# Patient Record
Sex: MALE | Race: WHITE | Employment: FULL TIME | URBAN - NONMETROPOLITAN AREA
[De-identification: names, ages, dates, MRNs, and addresses within clinical notes are randomized per-mention and may not be internally consistent; named-entity substitution may affect disease eponyms.]

---

## 2017-01-04 ENCOUNTER — HOSPITAL ENCOUNTER (OUTPATIENT)
Dept: WOUND CARE | Age: 47
Discharge: HOME | End: 2017-01-04
Attending: INTERNAL MEDICINE | Admitting: INTERNAL MEDICINE

## 2017-01-04 VITALS
SYSTOLIC BLOOD PRESSURE: 161 MMHG | DIASTOLIC BLOOD PRESSURE: 105 MMHG | HEART RATE: 85 BPM | RESPIRATION RATE: 16 BRPM | TEMPERATURE: 97.4 F

## 2017-01-04 DIAGNOSIS — E11.621 DIABETIC ULCER OF FOOT WITH FAT LAYER EXPOSED (HCC): Primary | ICD-10-CM

## 2017-01-04 DIAGNOSIS — L97.512 ULCER OF RIGHT FOOT WITH FAT LAYER EXPOSED (HCC): ICD-10-CM

## 2017-01-04 DIAGNOSIS — L97.502 DIABETIC ULCER OF FOOT WITH FAT LAYER EXPOSED (HCC): Primary | ICD-10-CM

## 2017-01-04 PROCEDURE — 11042 DBRDMT SUBQ TIS 1ST 20SQCM/<: CPT | Performed by: INTERNAL MEDICINE

## 2017-01-09 ENCOUNTER — HOSPITAL ENCOUNTER (OUTPATIENT)
Dept: WOUND CARE | Age: 47
Discharge: HOME | End: 2017-01-09
Attending: PODIATRIST | Admitting: PODIATRIST

## 2017-01-09 VITALS
RESPIRATION RATE: 16 BRPM | SYSTOLIC BLOOD PRESSURE: 108 MMHG | DIASTOLIC BLOOD PRESSURE: 78 MMHG | HEART RATE: 80 BPM | TEMPERATURE: 98.3 F

## 2017-01-09 DIAGNOSIS — E11.621 DIABETIC ULCER OF FOOT WITH FAT LAYER EXPOSED (HCC): ICD-10-CM

## 2017-01-09 DIAGNOSIS — L97.502 DIABETIC ULCER OF FOOT WITH FAT LAYER EXPOSED (HCC): ICD-10-CM

## 2017-01-09 DIAGNOSIS — E11.610 CHARCOT'S JOINT OF FOOT IN TYPE 2 DIABETES MELLITUS (HCC): Primary | ICD-10-CM

## 2017-01-09 DIAGNOSIS — E08.00 DIABETES MELLITUS DUE TO UNDERLYING CONDITION WITH HYPEROSMOLARITY WITHOUT COMA, WITHOUT LONG-TERM CURRENT USE OF INSULIN (HCC): ICD-10-CM

## 2017-01-09 DIAGNOSIS — L97.512 ULCER OF RIGHT FOOT WITH FAT LAYER EXPOSED (HCC): ICD-10-CM

## 2017-01-09 DIAGNOSIS — L84 SKIN CALLUS: ICD-10-CM

## 2017-01-11 ENCOUNTER — HOSPITAL ENCOUNTER (OUTPATIENT)
Dept: LAB | Age: 47
Discharge: HOME | End: 2017-01-11

## 2017-01-11 LAB
ALBUMIN SERPL-MCNC: 3.9 GM/DL (ref 3.4–5)
ALP BLD-CCNC: 76 IU/L (ref 40–129)
ALT SERPL-CCNC: 15 U/L (ref 10–40)
ANION GAP SERPL CALCULATED.3IONS-SCNC: 9 MMOL/L (ref 4–16)
AST SERPL-CCNC: 11 IU/L (ref 15–37)
BASOPHILS ABSOLUTE: 0.1 K/CU MM
BASOPHILS RELATIVE PERCENT: 0.6 % (ref 0–1)
BILIRUB SERPL-MCNC: 0.4 MG/DL (ref 0–1)
BUN BLDV-MCNC: 12 MG/DL (ref 6–23)
CALCIUM SERPL-MCNC: 9.9 MG/DL (ref 8.3–10.6)
CHLORIDE BLD-SCNC: 101 MMOL/L (ref 99–110)
CHOLESTEROL: 165 MG/DL
CO2: 29 MMOL/L (ref 21–32)
CREAT SERPL-MCNC: 0.7 MG/DL (ref 0.9–1.3)
DIFFERENTIAL TYPE: ABNORMAL
EOSINOPHILS ABSOLUTE: 0.3 K/CU MM
EOSINOPHILS RELATIVE PERCENT: 3.3 % (ref 0–3)
ESTIMATED AVERAGE GLUCOSE: 105 MG/DL
GFR AFRICAN AMERICAN: >60 ML/MIN/1.73M2
GFR NON-AFRICAN AMERICAN: >60 ML/MIN/1.73M2
GLUCOSE FASTING: 140 MG/DL (ref 70–99)
HBA1C MFR BLD: 5.3 % (ref 4.2–6.3)
HCT VFR BLD CALC: 36.3 % (ref 42–52)
HDLC SERPL-MCNC: 27 MG/DL
HEMOGLOBIN: 10.9 GM/DL (ref 13.5–18)
IMMATURE NEUTROPHIL %: 0.2 % (ref 0–0.43)
LDL CHOLESTEROL CALCULATED: 116 MG/DL
LYMPHOCYTES ABSOLUTE: 2.2 K/CU MM
LYMPHOCYTES RELATIVE PERCENT: 25.6 % (ref 24–44)
MCH RBC QN AUTO: 24.4 PG (ref 27–31)
MCHC RBC AUTO-ENTMCNC: 30 % (ref 32–36)
MCV RBC AUTO: 81.2 FL (ref 78–100)
MONOCYTES ABSOLUTE: 0.4 K/CU MM
MONOCYTES RELATIVE PERCENT: 5.1 % (ref 0–4)
PDW BLD-RTO: 13.9 % (ref 11.7–14.9)
PLATELET # BLD: 264 K/CU MM (ref 140–440)
PMV BLD AUTO: 10 FL (ref 7.5–11.1)
POTASSIUM SERPL-SCNC: 3.9 MMOL/L (ref 3.5–5.1)
RBC # BLD: 4.47 M/CU MM (ref 4.6–6.2)
SEGMENTED NEUTROPHILS ABSOLUTE COUNT: 5.7 K/CU MM
SEGMENTED NEUTROPHILS RELATIVE PERCENT: 65.2 % (ref 36–66)
SODIUM BLD-SCNC: 139 MMOL/L (ref 135–145)
TOTAL IMMATURE NEUTOROPHIL: 0.02 K/CU MM
TOTAL PROTEIN: 7.5 GM/DL (ref 6.4–8.2)
TRIGL SERPL-MCNC: 112 MG/DL
WBC # BLD: 8.7 K/CU MM (ref 4–10.5)

## 2017-01-16 ENCOUNTER — HOSPITAL ENCOUNTER (OUTPATIENT)
Dept: WOUND CARE | Age: 47
Discharge: HOME | End: 2017-01-16
Attending: PODIATRIST | Admitting: PODIATRIST

## 2017-01-16 VITALS
DIASTOLIC BLOOD PRESSURE: 92 MMHG | SYSTOLIC BLOOD PRESSURE: 157 MMHG | RESPIRATION RATE: 18 BRPM | HEART RATE: 87 BPM | TEMPERATURE: 98 F

## 2017-01-16 DIAGNOSIS — L97.502 DIABETIC ULCER OF FOOT WITH FAT LAYER EXPOSED (HCC): ICD-10-CM

## 2017-01-16 DIAGNOSIS — L84 SKIN CALLUS: ICD-10-CM

## 2017-01-16 DIAGNOSIS — L97.512 ULCER OF RIGHT FOOT WITH FAT LAYER EXPOSED (HCC): ICD-10-CM

## 2017-01-16 DIAGNOSIS — E08.00 DIABETES MELLITUS DUE TO UNDERLYING CONDITION WITH HYPEROSMOLARITY WITHOUT COMA, WITHOUT LONG-TERM CURRENT USE OF INSULIN (HCC): ICD-10-CM

## 2017-01-16 DIAGNOSIS — E11.610 CHARCOT'S JOINT OF FOOT IN TYPE 2 DIABETES MELLITUS (HCC): Primary | ICD-10-CM

## 2017-01-16 DIAGNOSIS — E11.621 DIABETIC ULCER OF FOOT WITH FAT LAYER EXPOSED (HCC): ICD-10-CM

## 2017-01-23 ENCOUNTER — HOSPITAL ENCOUNTER (OUTPATIENT)
Dept: WOUND CARE | Age: 47
Discharge: HOME | End: 2017-01-23
Attending: PODIATRIST | Admitting: PODIATRIST

## 2017-01-23 VITALS
SYSTOLIC BLOOD PRESSURE: 134 MMHG | DIASTOLIC BLOOD PRESSURE: 66 MMHG | RESPIRATION RATE: 16 BRPM | HEART RATE: 78 BPM | TEMPERATURE: 97.2 F

## 2017-01-23 DIAGNOSIS — E08.00 DIABETES MELLITUS DUE TO UNDERLYING CONDITION WITH HYPEROSMOLARITY WITHOUT COMA, WITHOUT LONG-TERM CURRENT USE OF INSULIN (HCC): Primary | ICD-10-CM

## 2017-01-23 DIAGNOSIS — E11.610 CHARCOT'S JOINT OF FOOT IN TYPE 2 DIABETES MELLITUS (HCC): ICD-10-CM

## 2017-01-23 DIAGNOSIS — E11.621 DIABETIC ULCER OF FOOT WITH FAT LAYER EXPOSED (HCC): ICD-10-CM

## 2017-01-23 DIAGNOSIS — L97.512 ULCER OF RIGHT FOOT WITH FAT LAYER EXPOSED (HCC): ICD-10-CM

## 2017-01-23 DIAGNOSIS — L97.502 DIABETIC ULCER OF FOOT WITH FAT LAYER EXPOSED (HCC): ICD-10-CM

## 2017-01-23 RX ORDER — BUSPIRONE HYDROCHLORIDE 7.5 MG/1
7.5 TABLET ORAL 2 TIMES DAILY
COMMUNITY
End: 2018-04-12 | Stop reason: SDUPTHER

## 2017-01-23 RX ORDER — ESCITALOPRAM OXALATE 10 MG/1
20 TABLET ORAL DAILY
COMMUNITY
End: 2018-04-12 | Stop reason: SDUPTHER

## 2017-01-30 ENCOUNTER — HOSPITAL ENCOUNTER (OUTPATIENT)
Dept: WOUND CARE | Age: 47
Discharge: HOME | End: 2017-01-30
Attending: PODIATRIST | Admitting: PODIATRIST

## 2017-01-30 VITALS
RESPIRATION RATE: 18 BRPM | DIASTOLIC BLOOD PRESSURE: 84 MMHG | HEART RATE: 94 BPM | SYSTOLIC BLOOD PRESSURE: 152 MMHG | TEMPERATURE: 98 F

## 2017-01-30 DIAGNOSIS — L97.502 DIABETIC ULCER OF FOOT WITH FAT LAYER EXPOSED (HCC): ICD-10-CM

## 2017-01-30 DIAGNOSIS — L97.512 ULCER OF RIGHT FOOT WITH FAT LAYER EXPOSED (HCC): ICD-10-CM

## 2017-01-30 DIAGNOSIS — E11.621 DIABETIC ULCER OF FOOT WITH FAT LAYER EXPOSED (HCC): ICD-10-CM

## 2017-01-30 DIAGNOSIS — E11.610 CHARCOT'S JOINT OF FOOT IN TYPE 2 DIABETES MELLITUS (HCC): Primary | ICD-10-CM

## 2017-01-30 DIAGNOSIS — E08.00 DIABETES MELLITUS DUE TO UNDERLYING CONDITION WITH HYPEROSMOLARITY WITHOUT COMA, WITHOUT LONG-TERM CURRENT USE OF INSULIN (HCC): ICD-10-CM

## 2017-02-06 ENCOUNTER — HOSPITAL ENCOUNTER (OUTPATIENT)
Dept: WOUND CARE | Age: 47
Discharge: HOME | End: 2017-02-06
Attending: PODIATRIST | Admitting: PODIATRIST

## 2017-02-06 VITALS
SYSTOLIC BLOOD PRESSURE: 134 MMHG | RESPIRATION RATE: 16 BRPM | DIASTOLIC BLOOD PRESSURE: 88 MMHG | TEMPERATURE: 98.9 F | HEART RATE: 91 BPM

## 2017-02-06 DIAGNOSIS — E11.621 DIABETIC ULCER OF FOOT WITH FAT LAYER EXPOSED (HCC): ICD-10-CM

## 2017-02-06 DIAGNOSIS — E08.00 DIABETES MELLITUS DUE TO UNDERLYING CONDITION WITH HYPEROSMOLARITY WITHOUT COMA, WITHOUT LONG-TERM CURRENT USE OF INSULIN (HCC): ICD-10-CM

## 2017-02-06 DIAGNOSIS — L97.502 DIABETIC ULCER OF FOOT WITH FAT LAYER EXPOSED (HCC): ICD-10-CM

## 2017-02-06 DIAGNOSIS — L97.512 ULCER OF RIGHT FOOT WITH FAT LAYER EXPOSED (HCC): ICD-10-CM

## 2017-02-06 DIAGNOSIS — L84 SKIN CALLUS: ICD-10-CM

## 2017-02-06 DIAGNOSIS — E11.610 CHARCOT'S JOINT OF FOOT IN TYPE 2 DIABETES MELLITUS (HCC): Primary | ICD-10-CM

## 2017-02-06 RX ORDER — CEPHALEXIN 500 MG/1
500 CAPSULE ORAL 3 TIMES DAILY
Qty: 21 CAPSULE | Refills: 0 | Status: SHIPPED | OUTPATIENT
Start: 2017-02-06 | End: 2017-03-27

## 2017-02-10 LAB
CULTURE: NORMAL
ORGANISM: NORMAL
ORGANISM: NORMAL
REPORT STATUS: NORMAL
REQUEST PROBLEM: NORMAL
SPECIMEN: NORMAL

## 2017-02-13 ENCOUNTER — HOSPITAL ENCOUNTER (OUTPATIENT)
Dept: WOUND CARE | Age: 47
Discharge: HOME | End: 2017-02-13
Attending: PODIATRIST | Admitting: PODIATRIST

## 2017-02-13 VITALS
DIASTOLIC BLOOD PRESSURE: 95 MMHG | HEART RATE: 89 BPM | TEMPERATURE: 98.4 F | RESPIRATION RATE: 16 BRPM | SYSTOLIC BLOOD PRESSURE: 152 MMHG

## 2017-02-13 DIAGNOSIS — L97.512 ULCER OF RIGHT FOOT WITH FAT LAYER EXPOSED (HCC): ICD-10-CM

## 2017-02-13 DIAGNOSIS — E11.610 CHARCOT'S JOINT OF FOOT IN TYPE 2 DIABETES MELLITUS (HCC): Primary | ICD-10-CM

## 2017-02-13 DIAGNOSIS — L97.502 DIABETIC ULCER OF FOOT WITH FAT LAYER EXPOSED (HCC): ICD-10-CM

## 2017-02-13 DIAGNOSIS — E08.00 DIABETES MELLITUS DUE TO UNDERLYING CONDITION WITH HYPEROSMOLARITY WITHOUT COMA, WITHOUT LONG-TERM CURRENT USE OF INSULIN (HCC): ICD-10-CM

## 2017-02-13 DIAGNOSIS — E11.621 DIABETIC ULCER OF FOOT WITH FAT LAYER EXPOSED (HCC): ICD-10-CM

## 2017-02-20 ENCOUNTER — HOSPITAL ENCOUNTER (OUTPATIENT)
Dept: WOUND CARE | Age: 47
Discharge: HOME | End: 2017-02-20
Attending: PODIATRIST | Admitting: PODIATRIST

## 2017-02-20 VITALS
HEART RATE: 85 BPM | TEMPERATURE: 98.7 F | RESPIRATION RATE: 18 BRPM | SYSTOLIC BLOOD PRESSURE: 137 MMHG | DIASTOLIC BLOOD PRESSURE: 87 MMHG

## 2017-02-20 DIAGNOSIS — E11.621 DIABETIC ULCER OF FOOT WITH FAT LAYER EXPOSED (HCC): ICD-10-CM

## 2017-02-20 DIAGNOSIS — L97.512 ULCER OF RIGHT FOOT WITH FAT LAYER EXPOSED (HCC): ICD-10-CM

## 2017-02-20 DIAGNOSIS — E08.00 DIABETES MELLITUS DUE TO UNDERLYING CONDITION WITH HYPEROSMOLARITY WITHOUT COMA, WITHOUT LONG-TERM CURRENT USE OF INSULIN (HCC): ICD-10-CM

## 2017-02-20 DIAGNOSIS — L97.502 DIABETIC ULCER OF FOOT WITH FAT LAYER EXPOSED (HCC): ICD-10-CM

## 2017-02-20 DIAGNOSIS — E11.610 CHARCOT'S JOINT OF FOOT IN TYPE 2 DIABETES MELLITUS (HCC): Primary | ICD-10-CM

## 2017-02-20 DIAGNOSIS — L84 SKIN CALLUS: ICD-10-CM

## 2017-02-27 ENCOUNTER — HOSPITAL ENCOUNTER (OUTPATIENT)
Dept: WOUND CARE | Age: 47
Discharge: HOME | End: 2017-02-27
Attending: ORTHOPAEDIC SURGERY | Admitting: ORTHOPAEDIC SURGERY

## 2017-02-27 VITALS
DIASTOLIC BLOOD PRESSURE: 104 MMHG | TEMPERATURE: 97.6 F | RESPIRATION RATE: 16 BRPM | SYSTOLIC BLOOD PRESSURE: 163 MMHG | HEART RATE: 76 BPM

## 2017-02-27 DIAGNOSIS — L97.502 DIABETIC ULCER OF FOOT WITH FAT LAYER EXPOSED (HCC): ICD-10-CM

## 2017-02-27 DIAGNOSIS — E11.621 DIABETIC ULCER OF FOOT WITH FAT LAYER EXPOSED (HCC): ICD-10-CM

## 2017-02-27 DIAGNOSIS — L97.521 ISCHEMIC ULCER OF LEFT FOOT, LIMITED TO BREAKDOWN OF SKIN (HCC): ICD-10-CM

## 2017-02-27 DIAGNOSIS — L97.522 CHRONIC ULCER OF LEFT FOOT WITH FAT LAYER EXPOSED (HCC): Primary | ICD-10-CM

## 2017-03-13 ENCOUNTER — HOSPITAL ENCOUNTER (OUTPATIENT)
Dept: WOUND CARE | Age: 47
Discharge: HOME | End: 2017-03-13
Attending: PODIATRIST | Admitting: PODIATRIST

## 2017-03-13 VITALS — RESPIRATION RATE: 16 BRPM | TEMPERATURE: 96.4 F

## 2017-03-13 DIAGNOSIS — L97.512 ULCER OF RIGHT FOOT WITH FAT LAYER EXPOSED (HCC): ICD-10-CM

## 2017-03-13 DIAGNOSIS — L97.502 DIABETIC ULCER OF FOOT WITH FAT LAYER EXPOSED (HCC): ICD-10-CM

## 2017-03-13 DIAGNOSIS — E11.621 DIABETIC ULCER OF FOOT WITH FAT LAYER EXPOSED (HCC): ICD-10-CM

## 2017-03-13 DIAGNOSIS — E08.00 DIABETES MELLITUS DUE TO UNDERLYING CONDITION WITH HYPEROSMOLARITY WITHOUT COMA, WITHOUT LONG-TERM CURRENT USE OF INSULIN (HCC): ICD-10-CM

## 2017-03-13 DIAGNOSIS — E11.610 CHARCOT'S JOINT OF FOOT IN TYPE 2 DIABETES MELLITUS (HCC): Primary | ICD-10-CM

## 2017-03-20 ENCOUNTER — HOSPITAL ENCOUNTER (OUTPATIENT)
Dept: WOUND CARE | Age: 47
Discharge: HOME | End: 2017-03-20
Attending: PODIATRIST | Admitting: PODIATRIST

## 2017-03-20 VITALS — TEMPERATURE: 97.4 F | RESPIRATION RATE: 16 BRPM

## 2017-03-20 DIAGNOSIS — E11.610 CHARCOT'S JOINT OF FOOT IN TYPE 2 DIABETES MELLITUS (HCC): Primary | ICD-10-CM

## 2017-03-20 DIAGNOSIS — E11.621 DIABETIC ULCER OF FOOT WITH FAT LAYER EXPOSED (HCC): ICD-10-CM

## 2017-03-20 DIAGNOSIS — L97.512 ULCER OF RIGHT FOOT WITH FAT LAYER EXPOSED (HCC): ICD-10-CM

## 2017-03-20 DIAGNOSIS — E08.00 DIABETES MELLITUS DUE TO UNDERLYING CONDITION WITH HYPEROSMOLARITY WITHOUT COMA, WITHOUT LONG-TERM CURRENT USE OF INSULIN (HCC): ICD-10-CM

## 2017-03-20 DIAGNOSIS — L97.502 DIABETIC ULCER OF FOOT WITH FAT LAYER EXPOSED (HCC): ICD-10-CM

## 2017-03-20 RX ORDER — DOXYCYCLINE HYCLATE 100 MG
100 TABLET ORAL 2 TIMES DAILY
Qty: 20 TABLET | Refills: 0 | Status: SHIPPED | OUTPATIENT
Start: 2017-03-20 | End: 2017-03-27

## 2017-03-27 ENCOUNTER — HOSPITAL ENCOUNTER (OUTPATIENT)
Dept: WOUND CARE | Age: 47
Discharge: HOME | End: 2017-03-27
Attending: PODIATRIST | Admitting: PODIATRIST

## 2017-03-27 VITALS — RESPIRATION RATE: 16 BRPM | TEMPERATURE: 97.8 F

## 2017-03-27 DIAGNOSIS — L97.512 ULCER OF RIGHT FOOT WITH FAT LAYER EXPOSED (HCC): Primary | ICD-10-CM

## 2017-03-27 DIAGNOSIS — L97.502 DIABETIC ULCER OF FOOT WITH FAT LAYER EXPOSED (HCC): ICD-10-CM

## 2017-03-27 DIAGNOSIS — E11.621 DIABETIC ULCER OF FOOT WITH FAT LAYER EXPOSED (HCC): ICD-10-CM

## 2017-03-27 DIAGNOSIS — E11.610 CHARCOT'S JOINT OF FOOT IN TYPE 2 DIABETES MELLITUS (HCC): ICD-10-CM

## 2017-03-27 DIAGNOSIS — E08.00 DIABETES MELLITUS DUE TO UNDERLYING CONDITION WITH HYPEROSMOLARITY WITHOUT COMA, WITHOUT LONG-TERM CURRENT USE OF INSULIN (HCC): ICD-10-CM

## 2017-03-27 RX ORDER — CIPROFLOXACIN 500 MG/1
500 TABLET, FILM COATED ORAL 2 TIMES DAILY
Qty: 20 TABLET | Refills: 0 | Status: SHIPPED | OUTPATIENT
Start: 2017-03-27 | End: 2017-04-06

## 2017-03-27 RX ORDER — DICLOXACILLIN SODIUM 250 MG/1
250 CAPSULE ORAL 4 TIMES DAILY
Qty: 40 CAPSULE | Refills: 0 | Status: SHIPPED | OUTPATIENT
Start: 2017-03-27 | End: 2017-04-06

## 2017-04-03 ENCOUNTER — HOSPITAL ENCOUNTER (OUTPATIENT)
Dept: WOUND CARE | Age: 47
Discharge: HOME | End: 2017-04-03
Attending: PODIATRIST | Admitting: PODIATRIST

## 2017-04-03 VITALS — TEMPERATURE: 98.6 F | RESPIRATION RATE: 16 BRPM

## 2017-04-03 DIAGNOSIS — L97.512 ULCER OF RIGHT FOOT WITH FAT LAYER EXPOSED (HCC): ICD-10-CM

## 2017-04-03 DIAGNOSIS — E08.00 DIABETES MELLITUS DUE TO UNDERLYING CONDITION WITH HYPEROSMOLARITY WITHOUT COMA, WITHOUT LONG-TERM CURRENT USE OF INSULIN (HCC): ICD-10-CM

## 2017-04-03 DIAGNOSIS — E11.610 CHARCOT'S JOINT OF FOOT IN TYPE 2 DIABETES MELLITUS (HCC): Primary | ICD-10-CM

## 2017-04-10 ENCOUNTER — HOSPITAL ENCOUNTER (OUTPATIENT)
Dept: WOUND CARE | Age: 47
Discharge: HOME | End: 2017-04-10
Attending: PODIATRIST | Admitting: PODIATRIST

## 2017-04-10 ENCOUNTER — HOSPITAL ENCOUNTER (OUTPATIENT)
Dept: GENERAL RADIOLOGY | Age: 47
Discharge: HOME | End: 2017-04-10
Attending: PODIATRIST | Admitting: PODIATRIST

## 2017-04-10 VITALS — TEMPERATURE: 97.4 F

## 2017-04-10 DIAGNOSIS — M86.9 DIABETIC FOOT ULCER WITH OSTEOMYELITIS (HCC): ICD-10-CM

## 2017-04-10 DIAGNOSIS — E11.621 DIABETIC FOOT ULCER WITH OSTEOMYELITIS (HCC): ICD-10-CM

## 2017-04-10 DIAGNOSIS — E11.610 CHARCOT'S JOINT OF FOOT IN TYPE 2 DIABETES MELLITUS (HCC): Primary | ICD-10-CM

## 2017-04-10 DIAGNOSIS — E11.610 DIABETIC NEUROGENIC ARTHROPATHY (HCC): ICD-10-CM

## 2017-04-10 DIAGNOSIS — L97.512 ULCER OF RIGHT FOOT WITH FAT LAYER EXPOSED (HCC): ICD-10-CM

## 2017-04-10 DIAGNOSIS — L97.502 DIABETIC ULCER OF FOOT WITH FAT LAYER EXPOSED (HCC): ICD-10-CM

## 2017-04-10 DIAGNOSIS — E11.621 DIABETIC ULCER OF FOOT WITH FAT LAYER EXPOSED (HCC): ICD-10-CM

## 2017-04-10 DIAGNOSIS — E11.69 DIABETIC FOOT ULCER WITH OSTEOMYELITIS (HCC): ICD-10-CM

## 2017-04-10 DIAGNOSIS — L97.509 DIABETIC FOOT ULCER WITH OSTEOMYELITIS (HCC): ICD-10-CM

## 2017-04-10 DIAGNOSIS — E08.00 DIABETES MELLITUS DUE TO UNDERLYING CONDITION WITH HYPEROSMOLARITY WITHOUT COMA, WITHOUT LONG-TERM CURRENT USE OF INSULIN (HCC): ICD-10-CM

## 2017-04-24 ENCOUNTER — HOSPITAL ENCOUNTER (OUTPATIENT)
Dept: WOUND CARE | Age: 47
Discharge: HOME | End: 2017-04-24
Attending: PODIATRIST | Admitting: PODIATRIST

## 2017-04-24 VITALS — RESPIRATION RATE: 16 BRPM | TEMPERATURE: 97.4 F

## 2017-04-24 DIAGNOSIS — E11.621 DIABETIC ULCER OF FOOT WITH FAT LAYER EXPOSED (HCC): ICD-10-CM

## 2017-04-24 DIAGNOSIS — L97.502 DIABETIC ULCER OF FOOT WITH FAT LAYER EXPOSED (HCC): ICD-10-CM

## 2017-04-24 DIAGNOSIS — L97.512 ULCER OF RIGHT FOOT WITH FAT LAYER EXPOSED (HCC): Primary | ICD-10-CM

## 2017-04-24 DIAGNOSIS — E11.610 CHARCOT'S JOINT OF FOOT IN TYPE 2 DIABETES MELLITUS (HCC): ICD-10-CM

## 2017-04-24 DIAGNOSIS — E11.610 TYPE 2 DIABETES MELLITUS WITH DIABETIC NEUROPATHIC ARTHROPATHY, WITHOUT LONG-TERM CURRENT USE OF INSULIN (HCC): ICD-10-CM

## 2017-05-01 ENCOUNTER — HOSPITAL ENCOUNTER (OUTPATIENT)
Dept: WOUND CARE | Age: 47
Discharge: HOME | End: 2017-05-01
Attending: PODIATRIST | Admitting: PODIATRIST

## 2017-05-01 ENCOUNTER — HOSPITAL ENCOUNTER (OUTPATIENT)
Dept: CARDIAC REHAB | Age: 47
Discharge: HOME | End: 2017-05-01
Attending: PODIATRIST | Admitting: PODIATRIST

## 2017-05-01 VITALS
HEART RATE: 87 BPM | RESPIRATION RATE: 16 BRPM | TEMPERATURE: 97.4 F | SYSTOLIC BLOOD PRESSURE: 138 MMHG | DIASTOLIC BLOOD PRESSURE: 85 MMHG

## 2017-05-01 DIAGNOSIS — E11.610 CHARCOT'S JOINT OF FOOT DUE TO DIABETES (HCC): ICD-10-CM

## 2017-05-01 DIAGNOSIS — L97.502 DIABETIC ULCER OF FOOT WITH FAT LAYER EXPOSED (HCC): ICD-10-CM

## 2017-05-01 DIAGNOSIS — L97.512 ULCER OF RIGHT FOOT WITH FAT LAYER EXPOSED (HCC): ICD-10-CM

## 2017-05-01 DIAGNOSIS — E11.621 DIABETIC ULCER OF FOOT WITH FAT LAYER EXPOSED (HCC): ICD-10-CM

## 2017-05-01 DIAGNOSIS — E11.610 TYPE 2 DIABETES MELLITUS WITH DIABETIC NEUROPATHIC ARTHROPATHY, WITHOUT LONG-TERM CURRENT USE OF INSULIN (HCC): ICD-10-CM

## 2017-05-01 DIAGNOSIS — Z01.818 PREOP EXAMINATION: ICD-10-CM

## 2017-05-01 DIAGNOSIS — E11.610 CHARCOT'S JOINT OF FOOT IN TYPE 2 DIABETES MELLITUS (HCC): Primary | ICD-10-CM

## 2017-05-01 DIAGNOSIS — E11.610 TYPE 2 DIABETES MELLITUS WITH CHARCOT'S JOINT ARTHROPATHY (HCC): ICD-10-CM

## 2017-05-01 LAB
BUN / CREAT RATIO: 20 (CALC) (ref 7–25)
BUN BLDV-MCNC: 14 MG/DL (ref 3–29)
CALCIUM SERPL-MCNC: 9.8 MG/DL (ref 8.5–10.5)
CHLORIDE BLD-SCNC: 95 MEQ/L (ref 96–110)
CO2: 29 MEQ/L (ref 19–32)
CREAT SERPL-MCNC: 0.7 MG/DL
GFR SERPL CREATININE-BSD FRML MDRD: 113 ML/MIN/1.73M2
GLUCOSE BLD-MCNC: 126 MG/DL
POTASSIUM SERPL-SCNC: 4.3 MEQ/L (ref 3.4–5.3)
SODIUM BLD-SCNC: 138 MEQ/L (ref 135–148)

## 2017-05-01 PROCEDURE — 99214 OFFICE O/P EST MOD 30 MIN: CPT | Performed by: INTERNAL MEDICINE

## 2017-05-01 RX ORDER — AMLODIPINE BESYLATE 2.5 MG/1
5 TABLET ORAL DAILY
COMMUNITY
End: 2018-04-12 | Stop reason: SDUPTHER

## 2017-05-02 LAB
EKG ATRIAL RATE: 86 BPM
EKG DIAGNOSIS: NORMAL
EKG P AXIS: 12 DEGREES
EKG P-R INTERVAL: 196 MS
EKG Q-T INTERVAL: 386 MS
EKG QRS DURATION: 110 MS
EKG QTC CALCULATION (BAZETT): 461 MS
EKG R AXIS: 6 DEGREES
EKG T AXIS: 9 DEGREES
EKG VENTRICULAR RATE: 86 BPM

## 2017-05-03 PROBLEM — I10 HTN (HYPERTENSION): Status: ACTIVE | Noted: 2017-05-03

## 2017-05-08 ENCOUNTER — HOSPITAL ENCOUNTER (OUTPATIENT)
Dept: WOUND CARE | Age: 47
Discharge: HOME | End: 2017-05-08
Attending: PODIATRIST | Admitting: PODIATRIST

## 2017-05-08 VITALS
TEMPERATURE: 98 F | SYSTOLIC BLOOD PRESSURE: 143 MMHG | HEART RATE: 86 BPM | RESPIRATION RATE: 18 BRPM | DIASTOLIC BLOOD PRESSURE: 82 MMHG

## 2017-05-08 DIAGNOSIS — E11.621 DIABETIC ULCER OF FOOT WITH FAT LAYER EXPOSED (HCC): ICD-10-CM

## 2017-05-08 DIAGNOSIS — E08.29 DIABETES MELLITUS DUE TO UNDERLYING CONDITION WITH OTHER DIABETIC KIDNEY COMPLICATION, WITHOUT LONG-TERM CURRENT USE OF INSULIN (HCC): ICD-10-CM

## 2017-05-08 DIAGNOSIS — E11.610 CHARCOT'S JOINT OF FOOT IN TYPE 2 DIABETES MELLITUS (HCC): Primary | ICD-10-CM

## 2017-05-08 DIAGNOSIS — L97.502 DIABETIC ULCER OF FOOT WITH FAT LAYER EXPOSED (HCC): ICD-10-CM

## 2017-05-08 DIAGNOSIS — L97.512 ULCER OF RIGHT FOOT WITH FAT LAYER EXPOSED (HCC): ICD-10-CM

## 2017-05-15 ENCOUNTER — HOSPITAL ENCOUNTER (OUTPATIENT)
Dept: WOUND CARE | Age: 47
Discharge: HOME | End: 2017-05-15
Attending: PODIATRIST | Admitting: PODIATRIST

## 2017-05-15 VITALS — TEMPERATURE: 97.6 F

## 2017-05-15 DIAGNOSIS — E11.621 DIABETIC ULCER OF FOOT WITH FAT LAYER EXPOSED (HCC): ICD-10-CM

## 2017-05-15 DIAGNOSIS — L97.502 DIABETIC ULCER OF FOOT WITH FAT LAYER EXPOSED (HCC): ICD-10-CM

## 2017-05-15 DIAGNOSIS — E11.610 CHARCOT'S JOINT OF FOOT IN TYPE 2 DIABETES MELLITUS (HCC): Primary | ICD-10-CM

## 2017-05-15 DIAGNOSIS — L97.512 ULCER OF RIGHT FOOT WITH FAT LAYER EXPOSED (HCC): ICD-10-CM

## 2017-05-15 DIAGNOSIS — E08.29 DIABETES MELLITUS DUE TO UNDERLYING CONDITION WITH OTHER DIABETIC KIDNEY COMPLICATION, WITHOUT LONG-TERM CURRENT USE OF INSULIN (HCC): ICD-10-CM

## 2017-05-22 ENCOUNTER — HOSPITAL ENCOUNTER (OUTPATIENT)
Dept: WOUND CARE | Age: 47
Discharge: HOME | End: 2017-05-22
Attending: PODIATRIST | Admitting: PODIATRIST

## 2017-05-22 VITALS
SYSTOLIC BLOOD PRESSURE: 121 MMHG | HEART RATE: 83 BPM | TEMPERATURE: 97.8 F | DIASTOLIC BLOOD PRESSURE: 79 MMHG | RESPIRATION RATE: 16 BRPM

## 2017-05-22 DIAGNOSIS — L97.502 DIABETIC ULCER OF FOOT WITH FAT LAYER EXPOSED (HCC): ICD-10-CM

## 2017-05-22 DIAGNOSIS — E11.610 CHARCOT'S JOINT OF FOOT IN TYPE 2 DIABETES MELLITUS (HCC): Primary | ICD-10-CM

## 2017-05-22 DIAGNOSIS — L97.512 ULCER OF RIGHT FOOT WITH FAT LAYER EXPOSED (HCC): ICD-10-CM

## 2017-05-22 DIAGNOSIS — E11.621 DIABETIC ULCER OF FOOT WITH FAT LAYER EXPOSED (HCC): ICD-10-CM

## 2017-06-05 ENCOUNTER — HOSPITAL ENCOUNTER (OUTPATIENT)
Dept: WOUND CARE | Age: 47
Discharge: HOME | End: 2017-06-05
Attending: PODIATRIST | Admitting: PODIATRIST

## 2017-06-05 VITALS
DIASTOLIC BLOOD PRESSURE: 76 MMHG | SYSTOLIC BLOOD PRESSURE: 113 MMHG | TEMPERATURE: 97.4 F | HEART RATE: 84 BPM | RESPIRATION RATE: 16 BRPM

## 2017-06-05 DIAGNOSIS — L97.512 ULCER OF RIGHT FOOT WITH FAT LAYER EXPOSED (HCC): ICD-10-CM

## 2017-06-05 DIAGNOSIS — E11.621 DIABETIC ULCER OF FOOT WITH FAT LAYER EXPOSED (HCC): Primary | ICD-10-CM

## 2017-06-05 DIAGNOSIS — E08.29 DIABETES MELLITUS DUE TO UNDERLYING CONDITION WITH OTHER DIABETIC KIDNEY COMPLICATION, WITHOUT LONG-TERM CURRENT USE OF INSULIN (HCC): ICD-10-CM

## 2017-06-05 DIAGNOSIS — E11.610 CHARCOT'S JOINT OF FOOT IN TYPE 2 DIABETES MELLITUS (HCC): ICD-10-CM

## 2017-06-05 DIAGNOSIS — L97.502 DIABETIC ULCER OF FOOT WITH FAT LAYER EXPOSED (HCC): Primary | ICD-10-CM

## 2017-06-05 RX ORDER — CLINDAMYCIN HYDROCHLORIDE 300 MG/1
300 CAPSULE ORAL 3 TIMES DAILY
COMMUNITY
Start: 2017-06-02 | End: 2017-06-11

## 2017-06-12 ENCOUNTER — HOSPITAL ENCOUNTER (OUTPATIENT)
Dept: WOUND CARE | Age: 47
Discharge: HOME | End: 2017-06-12
Attending: PODIATRIST | Admitting: PODIATRIST

## 2017-06-12 VITALS
DIASTOLIC BLOOD PRESSURE: 77 MMHG | TEMPERATURE: 98.1 F | SYSTOLIC BLOOD PRESSURE: 154 MMHG | HEART RATE: 73 BPM | RESPIRATION RATE: 15 BRPM

## 2017-06-12 DIAGNOSIS — L97.502 DIABETIC ULCER OF FOOT WITH FAT LAYER EXPOSED (HCC): ICD-10-CM

## 2017-06-12 DIAGNOSIS — E11.621 DIABETIC ULCER OF FOOT WITH FAT LAYER EXPOSED (HCC): ICD-10-CM

## 2017-06-12 DIAGNOSIS — E11.610 CHARCOT'S JOINT OF FOOT IN TYPE 2 DIABETES MELLITUS (HCC): Primary | ICD-10-CM

## 2017-06-12 DIAGNOSIS — L97.512 ULCER OF RIGHT FOOT WITH FAT LAYER EXPOSED (HCC): ICD-10-CM

## 2017-06-19 ENCOUNTER — HOSPITAL ENCOUNTER (OUTPATIENT)
Dept: WOUND CARE | Age: 47
Discharge: HOME | End: 2017-06-19
Attending: PODIATRIST | Admitting: PODIATRIST

## 2017-06-19 VITALS
HEART RATE: 79 BPM | SYSTOLIC BLOOD PRESSURE: 159 MMHG | RESPIRATION RATE: 16 BRPM | DIASTOLIC BLOOD PRESSURE: 109 MMHG | TEMPERATURE: 97.6 F

## 2017-06-19 DIAGNOSIS — E11.621 DIABETIC ULCER OF FOOT WITH FAT LAYER EXPOSED (HCC): ICD-10-CM

## 2017-06-19 DIAGNOSIS — E11.610 CHARCOT'S JOINT OF FOOT IN TYPE 2 DIABETES MELLITUS (HCC): Primary | ICD-10-CM

## 2017-06-19 DIAGNOSIS — E08.29 DIABETES MELLITUS DUE TO UNDERLYING CONDITION WITH OTHER DIABETIC KIDNEY COMPLICATION, WITHOUT LONG-TERM CURRENT USE OF INSULIN (HCC): ICD-10-CM

## 2017-06-19 DIAGNOSIS — L97.502 DIABETIC ULCER OF FOOT WITH FAT LAYER EXPOSED (HCC): ICD-10-CM

## 2017-06-19 DIAGNOSIS — L97.512 ULCER OF RIGHT FOOT WITH FAT LAYER EXPOSED (HCC): ICD-10-CM

## 2017-06-26 ENCOUNTER — HOSPITAL ENCOUNTER (OUTPATIENT)
Dept: WOUND CARE | Age: 47
Discharge: HOME | End: 2017-06-26
Attending: PODIATRIST | Admitting: PODIATRIST

## 2017-06-26 VITALS — TEMPERATURE: 97.1 F

## 2017-06-26 DIAGNOSIS — L97.512 ULCER OF RIGHT FOOT WITH FAT LAYER EXPOSED (HCC): ICD-10-CM

## 2017-06-26 DIAGNOSIS — E11.621 DIABETIC ULCER OF FOOT WITH FAT LAYER EXPOSED (HCC): ICD-10-CM

## 2017-06-26 DIAGNOSIS — E11.610 CHARCOT'S JOINT OF FOOT IN TYPE 2 DIABETES MELLITUS (HCC): Primary | ICD-10-CM

## 2017-06-26 DIAGNOSIS — L97.502 DIABETIC ULCER OF FOOT WITH FAT LAYER EXPOSED (HCC): ICD-10-CM

## 2017-07-03 ENCOUNTER — HOSPITAL ENCOUNTER (OUTPATIENT)
Dept: WOUND CARE | Age: 47
Discharge: HOME | End: 2017-07-03
Attending: PODIATRIST | Admitting: INTERNAL MEDICINE

## 2017-07-03 VITALS — TEMPERATURE: 97.2 F | RESPIRATION RATE: 16 BRPM

## 2017-07-03 DIAGNOSIS — L97.513 ULCER OF RIGHT FOOT WITH NECROSIS OF MUSCLE (HCC): ICD-10-CM

## 2017-07-03 DIAGNOSIS — E11.610 CHARCOT'S JOINT OF FOOT IN TYPE 2 DIABETES MELLITUS (HCC): Primary | ICD-10-CM

## 2017-07-03 DIAGNOSIS — L97.512 ULCER OF RIGHT FOOT WITH FAT LAYER EXPOSED (HCC): ICD-10-CM

## 2017-07-10 ENCOUNTER — HOSPITAL ENCOUNTER (OUTPATIENT)
Dept: WOUND CARE | Age: 47
Discharge: HOME | End: 2017-07-10
Attending: PODIATRIST | Admitting: PODIATRIST

## 2017-07-10 VITALS — RESPIRATION RATE: 18 BRPM | TEMPERATURE: 97.4 F

## 2017-07-10 DIAGNOSIS — E08.29 DIABETES MELLITUS DUE TO UNDERLYING CONDITION WITH OTHER DIABETIC KIDNEY COMPLICATION, WITHOUT LONG-TERM CURRENT USE OF INSULIN (HCC): ICD-10-CM

## 2017-07-10 DIAGNOSIS — E11.610 CHARCOT'S JOINT OF FOOT IN TYPE 2 DIABETES MELLITUS (HCC): Primary | ICD-10-CM

## 2017-07-10 DIAGNOSIS — L97.512 ULCER OF RIGHT FOOT WITH FAT LAYER EXPOSED (HCC): ICD-10-CM

## 2017-07-10 DIAGNOSIS — L97.513 ULCER OF RIGHT FOOT WITH NECROSIS OF MUSCLE (HCC): ICD-10-CM

## 2017-07-17 ENCOUNTER — HOSPITAL ENCOUNTER (OUTPATIENT)
Dept: WOUND CARE | Age: 47
Discharge: HOME | End: 2017-07-17
Attending: PODIATRIST | Admitting: INTERNAL MEDICINE

## 2017-07-17 ENCOUNTER — HOSPITAL ENCOUNTER (OUTPATIENT)
Dept: WOUND CARE | Age: 47
Discharge: HOME | End: 2017-07-31
Attending: INTERNAL MEDICINE | Admitting: INTERNAL MEDICINE

## 2017-07-17 DIAGNOSIS — L97.513 DIABETIC ULCER OF TOE OF RIGHT FOOT ASSOCIATED WITH TYPE 2 DIABETES MELLITUS, WITH NECROSIS OF MUSCLE (HCC): ICD-10-CM

## 2017-07-17 DIAGNOSIS — E11.621 DIABETIC ULCER OF TOE OF RIGHT FOOT ASSOCIATED WITH TYPE 2 DIABETES MELLITUS, WITH NECROSIS OF MUSCLE (HCC): ICD-10-CM

## 2017-07-17 DIAGNOSIS — L97.512 ULCER OF RIGHT FOOT WITH FAT LAYER EXPOSED (HCC): Primary | ICD-10-CM

## 2017-07-17 PROCEDURE — 11042 DBRDMT SUBQ TIS 1ST 20SQCM/<: CPT | Performed by: INTERNAL MEDICINE

## 2017-07-18 ENCOUNTER — HOSPITAL ENCOUNTER (OUTPATIENT)
Dept: WOUND CARE | Age: 47
Discharge: HOME | End: 2017-07-18
Attending: INTERNAL MEDICINE | Admitting: INTERNAL MEDICINE

## 2017-07-18 VITALS
SYSTOLIC BLOOD PRESSURE: 154 MMHG | HEART RATE: 76 BPM | RESPIRATION RATE: 16 BRPM | TEMPERATURE: 98 F | DIASTOLIC BLOOD PRESSURE: 98 MMHG

## 2017-07-18 DIAGNOSIS — E11.621 DIABETIC ULCER OF TOE OF RIGHT FOOT ASSOCIATED WITH TYPE 2 DIABETES MELLITUS, WITH NECROSIS OF MUSCLE (HCC): Primary | ICD-10-CM

## 2017-07-18 DIAGNOSIS — L97.513 DIABETIC ULCER OF TOE OF RIGHT FOOT ASSOCIATED WITH TYPE 2 DIABETES MELLITUS, WITH NECROSIS OF MUSCLE (HCC): Primary | ICD-10-CM

## 2017-07-18 PROCEDURE — 99183 HYPERBARIC OXYGEN THERAPY: CPT | Performed by: INTERNAL MEDICINE

## 2017-07-19 ENCOUNTER — HOSPITAL ENCOUNTER (OUTPATIENT)
Dept: WOUND CARE | Age: 47
Discharge: HOME | End: 2017-07-19
Attending: INTERNAL MEDICINE | Admitting: INTERNAL MEDICINE

## 2017-07-19 VITALS
TEMPERATURE: 98 F | RESPIRATION RATE: 16 BRPM | DIASTOLIC BLOOD PRESSURE: 119 MMHG | SYSTOLIC BLOOD PRESSURE: 156 MMHG | HEART RATE: 64 BPM

## 2017-07-19 DIAGNOSIS — L97.513 DIABETIC ULCER OF TOE OF RIGHT FOOT ASSOCIATED WITH TYPE 2 DIABETES MELLITUS, WITH NECROSIS OF MUSCLE (HCC): Primary | ICD-10-CM

## 2017-07-19 DIAGNOSIS — E11.621 DIABETIC ULCER OF TOE OF RIGHT FOOT ASSOCIATED WITH TYPE 2 DIABETES MELLITUS, WITH NECROSIS OF MUSCLE (HCC): Primary | ICD-10-CM

## 2017-07-19 PROCEDURE — 99183 HYPERBARIC OXYGEN THERAPY: CPT | Performed by: INTERNAL MEDICINE

## 2017-07-20 ENCOUNTER — HOSPITAL ENCOUNTER (OUTPATIENT)
Dept: WOUND CARE | Age: 47
Discharge: HOME | End: 2017-07-20
Attending: INTERNAL MEDICINE | Admitting: INTERNAL MEDICINE

## 2017-07-20 VITALS
SYSTOLIC BLOOD PRESSURE: 173 MMHG | DIASTOLIC BLOOD PRESSURE: 114 MMHG | HEART RATE: 76 BPM | TEMPERATURE: 98.6 F | RESPIRATION RATE: 16 BRPM

## 2017-07-20 DIAGNOSIS — L97.513 DIABETIC ULCER OF TOE OF RIGHT FOOT ASSOCIATED WITH TYPE 2 DIABETES MELLITUS, WITH NECROSIS OF MUSCLE (HCC): Primary | ICD-10-CM

## 2017-07-20 DIAGNOSIS — E11.621 DIABETIC ULCER OF TOE OF RIGHT FOOT ASSOCIATED WITH TYPE 2 DIABETES MELLITUS, WITH NECROSIS OF MUSCLE (HCC): Primary | ICD-10-CM

## 2017-07-20 RX ORDER — SULFAMETHOXAZOLE AND TRIMETHOPRIM 800; 160 MG/1; MG/1
1 TABLET ORAL 2 TIMES DAILY
COMMUNITY
Start: 2017-07-19 | End: 2017-07-29

## 2017-07-24 ENCOUNTER — HOSPITAL ENCOUNTER (OUTPATIENT)
Dept: WOUND CARE | Age: 47
Discharge: HOME | End: 2017-07-24
Attending: PODIATRIST | Admitting: PODIATRIST

## 2017-07-24 ENCOUNTER — HOSPITAL ENCOUNTER (OUTPATIENT)
Dept: WOUND CARE | Age: 47
Discharge: HOME | End: 2017-07-24
Attending: INTERNAL MEDICINE | Admitting: INTERNAL MEDICINE

## 2017-07-24 VITALS
DIASTOLIC BLOOD PRESSURE: 90 MMHG | SYSTOLIC BLOOD PRESSURE: 149 MMHG | TEMPERATURE: 98.3 F | RESPIRATION RATE: 16 BRPM | HEART RATE: 77 BPM

## 2017-07-24 DIAGNOSIS — L97.513 DIABETIC ULCER OF TOE OF RIGHT FOOT ASSOCIATED WITH TYPE 2 DIABETES MELLITUS, WITH NECROSIS OF MUSCLE (HCC): Primary | ICD-10-CM

## 2017-07-24 DIAGNOSIS — I10 ESSENTIAL HYPERTENSION: ICD-10-CM

## 2017-07-24 DIAGNOSIS — E08.29 DIABETES MELLITUS DUE TO UNDERLYING CONDITION WITH OTHER DIABETIC KIDNEY COMPLICATION, WITHOUT LONG-TERM CURRENT USE OF INSULIN (HCC): ICD-10-CM

## 2017-07-24 DIAGNOSIS — E11.621 DIABETIC ULCER OF TOE OF RIGHT FOOT ASSOCIATED WITH TYPE 2 DIABETES MELLITUS, WITH NECROSIS OF MUSCLE (HCC): Primary | ICD-10-CM

## 2017-07-24 DIAGNOSIS — L97.513 DIABETIC ULCER OF TOE OF RIGHT FOOT ASSOCIATED WITH TYPE 2 DIABETES MELLITUS, WITH NECROSIS OF MUSCLE (HCC): ICD-10-CM

## 2017-07-24 DIAGNOSIS — E11.610 CHARCOT'S JOINT OF FOOT IN TYPE 2 DIABETES MELLITUS (HCC): Primary | ICD-10-CM

## 2017-07-24 DIAGNOSIS — L97.512 ULCER OF RIGHT FOOT WITH FAT LAYER EXPOSED (HCC): ICD-10-CM

## 2017-07-24 DIAGNOSIS — E11.621 DIABETIC ULCER OF TOE OF RIGHT FOOT ASSOCIATED WITH TYPE 2 DIABETES MELLITUS, WITH NECROSIS OF MUSCLE (HCC): ICD-10-CM

## 2017-07-24 PROCEDURE — 99183 HYPERBARIC OXYGEN THERAPY: CPT | Performed by: INTERNAL MEDICINE

## 2017-07-25 ENCOUNTER — HOSPITAL ENCOUNTER (OUTPATIENT)
Dept: WOUND CARE | Age: 47
Discharge: HOME | End: 2017-07-25
Admitting: INTERNAL MEDICINE

## 2017-07-25 VITALS
SYSTOLIC BLOOD PRESSURE: 161 MMHG | TEMPERATURE: 98.6 F | DIASTOLIC BLOOD PRESSURE: 98 MMHG | HEART RATE: 84 BPM | RESPIRATION RATE: 16 BRPM

## 2017-07-25 DIAGNOSIS — L97.513 DIABETIC ULCER OF TOE OF RIGHT FOOT ASSOCIATED WITH TYPE 2 DIABETES MELLITUS, WITH NECROSIS OF MUSCLE (HCC): Primary | ICD-10-CM

## 2017-07-25 DIAGNOSIS — E11.621 DIABETIC ULCER OF TOE OF RIGHT FOOT ASSOCIATED WITH TYPE 2 DIABETES MELLITUS, WITH NECROSIS OF MUSCLE (HCC): Primary | ICD-10-CM

## 2017-07-26 ENCOUNTER — HOSPITAL ENCOUNTER (OUTPATIENT)
Dept: WOUND CARE | Age: 47
Discharge: HOME | End: 2017-07-26
Admitting: SURGERY

## 2017-07-26 VITALS
SYSTOLIC BLOOD PRESSURE: 171 MMHG | TEMPERATURE: 98.1 F | DIASTOLIC BLOOD PRESSURE: 101 MMHG | HEART RATE: 76 BPM | RESPIRATION RATE: 20 BRPM

## 2017-07-26 DIAGNOSIS — E11.621 DIABETIC ULCER OF TOE OF RIGHT FOOT ASSOCIATED WITH TYPE 2 DIABETES MELLITUS, WITH NECROSIS OF MUSCLE (HCC): Primary | ICD-10-CM

## 2017-07-26 DIAGNOSIS — L97.513 DIABETIC ULCER OF TOE OF RIGHT FOOT ASSOCIATED WITH TYPE 2 DIABETES MELLITUS, WITH NECROSIS OF MUSCLE (HCC): Primary | ICD-10-CM

## 2017-07-26 PROCEDURE — 99183 HYPERBARIC OXYGEN THERAPY: CPT | Performed by: NURSE PRACTITIONER

## 2017-07-27 ENCOUNTER — HOSPITAL ENCOUNTER (OUTPATIENT)
Dept: WOUND CARE | Age: 47
Discharge: HOME | End: 2017-07-27
Admitting: INTERNAL MEDICINE

## 2017-07-27 VITALS
DIASTOLIC BLOOD PRESSURE: 93 MMHG | HEART RATE: 69 BPM | TEMPERATURE: 98 F | RESPIRATION RATE: 18 BRPM | SYSTOLIC BLOOD PRESSURE: 157 MMHG

## 2017-07-27 DIAGNOSIS — E11.621 DIABETIC ULCER OF TOE OF RIGHT FOOT ASSOCIATED WITH TYPE 2 DIABETES MELLITUS, WITH NECROSIS OF MUSCLE (HCC): Primary | ICD-10-CM

## 2017-07-27 DIAGNOSIS — L97.513 DIABETIC ULCER OF TOE OF RIGHT FOOT ASSOCIATED WITH TYPE 2 DIABETES MELLITUS, WITH NECROSIS OF MUSCLE (HCC): Primary | ICD-10-CM

## 2017-07-28 ENCOUNTER — HOSPITAL ENCOUNTER (OUTPATIENT)
Dept: WOUND CARE | Age: 47
Discharge: HOME | End: 2017-07-28
Admitting: INTERNAL MEDICINE

## 2017-07-28 VITALS
TEMPERATURE: 97.9 F | SYSTOLIC BLOOD PRESSURE: 147 MMHG | HEART RATE: 83 BPM | DIASTOLIC BLOOD PRESSURE: 96 MMHG | RESPIRATION RATE: 16 BRPM

## 2017-07-28 DIAGNOSIS — E11.621 DIABETIC ULCER OF TOE OF RIGHT FOOT ASSOCIATED WITH TYPE 2 DIABETES MELLITUS, WITH NECROSIS OF MUSCLE (HCC): Primary | ICD-10-CM

## 2017-07-28 DIAGNOSIS — L97.513 DIABETIC ULCER OF TOE OF RIGHT FOOT ASSOCIATED WITH TYPE 2 DIABETES MELLITUS, WITH NECROSIS OF MUSCLE (HCC): Primary | ICD-10-CM

## 2017-07-31 ENCOUNTER — HOSPITAL ENCOUNTER (OUTPATIENT)
Dept: WOUND CARE | Age: 47
Discharge: HOME | End: 2017-07-31
Attending: PODIATRIST | Admitting: PODIATRIST

## 2017-07-31 ENCOUNTER — HOSPITAL ENCOUNTER (OUTPATIENT)
Dept: WOUND CARE | Age: 47
Discharge: HOME | End: 2017-07-31
Attending: INTERNAL MEDICINE | Admitting: INTERNAL MEDICINE

## 2017-07-31 VITALS
RESPIRATION RATE: 16 BRPM | HEART RATE: 79 BPM | DIASTOLIC BLOOD PRESSURE: 105 MMHG | SYSTOLIC BLOOD PRESSURE: 155 MMHG | TEMPERATURE: 98.2 F

## 2017-07-31 DIAGNOSIS — L97.513 DIABETIC ULCER OF TOE OF RIGHT FOOT ASSOCIATED WITH TYPE 2 DIABETES MELLITUS, WITH NECROSIS OF MUSCLE (HCC): ICD-10-CM

## 2017-07-31 DIAGNOSIS — E11.621 DIABETIC ULCER OF TOE OF RIGHT FOOT ASSOCIATED WITH TYPE 2 DIABETES MELLITUS, WITH NECROSIS OF MUSCLE (HCC): ICD-10-CM

## 2017-07-31 DIAGNOSIS — E11.610 CHARCOT'S JOINT OF FOOT IN TYPE 2 DIABETES MELLITUS (HCC): ICD-10-CM

## 2017-07-31 DIAGNOSIS — L97.512 ULCER OF RIGHT FOOT WITH FAT LAYER EXPOSED (HCC): ICD-10-CM

## 2017-07-31 DIAGNOSIS — E08.29: Primary | ICD-10-CM

## 2017-07-31 DIAGNOSIS — L97.513 DIABETIC ULCER OF TOE OF RIGHT FOOT ASSOCIATED WITH TYPE 2 DIABETES MELLITUS, WITH NECROSIS OF MUSCLE (HCC): Primary | ICD-10-CM

## 2017-07-31 DIAGNOSIS — E11.621 DIABETIC ULCER OF TOE OF RIGHT FOOT ASSOCIATED WITH TYPE 2 DIABETES MELLITUS, WITH NECROSIS OF MUSCLE (HCC): Primary | ICD-10-CM

## 2017-07-31 PROCEDURE — 99183 HYPERBARIC OXYGEN THERAPY: CPT | Performed by: INTERNAL MEDICINE

## 2017-08-01 ENCOUNTER — HOSPITAL ENCOUNTER (OUTPATIENT)
Dept: WOUND CARE | Age: 47
Discharge: HOME | End: 2017-08-31
Attending: INTERNAL MEDICINE | Admitting: INTERNAL MEDICINE

## 2017-08-01 VITALS
HEART RATE: 79 BPM | RESPIRATION RATE: 16 BRPM | DIASTOLIC BLOOD PRESSURE: 108 MMHG | SYSTOLIC BLOOD PRESSURE: 171 MMHG | TEMPERATURE: 98.4 F

## 2017-08-01 DIAGNOSIS — L97.513 DIABETIC ULCER OF TOE OF RIGHT FOOT ASSOCIATED WITH TYPE 2 DIABETES MELLITUS, WITH NECROSIS OF MUSCLE (HCC): Primary | ICD-10-CM

## 2017-08-01 DIAGNOSIS — E11.621 DIABETIC ULCER OF TOE OF RIGHT FOOT ASSOCIATED WITH TYPE 2 DIABETES MELLITUS, WITH NECROSIS OF MUSCLE (HCC): Primary | ICD-10-CM

## 2017-08-02 ENCOUNTER — HOSPITAL ENCOUNTER (OUTPATIENT)
Dept: WOUND CARE | Age: 47
Discharge: HOME | End: 2017-08-02
Admitting: SURGERY

## 2017-08-02 VITALS
SYSTOLIC BLOOD PRESSURE: 162 MMHG | DIASTOLIC BLOOD PRESSURE: 81 MMHG | TEMPERATURE: 98 F | HEART RATE: 89 BPM | RESPIRATION RATE: 16 BRPM

## 2017-08-02 DIAGNOSIS — L97.513 DIABETIC ULCER OF TOE OF RIGHT FOOT ASSOCIATED WITH TYPE 2 DIABETES MELLITUS, WITH NECROSIS OF MUSCLE (HCC): Primary | ICD-10-CM

## 2017-08-02 DIAGNOSIS — E11.621 DIABETIC ULCER OF TOE OF RIGHT FOOT ASSOCIATED WITH TYPE 2 DIABETES MELLITUS, WITH NECROSIS OF MUSCLE (HCC): Primary | ICD-10-CM

## 2017-08-02 PROCEDURE — 99183 HYPERBARIC OXYGEN THERAPY: CPT | Performed by: NURSE PRACTITIONER

## 2017-08-03 ENCOUNTER — HOSPITAL ENCOUNTER (OUTPATIENT)
Dept: WOUND CARE | Age: 47
Discharge: HOME | End: 2017-08-03
Admitting: NURSE PRACTITIONER

## 2017-08-03 VITALS
TEMPERATURE: 98.4 F | HEART RATE: 82 BPM | RESPIRATION RATE: 16 BRPM | SYSTOLIC BLOOD PRESSURE: 152 MMHG | DIASTOLIC BLOOD PRESSURE: 97 MMHG

## 2017-08-03 DIAGNOSIS — E11.621 DIABETIC ULCER OF TOE OF RIGHT FOOT ASSOCIATED WITH TYPE 2 DIABETES MELLITUS, WITH NECROSIS OF MUSCLE (HCC): ICD-10-CM

## 2017-08-03 DIAGNOSIS — L97.513 DIABETIC ULCER OF TOE OF RIGHT FOOT ASSOCIATED WITH TYPE 2 DIABETES MELLITUS, WITH NECROSIS OF MUSCLE (HCC): ICD-10-CM

## 2017-08-03 DIAGNOSIS — L97.512 ULCER OF RIGHT FOOT WITH FAT LAYER EXPOSED (HCC): Primary | ICD-10-CM

## 2017-08-04 ENCOUNTER — HOSPITAL ENCOUNTER (OUTPATIENT)
Dept: WOUND CARE | Age: 47
Discharge: HOME | End: 2017-08-04
Admitting: NURSE PRACTITIONER

## 2017-08-04 VITALS
DIASTOLIC BLOOD PRESSURE: 102 MMHG | HEART RATE: 78 BPM | RESPIRATION RATE: 16 BRPM | SYSTOLIC BLOOD PRESSURE: 151 MMHG | TEMPERATURE: 98.3 F

## 2017-08-04 DIAGNOSIS — E11.621 DIABETIC ULCER OF TOE OF RIGHT FOOT ASSOCIATED WITH TYPE 2 DIABETES MELLITUS, WITH NECROSIS OF MUSCLE (HCC): Primary | ICD-10-CM

## 2017-08-04 DIAGNOSIS — L97.513 DIABETIC ULCER OF TOE OF RIGHT FOOT ASSOCIATED WITH TYPE 2 DIABETES MELLITUS, WITH NECROSIS OF MUSCLE (HCC): Primary | ICD-10-CM

## 2017-08-07 ENCOUNTER — HOSPITAL ENCOUNTER (OUTPATIENT)
Dept: WOUND CARE | Age: 47
Discharge: HOME | End: 2017-08-07
Attending: INTERNAL MEDICINE | Admitting: NURSE PRACTITIONER

## 2017-08-07 ENCOUNTER — HOSPITAL ENCOUNTER (OUTPATIENT)
Dept: WOUND CARE | Age: 47
Discharge: HOME | End: 2017-08-07
Attending: PODIATRIST | Admitting: PODIATRIST

## 2017-08-07 VITALS
HEART RATE: 76 BPM | SYSTOLIC BLOOD PRESSURE: 116 MMHG | TEMPERATURE: 98.4 F | RESPIRATION RATE: 16 BRPM | DIASTOLIC BLOOD PRESSURE: 82 MMHG

## 2017-08-07 DIAGNOSIS — L97.513 DIABETIC ULCER OF TOE OF RIGHT FOOT ASSOCIATED WITH TYPE 2 DIABETES MELLITUS, WITH NECROSIS OF MUSCLE (HCC): ICD-10-CM

## 2017-08-07 DIAGNOSIS — L97.512 ULCER OF RIGHT FOOT WITH FAT LAYER EXPOSED (HCC): ICD-10-CM

## 2017-08-07 DIAGNOSIS — E11.610 CHARCOT'S JOINT OF FOOT IN TYPE 2 DIABETES MELLITUS (HCC): Primary | ICD-10-CM

## 2017-08-07 DIAGNOSIS — E11.621 DIABETIC ULCER OF TOE OF RIGHT FOOT ASSOCIATED WITH TYPE 2 DIABETES MELLITUS, WITH NECROSIS OF MUSCLE (HCC): ICD-10-CM

## 2017-08-07 DIAGNOSIS — E08.29: ICD-10-CM

## 2017-08-07 DIAGNOSIS — L97.513 DIABETIC ULCER OF TOE OF RIGHT FOOT ASSOCIATED WITH TYPE 2 DIABETES MELLITUS, WITH NECROSIS OF MUSCLE (HCC): Primary | ICD-10-CM

## 2017-08-07 DIAGNOSIS — E11.621 DIABETIC ULCER OF TOE OF RIGHT FOOT ASSOCIATED WITH TYPE 2 DIABETES MELLITUS, WITH NECROSIS OF MUSCLE (HCC): Primary | ICD-10-CM

## 2017-08-07 PROCEDURE — 99183 HYPERBARIC OXYGEN THERAPY: CPT | Performed by: INTERNAL MEDICINE

## 2017-08-07 RX ORDER — HYDROCHLOROTHIAZIDE 25 MG/1
25 TABLET ORAL DAILY
COMMUNITY
End: 2018-04-12 | Stop reason: SDUPTHER

## 2017-08-08 ENCOUNTER — HOSPITAL ENCOUNTER (OUTPATIENT)
Dept: WOUND CARE | Age: 47
Discharge: HOME | End: 2017-08-08
Admitting: INTERNAL MEDICINE

## 2017-08-08 VITALS
HEART RATE: 75 BPM | SYSTOLIC BLOOD PRESSURE: 124 MMHG | TEMPERATURE: 98.6 F | RESPIRATION RATE: 16 BRPM | DIASTOLIC BLOOD PRESSURE: 86 MMHG

## 2017-08-08 DIAGNOSIS — L97.513 DIABETIC ULCER OF TOE OF RIGHT FOOT ASSOCIATED WITH TYPE 2 DIABETES MELLITUS, WITH NECROSIS OF MUSCLE (HCC): Primary | ICD-10-CM

## 2017-08-08 DIAGNOSIS — E11.621 DIABETIC ULCER OF TOE OF RIGHT FOOT ASSOCIATED WITH TYPE 2 DIABETES MELLITUS, WITH NECROSIS OF MUSCLE (HCC): Primary | ICD-10-CM

## 2017-08-10 ENCOUNTER — HOSPITAL ENCOUNTER (OUTPATIENT)
Dept: WOUND CARE | Age: 47
Discharge: HOME | End: 2017-08-10
Attending: INTERNAL MEDICINE | Admitting: SURGERY

## 2017-08-10 VITALS
SYSTOLIC BLOOD PRESSURE: 114 MMHG | RESPIRATION RATE: 16 BRPM | TEMPERATURE: 98.4 F | DIASTOLIC BLOOD PRESSURE: 79 MMHG | HEART RATE: 111 BPM

## 2017-08-10 DIAGNOSIS — E11.621 DIABETIC ULCER OF TOE OF RIGHT FOOT ASSOCIATED WITH TYPE 2 DIABETES MELLITUS, WITH NECROSIS OF MUSCLE (HCC): ICD-10-CM

## 2017-08-10 DIAGNOSIS — L97.512 ULCER OF RIGHT FOOT WITH FAT LAYER EXPOSED (HCC): Primary | ICD-10-CM

## 2017-08-10 DIAGNOSIS — L97.513 DIABETIC ULCER OF TOE OF RIGHT FOOT ASSOCIATED WITH TYPE 2 DIABETES MELLITUS, WITH NECROSIS OF MUSCLE (HCC): ICD-10-CM

## 2017-08-11 ENCOUNTER — HOSPITAL ENCOUNTER (OUTPATIENT)
Dept: WOUND CARE | Age: 47
Discharge: HOME | End: 2017-08-11
Admitting: NURSE PRACTITIONER

## 2017-08-11 VITALS
DIASTOLIC BLOOD PRESSURE: 75 MMHG | HEART RATE: 73 BPM | RESPIRATION RATE: 16 BRPM | SYSTOLIC BLOOD PRESSURE: 113 MMHG | TEMPERATURE: 98.3 F

## 2017-08-11 DIAGNOSIS — E11.621 DIABETIC ULCER OF TOE OF RIGHT FOOT ASSOCIATED WITH TYPE 2 DIABETES MELLITUS, WITH NECROSIS OF MUSCLE (HCC): Primary | ICD-10-CM

## 2017-08-11 DIAGNOSIS — L97.513 DIABETIC ULCER OF TOE OF RIGHT FOOT ASSOCIATED WITH TYPE 2 DIABETES MELLITUS, WITH NECROSIS OF MUSCLE (HCC): Primary | ICD-10-CM

## 2017-08-14 ENCOUNTER — HOSPITAL ENCOUNTER (OUTPATIENT)
Dept: WOUND CARE | Age: 47
Discharge: HOME | End: 2017-08-14
Attending: INTERNAL MEDICINE | Admitting: NURSE PRACTITIONER

## 2017-08-14 ENCOUNTER — HOSPITAL ENCOUNTER (OUTPATIENT)
Dept: WOUND CARE | Age: 47
Discharge: HOME | End: 2017-08-14
Attending: PODIATRIST | Admitting: INTERNAL MEDICINE

## 2017-08-14 VITALS
DIASTOLIC BLOOD PRESSURE: 76 MMHG | HEART RATE: 80 BPM | RESPIRATION RATE: 16 BRPM | TEMPERATURE: 98.5 F | SYSTOLIC BLOOD PRESSURE: 123 MMHG

## 2017-08-14 DIAGNOSIS — L97.513 DIABETIC ULCER OF TOE OF RIGHT FOOT ASSOCIATED WITH TYPE 2 DIABETES MELLITUS, WITH NECROSIS OF MUSCLE (HCC): ICD-10-CM

## 2017-08-14 DIAGNOSIS — L97.512 ULCER OF RIGHT FOOT WITH FAT LAYER EXPOSED (HCC): ICD-10-CM

## 2017-08-14 DIAGNOSIS — L97.513 DIABETIC ULCER OF TOE OF RIGHT FOOT ASSOCIATED WITH TYPE 2 DIABETES MELLITUS, WITH NECROSIS OF MUSCLE (HCC): Primary | ICD-10-CM

## 2017-08-14 DIAGNOSIS — E11.621 DIABETIC ULCER OF TOE OF RIGHT FOOT ASSOCIATED WITH TYPE 2 DIABETES MELLITUS, WITH NECROSIS OF MUSCLE (HCC): Primary | ICD-10-CM

## 2017-08-14 DIAGNOSIS — E11.621 DIABETIC ULCER OF TOE OF RIGHT FOOT ASSOCIATED WITH TYPE 2 DIABETES MELLITUS, WITH NECROSIS OF MUSCLE (HCC): ICD-10-CM

## 2017-08-14 PROCEDURE — 99183 HYPERBARIC OXYGEN THERAPY: CPT | Performed by: INTERNAL MEDICINE

## 2017-08-14 PROCEDURE — 11042 DBRDMT SUBQ TIS 1ST 20SQCM/<: CPT | Performed by: INTERNAL MEDICINE

## 2017-08-15 ENCOUNTER — HOSPITAL ENCOUNTER (OUTPATIENT)
Dept: WOUND CARE | Age: 47
Discharge: HOME | End: 2017-08-15
Admitting: INTERNAL MEDICINE

## 2017-08-15 VITALS
TEMPERATURE: 98.5 F | HEART RATE: 76 BPM | DIASTOLIC BLOOD PRESSURE: 78 MMHG | SYSTOLIC BLOOD PRESSURE: 120 MMHG | RESPIRATION RATE: 16 BRPM

## 2017-08-15 DIAGNOSIS — E11.621 DIABETIC ULCER OF TOE OF RIGHT FOOT ASSOCIATED WITH TYPE 2 DIABETES MELLITUS, WITH NECROSIS OF MUSCLE (HCC): Primary | ICD-10-CM

## 2017-08-15 DIAGNOSIS — L97.513 DIABETIC ULCER OF TOE OF RIGHT FOOT ASSOCIATED WITH TYPE 2 DIABETES MELLITUS, WITH NECROSIS OF MUSCLE (HCC): Primary | ICD-10-CM

## 2017-08-16 ENCOUNTER — HOSPITAL ENCOUNTER (OUTPATIENT)
Dept: WOUND CARE | Age: 47
Discharge: HOME | End: 2017-08-16
Admitting: SURGERY

## 2017-08-16 VITALS
SYSTOLIC BLOOD PRESSURE: 113 MMHG | DIASTOLIC BLOOD PRESSURE: 76 MMHG | HEART RATE: 70 BPM | TEMPERATURE: 98.3 F | RESPIRATION RATE: 16 BRPM

## 2017-08-16 DIAGNOSIS — L97.513 DIABETIC ULCER OF TOE OF RIGHT FOOT ASSOCIATED WITH TYPE 2 DIABETES MELLITUS, WITH NECROSIS OF MUSCLE (HCC): Primary | ICD-10-CM

## 2017-08-16 DIAGNOSIS — E11.621 DIABETIC ULCER OF TOE OF RIGHT FOOT ASSOCIATED WITH TYPE 2 DIABETES MELLITUS, WITH NECROSIS OF MUSCLE (HCC): Primary | ICD-10-CM

## 2017-08-16 PROCEDURE — 99183 HYPERBARIC OXYGEN THERAPY: CPT | Performed by: INTERNAL MEDICINE

## 2017-08-17 ENCOUNTER — HOSPITAL ENCOUNTER (OUTPATIENT)
Dept: WOUND CARE | Age: 47
Discharge: HOME | End: 2017-08-17
Admitting: INTERNAL MEDICINE

## 2017-08-17 VITALS
HEART RATE: 72 BPM | RESPIRATION RATE: 16 BRPM | SYSTOLIC BLOOD PRESSURE: 104 MMHG | TEMPERATURE: 98.3 F | DIASTOLIC BLOOD PRESSURE: 73 MMHG

## 2017-08-17 DIAGNOSIS — E11.621 DIABETIC ULCER OF TOE OF RIGHT FOOT ASSOCIATED WITH TYPE 2 DIABETES MELLITUS, WITH NECROSIS OF MUSCLE (HCC): ICD-10-CM

## 2017-08-17 DIAGNOSIS — L97.512 ULCER OF RIGHT FOOT WITH FAT LAYER EXPOSED (HCC): Primary | ICD-10-CM

## 2017-08-17 DIAGNOSIS — L97.513 DIABETIC ULCER OF TOE OF RIGHT FOOT ASSOCIATED WITH TYPE 2 DIABETES MELLITUS, WITH NECROSIS OF MUSCLE (HCC): ICD-10-CM

## 2017-08-18 ENCOUNTER — HOSPITAL ENCOUNTER (OUTPATIENT)
Dept: WOUND CARE | Age: 47
Discharge: HOME | End: 2017-08-18
Admitting: INTERNAL MEDICINE

## 2017-08-18 VITALS
HEART RATE: 75 BPM | DIASTOLIC BLOOD PRESSURE: 81 MMHG | TEMPERATURE: 98.2 F | RESPIRATION RATE: 16 BRPM | SYSTOLIC BLOOD PRESSURE: 131 MMHG

## 2017-08-18 DIAGNOSIS — L97.513 DIABETIC ULCER OF TOE OF RIGHT FOOT ASSOCIATED WITH TYPE 2 DIABETES MELLITUS, WITH NECROSIS OF MUSCLE (HCC): Primary | ICD-10-CM

## 2017-08-18 DIAGNOSIS — E11.621 DIABETIC ULCER OF TOE OF RIGHT FOOT ASSOCIATED WITH TYPE 2 DIABETES MELLITUS, WITH NECROSIS OF MUSCLE (HCC): Primary | ICD-10-CM

## 2017-08-21 ENCOUNTER — HOSPITAL ENCOUNTER (OUTPATIENT)
Dept: WOUND CARE | Age: 47
Discharge: HOME | End: 2017-08-21
Attending: PODIATRIST | Admitting: PODIATRIST

## 2017-08-21 ENCOUNTER — HOSPITAL ENCOUNTER (OUTPATIENT)
Dept: WOUND CARE | Age: 47
Discharge: HOME | End: 2017-08-21
Admitting: INTERNAL MEDICINE

## 2017-08-21 VITALS
HEART RATE: 75 BPM | RESPIRATION RATE: 16 BRPM | TEMPERATURE: 98.1 F | DIASTOLIC BLOOD PRESSURE: 75 MMHG | SYSTOLIC BLOOD PRESSURE: 115 MMHG

## 2017-08-21 DIAGNOSIS — E08.29: ICD-10-CM

## 2017-08-21 DIAGNOSIS — L97.513 DIABETIC ULCER OF TOE OF RIGHT FOOT ASSOCIATED WITH TYPE 2 DIABETES MELLITUS, WITH NECROSIS OF MUSCLE (HCC): Primary | ICD-10-CM

## 2017-08-21 DIAGNOSIS — E11.621 DIABETIC ULCER OF TOE OF RIGHT FOOT ASSOCIATED WITH TYPE 2 DIABETES MELLITUS, WITH NECROSIS OF MUSCLE (HCC): Primary | ICD-10-CM

## 2017-08-21 DIAGNOSIS — L97.513 DIABETIC ULCER OF TOE OF RIGHT FOOT ASSOCIATED WITH TYPE 2 DIABETES MELLITUS, WITH NECROSIS OF MUSCLE (HCC): ICD-10-CM

## 2017-08-21 DIAGNOSIS — E11.621 DIABETIC ULCER OF TOE OF RIGHT FOOT ASSOCIATED WITH TYPE 2 DIABETES MELLITUS, WITH NECROSIS OF MUSCLE (HCC): ICD-10-CM

## 2017-08-21 DIAGNOSIS — L97.512 ULCER OF RIGHT FOOT WITH FAT LAYER EXPOSED (HCC): ICD-10-CM

## 2017-08-21 DIAGNOSIS — E11.610 CHARCOT'S JOINT OF FOOT IN TYPE 2 DIABETES MELLITUS (HCC): Primary | ICD-10-CM

## 2017-08-21 PROCEDURE — 99183 HYPERBARIC OXYGEN THERAPY: CPT | Performed by: NURSE PRACTITIONER

## 2017-08-22 ENCOUNTER — HOSPITAL ENCOUNTER (OUTPATIENT)
Dept: WOUND CARE | Age: 47
Discharge: HOME | End: 2017-08-22
Admitting: NURSE PRACTITIONER

## 2017-08-22 VITALS
HEART RATE: 73 BPM | DIASTOLIC BLOOD PRESSURE: 75 MMHG | TEMPERATURE: 98.2 F | SYSTOLIC BLOOD PRESSURE: 114 MMHG | RESPIRATION RATE: 16 BRPM

## 2017-08-22 DIAGNOSIS — E11.621 DIABETIC ULCER OF TOE OF RIGHT FOOT ASSOCIATED WITH TYPE 2 DIABETES MELLITUS, WITH NECROSIS OF MUSCLE (HCC): Primary | ICD-10-CM

## 2017-08-22 DIAGNOSIS — L97.513 DIABETIC ULCER OF TOE OF RIGHT FOOT ASSOCIATED WITH TYPE 2 DIABETES MELLITUS, WITH NECROSIS OF MUSCLE (HCC): Primary | ICD-10-CM

## 2017-08-22 PROCEDURE — 99183 HYPERBARIC OXYGEN THERAPY: CPT | Performed by: NURSE PRACTITIONER

## 2017-08-23 ENCOUNTER — HOSPITAL ENCOUNTER (OUTPATIENT)
Dept: WOUND CARE | Age: 47
Discharge: HOME | End: 2017-08-23
Admitting: SURGERY

## 2017-08-23 VITALS
RESPIRATION RATE: 16 BRPM | TEMPERATURE: 98.2 F | SYSTOLIC BLOOD PRESSURE: 106 MMHG | HEART RATE: 72 BPM | DIASTOLIC BLOOD PRESSURE: 72 MMHG

## 2017-08-23 DIAGNOSIS — L97.513 DIABETIC ULCER OF TOE OF RIGHT FOOT ASSOCIATED WITH TYPE 2 DIABETES MELLITUS, WITH NECROSIS OF MUSCLE (HCC): Primary | ICD-10-CM

## 2017-08-23 DIAGNOSIS — E11.621 DIABETIC ULCER OF TOE OF RIGHT FOOT ASSOCIATED WITH TYPE 2 DIABETES MELLITUS, WITH NECROSIS OF MUSCLE (HCC): Primary | ICD-10-CM

## 2017-08-23 PROCEDURE — 99183 HYPERBARIC OXYGEN THERAPY: CPT | Performed by: NURSE PRACTITIONER

## 2017-08-24 ENCOUNTER — HOSPITAL ENCOUNTER (OUTPATIENT)
Dept: WOUND CARE | Age: 47
Discharge: HOME | End: 2017-08-24
Admitting: NURSE PRACTITIONER

## 2017-08-24 VITALS
DIASTOLIC BLOOD PRESSURE: 74 MMHG | HEART RATE: 74 BPM | TEMPERATURE: 98.4 F | RESPIRATION RATE: 16 BRPM | SYSTOLIC BLOOD PRESSURE: 114 MMHG

## 2017-08-24 DIAGNOSIS — L97.513 DIABETIC ULCER OF TOE OF RIGHT FOOT ASSOCIATED WITH TYPE 2 DIABETES MELLITUS, WITH NECROSIS OF MUSCLE (HCC): ICD-10-CM

## 2017-08-24 DIAGNOSIS — E11.621 DIABETIC ULCER OF TOE OF RIGHT FOOT ASSOCIATED WITH TYPE 2 DIABETES MELLITUS, WITH NECROSIS OF MUSCLE (HCC): ICD-10-CM

## 2017-08-24 DIAGNOSIS — L97.512 ULCER OF RIGHT FOOT WITH FAT LAYER EXPOSED (HCC): Primary | ICD-10-CM

## 2017-08-25 ENCOUNTER — HOSPITAL ENCOUNTER (OUTPATIENT)
Dept: WOUND CARE | Age: 47
Discharge: HOME | End: 2017-08-25
Admitting: NURSE PRACTITIONER

## 2017-08-25 ENCOUNTER — HOSPITAL ENCOUNTER (OUTPATIENT)
Dept: GENERAL RADIOLOGY | Age: 47
Discharge: HOME | End: 2017-08-25
Attending: INTERNAL MEDICINE | Admitting: INTERNAL MEDICINE

## 2017-08-25 ENCOUNTER — HOSPITAL ENCOUNTER (OUTPATIENT)
Dept: CT IMAGING | Age: 47
Discharge: HOME | End: 2017-08-25
Attending: PODIATRIST

## 2017-08-25 VITALS
RESPIRATION RATE: 16 BRPM | HEART RATE: 89 BPM | DIASTOLIC BLOOD PRESSURE: 88 MMHG | TEMPERATURE: 98.6 F | SYSTOLIC BLOOD PRESSURE: 129 MMHG

## 2017-08-25 DIAGNOSIS — R60.0 LOCALIZED EDEMA: ICD-10-CM

## 2017-08-25 DIAGNOSIS — L97.512 ULCER OF RIGHT FOOT, WITH FAT LAYER EXPOSED (HCC): ICD-10-CM

## 2017-08-25 DIAGNOSIS — E11.69 DIABETIC FOOT ULCER WITH OSTEOMYELITIS (HCC): ICD-10-CM

## 2017-08-25 DIAGNOSIS — E11.621 DIABETIC ULCER OF TOE OF RIGHT FOOT ASSOCIATED WITH TYPE 2 DIABETES MELLITUS, WITH NECROSIS OF MUSCLE (HCC): ICD-10-CM

## 2017-08-25 DIAGNOSIS — L97.509 DIABETIC FOOT ULCER WITH OSTEOMYELITIS (HCC): ICD-10-CM

## 2017-08-25 DIAGNOSIS — R60.0 LEG EDEMA, RIGHT: ICD-10-CM

## 2017-08-25 DIAGNOSIS — E11.621 DIABETIC FOOT ULCER WITH OSTEOMYELITIS (HCC): ICD-10-CM

## 2017-08-25 DIAGNOSIS — L97.512 ULCER OF RIGHT FOOT WITH FAT LAYER EXPOSED (HCC): Primary | ICD-10-CM

## 2017-08-25 DIAGNOSIS — L97.512 RIGHT FOOT ULCER, WITH FAT LAYER EXPOSED (HCC): ICD-10-CM

## 2017-08-25 DIAGNOSIS — M79.604 RIGHT LEG PAIN: ICD-10-CM

## 2017-08-25 DIAGNOSIS — L97.513 DIABETIC ULCER OF TOE OF RIGHT FOOT ASSOCIATED WITH TYPE 2 DIABETES MELLITUS, WITH NECROSIS OF MUSCLE (HCC): ICD-10-CM

## 2017-08-25 DIAGNOSIS — M86.9 DIABETIC FOOT ULCER WITH OSTEOMYELITIS (HCC): ICD-10-CM

## 2017-08-25 LAB
GFR AFRICAN AMERICAN: >60 ML/MIN/1.73M2
GFR NON-AFRICAN AMERICAN: >60 ML/MIN/1.73M2
POC CREATININE: 0.8 MG/DL (ref 0.9–1.3)

## 2017-08-28 ENCOUNTER — HOSPITAL ENCOUNTER (OUTPATIENT)
Dept: WOUND CARE | Age: 47
Discharge: HOME | End: 2017-08-28
Attending: INTERNAL MEDICINE | Admitting: NURSE PRACTITIONER

## 2017-08-28 ENCOUNTER — HOSPITAL ENCOUNTER (OUTPATIENT)
Dept: WOUND CARE | Age: 47
Discharge: HOME | End: 2017-08-28
Attending: PODIATRIST | Admitting: PODIATRIST

## 2017-08-28 VITALS
DIASTOLIC BLOOD PRESSURE: 68 MMHG | SYSTOLIC BLOOD PRESSURE: 105 MMHG | TEMPERATURE: 98.6 F | RESPIRATION RATE: 16 BRPM | HEART RATE: 78 BPM

## 2017-08-28 DIAGNOSIS — L97.512 ULCER OF RIGHT FOOT WITH FAT LAYER EXPOSED (HCC): ICD-10-CM

## 2017-08-28 DIAGNOSIS — L97.513 DIABETIC ULCER OF TOE OF RIGHT FOOT ASSOCIATED WITH TYPE 2 DIABETES MELLITUS, WITH NECROSIS OF MUSCLE (HCC): Primary | ICD-10-CM

## 2017-08-28 DIAGNOSIS — E11.610 CHARCOT'S JOINT OF FOOT IN TYPE 2 DIABETES MELLITUS (HCC): Primary | ICD-10-CM

## 2017-08-28 DIAGNOSIS — S92.354D CLOSED NONDISPLACED FRACTURE OF FIFTH METATARSAL BONE OF RIGHT FOOT WITH ROUTINE HEALING: ICD-10-CM

## 2017-08-28 DIAGNOSIS — L97.513 DIABETIC ULCER OF TOE OF RIGHT FOOT ASSOCIATED WITH TYPE 2 DIABETES MELLITUS, WITH NECROSIS OF MUSCLE (HCC): ICD-10-CM

## 2017-08-28 DIAGNOSIS — E11.621 DIABETIC ULCER OF TOE OF RIGHT FOOT ASSOCIATED WITH TYPE 2 DIABETES MELLITUS, WITH NECROSIS OF MUSCLE (HCC): Primary | ICD-10-CM

## 2017-08-28 DIAGNOSIS — E11.621 DIABETIC ULCER OF TOE OF RIGHT FOOT ASSOCIATED WITH TYPE 2 DIABETES MELLITUS, WITH NECROSIS OF MUSCLE (HCC): ICD-10-CM

## 2017-08-28 PROCEDURE — 99183 HYPERBARIC OXYGEN THERAPY: CPT | Performed by: INTERNAL MEDICINE

## 2017-08-29 ENCOUNTER — HOSPITAL ENCOUNTER (OUTPATIENT)
Dept: WOUND CARE | Age: 47
Discharge: HOME | End: 2017-08-29
Admitting: INTERNAL MEDICINE

## 2017-08-29 VITALS
TEMPERATURE: 98.5 F | SYSTOLIC BLOOD PRESSURE: 106 MMHG | RESPIRATION RATE: 16 BRPM | DIASTOLIC BLOOD PRESSURE: 66 MMHG | HEART RATE: 75 BPM

## 2017-08-29 DIAGNOSIS — L97.513 DIABETIC ULCER OF TOE OF RIGHT FOOT ASSOCIATED WITH TYPE 2 DIABETES MELLITUS, WITH NECROSIS OF MUSCLE (HCC): Primary | ICD-10-CM

## 2017-08-29 DIAGNOSIS — E11.621 DIABETIC ULCER OF TOE OF RIGHT FOOT ASSOCIATED WITH TYPE 2 DIABETES MELLITUS, WITH NECROSIS OF MUSCLE (HCC): Primary | ICD-10-CM

## 2017-08-30 ENCOUNTER — HOSPITAL ENCOUNTER (OUTPATIENT)
Dept: WOUND CARE | Age: 47
Discharge: HOME | End: 2017-08-30
Attending: INTERNAL MEDICINE | Admitting: INTERNAL MEDICINE

## 2017-08-31 ENCOUNTER — HOSPITAL ENCOUNTER (OUTPATIENT)
Dept: WOUND CARE | Age: 47
Discharge: HOME | End: 2017-08-31
Attending: INTERNAL MEDICINE | Admitting: SURGERY

## 2017-08-31 VITALS
TEMPERATURE: 98.5 F | DIASTOLIC BLOOD PRESSURE: 65 MMHG | SYSTOLIC BLOOD PRESSURE: 95 MMHG | RESPIRATION RATE: 16 BRPM | HEART RATE: 73 BPM

## 2017-08-31 DIAGNOSIS — E11.621 DIABETIC ULCER OF TOE OF RIGHT FOOT ASSOCIATED WITH TYPE 2 DIABETES MELLITUS, WITH NECROSIS OF MUSCLE (HCC): ICD-10-CM

## 2017-08-31 DIAGNOSIS — L97.512 ULCER OF RIGHT FOOT WITH FAT LAYER EXPOSED (HCC): Primary | ICD-10-CM

## 2017-08-31 DIAGNOSIS — L97.513 DIABETIC ULCER OF TOE OF RIGHT FOOT ASSOCIATED WITH TYPE 2 DIABETES MELLITUS, WITH NECROSIS OF MUSCLE (HCC): ICD-10-CM

## 2017-09-01 ENCOUNTER — HOSPITAL ENCOUNTER (OUTPATIENT)
Dept: WOUND CARE | Age: 47
Discharge: HOME | End: 2017-09-30
Attending: INTERNAL MEDICINE | Admitting: INTERNAL MEDICINE

## 2017-09-01 VITALS
HEART RATE: 74 BPM | RESPIRATION RATE: 16 BRPM | DIASTOLIC BLOOD PRESSURE: 72 MMHG | TEMPERATURE: 98.6 F | SYSTOLIC BLOOD PRESSURE: 104 MMHG

## 2017-09-01 DIAGNOSIS — E11.621 DIABETIC ULCER OF TOE OF RIGHT FOOT ASSOCIATED WITH TYPE 2 DIABETES MELLITUS, WITH NECROSIS OF MUSCLE (HCC): ICD-10-CM

## 2017-09-01 DIAGNOSIS — L97.512 ULCER OF RIGHT FOOT WITH FAT LAYER EXPOSED (HCC): Primary | ICD-10-CM

## 2017-09-01 DIAGNOSIS — L97.513 DIABETIC ULCER OF TOE OF RIGHT FOOT ASSOCIATED WITH TYPE 2 DIABETES MELLITUS, WITH NECROSIS OF MUSCLE (HCC): ICD-10-CM

## 2017-09-01 LAB
CULTURE: NORMAL
ORGANISM: NORMAL
REPORT STATUS: NORMAL
REQUEST PROBLEM: NORMAL
SPECIMEN: NORMAL

## 2017-09-01 NOTE — PROGRESS NOTES
appears stated age    Ears: External otic canals are within acceptable limits. The TMs are well visualized and are normal.  Teed Grade on the right 0  on the left 0 pre-treatment. Lungs: Clear to auscultation bilaterally. No wheezes, rhonchi, or rales bilaterally. Respirations unlabored    Heart:  normal rate rhythm, no murmur, rub or gallop    Pretreatment sugar was 195  Posttreatment sugar was 177    The patient was treated with 100% oxygen at 2.0 ZAHRAA for 108 minutes. Assessment      Brain Emile successfully completed today's hyperbaric oxygen treatment at the [facility] and tolerated the treatment well without complications. Please refer to nursing documentation for details. Plan     Continue HBO treatment as outlined in the treatment plan. I was present on these premises and immediately available to furnish assistance & direction throughout the procedure.      Liza Nj MD  9/1/2017

## 2017-09-05 ENCOUNTER — HOSPITAL ENCOUNTER (OUTPATIENT)
Dept: WOUND CARE | Age: 47
Discharge: HOME | End: 2017-09-05
Attending: SURGERY | Admitting: INTERNAL MEDICINE

## 2017-09-05 VITALS
RESPIRATION RATE: 16 BRPM | SYSTOLIC BLOOD PRESSURE: 117 MMHG | DIASTOLIC BLOOD PRESSURE: 78 MMHG | TEMPERATURE: 98.6 F | HEART RATE: 73 BPM

## 2017-09-05 DIAGNOSIS — E11.621 DIABETIC ULCER OF TOE OF RIGHT FOOT ASSOCIATED WITH TYPE 2 DIABETES MELLITUS, WITH NECROSIS OF MUSCLE (HCC): Primary | ICD-10-CM

## 2017-09-05 DIAGNOSIS — L97.513 DIABETIC ULCER OF TOE OF RIGHT FOOT ASSOCIATED WITH TYPE 2 DIABETES MELLITUS, WITH NECROSIS OF MUSCLE (HCC): Primary | ICD-10-CM

## 2017-09-06 ENCOUNTER — HOSPITAL ENCOUNTER (OUTPATIENT)
Dept: WOUND CARE | Age: 47
Discharge: HOME | End: 2017-09-06
Attending: INTERNAL MEDICINE | Admitting: INTERNAL MEDICINE

## 2017-09-06 ENCOUNTER — HOSPITAL ENCOUNTER (OUTPATIENT)
Dept: WOUND CARE | Age: 47
Discharge: HOME | End: 2017-09-06
Admitting: SURGERY

## 2017-09-06 VITALS
SYSTOLIC BLOOD PRESSURE: 128 MMHG | HEART RATE: 71 BPM | DIASTOLIC BLOOD PRESSURE: 83 MMHG | RESPIRATION RATE: 16 BRPM | TEMPERATURE: 97.8 F

## 2017-09-06 VITALS — SYSTOLIC BLOOD PRESSURE: 112 MMHG | DIASTOLIC BLOOD PRESSURE: 73 MMHG | TEMPERATURE: 97.8 F | RESPIRATION RATE: 18 BRPM

## 2017-09-06 DIAGNOSIS — E11.621 TYPE 2 DIABETES MELLITUS WITH RIGHT DIABETIC FOOT ULCER (HCC): ICD-10-CM

## 2017-09-06 DIAGNOSIS — L97.513 DIABETIC ULCER OF TOE OF RIGHT FOOT ASSOCIATED WITH TYPE 2 DIABETES MELLITUS, WITH NECROSIS OF MUSCLE (HCC): Primary | ICD-10-CM

## 2017-09-06 DIAGNOSIS — L97.519 TYPE 2 DIABETES MELLITUS WITH RIGHT DIABETIC FOOT ULCER (HCC): ICD-10-CM

## 2017-09-06 DIAGNOSIS — E11.621 DIABETIC ULCER OF TOE OF RIGHT FOOT ASSOCIATED WITH TYPE 2 DIABETES MELLITUS, WITH NECROSIS OF MUSCLE (HCC): Primary | ICD-10-CM

## 2017-09-06 PROCEDURE — 11042 DBRDMT SUBQ TIS 1ST 20SQCM/<: CPT | Performed by: INTERNAL MEDICINE

## 2017-09-06 PROCEDURE — 99183 HYPERBARIC OXYGEN THERAPY: CPT | Performed by: INTERNAL MEDICINE

## 2017-09-07 ENCOUNTER — HOSPITAL ENCOUNTER (OUTPATIENT)
Dept: WOUND CARE | Age: 47
Discharge: HOME | End: 2017-09-07
Admitting: INTERNAL MEDICINE

## 2017-09-07 VITALS
SYSTOLIC BLOOD PRESSURE: 123 MMHG | RESPIRATION RATE: 16 BRPM | HEART RATE: 70 BPM | TEMPERATURE: 97.8 F | DIASTOLIC BLOOD PRESSURE: 79 MMHG

## 2017-09-07 DIAGNOSIS — E11.621 DIABETIC ULCER OF TOE OF RIGHT FOOT ASSOCIATED WITH TYPE 2 DIABETES MELLITUS, WITH NECROSIS OF MUSCLE (HCC): Primary | ICD-10-CM

## 2017-09-07 DIAGNOSIS — L97.513 DIABETIC ULCER OF TOE OF RIGHT FOOT ASSOCIATED WITH TYPE 2 DIABETES MELLITUS, WITH NECROSIS OF MUSCLE (HCC): Primary | ICD-10-CM

## 2017-09-08 ENCOUNTER — HOSPITAL ENCOUNTER (OUTPATIENT)
Dept: WOUND CARE | Age: 47
Discharge: HOME | End: 2017-09-08
Admitting: INTERNAL MEDICINE

## 2017-09-08 VITALS
TEMPERATURE: 98.2 F | DIASTOLIC BLOOD PRESSURE: 75 MMHG | SYSTOLIC BLOOD PRESSURE: 108 MMHG | RESPIRATION RATE: 16 BRPM | HEART RATE: 66 BPM

## 2017-09-08 DIAGNOSIS — E11.621 TYPE 2 DIABETES MELLITUS WITH RIGHT DIABETIC FOOT ULCER (HCC): Primary | ICD-10-CM

## 2017-09-08 DIAGNOSIS — L97.519 TYPE 2 DIABETES MELLITUS WITH RIGHT DIABETIC FOOT ULCER (HCC): Primary | ICD-10-CM

## 2017-09-08 DIAGNOSIS — L97.512 ULCER OF RIGHT FOOT WITH FAT LAYER EXPOSED (HCC): ICD-10-CM

## 2017-09-11 ENCOUNTER — HOSPITAL ENCOUNTER (OUTPATIENT)
Dept: WOUND CARE | Age: 47
Discharge: HOME | End: 2017-09-11
Attending: PODIATRIST | Admitting: PODIATRIST

## 2017-09-11 ENCOUNTER — HOSPITAL ENCOUNTER (OUTPATIENT)
Dept: WOUND CARE | Age: 47
Discharge: HOME | End: 2017-09-11
Attending: INTERNAL MEDICINE | Admitting: INTERNAL MEDICINE

## 2017-09-11 VITALS
RESPIRATION RATE: 16 BRPM | DIASTOLIC BLOOD PRESSURE: 71 MMHG | TEMPERATURE: 98.4 F | HEART RATE: 79 BPM | SYSTOLIC BLOOD PRESSURE: 117 MMHG

## 2017-09-11 VITALS
SYSTOLIC BLOOD PRESSURE: 124 MMHG | DIASTOLIC BLOOD PRESSURE: 82 MMHG | HEIGHT: 72 IN | WEIGHT: 267 LBS | BODY MASS INDEX: 36.16 KG/M2 | HEART RATE: 76 BPM | TEMPERATURE: 96.8 F | RESPIRATION RATE: 16 BRPM

## 2017-09-11 DIAGNOSIS — L97.512 ULCER OF RIGHT FOOT WITH FAT LAYER EXPOSED (HCC): ICD-10-CM

## 2017-09-11 DIAGNOSIS — E11.621 DIABETIC ULCER OF TOE OF RIGHT FOOT ASSOCIATED WITH TYPE 2 DIABETES MELLITUS, WITH NECROSIS OF MUSCLE (HCC): Primary | ICD-10-CM

## 2017-09-11 DIAGNOSIS — E11.621 DIABETIC ULCER OF TOE OF RIGHT FOOT ASSOCIATED WITH TYPE 2 DIABETES MELLITUS, WITH NECROSIS OF MUSCLE (HCC): ICD-10-CM

## 2017-09-11 DIAGNOSIS — L97.513 DIABETIC ULCER OF TOE OF RIGHT FOOT ASSOCIATED WITH TYPE 2 DIABETES MELLITUS, WITH NECROSIS OF MUSCLE (HCC): Primary | ICD-10-CM

## 2017-09-11 DIAGNOSIS — E11.621 TYPE 2 DIABETES MELLITUS WITH RIGHT DIABETIC FOOT ULCER (HCC): ICD-10-CM

## 2017-09-11 DIAGNOSIS — L97.513 DIABETIC ULCER OF TOE OF RIGHT FOOT ASSOCIATED WITH TYPE 2 DIABETES MELLITUS, WITH NECROSIS OF MUSCLE (HCC): ICD-10-CM

## 2017-09-11 DIAGNOSIS — E11.610 CHARCOT'S JOINT OF FOOT IN TYPE 2 DIABETES MELLITUS (HCC): Primary | ICD-10-CM

## 2017-09-11 DIAGNOSIS — E08.29: ICD-10-CM

## 2017-09-11 DIAGNOSIS — L97.519 TYPE 2 DIABETES MELLITUS WITH RIGHT DIABETIC FOOT ULCER (HCC): ICD-10-CM

## 2017-09-11 DIAGNOSIS — S92.354D CLOSED NONDISPLACED FRACTURE OF FIFTH METATARSAL BONE OF RIGHT FOOT WITH ROUTINE HEALING: ICD-10-CM

## 2017-09-11 PROCEDURE — 99183 HYPERBARIC OXYGEN THERAPY: CPT | Performed by: INTERNAL MEDICINE

## 2017-09-12 ENCOUNTER — HOSPITAL ENCOUNTER (OUTPATIENT)
Dept: WOUND CARE | Age: 47
Discharge: HOME | End: 2017-09-12
Admitting: INTERNAL MEDICINE

## 2017-09-12 VITALS
SYSTOLIC BLOOD PRESSURE: 120 MMHG | HEART RATE: 71 BPM | RESPIRATION RATE: 16 BRPM | DIASTOLIC BLOOD PRESSURE: 81 MMHG | TEMPERATURE: 98.1 F

## 2017-09-12 DIAGNOSIS — E11.621 DIABETIC ULCER OF TOE OF RIGHT FOOT ASSOCIATED WITH TYPE 2 DIABETES MELLITUS, WITH NECROSIS OF MUSCLE (HCC): Primary | ICD-10-CM

## 2017-09-12 DIAGNOSIS — L97.513 DIABETIC ULCER OF TOE OF RIGHT FOOT ASSOCIATED WITH TYPE 2 DIABETES MELLITUS, WITH NECROSIS OF MUSCLE (HCC): Primary | ICD-10-CM

## 2017-09-13 ENCOUNTER — HOSPITAL ENCOUNTER (OUTPATIENT)
Dept: WOUND CARE | Age: 47
Discharge: HOME | End: 2017-09-13
Admitting: SURGERY

## 2017-09-13 VITALS
DIASTOLIC BLOOD PRESSURE: 73 MMHG | HEART RATE: 81 BPM | SYSTOLIC BLOOD PRESSURE: 112 MMHG | TEMPERATURE: 98 F | RESPIRATION RATE: 16 BRPM

## 2017-09-13 DIAGNOSIS — L97.513 DIABETIC ULCER OF TOE OF RIGHT FOOT ASSOCIATED WITH TYPE 2 DIABETES MELLITUS, WITH NECROSIS OF MUSCLE (HCC): Primary | ICD-10-CM

## 2017-09-13 DIAGNOSIS — E11.621 DIABETIC ULCER OF TOE OF RIGHT FOOT ASSOCIATED WITH TYPE 2 DIABETES MELLITUS, WITH NECROSIS OF MUSCLE (HCC): Primary | ICD-10-CM

## 2017-09-13 PROCEDURE — 99183 HYPERBARIC OXYGEN THERAPY: CPT | Performed by: INTERNAL MEDICINE

## 2017-09-14 ENCOUNTER — HOSPITAL ENCOUNTER (OUTPATIENT)
Dept: WOUND CARE | Age: 47
Discharge: HOME | End: 2017-09-14
Admitting: INTERNAL MEDICINE

## 2017-09-14 VITALS
DIASTOLIC BLOOD PRESSURE: 79 MMHG | RESPIRATION RATE: 16 BRPM | HEART RATE: 71 BPM | TEMPERATURE: 98.6 F | SYSTOLIC BLOOD PRESSURE: 126 MMHG

## 2017-09-14 DIAGNOSIS — L97.512 ULCER OF RIGHT FOOT WITH FAT LAYER EXPOSED (HCC): ICD-10-CM

## 2017-09-14 DIAGNOSIS — L97.519 TYPE 2 DIABETES MELLITUS WITH RIGHT DIABETIC FOOT ULCER (HCC): Primary | ICD-10-CM

## 2017-09-14 DIAGNOSIS — E11.621 TYPE 2 DIABETES MELLITUS WITH RIGHT DIABETIC FOOT ULCER (HCC): Primary | ICD-10-CM

## 2017-09-15 ENCOUNTER — HOSPITAL ENCOUNTER (OUTPATIENT)
Dept: WOUND CARE | Age: 47
Discharge: HOME | End: 2017-09-15
Admitting: INTERNAL MEDICINE

## 2017-09-15 VITALS
RESPIRATION RATE: 16 BRPM | HEART RATE: 71 BPM | SYSTOLIC BLOOD PRESSURE: 122 MMHG | DIASTOLIC BLOOD PRESSURE: 83 MMHG | TEMPERATURE: 98.4 F

## 2017-09-15 DIAGNOSIS — L97.513 DIABETIC ULCER OF TOE OF RIGHT FOOT ASSOCIATED WITH TYPE 2 DIABETES MELLITUS, WITH NECROSIS OF MUSCLE (HCC): Primary | ICD-10-CM

## 2017-09-15 DIAGNOSIS — E11.621 DIABETIC ULCER OF TOE OF RIGHT FOOT ASSOCIATED WITH TYPE 2 DIABETES MELLITUS, WITH NECROSIS OF MUSCLE (HCC): Primary | ICD-10-CM

## 2017-09-18 ENCOUNTER — HOSPITAL ENCOUNTER (OUTPATIENT)
Dept: WOUND CARE | Age: 47
Discharge: HOME | End: 2017-09-18
Attending: PODIATRIST | Admitting: PODIATRIST

## 2017-09-18 VITALS
SYSTOLIC BLOOD PRESSURE: 101 MMHG | RESPIRATION RATE: 16 BRPM | HEART RATE: 87 BPM | TEMPERATURE: 97.6 F | DIASTOLIC BLOOD PRESSURE: 65 MMHG

## 2017-09-18 DIAGNOSIS — S92.354D CLOSED NONDISPLACED FRACTURE OF FIFTH METATARSAL BONE OF RIGHT FOOT WITH ROUTINE HEALING: ICD-10-CM

## 2017-09-18 DIAGNOSIS — L97.513 DIABETIC ULCER OF TOE OF RIGHT FOOT ASSOCIATED WITH TYPE 2 DIABETES MELLITUS, WITH NECROSIS OF MUSCLE (HCC): ICD-10-CM

## 2017-09-18 DIAGNOSIS — E11.621 TYPE 2 DIABETES MELLITUS WITH RIGHT DIABETIC FOOT ULCER (HCC): ICD-10-CM

## 2017-09-18 DIAGNOSIS — E11.621 DIABETIC ULCER OF TOE OF RIGHT FOOT ASSOCIATED WITH TYPE 2 DIABETES MELLITUS, WITH NECROSIS OF MUSCLE (HCC): ICD-10-CM

## 2017-09-18 DIAGNOSIS — E11.610 CHARCOT'S JOINT OF FOOT IN TYPE 2 DIABETES MELLITUS (HCC): Primary | ICD-10-CM

## 2017-09-18 DIAGNOSIS — L97.519 TYPE 2 DIABETES MELLITUS WITH RIGHT DIABETIC FOOT ULCER (HCC): ICD-10-CM

## 2017-09-18 DIAGNOSIS — L97.512 ULCER OF RIGHT FOOT WITH FAT LAYER EXPOSED (HCC): ICD-10-CM

## 2017-10-01 ENCOUNTER — HOSPITAL ENCOUNTER (OUTPATIENT)
Dept: WOUND CARE | Age: 47
Discharge: HOME | End: 2017-10-31
Attending: INTERNAL MEDICINE | Admitting: INTERNAL MEDICINE

## 2017-10-11 LAB
ALBUMIN ELP: NEGATIVE MG/DL
APPEARANCE: CLEAR
BACTERIA: NORMAL
BILIRUBIN URINE: NEGATIVE
BLOOD, URINE: ABNORMAL
BUN / CREAT RATIO: 18 (CALC) (ref 7–25)
BUN BLDV-MCNC: 11 MG/DL (ref 3–29)
CALCIUM SERPL-MCNC: 9.3 MG/DL (ref 8.5–10.5)
CASTS: NORMAL
CHLORIDE BLD-SCNC: 96 MEQ/L (ref 96–110)
CO2: 32 MEQ/L (ref 19–32)
COLOR: YELLOW
CREAT SERPL-MCNC: 0.6 MG/DL
CREATININE, RANDOM: 87.8 MG/DL
CRYSTALS: NORMAL
EPITHELIAL CELLS: NORMAL HPF
GFR SERPL CREATININE-BSD FRML MDRD: 121 ML/MIN/1.73M2
GLUCOSE BLD-MCNC: 120 MG/DL
GLUCOSE BLD-MCNC: 50 MG/DL
KETONES, URINE: NEGATIVE MG/DL
LEUKOCYTE ESTERASE, URINE: NEGATIVE
MAGNESIUM: 1.5 MG/DL (ref 1.4–2.5)
NITRITE, URINE: NEGATIVE
PH UA: 6.5 (ref 4.5–8)
POTASSIUM SERPL-SCNC: 4.1 MEQ/L (ref 3.4–5.3)
PROTEIN, URINE, RANDOM: 12.2 MG/DL
PROTEIN/CREAT RATIO URINE RAN: 0.1 MG/MG CREAT (ref 0.02–0.17)
RBC URINE: NORMAL HPF (ref 0–5)
SODIUM BLD-SCNC: 139 MEQ/L (ref 135–148)
SPECIFIC GRAVITY UA: 1.01 (ref 1–1.03)
UROBILINOGEN, URINE: <2 MG/DL (ref 0–2)
WBC UA: NORMAL HPF (ref 0–5)

## 2018-03-19 ENCOUNTER — OFFICE VISIT (OUTPATIENT)
Dept: FAMILY MEDICINE CLINIC | Age: 48
End: 2018-03-19

## 2018-03-19 VITALS
DIASTOLIC BLOOD PRESSURE: 80 MMHG | HEART RATE: 96 BPM | WEIGHT: 283 LBS | RESPIRATION RATE: 16 BRPM | BODY MASS INDEX: 38.33 KG/M2 | SYSTOLIC BLOOD PRESSURE: 128 MMHG | HEIGHT: 72 IN

## 2018-03-19 DIAGNOSIS — E11.610 CHARCOT'S JOINT OF FOOT IN TYPE 2 DIABETES MELLITUS (HCC): ICD-10-CM

## 2018-03-19 DIAGNOSIS — A41.9 SEVERE SEPSIS (HCC): ICD-10-CM

## 2018-03-19 DIAGNOSIS — I10 ESSENTIAL HYPERTENSION: ICD-10-CM

## 2018-03-19 DIAGNOSIS — E08.29: ICD-10-CM

## 2018-03-19 DIAGNOSIS — L40.9 PSORIASIS: ICD-10-CM

## 2018-03-19 DIAGNOSIS — Z23 NEED FOR PROPHYLACTIC VACCINATION AGAINST DIPHTHERIA-TETANUS-PERTUSSIS (DTP): ICD-10-CM

## 2018-03-19 DIAGNOSIS — Z23 NEED FOR PROPHYLACTIC VACCINATION AGAINST STREPTOCOCCUS PNEUMONIAE (PNEUMOCOCCUS): ICD-10-CM

## 2018-03-19 DIAGNOSIS — R65.20 SEVERE SEPSIS (HCC): ICD-10-CM

## 2018-03-19 DIAGNOSIS — Z00.00 ROUTINE HEALTH MAINTENANCE: Primary | ICD-10-CM

## 2018-03-19 DIAGNOSIS — Z00.00 ROUTINE HEALTH MAINTENANCE: ICD-10-CM

## 2018-03-19 LAB
CREATININE URINE POCT: 100
HCT VFR BLD CALC: 44.4 % (ref 40.5–52.5)
HEMOGLOBIN: 15.1 G/DL (ref 13.5–17.5)
MCH RBC QN AUTO: 27.9 PG (ref 26–34)
MCHC RBC AUTO-ENTMCNC: 34 G/DL (ref 31–36)
MCV RBC AUTO: 82.2 FL (ref 80–100)
MICROALBUMIN/CREAT 24H UR: 30 MG/G{CREAT}
MICROALBUMIN/CREAT UR-RTO: <30
PDW BLD-RTO: 14.1 % (ref 12.4–15.4)
PLATELET # BLD: 202 K/UL (ref 135–450)
PMV BLD AUTO: 8.8 FL (ref 5–10.5)
RBC # BLD: 5.4 M/UL (ref 4.2–5.9)
WBC # BLD: 6.6 K/UL (ref 4–11)

## 2018-03-19 PROCEDURE — 90732 PPSV23 VACC 2 YRS+ SUBQ/IM: CPT | Performed by: NURSE PRACTITIONER

## 2018-03-19 PROCEDURE — 82044 UR ALBUMIN SEMIQUANTITATIVE: CPT | Performed by: NURSE PRACTITIONER

## 2018-03-19 PROCEDURE — 90471 IMMUNIZATION ADMIN: CPT | Performed by: NURSE PRACTITIONER

## 2018-03-19 PROCEDURE — 90472 IMMUNIZATION ADMIN EACH ADD: CPT | Performed by: NURSE PRACTITIONER

## 2018-03-19 PROCEDURE — 90715 TDAP VACCINE 7 YRS/> IM: CPT | Performed by: NURSE PRACTITIONER

## 2018-03-19 PROCEDURE — 99386 PREV VISIT NEW AGE 40-64: CPT | Performed by: NURSE PRACTITIONER

## 2018-03-19 ASSESSMENT — PATIENT HEALTH QUESTIONNAIRE - PHQ9
2. FEELING DOWN, DEPRESSED OR HOPELESS: 0
1. LITTLE INTEREST OR PLEASURE IN DOING THINGS: 0
SUM OF ALL RESPONSES TO PHQ9 QUESTIONS 1 & 2: 0
SUM OF ALL RESPONSES TO PHQ QUESTIONS 1-9: 0

## 2018-03-19 ASSESSMENT — ENCOUNTER SYMPTOMS
VOMITING: 0
NAUSEA: 0
WHEEZING: 0
CHEST TIGHTNESS: 0
ABDOMINAL PAIN: 0
COUGH: 0
DIARRHEA: 0
SHORTNESS OF BREATH: 0

## 2018-03-19 NOTE — ASSESSMENT & PLAN NOTE
Uses foam occaisonal  Has not needed steroid lotion for awhil  Diagnosed 2 years ago  He is in process of finding new dermatologist due to insurance changes

## 2018-03-19 NOTE — PROGRESS NOTES
SUBJECTIVE:    Jona Max  1970  52 y.o.  male      Chief Complaint   Patient presents with    Established New Doctor     47yr old male in office today for est care    Other     pt scratched himself on the side of the face yesterday with a glen nail; started to bleed for a little bit then stopped; pt was told he should get a tetnus shot    Medication Check     pt stated at his old PCP's office they gave him samples of Januvia because it was too expensive at the pharmacy     HPI     Patient scratched his face with a glen nail yesterday. States it's been greater than 10 years since he got a tetanus. No current bleeding. No drainage, swelling, redness. Patient states that he was hospitalized in the fall 2016 due to sepsis. They're not sure completely sure what caused it. Could be due to sulfa allergy or a possible other cause. He did state that he follows with nephrology still due to kidney failure during this time. Follows with nephrology at least once a year.     Allergies   Allergen Reactions    Bactrim [Sulfamethoxazole-Trimethoprim] Swelling, Dermatitis and Other (See Comments)     Causes vasculitis    Sulfa Antibiotics Anaphylaxis       Past Medical History:   Diagnosis Date    Blood circulation, collateral     Callus of foot     Charcot's joint of foot     Charcot's joint of foot in type 2 diabetes mellitus ([address] 2/6/2014    Closed nondisplaced fracture of fifth metatarsal bone of right foot with routine healing 8/28/2017    Diabetes mellitus ([address]     Hyperlipidemia     Hypertension     Ischemic ulcer of left foot, limited to breakdown of skin ([address] 7/12/2016    Leg edema, right 8/25/2017    Neuropathy ([address]     Ulcer of right foot with fat layer exposed ([address] 11/28/2016    Ulcer of right foot with necrosis of muscle, Hannon Grade III 8/11/2016    WD-Chronic ulcer of left foot with fat layer exposed ([address] 8/11/2016    WD-Chronic ulcer of toe of right foot, limited to breakdown of skin ([facility] 7/12/2016    WD-Diabetic ulcer of left foot associated with type 2 diabetes mellitus ([facility] 8/11/2016    WD-Diabetic ulcer of right lateral foot associated with type 2 diabetes mellitus, with fat layer exposed ([facility]     WD-Type 2 diabetes mellitus with right diabetic foot ulcer ([facility] 7/12/2016     Past Surgical History:   Procedure Laterality Date    ABDOMEN SURGERY      FOOT SURGERY  7/2012    charcot    FRACTURE SURGERY Right     foot     Social History     Social History    Marital status:      Spouse name: N/A    Number of children: N/A    Years of education: N/A     Social History Main Topics    Smoking status: Never Smoker    Smokeless tobacco: Current User     Types: Chew    Alcohol use Yes      Comment: limited    Drug use: No    Sexual activity: Not Asked     Other Topics Concern    None     Social History Narrative    None         Problem List Items Addressed This Visit     Charcot's joint of foot in type 2 diabetes mellitus ([facility]     Charcot foot  Diagnosed 6-7 years ago  Sees podiatry every 3-6 months  Last appointment was November 2017    Occasional swelling  He has had 6 surgeries in 5 years on the foot  States he has a broken angel in his foot--not getting fixed due to surgery for fixing being wors claude surgery to fix    Sees Dr. Merrill Nageotte (Completed)    Severe sepsis [facility])     Patient states that he was hospitalized in the fall 2016 due to sepsis. They're not sure completely sure what caused it. Could be due to sulfa allergy or a possible other cause. He did state that he follows with nephrology still due to kidney failure during this time. Follows with nephrology at least once a year.          DM (diabetes mellitus) ([facility]     Diagnosed 7-8 years ago  He is on metformin  States he was getting januvia samples from previous provider due to cost    Neuropathy in both feet  \"next to no feeling in right foot,

## 2018-03-20 LAB
A/G RATIO: 1.4 (ref 1.1–2.2)
ALBUMIN SERPL-MCNC: 4.2 G/DL (ref 3.4–5)
ALP BLD-CCNC: 73 U/L (ref 40–129)
ALT SERPL-CCNC: 33 U/L (ref 10–40)
ANION GAP SERPL CALCULATED.3IONS-SCNC: 16 MMOL/L (ref 3–16)
AST SERPL-CCNC: 26 U/L (ref 15–37)
BILIRUB SERPL-MCNC: 0.4 MG/DL (ref 0–1)
BUN BLDV-MCNC: 15 MG/DL (ref 7–20)
CALCIUM SERPL-MCNC: 9.9 MG/DL (ref 8.3–10.6)
CHLORIDE BLD-SCNC: 94 MMOL/L (ref 99–110)
CHOLESTEROL, TOTAL: 207 MG/DL (ref 0–199)
CO2: 27 MMOL/L (ref 21–32)
CREAT SERPL-MCNC: 0.7 MG/DL (ref 0.9–1.3)
ESTIMATED AVERAGE GLUCOSE: 211.6 MG/DL
GFR AFRICAN AMERICAN: >60
GFR NON-AFRICAN AMERICAN: >60
GLOBULIN: 2.9 G/DL
GLUCOSE BLD-MCNC: 286 MG/DL (ref 70–99)
HBA1C MFR BLD: 9 %
HDLC SERPL-MCNC: 28 MG/DL (ref 40–60)
HIV AG/AB: NORMAL
HIV ANTIGEN: NORMAL
HIV-1 ANTIBODY: NORMAL
HIV-2 AB: NORMAL
LDL CHOLESTEROL CALCULATED: 120 MG/DL
POTASSIUM SERPL-SCNC: 4.2 MMOL/L (ref 3.5–5.1)
SODIUM BLD-SCNC: 137 MMOL/L (ref 136–145)
TOTAL PROTEIN: 7.1 G/DL (ref 6.4–8.2)
TRIGL SERPL-MCNC: 297 MG/DL (ref 0–150)
TSH REFLEX: 1.39 UIU/ML (ref 0.27–4.2)
VLDLC SERPL CALC-MCNC: 59 MG/DL

## 2018-03-20 RX ORDER — ATORVASTATIN CALCIUM 80 MG/1
80 TABLET, FILM COATED ORAL DAILY
Qty: 30 TABLET | Refills: 5 | Status: SHIPPED | OUTPATIENT
Start: 2018-03-20 | End: 2018-04-12 | Stop reason: SDUPTHER

## 2018-04-12 ENCOUNTER — OFFICE VISIT (OUTPATIENT)
Dept: FAMILY MEDICINE CLINIC | Age: 48
End: 2018-04-12

## 2018-04-12 VITALS
RESPIRATION RATE: 18 BRPM | SYSTOLIC BLOOD PRESSURE: 112 MMHG | DIASTOLIC BLOOD PRESSURE: 76 MMHG | HEIGHT: 72 IN | BODY MASS INDEX: 37.76 KG/M2 | WEIGHT: 278.8 LBS | HEART RATE: 82 BPM

## 2018-04-12 DIAGNOSIS — E11.610 CHARCOT'S JOINT OF FOOT IN TYPE 2 DIABETES MELLITUS (HCC): ICD-10-CM

## 2018-04-12 DIAGNOSIS — L40.9 PSORIASIS: ICD-10-CM

## 2018-04-12 DIAGNOSIS — I10 ESSENTIAL HYPERTENSION: ICD-10-CM

## 2018-04-12 DIAGNOSIS — E08.29: Primary | ICD-10-CM

## 2018-04-12 LAB — HBA1C MFR BLD: 9.4 %

## 2018-04-12 PROCEDURE — 99214 OFFICE O/P EST MOD 30 MIN: CPT | Performed by: NURSE PRACTITIONER

## 2018-04-12 PROCEDURE — 83036 HEMOGLOBIN GLYCOSYLATED A1C: CPT | Performed by: NURSE PRACTITIONER

## 2018-04-12 RX ORDER — GABAPENTIN 300 MG/1
300 CAPSULE ORAL 3 TIMES DAILY
Qty: 270 CAPSULE | Status: CANCELLED | OUTPATIENT
Start: 2018-04-12

## 2018-04-12 RX ORDER — ATORVASTATIN CALCIUM 80 MG/1
80 TABLET, FILM COATED ORAL DAILY
Qty: 90 TABLET | Refills: 3 | Status: SHIPPED | OUTPATIENT
Start: 2018-04-12 | End: 2019-03-26 | Stop reason: SDUPTHER

## 2018-04-12 RX ORDER — AMLODIPINE BESYLATE 2.5 MG/1
5 TABLET ORAL DAILY
Qty: 90 TABLET | Refills: 3 | Status: SHIPPED | OUTPATIENT
Start: 2018-04-12 | End: 2018-10-25 | Stop reason: CLARIF

## 2018-04-12 RX ORDER — GABAPENTIN 600 MG/1
300 TABLET ORAL 3 TIMES DAILY
Qty: 45 TABLET | Refills: 0 | Status: SHIPPED | OUTPATIENT
Start: 2018-04-12 | End: 2018-08-13 | Stop reason: CLARIF

## 2018-04-12 RX ORDER — BUSPIRONE HYDROCHLORIDE 7.5 MG/1
7.5 TABLET ORAL 2 TIMES DAILY
Qty: 180 TABLET | Refills: 1 | Status: SHIPPED | OUTPATIENT
Start: 2018-04-12 | End: 2018-09-27 | Stop reason: SDUPTHER

## 2018-04-12 RX ORDER — HYDROCHLOROTHIAZIDE 25 MG/1
25 TABLET ORAL DAILY
Qty: 90 TABLET | Refills: 3 | Status: SHIPPED | OUTPATIENT
Start: 2018-04-12 | End: 2019-04-15 | Stop reason: SDUPTHER

## 2018-04-12 RX ORDER — LISINOPRIL 20 MG/1
20 TABLET ORAL 2 TIMES DAILY
Qty: 180 TABLET | Refills: 3 | Status: SHIPPED | OUTPATIENT
Start: 2018-04-12 | End: 2019-04-15 | Stop reason: SDUPTHER

## 2018-04-12 RX ORDER — ESCITALOPRAM OXALATE 10 MG/1
10 TABLET ORAL DAILY
Qty: 90 TABLET | Refills: 3 | Status: SHIPPED | OUTPATIENT
Start: 2018-04-12 | End: 2019-06-24 | Stop reason: SDUPTHER

## 2018-04-12 RX ORDER — CLONIDINE HYDROCHLORIDE 0.1 MG/1
0.1 TABLET ORAL 2 TIMES DAILY
Qty: 180 TABLET | Refills: 3 | Status: SHIPPED | OUTPATIENT
Start: 2018-04-12 | End: 2019-08-04 | Stop reason: SDUPTHER

## 2018-04-12 ASSESSMENT — ENCOUNTER SYMPTOMS
VOMITING: 0
DIARRHEA: 0
COUGH: 0
CONSTIPATION: 0
ABDOMINAL PAIN: 0
WHEEZING: 0
NAUSEA: 0
SHORTNESS OF BREATH: 0
CHEST TIGHTNESS: 0

## 2018-04-16 ENCOUNTER — TELEPHONE (OUTPATIENT)
Dept: FAMILY MEDICINE CLINIC | Age: 48
End: 2018-04-16

## 2018-04-23 LAB — DIABETIC RETINOPATHY: NORMAL

## 2018-06-13 ENCOUNTER — OFFICE VISIT (OUTPATIENT)
Dept: FAMILY MEDICINE CLINIC | Age: 48
End: 2018-06-13

## 2018-06-13 VITALS
BODY MASS INDEX: 37.16 KG/M2 | RESPIRATION RATE: 16 BRPM | SYSTOLIC BLOOD PRESSURE: 128 MMHG | WEIGHT: 274 LBS | HEART RATE: 80 BPM | DIASTOLIC BLOOD PRESSURE: 74 MMHG

## 2018-06-13 DIAGNOSIS — L97.519 TYPE 2 DIABETES MELLITUS WITH RIGHT DIABETIC FOOT ULCER (HCC): ICD-10-CM

## 2018-06-13 DIAGNOSIS — E11.621 TYPE 2 DIABETES MELLITUS WITH RIGHT DIABETIC FOOT ULCER (HCC): ICD-10-CM

## 2018-06-13 DIAGNOSIS — E08.29: Primary | ICD-10-CM

## 2018-06-13 DIAGNOSIS — E11.610 CHARCOT'S JOINT OF FOOT IN TYPE 2 DIABETES MELLITUS (HCC): ICD-10-CM

## 2018-06-13 DIAGNOSIS — L97.513 DIABETIC ULCER OF TOE OF RIGHT FOOT ASSOCIATED WITH TYPE 2 DIABETES MELLITUS, WITH NECROSIS OF MUSCLE (HCC): ICD-10-CM

## 2018-06-13 DIAGNOSIS — I10 ESSENTIAL HYPERTENSION: ICD-10-CM

## 2018-06-13 DIAGNOSIS — E11.621 DIABETIC ULCER OF TOE OF RIGHT FOOT ASSOCIATED WITH TYPE 2 DIABETES MELLITUS, WITH NECROSIS OF MUSCLE (HCC): ICD-10-CM

## 2018-06-13 DIAGNOSIS — L40.9 PSORIASIS: ICD-10-CM

## 2018-06-13 DIAGNOSIS — L97.512 ULCER OF RIGHT FOOT WITH FAT LAYER EXPOSED (HCC): ICD-10-CM

## 2018-06-13 LAB — HBA1C MFR BLD: 7.8 %

## 2018-06-13 PROCEDURE — 83036 HEMOGLOBIN GLYCOSYLATED A1C: CPT | Performed by: NURSE PRACTITIONER

## 2018-06-13 PROCEDURE — 99214 OFFICE O/P EST MOD 30 MIN: CPT | Performed by: NURSE PRACTITIONER

## 2018-06-13 RX ORDER — ADALIMUMAB 40MG/0.8ML
KIT SUBCUTANEOUS
COMMUNITY
Start: 2018-05-23 | End: 2022-10-05

## 2018-06-13 ASSESSMENT — ENCOUNTER SYMPTOMS
CONSTIPATION: 0
CHEST TIGHTNESS: 0
NAUSEA: 0
VOMITING: 0
ABDOMINAL PAIN: 0
DIARRHEA: 0
WHEEZING: 0
COUGH: 0
SHORTNESS OF BREATH: 0

## 2018-07-18 ENCOUNTER — OFFICE VISIT (OUTPATIENT)
Dept: FAMILY MEDICINE CLINIC | Age: 48
End: 2018-07-18

## 2018-07-18 VITALS
OXYGEN SATURATION: 98 % | HEART RATE: 89 BPM | RESPIRATION RATE: 16 BRPM | SYSTOLIC BLOOD PRESSURE: 104 MMHG | BODY MASS INDEX: 36.56 KG/M2 | WEIGHT: 269.6 LBS | DIASTOLIC BLOOD PRESSURE: 66 MMHG

## 2018-07-18 DIAGNOSIS — L97.519 TYPE 2 DIABETES MELLITUS WITH RIGHT DIABETIC FOOT ULCER (HCC): ICD-10-CM

## 2018-07-18 DIAGNOSIS — L97.512 ULCER OF RIGHT FOOT WITH FAT LAYER EXPOSED (HCC): ICD-10-CM

## 2018-07-18 DIAGNOSIS — L97.513 DIABETIC ULCER OF TOE OF RIGHT FOOT ASSOCIATED WITH TYPE 2 DIABETES MELLITUS, WITH NECROSIS OF MUSCLE (HCC): ICD-10-CM

## 2018-07-18 DIAGNOSIS — E08.29: ICD-10-CM

## 2018-07-18 DIAGNOSIS — E11.621 TYPE 2 DIABETES MELLITUS WITH RIGHT DIABETIC FOOT ULCER (HCC): ICD-10-CM

## 2018-07-18 DIAGNOSIS — E11.610 CHARCOT'S JOINT OF FOOT IN TYPE 2 DIABETES MELLITUS (HCC): ICD-10-CM

## 2018-07-18 DIAGNOSIS — I10 ESSENTIAL HYPERTENSION: ICD-10-CM

## 2018-07-18 DIAGNOSIS — M70.61 TROCHANTERIC BURSITIS OF RIGHT HIP: Primary | ICD-10-CM

## 2018-07-18 DIAGNOSIS — E11.621 DIABETIC ULCER OF TOE OF RIGHT FOOT ASSOCIATED WITH TYPE 2 DIABETES MELLITUS, WITH NECROSIS OF MUSCLE (HCC): ICD-10-CM

## 2018-07-18 LAB — HBA1C MFR BLD: 7.4 %

## 2018-07-18 PROCEDURE — 83036 HEMOGLOBIN GLYCOSYLATED A1C: CPT | Performed by: NURSE PRACTITIONER

## 2018-07-18 PROCEDURE — 99214 OFFICE O/P EST MOD 30 MIN: CPT | Performed by: NURSE PRACTITIONER

## 2018-07-18 ASSESSMENT — ENCOUNTER SYMPTOMS
COUGH: 0
DIARRHEA: 0
VOMITING: 0
SHORTNESS OF BREATH: 0
ABDOMINAL PAIN: 0
CHEST TIGHTNESS: 0
WHEEZING: 0
NAUSEA: 0
CONSTIPATION: 0

## 2018-07-18 NOTE — PATIENT INSTRUCTIONS
your top knee, but keep your feet together. Do not let your hips roll back. Your legs should open up like a clamshell. 3. Hold for 6 seconds. 4. Slowly lower your knee back down. Rest for 10 seconds. 5. Repeat 8 to 12 times. Follow-up care is a key part of your treatment and safety. Be sure to make and go to all appointments, and call your doctor if you are having problems. It's also a good idea to know your test results and keep a list of the medicines you take. Where can you learn more? Go to [email or web address] org and sign in to your [employer] account. Enter G468 in the [employer] box to learn more about \"Hip Bursitis: Exercises. \"     If you do not have an account, please click on the \"Sign Up Now\" link. Current as of: November 29, 2017  Content Version: 11.6  © 0963-4536 [employer]. Care instructions adapted under license by [facility] ([facility]). If you have questions about a medical condition or this instruction, always ask your healthcare professional. [place] [address] any warranty or liability for your use of this information. Patient Education        Trochanteric Bursitis: Exercises  Your Care Instructions  Here are some examples of typical rehabilitation exercises for your condition. Start each exercise slowly. Ease off the exercise if you start to have pain. Your doctor or physical therapist will tell you when you can start these exercises and which ones will work best for you. How to do the exercises  Hamstring wall stretch    7. Lie on your back in a doorway, with your good leg through the open door. 8. Slide your affected leg up the wall to straighten your knee. You should feel a gentle stretch down the back of your leg. 1. Do not arch your back. 2. Do not bend either knee. 3. Keep one heel touching the floor and the other heel touching the wall. Do not point your toes. 9. Hold the stretch for at least 1 minute to begin.  Then try until you feel a gentle stretch across the front of your hip and down the front of your thigh. Your knee should be pointed directly to the ground, and not out to the side. 9. Hold the stretch for 15 to 30 seconds. 10. Repeat 2 to 4 times. Piriformis stretch    1. Lie on your back with your legs straight. 2. Lift your affected leg and bend your knee. With your opposite hand, reach across your body, and then gently pull your knee toward your opposite shoulder. 3. Hold the stretch for 15 to 30 seconds. 4. Repeat 2 to 4 times. Double knee-to-chest    1. Lie on your back with your knees bent and your feet flat on the floor. You can put a small pillow under your head and neck if it is more comfortable. 2. Bring both knees to your chest.  3. Keep your lower back pressed to the floor. Hold for 15 to 30 seconds. 4. Relax, and lower your knees to the starting position. 5. Repeat 2 to 4 times. Follow-up care is a key part of your treatment and safety. Be sure to make and go to all appointments, and call your doctor if you are having problems. It's also a good idea to know your test results and keep a list of the medicines you take. Where can you learn more? Go to https://Pixlee.[email or web address] org and sign in to your [employer] account. Enter P498 in the [employer] box to learn more about \"Trochanteric Bursitis: Exercises. \"     If you do not have an account, please click on the \"Sign Up Now\" link. Current as of: November 29, 2017  Content Version: 11.6  © 3676-3454 [employer]. Care instructions adapted under license by [facility] ([facility]). If you have questions about a medical condition or this instruction, always ask your healthcare professional. [place] [address] any warranty or liability for your use of this information.

## 2018-07-18 NOTE — PROGRESS NOTES
Nasal route daily      betamethasone valerate 0.12 % FOAM Apply topically 2 times daily as needed (Skin irritation / itching) Apply a small amount topically to affected area       No current facility-administered medications for this visit.         Past Medical History:   Diagnosis Date    Blood circulation, collateral     Callus of foot     Charcot's joint of foot     Charcot's joint of foot in type 2 diabetes mellitus ([address] 2/6/2014    Closed nondisplaced fracture of fifth metatarsal bone of right foot with routine healing 8/28/2017    Diabetes mellitus ([address]     Hyperlipidemia     Hypertension     Ischemic ulcer of left foot, limited to breakdown of skin ([address] 7/12/2016    Leg edema, right 8/25/2017    Neuropathy ([address]     Ulcer of right foot with fat layer exposed ([address] 11/28/2016    Ulcer of right foot with necrosis of muscle, Hannon Grade III 8/11/2016    WD-Chronic ulcer of left foot with fat layer exposed ([address] 8/11/2016    WD-Chronic ulcer of toe of right foot, limited to breakdown of skin ([address] 7/12/2016    WD-Diabetic ulcer of left foot associated with type 2 diabetes mellitus ([address] 8/11/2016    WD-Diabetic ulcer of right lateral foot associated with type 2 diabetes mellitus, with fat layer exposed ([address]     WD-Type 2 diabetes mellitus with right diabetic foot ulcer ([address] 7/12/2016     Past Surgical History:   Procedure Laterality Date    ABDOMEN SURGERY      FOOT SURGERY  7/2012    charcot    FRACTURE SURGERY Right     foot     Social History     Social History    Marital status:      Spouse name: N/A    Number of children: N/A    Years of education: N/A     Social History Main Topics    Smoking status: Never Smoker    Smokeless tobacco: Current User     Types: Chew    Alcohol use Yes      Comment: limited    Drug use: No    Sexual activity: Not Asked     Other Topics Concern    None     Social History Narrative    None         DM (diabetes mellitus) ([address]  Average present. Carotid bruit is not present. Cardiovascular: Normal rate, regular rhythm and normal heart sounds. Exam reveals no gallop and no friction rub. No murmur heard. Pulmonary/Chest: Effort normal and breath sounds normal. No respiratory distress. He has no wheezes. He has no rhonchi. He has no rales. Abdominal: Soft. Musculoskeletal: He exhibits no edema. Right hip: He exhibits decreased range of motion. He exhibits no bony tenderness, no swelling, no crepitus and no deformity. Tenderness to palpation of greater trochanter. Neurological: He is alert and oriented to person, place, and time. Skin: Skin is warm and dry. 1 inch ulceration to lateral right foot. No redness, swelling. Psychiatric: He has a normal mood and affect. His behavior is normal.        ASSESSMENT/PLAN:    1. Diabetes mellitus due to underlying condition with other kidney complication, unspecified long term insulin use status (HCC)  A1c further improved. Continue current medication and dietary changes. - POCT glycosylated hemoglobin (Hb A1C)    2. Essential hypertension  Continue current medication    3. WD-Type 2 diabetes mellitus with right lateral diabetic foot ulcer ([address]  Keep follow up with podiatry    4. WD-Ulcer of right foot with fat layer exposed ([address]  resolved    5. HBO-WD-Diabetic ulcer of toe of right foot associated with type 2 diabetes mellitus, with necrosis of muscle Hannon grade 3 ([address]  resolved    6. Charcot's joint of foot in type 2 diabetes mellitus ([address]  Follow up with podiatry    7. Trochanteric bursitis of right hip  Given handout of exercises. Encouraged regular physical activity. Return in about 3 months (around 10/18/2018).       (Please note that portions of this note may have been completed with a voice recognition program. Efforts were made to edit the dictations but occasionally words are mis-transcribed.)

## 2018-07-18 NOTE — ASSESSMENT & PLAN NOTE
Ulcer on right foot, started in the last month  He was picking at a callus and opened up area--difficulty healing. He has been seeing podiatrist weekly for the last 3 weeks.  Area starting to heal. No infection per podiatry

## 2018-07-18 NOTE — ASSESSMENT & PLAN NOTE
Stable  Patient denies any exertional chest pain, dyspnea, palpitations, syncope, orthopnea, edema or paroxysmal nocturnal dyspnea.

## 2018-07-18 NOTE — ASSESSMENT & PLAN NOTE
Continues to follow with podiatry. Currently seeing podiatry weekly due to ulcer on lateral aspect of right foot. Full diabetic foot exam completed at podiatry with no feeling in right foot.

## 2018-07-19 LAB — DIABETIC RETINOPATHY: NEGATIVE

## 2018-08-06 ENCOUNTER — HOSPITAL ENCOUNTER (OUTPATIENT)
Dept: WOUND CARE | Age: 48
Discharge: HOME | End: 2018-08-06
Attending: PODIATRIST | Admitting: PODIATRIST

## 2018-08-06 VITALS
RESPIRATION RATE: 16 BRPM | DIASTOLIC BLOOD PRESSURE: 69 MMHG | TEMPERATURE: 98.3 F | HEART RATE: 78 BPM | SYSTOLIC BLOOD PRESSURE: 118 MMHG

## 2018-08-06 DIAGNOSIS — E11.621 TYPE 2 DIABETES MELLITUS WITH RIGHT DIABETIC FOOT ULCER (HCC): ICD-10-CM

## 2018-08-06 DIAGNOSIS — E11.610 CHARCOT'S JOINT OF FOOT IN TYPE 2 DIABETES MELLITUS (HCC): Primary | ICD-10-CM

## 2018-08-06 DIAGNOSIS — L97.519 TYPE 2 DIABETES MELLITUS WITH RIGHT DIABETIC FOOT ULCER (HCC): ICD-10-CM

## 2018-08-06 DIAGNOSIS — L97.512 ULCER OF RIGHT FOOT WITH FAT LAYER EXPOSED (HCC): ICD-10-CM

## 2018-08-06 DIAGNOSIS — E08.29 DIABETES MELLITUS DUE TO UNDERLYING CONDITION WITH OTHER KIDNEY COMPLICATION, UNSPECIFIED WHETHER LONG TERM INSULIN USE (HCC): ICD-10-CM

## 2018-08-06 DIAGNOSIS — E11.621 DIABETIC ULCER OF TOE OF RIGHT FOOT ASSOCIATED WITH TYPE 2 DIABETES MELLITUS, WITH NECROSIS OF MUSCLE (HCC): ICD-10-CM

## 2018-08-06 DIAGNOSIS — L97.513 DIABETIC ULCER OF TOE OF RIGHT FOOT ASSOCIATED WITH TYPE 2 DIABETES MELLITUS, WITH NECROSIS OF MUSCLE (HCC): ICD-10-CM

## 2018-08-10 LAB
CULTURE: NORMAL
Lab: NORMAL
ORGANISM: NORMAL
REPORT STATUS: NORMAL
SPECIMEN: NORMAL

## 2018-08-13 ENCOUNTER — HOSPITAL ENCOUNTER (OUTPATIENT)
Dept: WOUND CARE | Age: 48
Discharge: HOME | End: 2018-08-13
Attending: PODIATRIST | Admitting: PODIATRIST

## 2018-08-13 VITALS
RESPIRATION RATE: 16 BRPM | TEMPERATURE: 96.8 F | HEART RATE: 71 BPM | DIASTOLIC BLOOD PRESSURE: 84 MMHG | SYSTOLIC BLOOD PRESSURE: 128 MMHG

## 2018-08-13 DIAGNOSIS — B07.0 PLANTAR WART, LEFT FOOT: ICD-10-CM

## 2018-08-13 DIAGNOSIS — L97.519 TYPE 2 DIABETES MELLITUS WITH RIGHT DIABETIC FOOT ULCER (HCC): ICD-10-CM

## 2018-08-13 DIAGNOSIS — E11.621 TYPE 2 DIABETES MELLITUS WITH RIGHT DIABETIC FOOT ULCER (HCC): ICD-10-CM

## 2018-08-13 DIAGNOSIS — L97.513 DIABETIC ULCER OF TOE OF RIGHT FOOT ASSOCIATED WITH TYPE 2 DIABETES MELLITUS, WITH NECROSIS OF MUSCLE (HCC): ICD-10-CM

## 2018-08-13 DIAGNOSIS — E11.621 DIABETIC ULCER OF TOE OF RIGHT FOOT ASSOCIATED WITH TYPE 2 DIABETES MELLITUS, WITH NECROSIS OF MUSCLE (HCC): ICD-10-CM

## 2018-08-13 DIAGNOSIS — E11.610 CHARCOT'S JOINT OF FOOT IN TYPE 2 DIABETES MELLITUS (HCC): Primary | ICD-10-CM

## 2018-08-13 DIAGNOSIS — L97.512 ULCER OF RIGHT FOOT WITH FAT LAYER EXPOSED (HCC): ICD-10-CM

## 2018-08-13 DIAGNOSIS — L84 CALLUS OF FOOT: ICD-10-CM

## 2018-08-13 RX ORDER — GABAPENTIN 600 MG/1
300 TABLET ORAL 3 TIMES DAILY
COMMUNITY
End: 2018-10-25 | Stop reason: SDUPTHER

## 2018-08-13 RX ORDER — AMOXICILLIN AND CLAVULANATE POTASSIUM 875; 125 MG/1; MG/1
1 TABLET, FILM COATED ORAL 2 TIMES DAILY
Qty: 20 TABLET | Refills: 0 | Status: SHIPPED | OUTPATIENT
Start: 2018-08-13 | End: 2018-08-23

## 2018-08-13 RX ORDER — CIPROFLOXACIN 500 MG/1
500 TABLET, FILM COATED ORAL 2 TIMES DAILY
Qty: 20 TABLET | Refills: 0 | Status: SHIPPED | OUTPATIENT
Start: 2018-08-13 | End: 2018-08-23

## 2018-08-13 NOTE — PLAN OF CARE
Problem: Wound:  Intervention: Assess pain status  See flow sheet  Intervention: Assess wound size, appearance and drainage  See flowsheet     Goal: Will show signs of wound healing; wound closure and no evidence of infection  Will show signs of wound healing; wound closure and no evidence of infection  Outcome: Ongoing  See flow sheet

## 2018-08-13 NOTE — PROGRESS NOTES
Wound Documentation Flow Sheet    All active wounds listed below with today's date are evaluated  Wound(s)    debrided this date include # : 6     Post  Debridement Measurements:  Wound 08/06/18 Wound #6 )onset 6 weeks) Right Lateral Foot (Active)   Wound Image   8/6/2018  9:35 AM   Wound Type Wound 8/6/2018  9:44 AM   Dressing Status Clean;Dry; Intact 8/13/2018  8:52 AM   Dressing Changed Changed/New 8/13/2018  8:52 AM   Wound Cleansed Wound cleanser;Rinsed/Irrigated with saline 8/6/2018  9:44 AM   Wound Length (cm) 1 cm 8/13/2018  8:39 AM   Wound Width (cm) 1.1 cm 8/13/2018  8:39 AM   Wound Depth (cm)  0.2 8/13/2018  8:39 AM   Calculated Wound Size (cm^2) (l*w) 1.1 cm^2 8/13/2018  8:39 AM   Change in Wound Size % (l*w) 23.61 8/13/2018  8:39 AM   Distance Tunneling (cm) 0 cm 8/13/2018  8:08 AM   Tunneling Position ___ O'Clock 0 8/13/2018  8:08 AM   Undermining Starts ___ O'Clock 0 8/13/2018  8:08 AM   Undermining Ends___ O'Clock 0 8/13/2018  8:08 AM   Undermining Maxium Distance (cm) 0 8/13/2018  8:08 AM   Wound Assessment Red;Yellow 8/13/2018  8:08 AM   Drainage Amount Moderate 8/13/2018  8:08 AM   Drainage Description Serosanguinous 8/13/2018  8:08 AM   Odor None 8/13/2018  8:08 AM   Margins Defined edges; Unattached edges 8/13/2018  8:08 AM   Violeta-wound Assessment Calloused;Dark edges;Dry 8/13/2018  8:08 AM   Non-staged Wound Description Full thickness 8/13/2018  8:08 AM   [place]%Wound Bed 0 8/13/2018  8:08 AM   Red%Wound Bed 50 8/13/2018  8:08 AM   Yellow%Wound Bed 50 8/13/2018  8:08 AM   Black%Wound Bed 0 8/13/2018  8:08 AM   Purple%Wound Bed 0 8/13/2018  8:08 AM   Other%Wound Bed 0 8/13/2018  8:08 AM   Debridement per physician Subcutaneous 8/13/2018  8:39 AM   Number of days: 6       Incision 05/05/17 Foot Right (Active)   Number of days: 464       Percent of Wound(s) Debrided: approximately 100%    Total Surface Area Debrided:  1.1 sq cm     Bleeding:  Minimal    Hemostasis Achieved:  by pressure    Procedural

## 2018-08-27 ENCOUNTER — HOSPITAL ENCOUNTER (OUTPATIENT)
Dept: WOUND CARE | Age: 48
Discharge: HOME | End: 2018-08-27
Attending: PODIATRIST | Admitting: PODIATRIST

## 2018-08-27 VITALS
RESPIRATION RATE: 16 BRPM | DIASTOLIC BLOOD PRESSURE: 73 MMHG | SYSTOLIC BLOOD PRESSURE: 107 MMHG | TEMPERATURE: 98.1 F | HEART RATE: 78 BPM

## 2018-08-27 DIAGNOSIS — L97.519 TYPE 2 DIABETES MELLITUS WITH RIGHT DIABETIC FOOT ULCER (HCC): ICD-10-CM

## 2018-08-27 DIAGNOSIS — L97.512 ULCER OF RIGHT FOOT WITH FAT LAYER EXPOSED (HCC): ICD-10-CM

## 2018-08-27 DIAGNOSIS — E08.29 DIABETES MELLITUS DUE TO UNDERLYING CONDITION WITH OTHER KIDNEY COMPLICATION, UNSPECIFIED WHETHER LONG TERM INSULIN USE (HCC): ICD-10-CM

## 2018-08-27 DIAGNOSIS — E11.621 TYPE 2 DIABETES MELLITUS WITH RIGHT DIABETIC FOOT ULCER (HCC): ICD-10-CM

## 2018-08-27 DIAGNOSIS — E11.610 CHARCOT'S JOINT OF FOOT IN TYPE 2 DIABETES MELLITUS (HCC): Primary | ICD-10-CM

## 2018-08-27 DIAGNOSIS — E11.621 DIABETIC ULCER OF TOE OF RIGHT FOOT ASSOCIATED WITH TYPE 2 DIABETES MELLITUS, WITH NECROSIS OF MUSCLE (HCC): ICD-10-CM

## 2018-08-27 DIAGNOSIS — B07.0 PLANTAR WART, LEFT FOOT: ICD-10-CM

## 2018-08-27 DIAGNOSIS — L97.513 DIABETIC ULCER OF TOE OF RIGHT FOOT ASSOCIATED WITH TYPE 2 DIABETES MELLITUS, WITH NECROSIS OF MUSCLE (HCC): ICD-10-CM

## 2018-08-27 NOTE — PROGRESS NOTES
Documentation Flow Sheet    All active wounds listed below with today's date are evaluated  Wound(s)    debrided this date include # : 6     Post  Debridement Measurements:  Wound 08/06/18 Wound #6 )onset 6 weeks) Right Lateral Foot (Active)   Wound Image   8/6/2018  9:35 AM   Wound Type Wound 8/6/2018  9:44 AM   Dressing Status Clean;Dry; Intact 8/27/2018 11:01 AM   Dressing Changed Changed/New 8/27/2018 11:01 AM   Wound Cleansed Wound cleanser 8/27/2018 10:54 AM   Wound Length (cm) 1 cm 8/27/2018 11:01 AM   Wound Width (cm) 1.1 cm 8/27/2018 11:01 AM   Wound Depth (cm)  0.3 8/27/2018 11:01 AM   Calculated Wound Size (cm^2) (l*w) 1.1 cm^2 8/27/2018 11:01 AM   Change in Wound Size % (l*w) 23.61 8/27/2018 11:01 AM   Distance Tunneling (cm) 0 cm 8/27/2018 10:54 AM   Tunneling Position ___ O'Clock 0 8/27/2018 10:54 AM   Undermining Starts ___ O'Clock 0 8/27/2018 10:54 AM   Undermining Ends___ O'Clock 0 8/27/2018 10:54 AM   Undermining Maxium Distance (cm) 0 8/27/2018 10:54 AM   Wound Assessment White 8/27/2018 10:54 AM   Drainage Amount Small 8/27/2018 10:54 AM   Drainage Description Sanguinous 8/27/2018 10:54 AM   Odor None 8/27/2018 10:54 AM   Margins Defined edges 8/27/2018 10:54 AM   Violeta-wound Assessment Dark edges;Calloused 8/27/2018 10:54 AM   Non-staged Wound Description Full thickness 8/27/2018 10:54 AM   [place]%Wound Bed 0 8/27/2018 10:54 AM   Red%Wound Bed 0 8/27/2018 10:54 AM   Yellow%Wound Bed 100 8/27/2018 10:54 AM   Black%Wound Bed 0 8/27/2018 10:54 AM   Purple%Wound Bed 0 8/27/2018 10:54 AM   Other%Wound Bed 0 8/27/2018 10:54 AM   Debridement per physician Subcutaneous 8/13/2018  8:39 AM   Number of days: 21       Incision 05/05/17 Foot Right (Active)   Number of days: 478       Percent of Wound(s) Debrided: approximately 100%    Total Surface Area Debrided:  1.1 sq cm     Bleeding:  Minimal    Hemostasis Achieved:  by pressure    Procedural Pain:  0  / 10     Post Procedural Pain:  0 / 10     Response to treatment:  Well tolerated by patient. Status of wound progress and description from last visit:   Improved, wart on left foot improving well as well. Upon debridement, small area appears to still be present that measures 0.2 x 0.2 cm in size. Will require one more treatment but skin too thin to do that this week. Will see him back in the private office in 2 weeks and 2 weeks here in the wound center. Plan:       Discharge Instructions         Plan:         Discharge Instructions        .PHYSICIAN ORDERS AND DISCHARGE INSTRUCTIONS     NOTE: Upon discharge from the [address], you will receive a patient experience survey. We would be grateful if you would take the time to fill this survey out.     Wound care order history:                 GREGG's   Right       Left               Date               Vascular studies:   Date               Imaging:   Date               Cultures:   Date               Labs/ HbA1c:   Date               Grafts:  Date               HBO:  N/a               Antibiotics:               Earlier Wound care treatments:  Silver alginate              Authorizations:                        Consults:   Date                           PCP: Dr. Rebecca Noble wound care orders and information:              Residence: home              Continue home health care with: n/a              Your wound-care supplies will be provided by: self              DME provider:              Compression with              Off loading:  Date               BJVK Meds:              NUQIM cleansing:                           ZF not scrub or use excessive force.                          Wash hands with soap and water before and after dressing changes.                           Prior to applying a clean dressing, cleanse wound with normal saline,                          wound cleanser, or mild soap and water.                           Ask your physician or nurse before getting the wound(s) wet in the shower.              Daily Wound management:                          Keep weight off wounds and reposition every 2 hours.                          TMNCK standing for long periods of time.                          NDZZB wraps/stockings in AM and remove at bedtime.                          Elevate legs to the level of the heart or above for 30 minutes 4-5 times a day and/or when sitting.                                               When taking antibiotics take entire prescription as ordered by MD do not stop taking until medicine is all gone.                                                                 Orders for this week: 8/27/18     Right lateral foot- cleanse with soap and water, pat dry. Apply damp fibracol, silver algniate, fluffed 4x4s, wrap with conform and tape. Change daily.      Follow up with Dr Ayaz Brian 2 weeks in the wound care center     Call [phone] for any questions or concerns. Date__________   Time____________             Treatment Note Wound 08/06/18 Wound #6 )onset 6 weeks) Right Lateral Foot-Dressing/Treatment:  (ca alginate ag, 4x4, conform, tape.  Pt requested coban)    Written Patient Dismissal Instructions Given            Electronically signed by Randie Siemens, DPM on 8/27/2018 at 12:05 PM

## 2018-09-10 ENCOUNTER — HOSPITAL ENCOUNTER (OUTPATIENT)
Dept: WOUND CARE | Age: 48
Discharge: HOME | End: 2018-09-10
Attending: PODIATRIST | Admitting: PODIATRIST

## 2018-09-10 VITALS
HEART RATE: 73 BPM | DIASTOLIC BLOOD PRESSURE: 82 MMHG | RESPIRATION RATE: 16 BRPM | WEIGHT: 275 LBS | TEMPERATURE: 96.8 F | BODY MASS INDEX: 37.25 KG/M2 | SYSTOLIC BLOOD PRESSURE: 143 MMHG | HEIGHT: 72 IN

## 2018-09-10 DIAGNOSIS — L97.519 TYPE 2 DIABETES MELLITUS WITH RIGHT DIABETIC FOOT ULCER (HCC): ICD-10-CM

## 2018-09-10 DIAGNOSIS — E11.621 TYPE 2 DIABETES MELLITUS WITH RIGHT DIABETIC FOOT ULCER (HCC): ICD-10-CM

## 2018-09-10 DIAGNOSIS — L97.512 ULCER OF RIGHT FOOT WITH FAT LAYER EXPOSED (HCC): ICD-10-CM

## 2018-09-10 DIAGNOSIS — E11.621 DIABETIC ULCER OF TOE OF RIGHT FOOT ASSOCIATED WITH TYPE 2 DIABETES MELLITUS, WITH NECROSIS OF MUSCLE (HCC): ICD-10-CM

## 2018-09-10 DIAGNOSIS — L97.513 DIABETIC ULCER OF TOE OF RIGHT FOOT ASSOCIATED WITH TYPE 2 DIABETES MELLITUS, WITH NECROSIS OF MUSCLE (HCC): ICD-10-CM

## 2018-09-10 DIAGNOSIS — E11.610 CHARCOT'S JOINT OF FOOT IN TYPE 2 DIABETES MELLITUS (HCC): Primary | ICD-10-CM

## 2018-09-10 NOTE — PROGRESS NOTES
MG tablet Take 1 tablet by mouth daily 90 tablet 3    fluticasone (FLONASE) 50 MCG/ACT nasal spray 1 spray by Nasal route daily       No current facility-administered medications on file prior to encounter. REVIEW OF SYSTEMS    Pertinent items are noted in HPI. Constitutional: Negative for systemic symptoms including fever, chills and malaise. Objective:      BP (!) 143/82   Pulse 73   Temp 96.8 °F (36 °C) (Temporal)   Resp 16   Ht 6' (1.829 m)   Wt 275 lb (124.7 kg)   BMI 37.30 kg/m²     PHYSICAL EXAM    General: The patient is in no acute distress. Mental status:  Patient is appropriate, is  oriented to place and plan of care. Dermatologic exam: Visual inspection of the periwound reveals the skin to be edematous. Wound exam:  see wound description below     All active wounds listed below with today's date are evaluated      Wound 08/06/18 Wound #6 )onset 6 weeks) Right Lateral Foot (Active)   Wound Image   9/10/2018  8:07 AM   Wound Type Wound 9/10/2018  8:07 AM   Dressing Status Clean;Dry; Intact 9/10/2018  8:37 AM   Dressing Changed Changed/New 9/10/2018  8:37 AM   Wound Cleansed Rinsed/Irrigated with saline 9/10/2018  8:07 AM   Wound Length (cm) 0.5 cm 9/10/2018  8:07 AM   Wound Width (cm) 0.3 cm 9/10/2018  8:07 AM   Wound Depth (cm)  0.3 9/10/2018  8:07 AM   Calculated Wound Size (cm^2) (l*w) 0.15 cm^2 9/10/2018  8:07 AM   Change in Wound Size % (l*w) 89.58 9/10/2018  8:07 AM   Distance Tunneling (cm) 0 cm 9/10/2018  8:07 AM   Tunneling Position ___ O'Clock 0 9/10/2018  8:07 AM   Undermining Starts ___ O'Clock 0 9/10/2018  8:07 AM   Undermining Ends___ O'Clock 0 9/10/2018  8:07 AM   Undermining Maxium Distance (cm) 0 9/10/2018  8:07 AM   Wound Assessment [place] 9/10/2018  8:07 AM   Drainage Amount Moderate 9/10/2018  8:07 AM   Drainage Description Clear;Brown 9/10/2018  8:07 AM   Odor None 9/10/2018  8:07 AM   Margins Defined edges; Unattached edges 9/10/2018  8:07 AM   Violeta-wound Assessment

## 2018-09-17 ENCOUNTER — HOSPITAL ENCOUNTER (OUTPATIENT)
Dept: WOUND CARE | Age: 48
Discharge: HOME | End: 2018-09-17
Attending: PODIATRIST | Admitting: PODIATRIST

## 2018-09-17 VITALS
SYSTOLIC BLOOD PRESSURE: 121 MMHG | HEART RATE: 76 BPM | TEMPERATURE: 98.1 F | DIASTOLIC BLOOD PRESSURE: 81 MMHG | RESPIRATION RATE: 16 BRPM

## 2018-09-17 DIAGNOSIS — E08.29 DIABETES MELLITUS DUE TO UNDERLYING CONDITION WITH OTHER KIDNEY COMPLICATION, UNSPECIFIED WHETHER LONG TERM INSULIN USE (HCC): ICD-10-CM

## 2018-09-17 DIAGNOSIS — E11.610 CHARCOT'S JOINT OF FOOT IN TYPE 2 DIABETES MELLITUS (HCC): Primary | ICD-10-CM

## 2018-09-17 DIAGNOSIS — L97.513 DIABETIC ULCER OF TOE OF RIGHT FOOT ASSOCIATED WITH TYPE 2 DIABETES MELLITUS, WITH NECROSIS OF MUSCLE (HCC): ICD-10-CM

## 2018-09-17 DIAGNOSIS — L97.512 ULCER OF RIGHT FOOT WITH FAT LAYER EXPOSED (HCC): ICD-10-CM

## 2018-09-17 DIAGNOSIS — E11.621 TYPE 2 DIABETES MELLITUS WITH RIGHT DIABETIC FOOT ULCER (HCC): ICD-10-CM

## 2018-09-17 DIAGNOSIS — E11.621 DIABETIC ULCER OF TOE OF RIGHT FOOT ASSOCIATED WITH TYPE 2 DIABETES MELLITUS, WITH NECROSIS OF MUSCLE (HCC): ICD-10-CM

## 2018-09-17 DIAGNOSIS — L97.519 TYPE 2 DIABETES MELLITUS WITH RIGHT DIABETIC FOOT ULCER (HCC): ICD-10-CM

## 2018-09-17 NOTE — PROGRESS NOTES
Date    ABDOMEN SURGERY      FOOT SURGERY  7/2012    charcot    FRACTURE SURGERY Right     foot       FAMILY HISTORY    Family History   Problem Relation Age of Onset    Diabetes Mother     High Blood Pressure Mother     High Cholesterol Mother     Heart Disease Father     High Blood Pressure Father     Heart Disease Sister     High Blood Pressure Sister     High Blood Pressure Brother     Cancer Maternal Grandmother     Stroke Maternal Grandfather     Cancer Paternal Grandmother     High Blood Pressure Paternal Grandmother        SOCIAL HISTORY    Social History   Substance Use Topics    Smoking status: Never Smoker    Smokeless tobacco: Current User     Types: Chew    Alcohol use Yes      Comment: limited       ALLERGIES    Allergies   Allergen Reactions    Bactrim [Sulfamethoxazole-Trimethoprim] Swelling, Dermatitis and Other (See Comments)     Causes vasculitis    Sulfa Antibiotics Anaphylaxis       MEDICATIONS    Current Outpatient Prescriptions on File Prior to Encounter   Medication Sig Dispense Refill    gabapentin (NEURONTIN) 600 MG tablet Take 300 mg by mouth 3 times daily. .      ELIQUIS 5 MG TABS tablet TAKE 1 TABLET TWICE A  tablet 1    HUMIRA PEN-PSORIASIS STARTER 40 MG/0.8ML injection       SITagliptin (JANUVIA) 100 MG tablet Take 1 tablet by mouth daily 90 tablet 1    metFORMIN (GLUCOPHAGE) 1000 MG tablet Take 1 tablet by mouth 2 times daily (with meals) 180 tablet 3    escitalopram (LEXAPRO) 10 MG tablet Take 1 tablet by mouth daily 90 tablet 3    busPIRone (BUSPAR) 7.5 MG tablet Take 1 tablet by mouth 2 times daily 180 tablet 1    lisinopril (PRINIVIL;ZESTRIL) 20 MG tablet Take 1 tablet by mouth 2 times daily 180 tablet 3    cloNIDine (CATAPRES) 0.1 MG tablet Take 1 tablet by mouth 2 times daily 180 tablet 3    amLODIPine (NORVASC) 2.5 MG tablet Take 2 tablets by mouth daily 90 tablet 3    hydrochlorothiazide (HYDRODIURIL) 25 MG tablet Take 1 tablet by mouth progress and description from last visit:   improved. Plan:       Discharge Instructions         Plan:         Discharge Instructions        .PHYSICIAN ORDERS AND DISCHARGE INSTRUCTIONS     NOTE: Upon discharge from the [address], you will receive a patient experience survey. We would be grateful if you would take the time to fill this survey out.     Wound care order history:                 GREGG's   Right       Left               Date               Vascular studies:   Date               Imaging:   Date               Cultures:   Date               Labs/ HbA1c:   Date               Grafts:  Date               HBO:  N/a               Antibiotics:               Earlier Wound care treatments:  Silver alginate              Authorizations:                        Consults:   Date                           PCP: Dr. Yossi Alvarenga wound care orders and information:              Residence: home              Continue home health care with: n/a              Your wound-care supplies will be provided by: self              DME provider:              Compression with              Off loading:  Date               [facility] Meds:              MSFWG cleansing:                           CG not scrub or use excessive force.                          Wash hands with soap and water before and after dressing changes.                           Prior to applying a clean dressing, cleanse wound with normal saline,                          wound cleanser, or mild soap and water.                           Ask your physician or nurse before getting the wound(s) wet in the shower.              Daily Wound management:                          Keep weight off wounds and reposition every 2 hours.                          Avoid standing for long periods of time.                          Apply wraps/stockings in AM and remove at bedtime.                          Elevate legs to the level of the heart or above for 30 minutes 4-5 times a day

## 2018-09-24 ENCOUNTER — HOSPITAL ENCOUNTER (OUTPATIENT)
Dept: WOUND CARE | Age: 48
Discharge: HOME | End: 2018-09-24
Payer: COMMERCIAL

## 2018-09-24 VITALS
HEART RATE: 75 BPM | SYSTOLIC BLOOD PRESSURE: 130 MMHG | RESPIRATION RATE: 16 BRPM | DIASTOLIC BLOOD PRESSURE: 81 MMHG | TEMPERATURE: 98.4 F

## 2018-09-24 DIAGNOSIS — E11.621 DIABETIC ULCER OF TOE OF RIGHT FOOT ASSOCIATED WITH TYPE 2 DIABETES MELLITUS, WITH NECROSIS OF MUSCLE (HCC): ICD-10-CM

## 2018-09-24 DIAGNOSIS — E11.621 TYPE 2 DIABETES MELLITUS WITH RIGHT DIABETIC FOOT ULCER (HCC): ICD-10-CM

## 2018-09-24 DIAGNOSIS — L97.513 DIABETIC ULCER OF TOE OF RIGHT FOOT ASSOCIATED WITH TYPE 2 DIABETES MELLITUS, WITH NECROSIS OF MUSCLE (HCC): ICD-10-CM

## 2018-09-24 DIAGNOSIS — E11.610 CHARCOT'S JOINT OF FOOT IN TYPE 2 DIABETES MELLITUS (HCC): Primary | ICD-10-CM

## 2018-09-24 DIAGNOSIS — L97.519 TYPE 2 DIABETES MELLITUS WITH RIGHT DIABETIC FOOT ULCER (HCC): ICD-10-CM

## 2018-09-24 DIAGNOSIS — E08.29 DIABETES MELLITUS DUE TO UNDERLYING CONDITION WITH OTHER KIDNEY COMPLICATION, UNSPECIFIED WHETHER LONG TERM INSULIN USE (HCC): ICD-10-CM

## 2018-09-24 DIAGNOSIS — L97.512 ULCER OF RIGHT FOOT WITH FAT LAYER EXPOSED (HCC): ICD-10-CM

## 2018-09-24 PROCEDURE — 11042 DBRDMT SUBQ TIS 1ST 20SQCM/<: CPT

## 2018-09-24 NOTE — PROGRESS NOTES
charcot    FRACTURE SURGERY Right     foot       FAMILY HISTORY    Family History   Problem Relation Age of Onset    Diabetes Mother     High Blood Pressure Mother     High Cholesterol Mother     Heart Disease Father     High Blood Pressure Father     Heart Disease Sister     High Blood Pressure Sister     High Blood Pressure Brother     Cancer Maternal Grandmother     Stroke Maternal Grandfather     Cancer Paternal Grandmother     High Blood Pressure Paternal Grandmother        SOCIAL HISTORY    Social History   Substance Use Topics    Smoking status: Never Smoker    Smokeless tobacco: Current User     Types: Chew    Alcohol use Yes      Comment: limited       ALLERGIES    Allergies   Allergen Reactions    Bactrim [Sulfamethoxazole-Trimethoprim] Swelling, Dermatitis and Other (See Comments)     Causes vasculitis    Sulfa Antibiotics Anaphylaxis       MEDICATIONS    Current Outpatient Prescriptions on File Prior to Encounter   Medication Sig Dispense Refill    gabapentin (NEURONTIN) 600 MG tablet Take 300 mg by mouth 3 times daily. .      ELIQUIS 5 MG TABS tablet TAKE 1 TABLET TWICE A  tablet 1    HUMIRA PEN-PSORIASIS STARTER 40 MG/0.8ML injection       SITagliptin (JANUVIA) 100 MG tablet Take 1 tablet by mouth daily 90 tablet 1    metFORMIN (GLUCOPHAGE) 1000 MG tablet Take 1 tablet by mouth 2 times daily (with meals) 180 tablet 3    escitalopram (LEXAPRO) 10 MG tablet Take 1 tablet by mouth daily 90 tablet 3    busPIRone (BUSPAR) 7.5 MG tablet Take 1 tablet by mouth 2 times daily 180 tablet 1    lisinopril (PRINIVIL;ZESTRIL) 20 MG tablet Take 1 tablet by mouth 2 times daily 180 tablet 3    cloNIDine (CATAPRES) 0.1 MG tablet Take 1 tablet by mouth 2 times daily 180 tablet 3    amLODIPine (NORVASC) 2.5 MG tablet Take 2 tablets by mouth daily 90 tablet 3    hydrochlorothiazide (HYDRODIURIL) 25 MG tablet Take 1 tablet by mouth daily 90 tablet 3    atorvastatin (LIPITOR) 80 MG tablet visit:   unchanged. Plan:       Discharge Instructions         Plan:         Discharge Instructions        .PHYSICIAN ORDERS AND DISCHARGE INSTRUCTIONS     NOTE: Upon discharge from the [address], you will receive a patient experience survey. We would be grateful if you would take the time to fill this survey out.     Wound care order history:                 GREGG's   Right       Left               Date               Vascular studies:   Date               Imaging:   Date               Cultures:   Date               Labs/ HbA1c:   Date               Grafts:  Date               HBO:  N/a               Antibiotics:               Earlier Wound care treatments:  Silver alginate              Authorizations:                        Consults:   Date                           PCP: Dr. Jonatan Mcclellan wound care orders and information:              Residence: home              Continue home health care with: n/a              Your wound-care supplies will be provided by: self              DME provider:              Compression with              Off loading:  Date               RFOLA Meds:              GTDJC cleansing:                           OK not scrub or use excessive force.                          Wash hands with soap and water before and after dressing changes.                         Prior to applying a clean dressing, cleanse wound with normal saline,                          wound cleanser, or mild soap and water.                           Ask your physician or nurse before getting the wound(s) wet in the shower.              Daily Wound management:                          Keep weight off wounds and reposition every 2 hours.                          Avoid standing for long periods of time.                          Apply wraps/stockings in AM and remove at bedtime.                          Elevate legs to the level of the heart or above for 30 minutes 4-5 times a day and/or when sitting.

## 2018-09-27 RX ORDER — BUSPIRONE HYDROCHLORIDE 7.5 MG/1
TABLET ORAL
Qty: 180 TABLET | Refills: 1 | Status: SHIPPED | OUTPATIENT
Start: 2018-09-27 | End: 2019-05-01

## 2018-09-27 RX ORDER — SITAGLIPTIN 100 MG/1
TABLET, FILM COATED ORAL
Qty: 90 TABLET | Refills: 1 | Status: SHIPPED | OUTPATIENT
Start: 2018-09-27 | End: 2019-06-26 | Stop reason: SDUPTHER

## 2018-10-08 ENCOUNTER — HOSPITAL ENCOUNTER (OUTPATIENT)
Dept: WOUND CARE | Age: 48
Discharge: HOME | End: 2018-10-08
Payer: COMMERCIAL

## 2018-10-08 VITALS — HEART RATE: 80 BPM | SYSTOLIC BLOOD PRESSURE: 97 MMHG | RESPIRATION RATE: 16 BRPM | DIASTOLIC BLOOD PRESSURE: 60 MMHG

## 2018-10-08 DIAGNOSIS — E08.29 DIABETES MELLITUS DUE TO UNDERLYING CONDITION WITH OTHER KIDNEY COMPLICATION, UNSPECIFIED WHETHER LONG TERM INSULIN USE (HCC): ICD-10-CM

## 2018-10-08 DIAGNOSIS — L97.513 DIABETIC ULCER OF TOE OF RIGHT FOOT ASSOCIATED WITH TYPE 2 DIABETES MELLITUS, WITH NECROSIS OF MUSCLE (HCC): ICD-10-CM

## 2018-10-08 DIAGNOSIS — E11.621 DIABETIC ULCER OF TOE OF RIGHT FOOT ASSOCIATED WITH TYPE 2 DIABETES MELLITUS, WITH NECROSIS OF MUSCLE (HCC): ICD-10-CM

## 2018-10-08 DIAGNOSIS — L97.512 ULCER OF RIGHT FOOT WITH FAT LAYER EXPOSED (HCC): ICD-10-CM

## 2018-10-08 DIAGNOSIS — E11.610 CHARCOT'S JOINT OF FOOT IN TYPE 2 DIABETES MELLITUS (HCC): Primary | ICD-10-CM

## 2018-10-08 PROCEDURE — 11042 DBRDMT SUBQ TIS 1ST 20SQCM/<: CPT

## 2018-10-15 RX ORDER — GABAPENTIN 600 MG/1
300 TABLET ORAL 3 TIMES DAILY
Qty: 90 TABLET | OUTPATIENT
Start: 2018-10-15

## 2018-10-22 ENCOUNTER — HOSPITAL ENCOUNTER (OUTPATIENT)
Dept: WOUND CARE | Age: 48
Discharge: HOME | End: 2018-10-22
Payer: COMMERCIAL

## 2018-10-22 DIAGNOSIS — E11.621 TYPE 2 DIABETES MELLITUS WITH RIGHT DIABETIC FOOT ULCER (HCC): ICD-10-CM

## 2018-10-22 DIAGNOSIS — E11.621 DIABETIC ULCER OF TOE OF RIGHT FOOT ASSOCIATED WITH TYPE 2 DIABETES MELLITUS, WITH NECROSIS OF MUSCLE (HCC): ICD-10-CM

## 2018-10-22 DIAGNOSIS — E11.610 CHARCOT'S JOINT OF FOOT IN TYPE 2 DIABETES MELLITUS (HCC): Primary | ICD-10-CM

## 2018-10-22 DIAGNOSIS — L97.519 TYPE 2 DIABETES MELLITUS WITH RIGHT DIABETIC FOOT ULCER (HCC): ICD-10-CM

## 2018-10-22 DIAGNOSIS — L97.512 ULCER OF RIGHT FOOT WITH FAT LAYER EXPOSED (HCC): ICD-10-CM

## 2018-10-22 DIAGNOSIS — L97.513 DIABETIC ULCER OF TOE OF RIGHT FOOT ASSOCIATED WITH TYPE 2 DIABETES MELLITUS, WITH NECROSIS OF MUSCLE (HCC): ICD-10-CM

## 2018-10-22 PROCEDURE — 99213 OFFICE O/P EST LOW 20 MIN: CPT

## 2018-10-22 NOTE — PROGRESS NOTES
7/2012    charcot    FRACTURE SURGERY Right     foot       FAMILY HISTORY    Family History   Problem Relation Age of Onset    Diabetes Mother     High Blood Pressure Mother     High Cholesterol Mother     Heart Disease Father     High Blood Pressure Father     Heart Disease Sister     High Blood Pressure Sister     High Blood Pressure Brother     Cancer Maternal Grandmother     Stroke Maternal Grandfather     Cancer Paternal Grandmother     High Blood Pressure Paternal Grandmother        SOCIAL HISTORY    Social History   Substance Use Topics    Smoking status: Never Smoker    Smokeless tobacco: Current User     Types: Chew    Alcohol use Yes      Comment: limited       ALLERGIES    Allergies   Allergen Reactions    Bactrim [Sulfamethoxazole-Trimethoprim] Swelling, Dermatitis and Other (See Comments)     Causes vasculitis    Sulfa Antibiotics Anaphylaxis       MEDICATIONS    Current Outpatient Prescriptions on File Prior to Encounter   Medication Sig Dispense Refill    busPIRone (BUSPAR) 7.5 MG tablet TAKE 1 TABLET TWICE A  tablet 1    JANUVIA 100 MG tablet TAKE 1 TABLET DAILY 90 tablet 1    gabapentin (NEURONTIN) 600 MG tablet Take 300 mg by mouth 3 times daily. .      ELIQUIS 5 MG TABS tablet TAKE 1 TABLET TWICE A  tablet 1    HUMIRA PEN-PSORIASIS STARTER 40 MG/0.8ML injection       metFORMIN (GLUCOPHAGE) 1000 MG tablet Take 1 tablet by mouth 2 times daily (with meals) 180 tablet 3    escitalopram (LEXAPRO) 10 MG tablet Take 1 tablet by mouth daily 90 tablet 3    lisinopril (PRINIVIL;ZESTRIL) 20 MG tablet Take 1 tablet by mouth 2 times daily 180 tablet 3    cloNIDine (CATAPRES) 0.1 MG tablet Take 1 tablet by mouth 2 times daily 180 tablet 3    amLODIPine (NORVASC) 2.5 MG tablet Take 2 tablets by mouth daily 90 tablet 3    hydrochlorothiazide (HYDRODIURIL) 25 MG tablet Take 1 tablet by mouth daily 90 tablet 3    atorvastatin (LIPITOR) 80 MG tablet Take 1 tablet by mouth

## 2018-10-25 ENCOUNTER — OFFICE VISIT (OUTPATIENT)
Dept: FAMILY MEDICINE CLINIC | Age: 48
End: 2018-10-25
Payer: COMMERCIAL

## 2018-10-25 VITALS
DIASTOLIC BLOOD PRESSURE: 78 MMHG | OXYGEN SATURATION: 98 % | WEIGHT: 275.2 LBS | RESPIRATION RATE: 16 BRPM | SYSTOLIC BLOOD PRESSURE: 122 MMHG | BODY MASS INDEX: 37.32 KG/M2 | HEART RATE: 85 BPM

## 2018-10-25 DIAGNOSIS — L97.519 TYPE 2 DIABETES MELLITUS WITH RIGHT DIABETIC FOOT ULCER (HCC): Primary | ICD-10-CM

## 2018-10-25 DIAGNOSIS — I10 ESSENTIAL HYPERTENSION: ICD-10-CM

## 2018-10-25 DIAGNOSIS — E08.29 DIABETES MELLITUS DUE TO UNDERLYING CONDITION WITH OTHER KIDNEY COMPLICATION, UNSPECIFIED WHETHER LONG TERM INSULIN USE (HCC): ICD-10-CM

## 2018-10-25 DIAGNOSIS — E11.621 TYPE 2 DIABETES MELLITUS WITH RIGHT DIABETIC FOOT ULCER (HCC): Primary | ICD-10-CM

## 2018-10-25 DIAGNOSIS — E11.610 CHARCOT'S JOINT OF FOOT IN TYPE 2 DIABETES MELLITUS (HCC): ICD-10-CM

## 2018-10-25 PROCEDURE — 99213 OFFICE O/P EST LOW 20 MIN: CPT | Performed by: NURSE PRACTITIONER

## 2018-10-25 RX ORDER — GABAPENTIN 600 MG/1
300 TABLET ORAL 3 TIMES DAILY
Qty: 45 TABLET | Refills: 0 | Status: SHIPPED | OUTPATIENT
Start: 2018-10-25 | End: 2018-11-22 | Stop reason: SDUPTHER

## 2018-10-25 RX ORDER — AMLODIPINE BESYLATE 5 MG/1
5 TABLET ORAL DAILY
COMMUNITY
End: 2019-08-05 | Stop reason: SDUPTHER

## 2018-10-25 ASSESSMENT — ENCOUNTER SYMPTOMS
CONSTIPATION: 0
CHEST TIGHTNESS: 0
ABDOMINAL PAIN: 0
SHORTNESS OF BREATH: 0
COUGH: 0
NAUSEA: 0
VOMITING: 0
DIARRHEA: 0
WHEEZING: 0

## 2018-11-05 ENCOUNTER — HOSPITAL ENCOUNTER (OUTPATIENT)
Dept: WOUND CARE | Age: 48
Discharge: HOME | End: 2018-11-05
Payer: COMMERCIAL

## 2018-11-05 VITALS
SYSTOLIC BLOOD PRESSURE: 124 MMHG | TEMPERATURE: 98.2 F | HEART RATE: 80 BPM | RESPIRATION RATE: 18 BRPM | DIASTOLIC BLOOD PRESSURE: 77 MMHG

## 2018-11-05 DIAGNOSIS — L97.519 TYPE 2 DIABETES MELLITUS WITH RIGHT DIABETIC FOOT ULCER (HCC): ICD-10-CM

## 2018-11-05 DIAGNOSIS — E11.621 TYPE 2 DIABETES MELLITUS WITH RIGHT DIABETIC FOOT ULCER (HCC): ICD-10-CM

## 2018-11-05 DIAGNOSIS — E11.610 CHARCOT'S JOINT OF FOOT IN TYPE 2 DIABETES MELLITUS (HCC): Primary | ICD-10-CM

## 2018-11-05 DIAGNOSIS — E08.29 DIABETES MELLITUS DUE TO UNDERLYING CONDITION WITH OTHER KIDNEY COMPLICATION, UNSPECIFIED WHETHER LONG TERM INSULIN USE (HCC): ICD-10-CM

## 2018-11-05 PROCEDURE — 99212 OFFICE O/P EST SF 10 MIN: CPT

## 2018-11-05 NOTE — PROGRESS NOTES
Bed 0 11/5/2018  7:59 AM   Red%Wound Bed 0 11/5/2018  7:59 AM   Yellow%Wound Bed 0 11/5/2018  7:59 AM   Black%Wound Bed 0 11/5/2018  7:59 AM   Purple%Wound Bed 0 11/5/2018  7:59 AM   Other%Wound Bed 100 tan  11/5/2018  7:59 AM   Debridement per physician Subcutaneous 10/8/2018  8:39 AM   Number of days: 91       Incision 05/05/17 Foot Right (Active)   Number of days: 548       Assessment:       Problem List Items Addressed This Visit     Charcot's joint of foot in type 2 diabetes mellitus ([facility] [address] - Primary    DM (diabetes mellitus) ([facility] [address]    WD-Type 2 diabetes mellitus with right lateral diabetic foot ulcer ([facility] [address]          Status of wound progress and description from last visit:   closed. Plan:     Discharge Instructions         Plan:         Discharge Instructions        .PHYSICIAN ORDERS AND DISCHARGE INSTRUCTIONS     NOTE: Upon discharge from the [address], you will receive a patient experience survey.  We would be grateful if you would take the time to fill this survey out.     Wound care order history:                 GREGG's   Right       Left               Date               Vascular studies:   Date               Imaging:   Date               Cultures:   Date               Labs/ HbA1c:   Date               Grafts:  Date               HBO:  N/a               Antibiotics:               Earlier Wound care treatments:  Silver alginate              Authorizations:                        Consults:   Date                           PCP: Dr. Cindy Thapa wound care orders and information:              Residence: home              Continue home health care with: n/a              Your wound-care supplies will be provided by: self              DME provider:              Compression with              Off loading:  Date               RONRW Meds:              IPDJL cleansing:                           PO not scrub or use excessive force.                          Wash hands with soap and water before and after

## 2018-11-19 ENCOUNTER — HOSPITAL ENCOUNTER (OUTPATIENT)
Dept: WOUND CARE | Age: 48
Discharge: HOME | End: 2018-11-19

## 2018-11-26 ENCOUNTER — HOSPITAL ENCOUNTER (OUTPATIENT)
Dept: WOUND CARE | Age: 48
Discharge: HOME | End: 2018-11-26
Payer: COMMERCIAL

## 2018-11-26 VITALS — HEART RATE: 93 BPM | DIASTOLIC BLOOD PRESSURE: 101 MMHG | RESPIRATION RATE: 16 BRPM | SYSTOLIC BLOOD PRESSURE: 147 MMHG

## 2018-11-26 DIAGNOSIS — E11.610 CHARCOT'S JOINT OF FOOT IN TYPE 2 DIABETES MELLITUS (HCC): Primary | ICD-10-CM

## 2018-11-26 DIAGNOSIS — E08.29 DIABETES MELLITUS DUE TO UNDERLYING CONDITION WITH OTHER KIDNEY COMPLICATION, UNSPECIFIED WHETHER LONG TERM INSULIN USE (HCC): ICD-10-CM

## 2018-11-26 DIAGNOSIS — L97.519 TYPE 2 DIABETES MELLITUS WITH RIGHT DIABETIC FOOT ULCER (HCC): ICD-10-CM

## 2018-11-26 DIAGNOSIS — E11.621 TYPE 2 DIABETES MELLITUS WITH RIGHT DIABETIC FOOT ULCER (HCC): ICD-10-CM

## 2018-11-26 PROCEDURE — 99211 OFF/OP EST MAY X REQ PHY/QHP: CPT

## 2018-11-26 RX ORDER — GABAPENTIN 600 MG/1
300 TABLET ORAL 3 TIMES DAILY
Qty: 45 TABLET | Refills: 0 | Status: SHIPPED | OUTPATIENT
Start: 2018-11-26 | End: 2019-01-03 | Stop reason: SDUPTHER

## 2018-11-26 NOTE — PROGRESS NOTES
by mouth daily 90 tablet 3     No current facility-administered medications on file prior to encounter. REVIEW OF SYSTEMS    Pertinent items are noted in HPI. Constitutional: Negative for systemic symptoms including fever, chills and malaise. Objective:      BP (!) 147/101   Pulse 93   Resp 16     PHYSICAL EXAM    General: The patient is in no acute distress. Mental status:  Patient is appropriate, is  oriented to place and plan of care. Dermatologic exam: Visual inspection of the periwound reveals the skin to be normal in turgor and texture. Wound exam:  see wound description below     All active wounds listed below with today's date are evaluated      Incision 05/05/17 Foot Right (Active)   Number of days: 569       Assessment:       Problem List Items Addressed This Visit     Charcot's joint of foot in type 2 diabetes mellitus ([facility] [address] - Primary    DM (diabetes mellitus) ([facility] [address]    WD-Type 2 diabetes mellitus with right lateral diabetic foot ulcer ([facility] [address]          Status of wound progress and description from last visit:   Wound is closed. Plan:     Discharge Instructions         Plan:         Discharge Instructions        .PHYSICIAN ORDERS AND DISCHARGE INSTRUCTIONS     NOTE: Upon discharge from the [address], you will receive a patient experience survey.  We would be grateful if you would take the time to fill this survey out.     Wound care order history:                 GREGG's   Right       Left               Date               Vascular studies:   Date               Imaging:   Date               Cultures:   Date               Labs/ HbA1c:   Date               Grafts:  Date               HBO:  N/a               Antibiotics:               Earlier Wound care treatments:  Silver alginate              Authorizations:                        Consults:   Date                           PCP: Dr. Good Barroso     Continuing wound care orders and information:              Residence: home              Continue

## 2019-01-03 ENCOUNTER — OFFICE VISIT (OUTPATIENT)
Dept: FAMILY MEDICINE CLINIC | Age: 49
End: 2019-01-03
Payer: COMMERCIAL

## 2019-01-03 VITALS
HEART RATE: 75 BPM | RESPIRATION RATE: 14 BRPM | WEIGHT: 276 LBS | DIASTOLIC BLOOD PRESSURE: 74 MMHG | OXYGEN SATURATION: 99 % | SYSTOLIC BLOOD PRESSURE: 122 MMHG | BODY MASS INDEX: 37.43 KG/M2

## 2019-01-03 DIAGNOSIS — E11.610 CHARCOT'S JOINT OF FOOT IN TYPE 2 DIABETES MELLITUS (HCC): ICD-10-CM

## 2019-01-03 DIAGNOSIS — I10 ESSENTIAL HYPERTENSION: ICD-10-CM

## 2019-01-03 DIAGNOSIS — E11.621 TYPE 2 DIABETES MELLITUS WITH RIGHT DIABETIC FOOT ULCER (HCC): Primary | ICD-10-CM

## 2019-01-03 DIAGNOSIS — E08.29 DIABETES MELLITUS DUE TO UNDERLYING CONDITION WITH OTHER KIDNEY COMPLICATION, UNSPECIFIED WHETHER LONG TERM INSULIN USE (HCC): ICD-10-CM

## 2019-01-03 DIAGNOSIS — R19.8 CHANGE IN BOWEL MOVEMENT: ICD-10-CM

## 2019-01-03 DIAGNOSIS — L97.519 TYPE 2 DIABETES MELLITUS WITH RIGHT DIABETIC FOOT ULCER (HCC): Primary | ICD-10-CM

## 2019-01-03 PROBLEM — S92.354D CLOSED NONDISPLACED FRACTURE OF FIFTH METATARSAL BONE OF RIGHT FOOT WITH ROUTINE HEALING: Status: RESOLVED | Noted: 2017-08-28 | Resolved: 2019-01-03

## 2019-01-03 LAB
CREATININE URINE POCT: ABNORMAL
HBA1C MFR BLD: 7.9 %
MICROALBUMIN/CREAT 24H UR: ABNORMAL MG/G{CREAT}
MICROALBUMIN/CREAT UR-RTO: ABNORMAL

## 2019-01-03 PROCEDURE — 99214 OFFICE O/P EST MOD 30 MIN: CPT | Performed by: NURSE PRACTITIONER

## 2019-01-03 PROCEDURE — 82044 UR ALBUMIN SEMIQUANTITATIVE: CPT | Performed by: NURSE PRACTITIONER

## 2019-01-03 PROCEDURE — 83036 HEMOGLOBIN GLYCOSYLATED A1C: CPT | Performed by: NURSE PRACTITIONER

## 2019-01-03 RX ORDER — GABAPENTIN 600 MG/1
300 TABLET ORAL 3 TIMES DAILY
Qty: 45 TABLET | Refills: 0 | Status: SHIPPED | OUTPATIENT
Start: 2019-01-03 | End: 2019-04-17 | Stop reason: SDUPTHER

## 2019-01-03 ASSESSMENT — ENCOUNTER SYMPTOMS
VOMITING: 0
SHORTNESS OF BREATH: 0
WHEEZING: 0
CHEST TIGHTNESS: 0
CONSTIPATION: 0
DIARRHEA: 0
NAUSEA: 0
ABDOMINAL PAIN: 0
COUGH: 0

## 2019-03-25 RX ORDER — APIXABAN 5 MG/1
TABLET, FILM COATED ORAL
Qty: 180 TABLET | Refills: 1 | Status: SHIPPED | OUTPATIENT
Start: 2019-03-25 | End: 2019-05-20 | Stop reason: CLARIF

## 2019-03-27 RX ORDER — ATORVASTATIN CALCIUM 80 MG/1
TABLET, FILM COATED ORAL
Qty: 90 TABLET | Refills: 3 | Status: SHIPPED | OUTPATIENT
Start: 2019-03-27 | End: 2020-03-03

## 2019-04-17 ENCOUNTER — OFFICE VISIT (OUTPATIENT)
Dept: FAMILY MEDICINE CLINIC | Age: 49
End: 2019-04-17
Payer: COMMERCIAL

## 2019-04-17 VITALS
SYSTOLIC BLOOD PRESSURE: 142 MMHG | BODY MASS INDEX: 37.08 KG/M2 | OXYGEN SATURATION: 98 % | DIASTOLIC BLOOD PRESSURE: 76 MMHG | RESPIRATION RATE: 16 BRPM | HEART RATE: 87 BPM | WEIGHT: 273.4 LBS

## 2019-04-17 DIAGNOSIS — R10.30 LOWER ABDOMINAL PAIN: ICD-10-CM

## 2019-04-17 DIAGNOSIS — Z79.4 DIABETES MELLITUS DUE TO UNDERLYING CONDITION WITH HYPEROSMOLARITY WITHOUT COMA, WITH LONG-TERM CURRENT USE OF INSULIN (HCC): Primary | ICD-10-CM

## 2019-04-17 DIAGNOSIS — E08.00 DIABETES MELLITUS DUE TO UNDERLYING CONDITION WITH HYPEROSMOLARITY WITHOUT COMA, WITH LONG-TERM CURRENT USE OF INSULIN (HCC): Primary | ICD-10-CM

## 2019-04-17 LAB — HBA1C MFR BLD: 8.5 %

## 2019-04-17 PROCEDURE — 83036 HEMOGLOBIN GLYCOSYLATED A1C: CPT | Performed by: NURSE PRACTITIONER

## 2019-04-17 PROCEDURE — 99214 OFFICE O/P EST MOD 30 MIN: CPT | Performed by: NURSE PRACTITIONER

## 2019-04-17 RX ORDER — GABAPENTIN 600 MG/1
300 TABLET ORAL 3 TIMES DAILY
Qty: 45 TABLET | Refills: 0 | Status: SHIPPED | OUTPATIENT
Start: 2019-04-17 | End: 2019-05-14 | Stop reason: SDUPTHER

## 2019-04-17 ASSESSMENT — ENCOUNTER SYMPTOMS
SHORTNESS OF BREATH: 0
DIARRHEA: 1
CONSTIPATION: 1
COUGH: 0
RECTAL PAIN: 0
CRAMPS: 1
WHEEZING: 0
BLOOD IN STOOL: 0
VOMITING: 0
ABDOMINAL PAIN: 1
RESPIRATORY NEGATIVE: 1
NAUSEA: 0

## 2019-04-17 ASSESSMENT — PATIENT HEALTH QUESTIONNAIRE - PHQ9
SUM OF ALL RESPONSES TO PHQ QUESTIONS 1-9: 0
SUM OF ALL RESPONSES TO PHQ9 QUESTIONS 1 & 2: 0
SUM OF ALL RESPONSES TO PHQ QUESTIONS 1-9: 0
1. LITTLE INTEREST OR PLEASURE IN DOING THINGS: 0
2. FEELING DOWN, DEPRESSED OR HOPELESS: 0

## 2019-04-17 NOTE — PROGRESS NOTES
SUBJECTIVE:    [place]  1970  50 y.o.  male      Chief Complaint   Patient presents with    Abdominal Cramping     Pt c/o abdominal cramping real bad on sunday    Constipation     Pt states it feels like he has to go all the time but can't    Testicle Pain     Pt states his testicles feel very tender    Diarrhea     Pt also has had some bouts of diarrhea off and on, Pt states none recently    Medication Refill    Lower Back Pain     Pt c/o lower back pain in lower right quadrant. \"I feel like I have been kicked in the nuts\". Feels like they are tender but not sore to touch. Has been feeling constipated butfeels like he cannot go. Has not taken anything for this. Has been going  Daily-but feels like he has to go all the time. Had problem in past with diarrhea and this is better with diet change. No fever. No NVD. Last bm this morning. Was soft this AM but not bloody. Abdominal Cramping   This is a new problem. The current episode started 1 to 4 weeks ago. The problem has been waxing and waning. The pain is located in the LLQ and RLQ. The pain is at a severity of 2/10. The pain is mild. The quality of the pain is cramping (could hear gas and gurgline). Associated symptoms include constipation and diarrhea. Pertinent negatives include no dysuria, fever, hematuria, nausea or vomiting. Nothing aggravates the pain. The pain is relieved by nothing. He has tried nothing for the symptoms. The treatment provided no relief. Constipation   Associated symptoms include abdominal pain and diarrhea. Pertinent negatives include no fever, nausea, rectal pain or vomiting. Testicle Pain   Associated symptoms include abdominal pain. Pertinent negatives include no chest pain, chills, coughing, diaphoresis, fever, nausea or vomiting. Diarrhea    Associated symptoms include abdominal pain. Pertinent negatives include no chills, coughing, fever or vomiting.        No problem-specific Assessment & Plan notes found for this encounter. Current Outpatient Medications on File Prior to Visit   Medication Sig Dispense Refill    hydrochlorothiazide (HYDRODIURIL) 25 MG tablet TAKE 1 TABLET DAILY 90 tablet 1    metFORMIN (GLUCOPHAGE) 1000 MG tablet TAKE 1 TABLET TWICE A DAY WITH MEALS 180 tablet 1    lisinopril (PRINIVIL;ZESTRIL) 20 MG tablet TAKE 1 TABLET TWICE A  tablet 1    atorvastatin (LIPITOR) 80 MG tablet TAKE 1 TABLET DAILY 90 tablet 3    ELIQUIS 5 MG TABS tablet TAKE 1 TABLET TWICE A  tablet 1    gabapentin (NEURONTIN) 600 MG tablet Take 0.5 tablets by mouth 3 times daily for 30 days. . 45 tablet 0    amLODIPine (NORVASC) 5 MG tablet Take 5 mg by mouth daily      busPIRone (BUSPAR) 7.5 MG tablet TAKE 1 TABLET TWICE A  tablet 1    JANUVIA 100 MG tablet TAKE 1 TABLET DAILY 90 tablet 1    HUMIRA PEN-PSORIASIS STARTER 40 MG/0.8ML injection       escitalopram (LEXAPRO) 10 MG tablet Take 1 tablet by mouth daily 90 tablet 3    cloNIDine (CATAPRES) 0.1 MG tablet Take 1 tablet by mouth 2 times daily 180 tablet 3     No current facility-administered medications on file prior to visit. Review of PMH, PSH, Family Hx, Allergies and updates made as needed.   Past Medical History:   Diagnosis Date    Blood circulation, collateral     Callus of foot     Callus of foot 8/13/2018    Charcot's joint of foot     Charcot's joint of foot in type 2 diabetes mellitus ([address] 2/6/2014    Closed nondisplaced fracture of fifth metatarsal bone of right foot with routine healing 8/28/2017    Diabetes mellitus ([address]     Hyperlipidemia     Hypertension     Ischemic ulcer of left foot, limited to breakdown of skin ([address] 7/12/2016    Leg edema, right 8/25/2017    Neuropathy     Plantar wart, left foot 8/13/2018    Ulcer of right foot with fat layer exposed ([address] 11/28/2016    Ulcer of right foot with necrosis of muscle, Hannon Grade III 8/11/2016    WD-Chronic ulcer of left foot with fat layer exposed ([facility] [address] 8/11/2016    WD-Chronic ulcer of toe of right foot, limited to breakdown of skin ([facility] [address] 7/12/2016    WD-Diabetic ulcer of left foot associated with type 2 diabetes mellitus ([address] 8/11/2016    WD-Diabetic ulcer of right lateral foot associated with type 2 diabetes mellitus, with fat layer exposed ([facility] [address]     WD-Type 2 diabetes mellitus with right diabetic foot ulcer ([facility] [address] 7/12/2016     Past Surgical History:   Procedure Laterality Date    ABDOMEN SURGERY      FOOT SURGERY  7/2012    charcot    FRACTURE SURGERY Right     foot       PHQ Scores 4/17/2019 3/19/2018   PHQ2 Score 0 0   PHQ9 Score 0 0     Interpretation of Total Score Depression Severity: 1-4 = Minimal depression, 5-9 = Mild depression, 10-14 = Moderate depression, 15-19 = Moderately severe depression, 20-27 = Severe depression    Review of Systems   Constitutional: Negative. Negative for chills, diaphoresis and fever. Respiratory: Negative. Negative for cough, shortness of breath and wheezing. Cardiovascular: Negative. Negative for chest pain, palpitations and leg swelling. Gastrointestinal: Positive for abdominal pain, constipation and diarrhea. Negative for blood in stool, nausea, rectal pain and vomiting. Endocrine: Negative. Genitourinary: Positive for testicular pain ('sore'). Negative for discharge, dysuria, hematuria, penile pain, penile swelling and scrotal swelling. Musculoskeletal: Negative. Neurological: Negative. Psychiatric/Behavioral: Negative. All other systems reviewed and are negative. OBJECTIVE:    BP (!) 142/76 (Site: Right Upper Arm, Position: Sitting, Cuff Size: Large Adult)   Pulse 87   Resp 16   Wt 273 lb 6.4 oz (124 kg)   SpO2 98%   BMI 37.08 kg/m²     Physical Exam   Constitutional: He is oriented to person, place, and time. He appears well-developed and well-nourished. HENT:   Head: Normocephalic and atraumatic.    Eyes: Pupils are equal, round, and reactive to light.   Neck: Normal range of motion. Neck supple. Cardiovascular: Normal rate, regular rhythm, normal heart sounds and intact distal pulses. No murmur heard. Pulmonary/Chest: Effort normal and breath sounds normal. No stridor. No respiratory distress. He has no wheezes. Abdominal: Soft. Bowel sounds are normal. He exhibits mass (diastasis recti). There is tenderness. There is no rebound and no guarding. No hernia. Neurological: He is alert and oriented to person, place, and time. Skin: Skin is warm and dry. Capillary refill takes less than 2 seconds. No pallor. Psychiatric: He has a normal mood and affect. His behavior is normal. Judgment and thought content normal.   Vitals reviewed. ASSESSMENT/PLAN:    Problem List     DM (diabetes mellitus) ([facility] [address] - Primary    Relevant Medications    JANUVIA 100 MG tablet    metFORMIN (GLUCOPHAGE) 1000 MG tablet    Other Relevant Orders    POCT glycosylated hemoglobin (Hb A1C) (Completed)          Current Outpatient Medications   Medication Sig Dispense Refill    gabapentin (NEURONTIN) 600 MG tablet Take 0.5 tablets by mouth 3 times daily for 30 days.  45 tablet 0    psyllium (METAMUCIL SMOOTH TEXTURE) 28 % packet Take 1 packet by mouth 2 times daily Take with full glass of h20 or juice 30 each 1    hydrochlorothiazide (HYDRODIURIL) 25 MG tablet TAKE 1 TABLET DAILY 90 tablet 1    metFORMIN (GLUCOPHAGE) 1000 MG tablet TAKE 1 TABLET TWICE A DAY WITH MEALS 180 tablet 1    lisinopril (PRINIVIL;ZESTRIL) 20 MG tablet TAKE 1 TABLET TWICE A  tablet 1    atorvastatin (LIPITOR) 80 MG tablet TAKE 1 TABLET DAILY 90 tablet 3    ELIQUIS 5 MG TABS tablet TAKE 1 TABLET TWICE A  tablet 1    amLODIPine (NORVASC) 5 MG tablet Take 5 mg by mouth daily      busPIRone (BUSPAR) 7.5 MG tablet TAKE 1 TABLET TWICE A  tablet 1    JANUVIA 100 MG tablet TAKE 1 TABLET DAILY 90 tablet 1    HUMIRA PEN-PSORIASIS STARTER 40 MG/0.8ML injection       escitalopram (LEXAPRO) 10 MG tablet Take 1 tablet by mouth daily 90 tablet 3    cloNIDine (CATAPRES) 0.1 MG tablet Take 1 tablet by mouth 2 times daily 180 tablet 3     No current facility-administered medications for this visit. 1. Diabetes mellitus due to underlying condition with hyperosmolarity without coma, with long-term current use of insulin (HCC)  a1c increased to 8.5. Discussed medication regimen. Patient frequently forgets evening dose of metformin. Advised to change to noon or early afternoon to increase med compliance and improve A1C . FU with PCP in a month to recheck. The patient is asked to make an attempt to improve diet and exercise patterns to aid in medical management of this problem. - POCT glycosylated hemoglobin (Hb A1C)    2. Lower abdominal pain  Increase fiber- metamucil. Ct scan this week. Call/FU for fever, worse pain, changes in condition. Otherwise FU in a month with PCP  - CT ABDOMEN PELVIS W WO CONTRAST Additional Contrast? Radiologist Recommendation; Future      Return in about 1 month (around 5/17/2019), or if symptoms worsen or fail to improve, for With primary care provider, DM. Controlled substance monitoring (if applicable to pt.  Visit)             (Please note that portions of this note may have beencompleted with a voice recognition program. Efforts were made to edit the dictations but occasionally words are mis-transcribed.)

## 2019-04-22 ENCOUNTER — HOSPITAL ENCOUNTER (OUTPATIENT)
Dept: CT IMAGING | Age: 49
Discharge: HOME | End: 2019-04-22
Payer: COMMERCIAL

## 2019-04-22 DIAGNOSIS — R10.30 LOWER ABDOMINAL PAIN: ICD-10-CM

## 2019-04-22 LAB
GFR AFRICAN AMERICAN: >60 ML/MIN/1.73M2
GFR NON-AFRICAN AMERICAN: >60 ML/MIN/1.73M2
POC CREATININE: 0.9 MG/DL (ref 0.9–1.3)

## 2019-04-22 PROCEDURE — 6360000004 HC RX CONTRAST MEDICATION: Performed by: NURSE PRACTITIONER

## 2019-04-22 PROCEDURE — 74178 CT ABD&PLV WO CNTR FLWD CNTR: CPT

## 2019-04-22 RX ADMIN — IOPAMIDOL 75 ML: 755 INJECTION, SOLUTION INTRAVENOUS at 10:00

## 2019-04-22 RX ADMIN — IOHEXOL 50 ML: 240 INJECTION, SOLUTION INTRATHECAL; INTRAVASCULAR; INTRAVENOUS; ORAL at 08:35

## 2019-04-23 DIAGNOSIS — N20.0 KIDNEY STONE: ICD-10-CM

## 2019-04-23 DIAGNOSIS — K57.90 DIVERTICULOSIS OF INTESTINE WITHOUT BLEEDING, UNSPECIFIED INTESTINAL TRACT LOCATION: Primary | ICD-10-CM

## 2019-04-24 ENCOUNTER — TELEPHONE (OUTPATIENT)
Dept: GASTROENTEROLOGY | Age: 49
End: 2019-04-24

## 2019-04-24 NOTE — TELEPHONE ENCOUNTER
I need to see him myself. Alternatively, if her prefers, he can see other GI provider if he can be seen earlier.

## 2019-04-24 NOTE — TELEPHONE ENCOUNTER
Pt was referred by Tobias Riddle for diverticulosis and abnormal CT results. Pt has also been having abd cramping and constipation. He is scheduled on 8/29/19 and on the wait list, but he wants to know if he can be scheduled with Salma Reaves in the meantime?

## 2019-04-25 ENCOUNTER — TELEPHONE (OUTPATIENT)
Dept: GASTROENTEROLOGY | Age: 49
End: 2019-04-25

## 2019-04-25 ENCOUNTER — HOSPITAL ENCOUNTER (OUTPATIENT)
Age: 49
Discharge: HOME | End: 2019-04-25
Payer: COMMERCIAL

## 2019-04-25 ENCOUNTER — OFFICE VISIT (OUTPATIENT)
Dept: GASTROENTEROLOGY | Age: 49
End: 2019-04-25
Payer: COMMERCIAL

## 2019-04-25 VITALS
HEIGHT: 71 IN | RESPIRATION RATE: 16 BRPM | DIASTOLIC BLOOD PRESSURE: 60 MMHG | HEART RATE: 86 BPM | OXYGEN SATURATION: 97 % | SYSTOLIC BLOOD PRESSURE: 124 MMHG | WEIGHT: 271.8 LBS | BODY MASS INDEX: 38.05 KG/M2

## 2019-04-25 DIAGNOSIS — R10.30 LOWER ABDOMINAL PAIN: ICD-10-CM

## 2019-04-25 DIAGNOSIS — R79.89 LFT ELEVATION: Primary | ICD-10-CM

## 2019-04-25 DIAGNOSIS — R10.30 LOWER ABDOMINAL PAIN: Primary | ICD-10-CM

## 2019-04-25 LAB
ALBUMIN SERPL-MCNC: 4.1 GM/DL (ref 3.4–5)
ALP BLD-CCNC: 104 IU/L (ref 40–129)
ALT SERPL-CCNC: 38 U/L (ref 10–40)
ANION GAP SERPL CALCULATED.3IONS-SCNC: 13 MMOL/L (ref 4–16)
AST SERPL-CCNC: 39 IU/L (ref 15–37)
BASOPHILS ABSOLUTE: 0 K/CU MM
BASOPHILS RELATIVE PERCENT: 0.4 % (ref 0–1)
BILIRUB SERPL-MCNC: 0.6 MG/DL (ref 0–1)
BILIRUBIN DIRECT: 0.2 MG/DL (ref 0–0.3)
BILIRUBIN, INDIRECT: 0.4 MG/DL (ref 0–0.7)
BUN BLDV-MCNC: 15 MG/DL (ref 6–23)
CALCIUM SERPL-MCNC: 9.6 MG/DL (ref 8.3–10.6)
CHLORIDE BLD-SCNC: 92 MMOL/L (ref 99–110)
CO2: 28 MMOL/L (ref 21–32)
CREAT SERPL-MCNC: 0.8 MG/DL (ref 0.9–1.3)
DIFFERENTIAL TYPE: ABNORMAL
EOSINOPHILS ABSOLUTE: 0.2 K/CU MM
EOSINOPHILS RELATIVE PERCENT: 2.9 % (ref 0–3)
GFR AFRICAN AMERICAN: >60 ML/MIN/1.73M2
GFR NON-AFRICAN AMERICAN: >60 ML/MIN/1.73M2
GLUCOSE BLD-MCNC: 164 MG/DL (ref 70–99)
HCT VFR BLD CALC: 44.9 % (ref 42–52)
HEMOGLOBIN: 14.7 GM/DL (ref 13.5–18)
IMMATURE NEUTROPHIL %: 0.3 % (ref 0–0.43)
LYMPHOCYTES ABSOLUTE: 2.5 K/CU MM
LYMPHOCYTES RELATIVE PERCENT: 33.8 % (ref 24–44)
MCH RBC QN AUTO: 28.4 PG (ref 27–31)
MCHC RBC AUTO-ENTMCNC: 32.7 % (ref 32–36)
MCV RBC AUTO: 86.8 FL (ref 78–100)
MONOCYTES ABSOLUTE: 0.5 K/CU MM
MONOCYTES RELATIVE PERCENT: 6.6 % (ref 0–4)
NUCLEATED RBC %: 0 %
PDW BLD-RTO: 13.2 % (ref 11.7–14.9)
PLATELET # BLD: 282 K/CU MM (ref 140–440)
PMV BLD AUTO: 10.1 FL (ref 7.5–11.1)
POTASSIUM SERPL-SCNC: 4.4 MMOL/L (ref 3.5–5.1)
RBC # BLD: 5.17 M/CU MM (ref 4.6–6.2)
SEGMENTED NEUTROPHILS ABSOLUTE COUNT: 4.2 K/CU MM
SEGMENTED NEUTROPHILS RELATIVE PERCENT: 56 % (ref 36–66)
SODIUM BLD-SCNC: 133 MMOL/L (ref 135–145)
TOTAL IMMATURE NEUTOROPHIL: 0.02 K/CU MM
TOTAL NUCLEATED RBC: 0 K/CU MM
TOTAL PROTEIN: 7.2 GM/DL (ref 6.4–8.2)
WBC # BLD: 7.5 K/CU MM (ref 4–10.5)

## 2019-04-25 PROCEDURE — 99203 OFFICE O/P NEW LOW 30 MIN: CPT | Performed by: INTERNAL MEDICINE

## 2019-04-25 PROCEDURE — 82248 BILIRUBIN DIRECT: CPT

## 2019-04-25 PROCEDURE — 36415 COLL VENOUS BLD VENIPUNCTURE: CPT

## 2019-04-25 PROCEDURE — 85025 COMPLETE CBC W/AUTO DIFF WBC: CPT

## 2019-04-25 PROCEDURE — 80053 COMPREHEN METABOLIC PANEL: CPT

## 2019-04-25 NOTE — PROGRESS NOTES
Reason for Visit: Intermittent bilateral low quadrant abdominal cramps and abnormal findings of the CT scan    HPI: A 63-year-old  male patient who has a past medical history of diabetics, hypertension, hypercholesteremia, presented with 2 month history of intermittent bilateral quadrant abdominal cramps which were 5-6 out of 10 in intensity. There was no modifier factors of the abdominal cramps. He only had abdominal cramps once every week over past 2 months. The abdominal cramps only last for 1 day. Today patient denied abdominal pain abdominal cramps. Patient did have a regular bowel movement. He moved bowels 2 times per day. Stool is formed. There was no hematochezia or melena. Patient denied a family history of colorectal cancer or polyps. Patient denied weight loss or weight gain. The CT of abdomen and pelvis on April 22 2019 showed filling defects in the cecum which might be due to a polyp or stool. The patient denied heartburn, regurgitation,  dysphagia, odynophagia,nauseate, vomiting, abdominal bloating, abdominal gassy sensations, abdominal distention, abdominal girth increase, hematochezia, hematemesis, hematemesis, melena, weight loss, weight gain, rashes, jaundice, mental status change, itchiness on the skin, muscle skeleton joint pain, mouth ulcers, and blurred vision.          PMH:    Past Medical History:   Diagnosis Date    Blood circulation, collateral     Callus of foot     Callus of foot 8/13/2018    Charcot's joint of foot     Charcot's joint of foot in type 2 diabetes mellitus ([address] 2/6/2014    Closed nondisplaced fracture of fifth metatarsal bone of right foot with routine healing 8/28/2017    Diabetes mellitus ([address]     Hyperlipidemia     Hypertension     Ischemic ulcer of left foot, limited to breakdown of skin ([address] 7/12/2016    Leg edema, right 8/25/2017    Neuropathy     Plantar wart, left foot 8/13/2018    Ulcer of right foot with fat layer exposed ([facility] 11/28/2016    Ulcer of right foot with necrosis of muscle, Hannon Grade III 8/11/2016    WD-Chronic ulcer of left foot with fat layer exposed ([facility] 8/11/2016    WD-Chronic ulcer of toe of right foot, limited to breakdown of skin ([facility] 7/12/2016    WD-Diabetic ulcer of left foot associated with type 2 diabetes mellitus ([facility] 8/11/2016    WD-Diabetic ulcer of right lateral foot associated with type 2 diabetes mellitus, with fat layer exposed ([facility]     WD-Type 2 diabetes mellitus with right diabetic foot ulcer ([facility] 7/12/2016       PSH:    Past Surgical History:   Procedure Laterality Date    ABDOMEN SURGERY      FOOT SURGERY  7/2012    charcot    FRACTURE SURGERY Right     foot       Medications:    Current Outpatient Medications on File Prior to Visit   Medication Sig Dispense Refill    gabapentin (NEURONTIN) 600 MG tablet Take 0.5 tablets by mouth 3 times daily for 30 days.  45 tablet 0    psyllium (METAMUCIL SMOOTH TEXTURE) 28 % packet Take 1 packet by mouth 2 times daily Take with full glass of h20 or juice 30 each 1    hydrochlorothiazide (HYDRODIURIL) 25 MG tablet TAKE 1 TABLET DAILY 90 tablet 1    metFORMIN (GLUCOPHAGE) 1000 MG tablet TAKE 1 TABLET TWICE A DAY WITH MEALS 180 tablet 1    lisinopril (PRINIVIL;ZESTRIL) 20 MG tablet TAKE 1 TABLET TWICE A  tablet 1    atorvastatin (LIPITOR) 80 MG tablet TAKE 1 TABLET DAILY 90 tablet 3    ELIQUIS 5 MG TABS tablet TAKE 1 TABLET TWICE A  tablet 1    amLODIPine (NORVASC) 5 MG tablet Take 5 mg by mouth daily      busPIRone (BUSPAR) 7.5 MG tablet TAKE 1 TABLET TWICE A  tablet 1    JANUVIA 100 MG tablet TAKE 1 TABLET DAILY 90 tablet 1    HUMIRA PEN-PSORIASIS STARTER 40 MG/0.8ML injection       escitalopram (LEXAPRO) 10 MG tablet Take 1 tablet by mouth daily 90 tablet 3    cloNIDine (CATAPRES) 0.1 MG tablet Take 1 tablet by mouth 2 times daily 180 tablet 3     No current facility-administered medications on file prior to visit. Allergies:    Allergies   Allergen Reactions    Bactrim [Sulfamethoxazole-Trimethoprim] Swelling, Dermatitis and Other (See Comments)     Causes vasculitis    Sulfa Antibiotics Anaphylaxis       Social History:    Social History     Socioeconomic History    Marital status:      Spouse name: Not on file    Number of children: Not on file    Years of education: Not on file    Highest education level: Not on file   Occupational History    Not on file   Social Needs    Financial resource strain: Not on file    Food insecurity:     Worry: Not on file     Inability: Not on file    Transportation needs:     Medical: Not on file     Non-medical: Not on file   Tobacco Use    Smoking status: Never Smoker    Smokeless tobacco: Current User     Types: Chew   Substance and Sexual Activity    Alcohol use: Yes     Comment: limited    Drug use: No    Sexual activity: Not on file   Lifestyle    Physical activity:     Days per week: Not on file     Minutes per session: Not on file    Stress: Not on file   Relationships    Social connections:     Talks on phone: Not on file     Gets together: Not on file     Attends Mosque service: Not on file     Active member of club or organization: Not on file     Attends meetings of clubs or organizations: Not on file     Relationship status: Not on file    Intimate partner violence:     Fear of current or ex partner: Not on file     Emotionally abused: Not on file     Physically abused: Not on file     Forced sexual activity: Not on file   Other Topics Concern    Not on file   Social History Narrative    Not on file       Family History:        Problem Relation Age of Onset    Diabetes Mother     High Blood Pressure Mother     High Cholesterol Mother     Heart Disease Father     High Blood Pressure Father     Heart Disease Sister     High Blood Pressure Sister     High Blood Pressure Brother     Cancer Maternal Grandmother     Stroke Maternal Grandfather     Cancer Paternal Grandmother     High Blood Pressure Paternal Grandmother          Review of Systems:  Constitutional: No weight loss, no fevers. Eyes: No problems with vision. ENT: No nose or sinus problems, no oral problems, no throat problems or hoarseness. Cardiovascular: No chest pain, no leg pain with walking, no palpitations, no ankle swelling. Respiratory: No shortness of breath, no persistent cough, no wheezing. Endocrine: No increased thirst, no increased urination. Gastrointestinal: No heartburn, no dysphagia, no abdominal pain, no loss of appetite, no nausea or vomiting, no diarrhea, no constipation, no melena, no hematochezia, no sceleral icterus or jaundice. Skin: No rashes. Musculoskeletal: No trouble walking or standing, no joint pain, no muscle pain. Allergy/Immune System: No allergies, no frequent infections. Neurological: No memory difficulties, no temporary blindness, no difficulty speaking, no headaches, no numbness. Psychiatric: No depression, no suicidal ideation, no auditory hallucinations. Hematological/Lymphatic: No lymphadenopathy, no frequent nose bleeds, no easy bruising. Genitourinary: No penile/vaginal discharge, no pain with urination, no trouble starting urinary stream, no hematuria. Physical Examination  Vital Signs: /60 (Site: Left Upper Arm, Position: Sitting, Cuff Size: Large Adult)   Pulse 86   Resp 16   Ht 5' 11\" (1.803 m)   Wt 271 lb 12.8 oz (123.3 kg)   SpO2 97%   BMI 37.91 kg/m²  Body mass index is 37.91 kg/m². General: The patient is a 50 y.o. male in No acute distress. EYE: EOMI, Gross visual field was normal. Pupils reactive, The conjunctive was normal, with no erythema. ENT: no lymphadenopathy, oropharynx is without erythema, edema, or exudates, and moist mucus membranes. The nasal mucosa, septum and turbinates were normal without inflammation or edema.   Neck: There was no mass on palpitation, tracheal position was in the middle of the neck and there was no enlarged thyroid. There was no JVD. Lungs: The respiratory was not in labor and the patient did not use accessory muscle. Clear to auscultation bilaterally, no wheeze/crackles. Cardiovascular: Regular rate and rhythm, normal S1 & S2, no murmurs, rubs or gallops appreciated. Peripheral pulses were normal and no tenderness. Abdomen: Soft, non-tender, no rebound or guarding or peritoneal features, no masses, no hepatosplenomegaly. Extremities: upper and lower extremities were warm and dry, no clubbing, cyanosis, edema. Neuro: CN II-XII were intact grossly. Sensation was normal on all extremities and the muscle strength was normal and symmetry. Rectum:  There was no fistular, fissure, external hemorrhoid, tenderness, abscess, erythema or discharge    Labs:  CBC  WBC   Date Value Ref Range Status   03/19/2018 6.6 4.0 - 11.0 K/uL Final     Hemoglobin   Date Value Ref Range Status   03/19/2018 15.1 13.5 - 17.5 g/dL Final     Hematocrit   Date Value Ref Range Status   03/19/2018 44.4 40.5 - 52.5 % Final     MCV   Date Value Ref Range Status   03/19/2018 82.2 80.0 - 100.0 fL Final        Glucose   Date Value Ref Range Status   03/19/2018 286 (H) 70 - 99 mg/dL Final     CO2   Date Value Ref Range Status   03/19/2018 27 21 - 32 mmol/L Final     BUN   Date Value Ref Range Status   03/19/2018 15 7 - 20 mg/dL Final     Lab Results   Component Value Date    ALT 33 03/19/2018    AST 26 03/19/2018     No results found for: AMYLASE  Lab Results   Component Value Date    LIPASE 62 11/02/2016     Lab Results   Component Value Date    ESR 10 05/15/2012     No components found for: CREACTIVEPR  Lab Results   Component Value Date    LOVELY None Detected 12/05/2016    No components found for: ANTISMA, ANTIMITO  No results found for: CEA  No components found for: OCCULTBLOOD, OCCBLDSINPOC, OCCULTBLOODS, OCCBLD1, OCCBLD2, OCCBLD3, OCCULTBLOOD  No results found for: IRON, FERRITIN  No results

## 2019-04-25 NOTE — LETTER
Vincent Caballero 64    Your procedure with Dr. Jarred Hill has been scheduled at the     [facility] located at     [address] [place], [place] [address]Lizeth gold [facility]. 5/6/19 /      12:00_______________            Procedure Date                Arrival Time (Arrive 1 hour early)       1:00_____________  Procedure Time                               If an emergency arises and you are unable to keep your procedure appointment, you must call [facility] at [phone] and our office at [phone] within 24 hours to let us know. Not giving 24 hour notice or not showing for your procedure appointment may result in immediate dismissal from Dr. Thalia cook. COLONOSCOPY  MIRALAX AND DULCOLAX SPLIT BOWEL PREP    To prepare for this procedure, you will need to clean out your bowels or large intestines. Review all the information you are given as soon as you can so that you are prepared and know what to expect during and after your procedure. You may need to make changes to your diet or medications. Begin this bowel prep 1 day prior to your scheduled procedure. You will need an adult to drive you home after the procedure. Your  will need to check in with you at the facility so the staff are aware of who they are and needs to stay at the facility during the entire procedure. If you  does leave during the procedure, he or she needs to give the staff a phone number where they can be reached and stay within 30 minutes of the facility. If you are taking a cab, bus, or medical transportation service home after the procedure, an adult, other than the , needs to ride with you for your safety.  You should have an adult with you to help you and check on you after the procedure at home for at least 6 hours after the procedure. If you do not have a  with you, your procedure will be rescheduled to another date. You may need to make changes to your medicines. If you take aspirin or NSAIDS, such as ibuprofen, naproxen, or Celebrex for pain, you do NOT need to stop taking these medicines before the procedure. If you take medicines for diabetes, ask the doctor who ordered your diabetes medication how to adjust these medicines for this procedure. If you take any of the blood thinner medications listed below:  ? Ask the doctor who ordered this medication if it is safe for you to stop taking this medicine before the procedure. If you have a stent or certain other health problems, do NOT stop taking these medicines unless otherwise directed by the doctor. ? If your doctor agrees you should stop taking any of the medications listed below, stop for the listed number of days or as your doctor recommends:    o [address] (Ticagretor)  5 days     o Coumadin (Warfarin)  5 days  o Effient (Prasugrel)  7 days  o Eliquis (Apixaban)  2 days  o Lovenox (Enoxaparin)  1 day  o Plavix (Clopidogrel)  5 days  o Pletal (Cilostazol)  5 days  o Pradaxa (Dabigatran)  2 days  o Savaysa (Edoxaban)  2 days  o Xarelto (Rivaroxaban)  1 day  o Aggrenox -  5 days                You will need to purchase 2 medicines over the counter from a pharmacy. They can be found in the laxative section. Store brands often cost less. You do not need a prescription. Ask the pharmacist to help you find what you need if you are having trouble finding it.  o A large bottle (8.3 ounces or 238 grams) of Miralax (Polyethelene Glycol 3350)  o 4 Dulcolax Laxative Tablets (Bisacodyl USP 5 mg)  o Also buy one large 64-ounce bottle of Gatorade, Powerade, or other sports drink that is not red or purple in color. Use Crystal Light or sugar-free sports drinks if you have diabetes. The liquid will be used to mix your Miralax the day before the procedure. o You will also need plenty of clear liquids from the list to drink during the day before your procedure    Acceptable clear liquids for your prep    ? AVOID RED OR PURPLE COLORED PRODUCTS  ? Water  ? Fruit juices that you can see through, such as apple, white cranberry, or white grape  ? Popsicles or ice chips  ? Ginger ale or lemon-lime soda  ? Gatorade, other sports drinks or drink mixes like Kayode-Aid  ? Clear broth or bouillon  ? Jell-0  ? Coffee or tea with no milk or cream added    About the prep    For this prep, you will drink a medicine mixture and take some pills to clear your bowels of all solid matter. You will need to go to the bathroom often, and your stool will get very watery. The prep may cause you to have cramps or feel bloated. Your bowels are clear when you pass pale yellow liquid without any stool. The prep medicine may not taste good. You need to take all of it, so your bowels are clear for the procedure. If your bowels are not cleared, you may have to have the procedure rescheduled and do another prep. Day before your procedure    Do not eat any solid foods or eat or drink any milk products until after your procedure is done. Drink only clear liquids. Morning  ? Start in the morning drinking only clear liquids  ? Drink at least four 8-ounce glasses of water through the day as well as other clear liquids    At 3:00 PM  ? Take 4 Dulcolax tablets with a drink of clear liquid  ? Mix the Miralax powder with the sports drink, so the mixture can chill in the refrigerator  o To make room for the Miralax in the sports drink bottle, pour out a cup of the sports drink first and drink it  o Pour the Miralax powder into the sports drink bottle. Put the cap on the bottle and shake it to dissolve the powder  o Place the mixture into the refrigerator to cool.  Most people find it easier to drink if the mixture is cold    At 6:00 PM through evening

## 2019-04-29 NOTE — TELEPHONE ENCOUNTER
Patient advised of results. Verbal understanding expressed. Pt agrees to additional blood work & liver US. Patient will come into the office to get lab orders. You are booked until September 3rd. Pt is already scheduled for F/U with Bree Burt in July. Do you want the patient seen by you sometime before September?

## 2019-04-30 ENCOUNTER — TELEPHONE (OUTPATIENT)
Dept: GASTROENTEROLOGY | Age: 49
End: 2019-04-30

## 2019-04-30 NOTE — TELEPHONE ENCOUNTER
Pt came into the office and picked up lab orders. He is going to have labs drawn at [address] [place] [address]. He scheduled a f/u with Dr. Ashkan Stallworth on 9/3/19 at 9:30 AM and was placed on the wait list. He also kept appt with Marcelina Workman in July. He is going to have his labs drawn today.

## 2019-04-30 NOTE — TELEPHONE ENCOUNTER
CompAdityaismael does not have the F-Actin Smooth Muscle w/ reflex titer. I called the pt and asked him to have it done at one of our facilities. He stated he prefers not to pay a second lab bill if it is not necessary. He wants to know if this test is absolutely necessary? What is it testing for and how important is it in the diagnoses process? Please advise. Pt had all other labs drawn at [address] today.

## 2019-05-01 ENCOUNTER — HOSPITAL ENCOUNTER (OUTPATIENT)
Age: 49
Discharge: HOME | End: 2019-05-01
Payer: COMMERCIAL

## 2019-05-01 DIAGNOSIS — R79.89 LFT ELEVATION: ICD-10-CM

## 2019-05-01 PROCEDURE — 83516 IMMUNOASSAY NONANTIBODY: CPT

## 2019-05-01 PROCEDURE — 36415 COLL VENOUS BLD VENIPUNCTURE: CPT

## 2019-05-01 NOTE — PROGRESS NOTES
1. Hx of anesthesia complications? -- no  2. Hx of difficult airway/intubation? -- no  3. Hx of changed chest pain/CHF exacerbation in last 6 mo. ? -- no  4. Home O2 dependent? -- no  5. Able to climb one flight of stairs w/o SOB? -- yes  6. Recent COPD exacerbation or pneumonia/bronchitis that has been treated in last      month? -- no       1. Do not eat or drink anything after 12 midnight (or____hours) unless instructed by your doctor prior to surgery. This includes no water, chewing gum or mints. 2. Follow your directions as prescribed by the doctor for your procedure and medications. 3.Check with your Doctor regarding stopping Plavix, Coumadin, Lovenox,Effient,Pradaxa,Xarelto, Fragmin or other blood thinners and follow their instructions. 4. Do not smoke, and do not drink any alcoholic beverages 24 hours prior to surgery. This includes NA Beer. 5. You may brush your teeth and gargle the morning of surgery. DO NOT SWALLOW WATER   6. You MUST make arrangements for a responsible adult to take you home after your surgery and be able to check on you every couple hours for the day. You will not be allowed to leave alone or drive yourself home. It is strongly suggested someone stay with you the first 24 hrs. Your surgery will be cancelled if you do not have a ride home. 7. Please wear simple, loose fitting clothing to the hospital.  Nida Majors not bring valuables (money, credit cards, checkbooks, etc.) Do not wear any makeup (including no eye makeup) or nail polish on your fingers or toes. 8. DO NOT wear any jewelry or piercings on day of surgery. All body piercing jewelry must be removed. 9. If you have dentures, they will be removed before going to the OR; we will provide you a container. If you wear contact lenses or glasses, they will be removed; please bring a case for them.              10. If you  have a Living Will and Durable Power of  for Healthcare, please bring in a

## 2019-05-01 NOTE — TELEPHONE ENCOUNTER
Pt agreed to have lab drawn at [facility]. I faxed the lab order to [facility].

## 2019-05-02 ENCOUNTER — HOSPITAL ENCOUNTER (OUTPATIENT)
Dept: ULTRASOUND IMAGING | Age: 49
Discharge: HOME | End: 2019-05-02
Payer: COMMERCIAL

## 2019-05-02 ENCOUNTER — TELEPHONE (OUTPATIENT)
Dept: GASTROENTEROLOGY | Age: 49
End: 2019-05-02

## 2019-05-02 DIAGNOSIS — R79.89 LFT ELEVATION: ICD-10-CM

## 2019-05-02 PROCEDURE — 93975 VASCULAR STUDY: CPT

## 2019-05-02 PROCEDURE — 76705 ECHO EXAM OF ABDOMEN: CPT

## 2019-05-02 NOTE — TELEPHONE ENCOUNTER
Please let him know that his liver US showed \"fatty liver\". I suggested eating a healthy diet and doing excise daily.     Please schedule him for liver Fibroscan and I will sign the order    armando

## 2019-05-03 ENCOUNTER — TELEPHONE (OUTPATIENT)
Dept: GASTROENTEROLOGY | Age: 49
End: 2019-05-03

## 2019-05-03 ENCOUNTER — ANESTHESIA EVENT (OUTPATIENT)
Dept: OPERATING ROOM | Age: 49
End: 2019-05-03
Payer: COMMERCIAL

## 2019-05-03 DIAGNOSIS — N20.0 KIDNEY STONE: Primary | ICD-10-CM

## 2019-05-04 LAB
F-ACTIN AB, IGG: 13 UNITS (ref 0–19)
F-ACTIN AB, IGG: NORMAL UNITS (ref 0–19)

## 2019-05-06 ENCOUNTER — HOSPITAL ENCOUNTER (OUTPATIENT)
Age: 49
Setting detail: OUTPATIENT SURGERY
Discharge: HOME | End: 2019-05-06
Attending: INTERNAL MEDICINE | Admitting: INTERNAL MEDICINE
Payer: COMMERCIAL

## 2019-05-06 ENCOUNTER — ANESTHESIA (OUTPATIENT)
Dept: OPERATING ROOM | Age: 49
End: 2019-05-06
Payer: COMMERCIAL

## 2019-05-06 ENCOUNTER — TELEPHONE (OUTPATIENT)
Dept: GASTROENTEROLOGY | Age: 49
End: 2019-05-06

## 2019-05-06 VITALS
TEMPERATURE: 97.6 F | HEART RATE: 63 BPM | WEIGHT: 270 LBS | BODY MASS INDEX: 37.8 KG/M2 | DIASTOLIC BLOOD PRESSURE: 81 MMHG | RESPIRATION RATE: 16 BRPM | OXYGEN SATURATION: 98 % | HEIGHT: 71 IN | SYSTOLIC BLOOD PRESSURE: 122 MMHG

## 2019-05-06 VITALS — SYSTOLIC BLOOD PRESSURE: 119 MMHG | OXYGEN SATURATION: 97 % | DIASTOLIC BLOOD PRESSURE: 68 MMHG

## 2019-05-06 DIAGNOSIS — R79.89 ELEVATED FERRITIN: Primary | ICD-10-CM

## 2019-05-06 DIAGNOSIS — R79.89 LFT ELEVATION: ICD-10-CM

## 2019-05-06 PROBLEM — R93.5 ABNORMAL CT OF THE ABDOMEN: Status: ACTIVE | Noted: 2019-05-06

## 2019-05-06 LAB
A/G RATIO: 1.5
ALBUMIN SERPL-MCNC: 4.4 G/DL
ALP BLD-CCNC: 101 U/L
ALT SERPL-CCNC: 33 U/L
ANTIBODY: NEGATIVE
ANTIBODY: NEGATIVE
ANTIGEN INTERPRETATION: NEGATIVE
AST SERPL-CCNC: 31 U/L
BILIRUB SERPL-MCNC: 0.9 MG/DL (ref 0.1–1.4)
BILIRUBIN DIRECT: 0.2 MG/DL
BILIRUBIN, INDIRECT: 0.7
FERRITIN: 507 NG/ML (ref 18–300)
GLOBULIN: 3
GLUCOSE BLD-MCNC: 179 MG/DL (ref 70–99)
IRON: 102
PROTEIN TOTAL: 7.4 G/DL
TOTAL IRON BINDING CAPACITY: 274

## 2019-05-06 PROCEDURE — 82962 GLUCOSE BLOOD TEST: CPT

## 2019-05-06 PROCEDURE — 2720000010 HC SURG SUPPLY STERILE: Performed by: INTERNAL MEDICINE

## 2019-05-06 PROCEDURE — 88305 TISSUE EXAM BY PATHOLOGIST: CPT

## 2019-05-06 PROCEDURE — 7100000011 HC PHASE II RECOVERY - ADDTL 15 MIN: Performed by: INTERNAL MEDICINE

## 2019-05-06 PROCEDURE — 45385 COLONOSCOPY W/LESION REMOVAL: CPT | Performed by: INTERNAL MEDICINE

## 2019-05-06 PROCEDURE — 7100000010 HC PHASE II RECOVERY - FIRST 15 MIN: Performed by: INTERNAL MEDICINE

## 2019-05-06 PROCEDURE — 3609009900 HC COLONOSCOPY W/CONTROL BLEEDING ANY METHOD: Performed by: INTERNAL MEDICINE

## 2019-05-06 PROCEDURE — 2500000003 HC RX 250 WO HCPCS: Performed by: NURSE ANESTHETIST, CERTIFIED REGISTERED

## 2019-05-06 PROCEDURE — 6360000002 HC RX W HCPCS: Performed by: NURSE ANESTHETIST, CERTIFIED REGISTERED

## 2019-05-06 PROCEDURE — 3700000000 HC ANESTHESIA ATTENDED CARE: Performed by: INTERNAL MEDICINE

## 2019-05-06 PROCEDURE — 2709999900 HC NON-CHARGEABLE SUPPLY: Performed by: INTERNAL MEDICINE

## 2019-05-06 PROCEDURE — 3700000001 HC ADD 15 MINUTES (ANESTHESIA): Performed by: INTERNAL MEDICINE

## 2019-05-06 PROCEDURE — 2580000003 HC RX 258: Performed by: INTERNAL MEDICINE

## 2019-05-06 DEVICE — CLIP INT L235CM WRK CHN DIA2.8MM OPN 11MM BRAID CATH ROT BX/10: Type: IMPLANTABLE DEVICE | Status: FUNCTIONAL

## 2019-05-06 RX ORDER — PROPOFOL 10 MG/ML
INJECTION, EMULSION INTRAVENOUS PRN
Status: DISCONTINUED | OUTPATIENT
Start: 2019-05-06 | End: 2019-05-06 | Stop reason: SDUPTHER

## 2019-05-06 RX ORDER — SODIUM CHLORIDE, SODIUM LACTATE, POTASSIUM CHLORIDE, CALCIUM CHLORIDE 600; 310; 30; 20 MG/100ML; MG/100ML; MG/100ML; MG/100ML
INJECTION, SOLUTION INTRAVENOUS CONTINUOUS
Status: DISCONTINUED | OUTPATIENT
Start: 2019-05-06 | End: 2019-05-06 | Stop reason: HOSPADM

## 2019-05-06 RX ORDER — LIDOCAINE HYDROCHLORIDE 20 MG/ML
INJECTION, SOLUTION EPIDURAL; INFILTRATION; INTRACAUDAL; PERINEURAL PRN
Status: DISCONTINUED | OUTPATIENT
Start: 2019-05-06 | End: 2019-05-06 | Stop reason: SDUPTHER

## 2019-05-06 RX ADMIN — PROPOFOL 50 MG: 10 INJECTION, EMULSION INTRAVENOUS at 09:35

## 2019-05-06 RX ADMIN — PROPOFOL 50 MG: 10 INJECTION, EMULSION INTRAVENOUS at 09:45

## 2019-05-06 RX ADMIN — LIDOCAINE HYDROCHLORIDE 200 MG: 20 INJECTION, SOLUTION EPIDURAL; INFILTRATION; INTRACAUDAL; PERINEURAL at 09:32

## 2019-05-06 RX ADMIN — PROPOFOL 50 MG: 10 INJECTION, EMULSION INTRAVENOUS at 09:51

## 2019-05-06 RX ADMIN — PROPOFOL 50 MG: 10 INJECTION, EMULSION INTRAVENOUS at 09:39

## 2019-05-06 RX ADMIN — PROPOFOL 40 MG: 10 INJECTION, EMULSION INTRAVENOUS at 09:54

## 2019-05-06 RX ADMIN — PROPOFOL 50 MG: 10 INJECTION, EMULSION INTRAVENOUS at 09:42

## 2019-05-06 RX ADMIN — SODIUM CHLORIDE, POTASSIUM CHLORIDE, SODIUM LACTATE AND CALCIUM CHLORIDE: 600; 310; 30; 20 INJECTION, SOLUTION INTRAVENOUS at 09:09

## 2019-05-06 RX ADMIN — PROPOFOL 50 MG: 10 INJECTION, EMULSION INTRAVENOUS at 09:48

## 2019-05-06 RX ADMIN — PROPOFOL 100 MG: 10 INJECTION, EMULSION INTRAVENOUS at 09:32

## 2019-05-06 ASSESSMENT — PAIN - FUNCTIONAL ASSESSMENT: PAIN_FUNCTIONAL_ASSESSMENT: 0-10

## 2019-05-06 ASSESSMENT — PAIN SCALES - GENERAL
PAINLEVEL_OUTOF10: 0
PAINLEVEL_OUTOF10: 0

## 2019-05-06 NOTE — OP NOTE
BRIEF COLONOSCOPY REPORT:    Pre-op Diagnosis: abnormal CT    Post-op Diagnosis: see impression below    Anesthesia: MAC  Estimated blood loss: less than 50 cc  Implants: none  Drains: none  Complications: none  Specimens- none- or as referred to below     Impression:    1) a small polyp (4mm) in the cecum, after polypectomy, there was slowly bleeding, one [place] clip was used. Suggest:   1) repeat it when he is 48years old               2) resume Eliquis the day after tomorrow. The complete operative report (including photos) is available in the following locations:   1) soft chart now   2) report will also be scanned and can then be found by going to \"chart review\" then \"notes\" then \"op report\" or by going to \"chart review\" then \"media\" then \"op report\". For review of photos, may need to go to page 2.

## 2019-05-06 NOTE — TELEPHONE ENCOUNTER
Patient advised of results. Verbal understanding expressed. Pt agreed to be referred to Dr. Golden Nagel and agreed to complete lab work. He will stop by the office to get the lab order. Faxed referral to Dr. Sudha Tena office.

## 2019-05-06 NOTE — H&P
Reason for Visit: abnormal CT    HPI: The patient came here for a colonoscopy. There was no contraindication for the colonoscopy. I discussed with him about liver Finroscan to determine the fibrosis stage in order to prevent sequela of cirrhosis. He understood and still declined it. PMH:    Past Medical History:   Diagnosis Date    Blood circulation, collateral     Callus of foot     Callus of foot 8/13/2018    Charcot's joint of foot     Charcot's joint of foot in type 2 diabetes mellitus ([address] 2/6/2014    Closed nondisplaced fracture of fifth metatarsal bone of right foot with routine healing 8/28/2017    Diabetes mellitus ([address]     Hyperlipidemia     Hypertension     Ischemic ulcer of left foot, limited to breakdown of skin ([address] 7/12/2016    Leg edema, right 8/25/2017    Neuropathy     Plantar wart, left foot 8/13/2018    Ulcer of right foot with fat layer exposed ([address] 11/28/2016    Ulcer of right foot with necrosis of muscle, Hannon Grade III 8/11/2016    WD-Chronic ulcer of left foot with fat layer exposed ([address] 8/11/2016    WD-Chronic ulcer of toe of right foot, limited to breakdown of skin ([address] 7/12/2016    WD-Diabetic ulcer of left foot associated with type 2 diabetes mellitus ([address] 8/11/2016    WD-Diabetic ulcer of right lateral foot associated with type 2 diabetes mellitus, with fat layer exposed ([address]     WD-Type 2 diabetes mellitus with right diabetic foot ulcer ([address] 7/12/2016       PSH:    Past Surgical History:   Procedure Laterality Date    ABDOMEN SURGERY      FOOT SURGERY  7/2012    charcot    FRACTURE SURGERY Right     foot       Medications:    No current facility-administered medications on file prior to encounter. Current Outpatient Medications on File Prior to Encounter   Medication Sig Dispense Refill    gabapentin (NEURONTIN) 600 MG tablet Take 0.5 tablets by mouth 3 times daily for 30 days.  45 tablet 0    psyllium (METAMUCIL SMOOTH TEXTURE) 28 % packet Take 1 packet by mouth 2 times daily Take with full glass of h20 or juice 30 each 1    hydrochlorothiazide (HYDRODIURIL) 25 MG tablet TAKE 1 TABLET DAILY 90 tablet 1    metFORMIN (GLUCOPHAGE) 1000 MG tablet TAKE 1 TABLET TWICE A DAY WITH MEALS 180 tablet 1    lisinopril (PRINIVIL;ZESTRIL) 20 MG tablet TAKE 1 TABLET TWICE A  tablet 1    atorvastatin (LIPITOR) 80 MG tablet TAKE 1 TABLET DAILY 90 tablet 3    ELIQUIS 5 MG TABS tablet TAKE 1 TABLET TWICE A  tablet 1    amLODIPine (NORVASC) 5 MG tablet Take 5 mg by mouth daily      JANUVIA 100 MG tablet TAKE 1 TABLET DAILY 90 tablet 1    HUMIRA PEN-PSORIASIS STARTER 40 MG/0.8ML injection       escitalopram (LEXAPRO) 10 MG tablet Take 1 tablet by mouth daily 90 tablet 3    cloNIDine (CATAPRES) 0.1 MG tablet Take 1 tablet by mouth 2 times daily 180 tablet 3       Allergies:    Allergies   Allergen Reactions    Bactrim [Sulfamethoxazole-Trimethoprim] Swelling, Dermatitis and Other (See Comments)     Causes vasculitis    Sulfa Antibiotics Anaphylaxis       Social History:    Social History     Socioeconomic History    Marital status:      Spouse name: Not on file    Number of children: Not on file    Years of education: Not on file    Highest education level: Not on file   Occupational History    Not on file   Social Needs    Financial resource strain: Not on file    Food insecurity:     Worry: Not on file     Inability: Not on file    Transportation needs:     Medical: Not on file     Non-medical: Not on file   Tobacco Use    Smoking status: Never Smoker    Smokeless tobacco: Current User     Types: Chew   Substance and Sexual Activity    Alcohol use: Yes     Comment: limited    Drug use: No    Sexual activity: Not on file   Lifestyle    Physical activity:     Days per week: Not on file     Minutes per session: Not on file    Stress: Not on file   Relationships    Social connections:     Talks on phone: Not on file frequent nose bleeds, no easy bruising. Genitourinary: No penile/vaginal discharge, no pain with urination, no trouble starting urinary stream, no hematuria. Physical Examination  Vital Signs: There were no vitals taken for this visit. There is no height or weight on file to calculate BMI. General: The patient is a 50 y.o. male in No acute distress. EYE: EOMI, Gross visual field was normal. Pupils reactive, The conjunctive was normal, with no erythema. ENT: no lymphadenopathy, oropharynx is without erythema, edema, or exudates, and moist mucus membranes. The nasal mucosa, septum and turbinates were normal without inflammation or edema. Neck: There was no mass on palpitation, tracheal position was in the middle of the neck and there was no enlarged thyroid. There was no JVD. Lungs: The respiratory was not in labor and the patient did not use accessory muscle. Clear to auscultation bilaterally, no wheeze/crackles. Cardiovascular: Regular rate and rhythm, normal S1 & S2, no murmurs, rubs or gallops appreciated. Peripheral pulses were normal and no tenderness. Abdomen: Soft, non-tender, no rebound or guarding or peritoneal features, no masses, no hepatosplenomegaly. Extremities: upper and lower extremities were warm and dry, no clubbing, cyanosis, edema. Neuro: CN II-XII were intact grossly. Sensation was normal on all extremities and the muscle strength was normal and symmetry. Rectum:  There was no fistular, fissure, external hemorrhoid, tenderness, abscess, erythema or discharge    Labs:  CBC  WBC   Date Value Ref Range Status   04/25/2019 7.5 4.0 - 10.5 K/CU MM Final     Hemoglobin   Date Value Ref Range Status   04/25/2019 14.7 13.5 - 18.0 GM/DL Final     Hematocrit   Date Value Ref Range Status   04/25/2019 44.9 42 - 52 % Final     MCV   Date Value Ref Range Status   04/25/2019 86.8 78 - 100 FL Final        Glucose   Date Value Ref Range Status   04/25/2019 164 (H) 70 - 99 MG/DL Final     CO2 Date Value Ref Range Status   04/25/2019 28 21 - 32 MMOL/L Final     BUN   Date Value Ref Range Status   04/25/2019 15 6 - 23 MG/DL Final     Lab Results   Component Value Date    ALT 38 04/25/2019    AST 39 04/25/2019     No results found for: AMYLASE  Lab Results   Component Value Date    LIPASE 62 11/02/2016     Lab Results   Component Value Date    ESR 10 05/15/2012     No components found for: CREACTIVEPR  Lab Results   Component Value Date    LOVELY None Detected 12/05/2016    No components found for: ANTISMA, ANTIMITO  No results found for: CEA  No components found for: Soni Piper, OCCULTBLOODS, OCCBLD1, OCCBLD2, OCCBLD3, OCCULTBLOOD  No results found for: IRON, FERRITIN  No results found for: HAV    Assessment     Assessment and Plan:    colonoscopy    Herve Lewis MD [facility]  [address] [facility] [address]  0ffice: [phone]  Fax: [phone]

## 2019-05-06 NOTE — ANESTHESIA POSTPROCEDURE EVALUATION
Department of Anesthesiology  Postprocedure Note    Patient: Jo Ann Mcdowell  MRN: [id number]  YOB: 1970  Date of evaluation: 5/6/2019  Time:  10:02 AM     Procedure Summary     Date:  05/06/19 Room / Location:  [place] [address] 03 / 1200 [facility] OR    Anesthesia Start:  5475 Anesthesia Stop:  9579    Procedure:  COLONOSCOPY CONTROL HEMORRHAGE (N/A ) Diagnosis:  (Abdominal Pain)    Surgeon:  Florentin Hughes MD Responsible Provider:  ADELFO Brown CRNA    Anesthesia Type:  general ASA Status:  3          Anesthesia Type: general    Shilo Phase I:      Shilo Phase II:      Last vitals: Reviewed and per EMR flowsheets.        Anesthesia Post Evaluation    Patient location during evaluation: bedside  Patient participation: complete - patient participated  Level of consciousness: awake and alert  Pain score: 0  Airway patency: patent  Nausea & Vomiting: no nausea and no vomiting  Complications: no  Cardiovascular status: hemodynamically stable  Respiratory status: acceptable  Hydration status: euvolemic

## 2019-05-07 ENCOUNTER — HOSPITAL ENCOUNTER (OUTPATIENT)
Age: 49
Discharge: HOME | End: 2019-05-07
Payer: COMMERCIAL

## 2019-05-07 PROCEDURE — 36415 COLL VENOUS BLD VENIPUNCTURE: CPT

## 2019-05-07 PROCEDURE — 81256 HFE GENE: CPT

## 2019-05-10 LAB
C282Y HEMOCHROMATOSIS MUT: NEGATIVE
H63D HEMOCHROMATOSIS MUT: NORMAL
HEMOCHROMATOSIS MUTATION C282Y/H63D: NORMAL
HEMOCHROMATOSIS MUTATION C282Y/H63D: NORMAL
HFE PCR SPECIMEN: NORMAL
S65C HEMOCHROMATOSIS MUT: NEGATIVE

## 2019-05-14 ENCOUNTER — HOSPITAL ENCOUNTER (OUTPATIENT)
Age: 49
Setting detail: SPECIMEN
Discharge: HOME | End: 2019-05-14
Payer: COMMERCIAL

## 2019-05-14 LAB
FERRITIN: 456 NG/ML (ref 30–400)
IRON: 74 UG/DL (ref 59–158)
PCT TRANSFERRIN: 27 % (ref 10–44)
TOTAL IRON BINDING CAPACITY: 270 UG/DL (ref 250–450)
UNSATURATED IRON BINDING CAPACITY: 196 UG/DL (ref 110–370)

## 2019-05-14 PROCEDURE — 83550 IRON BINDING TEST: CPT

## 2019-05-14 PROCEDURE — 82728 ASSAY OF FERRITIN: CPT

## 2019-05-14 PROCEDURE — 83540 ASSAY OF IRON: CPT

## 2019-05-15 RX ORDER — GABAPENTIN 600 MG/1
300 TABLET ORAL 3 TIMES DAILY
Qty: 45 TABLET | Refills: 0 | Status: SHIPPED | OUTPATIENT
Start: 2019-05-15 | End: 2019-05-20 | Stop reason: SDUPTHER

## 2019-05-20 ENCOUNTER — OFFICE VISIT (OUTPATIENT)
Dept: FAMILY MEDICINE CLINIC | Age: 49
End: 2019-05-20
Payer: COMMERCIAL

## 2019-05-20 VITALS
DIASTOLIC BLOOD PRESSURE: 68 MMHG | SYSTOLIC BLOOD PRESSURE: 116 MMHG | WEIGHT: 271.8 LBS | RESPIRATION RATE: 16 BRPM | BODY MASS INDEX: 37.91 KG/M2 | OXYGEN SATURATION: 98 % | HEART RATE: 77 BPM

## 2019-05-20 DIAGNOSIS — E11.610 CHARCOT'S JOINT OF FOOT IN TYPE 2 DIABETES MELLITUS (HCC): Primary | ICD-10-CM

## 2019-05-20 DIAGNOSIS — E08.29 DIABETES MELLITUS DUE TO UNDERLYING CONDITION WITH OTHER KIDNEY COMPLICATION, UNSPECIFIED WHETHER LONG TERM INSULIN USE (HCC): ICD-10-CM

## 2019-05-20 DIAGNOSIS — G62.9 NEUROPATHY: ICD-10-CM

## 2019-05-20 DIAGNOSIS — I10 ESSENTIAL HYPERTENSION: ICD-10-CM

## 2019-05-20 DIAGNOSIS — L40.9 PSORIASIS: ICD-10-CM

## 2019-05-20 PROCEDURE — 99214 OFFICE O/P EST MOD 30 MIN: CPT | Performed by: NURSE PRACTITIONER

## 2019-05-20 RX ORDER — GABAPENTIN 600 MG/1
300 TABLET ORAL 3 TIMES DAILY
Qty: 135 TABLET | Refills: 0 | Status: SHIPPED | OUTPATIENT
Start: 2019-05-20 | End: 2019-08-27 | Stop reason: SDUPTHER

## 2019-05-20 ASSESSMENT — ENCOUNTER SYMPTOMS
WHEEZING: 0
COUGH: 0
NAUSEA: 0
ABDOMINAL PAIN: 0
VOMITING: 0
CHEST TIGHTNESS: 0
SHORTNESS OF BREATH: 0
CONSTIPATION: 0
DIARRHEA: 0

## 2019-05-20 NOTE — ASSESSMENT & PLAN NOTE
He is not regularly checking his glucose. Today his glucose was 190. Last A1c was 8.5%. Numbness in his feet. Right charcot.

## 2019-05-20 NOTE — LETTER
which may also result in my being prevented from receiving further care from this office. · Other:____________________________________________________________________    AGREEMENT:    I have read the above and have had all of my questions answered. For chronic disease management, I know that my symptoms can be managed with many types of treatments. A chronic medication trial may be part of my treatment, but I must be an active participant in my care. Medication therapy is only one part of my symptom management plan. In some cases, there may be limited scientific evidence to support the chronic use of certain medications to improve symptoms and daily function. Furthermore, in certain circumstances, there may be scientific information that suggests that use of chronic controlled substances may actually worsen my symptoms and increase my risk of unintentional death directly related to this medication therapy. I know that if my provider feels my risk from controlled medications is greater than my benefit, I will have my controlled substance medication(s) compassionately lowered or removed altogether. I agree to a controlled substance medication trial.      I further agree to allow this office to contact my HIPAA contact on file if there are concerns about my safety and use of controlled medications. I have agreed to use the following medications above as instructed by my physician and as stated in this [id number].      Patient Signature:  ______________________  Date:5/20/2019 or _____________    Provider Signature:_  _____________________  Date:5/20/2019 or _____________

## 2019-05-20 NOTE — PROGRESS NOTES
SUBJECTIVE:    Cherl Cramp  1970  50 y.o.  male      Chief Complaint   Patient presents with    Other     Pt here for follow up     HPI       Allergies   Allergen Reactions    Bactrim [Sulfamethoxazole-Trimethoprim] Swelling, Dermatitis and Other (See Comments)     Causes vasculitis    Sulfa Antibiotics Anaphylaxis       Current Outpatient Medications   Medication Sig Dispense Refill    exenatide (BYETTA 5 MCG PEN) 5 MCG/0.02ML injection Inject 0.02 mLs into the skin 2 times daily (with meals) 1.2 mL 1    gabapentin (NEURONTIN) 600 MG tablet Take 0.5 tablets by mouth 3 times daily for 30 days. 135 tablet 0    hydrochlorothiazide (HYDRODIURIL) 25 MG tablet TAKE 1 TABLET DAILY 90 tablet 1    metFORMIN (GLUCOPHAGE) 1000 MG tablet TAKE 1 TABLET TWICE A DAY WITH MEALS 180 tablet 1    lisinopril (PRINIVIL;ZESTRIL) 20 MG tablet TAKE 1 TABLET TWICE A  tablet 1    atorvastatin (LIPITOR) 80 MG tablet TAKE 1 TABLET DAILY (Patient taking differently: TAKE 1 TABLET DAILY/ NIGHTLTY) 90 tablet 3    amLODIPine (NORVASC) 5 MG tablet Take 5 mg by mouth daily      JANUVIA 100 MG tablet TAKE 1 TABLET DAILY 90 tablet 1    HUMIRA PEN-PSORIASIS STARTER 40 MG/0.8ML injection       escitalopram (LEXAPRO) 10 MG tablet Take 1 tablet by mouth daily 90 tablet 3    cloNIDine (CATAPRES) 0.1 MG tablet Take 1 tablet by mouth 2 times daily 180 tablet 3     No current facility-administered medications for this visit.            Past Medical History:   Diagnosis Date    Blood circulation, collateral     Callus of foot     Callus of foot 8/13/2018    Charcot's joint of foot     Charcot's joint of foot in type 2 diabetes mellitus ([address] 2/6/2014    Closed nondisplaced fracture of fifth metatarsal bone of right foot with routine healing 8/28/2017    Diabetes mellitus ([address]     Hyperlipidemia     Hypertension     Ischemic ulcer of left foot, limited to breakdown of skin ([address] 7/12/2016    Leg edema, right 8/25/2017  Neuropathy     Plantar wart, left foot 8/13/2018    Ulcer of right foot with fat layer exposed ([address] 11/28/2016    Ulcer of right foot with necrosis of muscle, Hannon Grade III 8/11/2016    WD-Chronic ulcer of left foot with fat layer exposed ([address] 8/11/2016    WD-Chronic ulcer of toe of right foot, limited to breakdown of skin ([address] 7/12/2016    WD-Diabetic ulcer of left foot associated with type 2 diabetes mellitus ([address] 8/11/2016    WD-Diabetic ulcer of right lateral foot associated with type 2 diabetes mellitus, with fat layer exposed ([address]     WD-Type 2 diabetes mellitus with right diabetic foot ulcer ([address] 7/12/2016     Past Surgical History:   Procedure Laterality Date    ABDOMEN SURGERY      COLONOSCOPY  05/06/2019    divertic, polyps, clip, repeat age 48    COLONOSCOPY N/A 5/6/2019    COLONOSCOPY CONTROL HEMORRHAGE performed by Montana Casas MD at [address] [place] [address]  7/2012    charcot    FRACTURE SURGERY Right     foot     Social History     Socioeconomic History    Marital status:      Spouse name: None    Number of children: None    Years of education: None    Highest education level: None   Occupational History    None   Social Needs    Financial resource strain: None    Food insecurity:     Worry: None     Inability: None    Transportation needs:     Medical: None     Non-medical: None   Tobacco Use    Smoking status: Never Smoker    Smokeless tobacco: Current User     Types: Chew   Substance and Sexual Activity    Alcohol use: Yes     Comment: limited    Drug use: No    Sexual activity: None   Lifestyle    Physical activity:     Days per week: None     Minutes per session: None    Stress: None   Relationships    Social connections:     Talks on phone: None     Gets together: None     Attends Shinto service: None     Active member of club or organization: None     Attends meetings of clubs or organizations: None     Relationship status: None    Intimate partner violence:     Fear of current or ex partner: None     Emotionally abused: None     Physically abused: None     Forced sexual activity: None   Other Topics Concern    None   Social History Narrative    None         Essential hypertension  Stable. Patient denies any exertional chest pain, dyspnea, palpitations, syncope, orthopnea, edema or paroxysmal nocturnal dyspnea. Psoriasis  Remains on humira. Psoriasis well controlled. Followed by dermatology every 3-6 months. DM (diabetes mellitus) ([address]  He is not regularly checking his glucose. Today his glucose was 190. Last A1c was 8.5%. Numbness in his feet. Right charcot. Charcot's joint of foot in type 2 diabetes mellitus ([address]  He has seen podiatry twice last month due to swelling for 3-4 weeks. He had calluses cut off. He also had an xray completed, which showed collapsing of previously repaired portion of his foot. He is supposed to see the surgeon that completed his previous surgery. He has been wearing compression stockings with relief. Last saw podiatry one week ago. Surgeon previously told him there was not much else he could do for his foot. Review of Systems   Constitutional: Negative for appetite change, chills, fatigue and unexpected weight change. Respiratory: Negative for cough, chest tightness, shortness of breath and wheezing. Cardiovascular: Negative for chest pain, palpitations and leg swelling. Gastrointestinal: Negative for abdominal pain, constipation, diarrhea, nausea and vomiting. Musculoskeletal: Negative for arthralgias. Neurological: Negative for weakness, numbness and headaches. Hematological: Negative for adenopathy. OBJECTIVE:    /68 (Site: Left Upper Arm, Position: Sitting, Cuff Size: Medium Adult)   Pulse 77   Resp 16   Wt 271 lb 12.8 oz (123.3 kg)   SpO2 98%   BMI 37.91 kg/m²     Physical Exam   Constitutional: He is oriented to person, place, and time.  He appears well-developed and well-nourished. HENT:   Head: Normocephalic and atraumatic. Neck: Normal range of motion. Neck supple. No JVD present. Carotid bruit is not present. Cardiovascular: Normal rate, regular rhythm and normal heart sounds. Exam reveals no gallop and no friction rub. No murmur heard. Pulmonary/Chest: Effort normal and breath sounds normal. No stridor. No respiratory distress. He has no wheezes. He has no rhonchi. He has no rales. Abdominal: Soft. Musculoskeletal: Normal range of motion. He exhibits no edema. Right foot: There is swelling and deformity. There is normal capillary refill. Left foot: Normal.   Right foot swollen, flat. Neurological: He is alert and oriented to person, place, and time. Skin: Skin is warm and dry. Psychiatric: He has a normal mood and affect. His behavior is normal.       ASSESSMENT/PLAN:    1. Charcot's joint of foot in type 2 diabetes mellitus ([facility]  Follow up with podiatry as scheduled    2. 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 Start Lucetta Para. Monitor glucose. Return in one month  - exenatide (BYETTA 5 MCG PEN) 5 MCG/0.02ML injection; Inject 0.02 mLs into the skin 2 times daily (with meals)  Dispense: 1.2 mL; Refill: 1    3. Essential hypertension  Continue current medications    4. Psoriasis  Follow up with dermatology as scheduled    5. Neuropathy  Refilled for mail in pharmacy. - gabapentin (NEURONTIN) 600 MG tablet; Take 0.5 tablets by mouth 3 times daily for 30 days. Dispense: 135 tablet; Refill: 0        Attestation: The Prescription Monitoring Report for this patient was reviewed today.  ADELFO Blanco CNP)  Chronic Pain Routine Monitoring: No signs of potential drug abuse or diversion identified: otherwise, see note documentation ADELFO Blanco CNP)      Return in about 1 month (around

## 2019-05-20 NOTE — ASSESSMENT & PLAN NOTE
He has seen podiatry twice last month due to swelling for 3-4 weeks. He had calluses cut off. He also had an xray completed, which showed collapsing of previously repaired portion of his foot. He is supposed to see the surgeon that completed his previous surgery. He has been wearing compression stockings with relief. Last saw podiatry one week ago. Surgeon previously told him there was not much else he could do for his foot.

## 2019-05-20 NOTE — LETTER
MEDICATION AGREEMENT     [place] [facility]  92/92/3423      For certain conditions, multiple classes of medications may be used to help better manage your symptoms, and to improve your ability to function at home, work and in social settings. However, these medications do have risks, which will be discussed with you, including addiction and dependency. The following prescribed medications need frequent monitoring and will require you to partner and assist in your healthcare. Medication  Dose, instructions and quantity as indicated on current prescription bottle Diagnosis/Reason(s) for Taking Category     Gabapentin 300 TID neuropathy                            Benefits and goals of Controlled Substance Medications: There are two potential goals for your treatment: (1) decreased pain and suffering (2) improved daily life functions. There are many possible treatments for your chronic condition(s), and, in addition to controlled substance medications, we will try alternatives such as physical therapy, yoga, massage, home daily exercise, meditation, relaxation techniques, injections, chiropractic manipulations, surgery, cognitive therapy, hypnosis and many medications that are not habit-forming. Use of controlled substance medications may be helpful, but they are unlikely to resolve all of your symptoms or restore all function. Risks of Controlled Substance Medications:    Opioid pain medications: These medications can lead to problems such as addiction/dependence, sedation, lightheadedness/dizziness, memory issues, falls, constipation, nausea, or vomiting. They may also impair the ability to drive or operate machinery. Additionally, these medications may lower testosterone levels, leading to loss of bone strength, stamina and sex drive.   They may cause problems with breathing, sleep apnea and reduced coughing, which are especially dangerous for patients with lung disease. Overdose or dangerous interactions with alcohol and other medications may occur, leading to death. Hyperalgesia may develop, in which patients receiving opioids for the treatment of pain may actually become more sensitive to certain painful stimuli, and in some cases, experience pain from ordinarily non-painful stimuli. Women between the ages of 14-53 who could become pregnant should carefully weigh the risks and benefits of opioids with their physicians, as these medications increase the risk of pregnancy complications, including miscarriage,  delivery and stillbirth. It is also possible for babies to be born addicted to opioids. Opioid dependence withdrawal symptoms may include; feelings of uneasiness, increased pain, irritability, belly pain, diarrhea, sweats and goose-flesh. Benzodiazepines and non-benzodiazepine sleep medications: These medications can lead to problems such as addiction/dependence, sedation, fatigue, lightheadedness, dizziness, incoordination, falls, depression, hallucinations, and impaired judgment, memory and concentration. The ability to drive and operate machinery may also be affected. Abnormal sleep-related behaviors have been reported, including sleep walking, driving, making telephone calls, eating, or having sex while not fully awake. These medications can suppress breathing and worsen sleep apnea, particularly when combined with alcohol or other sedating medications, potentially leading to death. Dependence withdrawal symptoms may include tremors, anxiety, hallucinations and seizures. Stimulants:  Common adverse effects include addiction/dependence, increased blood pressure and heart rate, decreased appetite, nausea, involuntary weight loss, insomnia, irritability, and headaches.   These risks may increase when these medications are combined with other stimulants, such as caffeine pills or energy drinks, certain weight loss assessments recommended by my provider. · I will actively participate in any program designed to improve function, including social, physical, psychological and daily or work activities. 2. I will not use illegal or street drugs or another person's prescription. If I have an addiction problem with drugs or alcohol and my provider asks me to enter a program to address this issue, I agree to follow through. Such programs may include:  · 12-Step program and securing a sponsor  · Individual counseling   · Inpatient or outpatient treatment  · Other:_____________________________________________________________________________________________________________________________________________    If in treatment, I will request that a copy of the programs initial evaluation and treatment recommendations be sent to this provider and will not expect refills until that is received. I will also request written monthly updates be sent to this provider to verify my continuing treatment. 3. I will consent to drug screening upon my providers request to assure I am only taking the prescribed drugs, described in this MEDICATION AGREEMENT. I understand that a drug screen is a laboratory test in which a sample of my urine, blood or saliva is checked to see what drugs I have been taking. 4. I agree that I will treat the providers and staff at this office with respect at all times. I will keep all of my scheduled appointments, but if I need to cancel my appointment, I will do so a minimum of 24 hours before it is scheduled. 5. I understand that this provider may stop prescribing the medications listed if:  · I do not show any improvement in pain, or my activity has not improved. · I develop rapid tolerance or loss of improvement, as described in my treatment plan. · I develop significant side effects from the medication.   · My behavior is inconsistent with the responsibilities outlined above, which may also result in my being prevented from receiving further care from this office. · Other:____________________________________________________________________    AGREEMENT:    I have read the above and have had all of my questions answered. For chronic disease management, I know that my symptoms can be managed with many types of treatments. A chronic medication trial may be part of my treatment, but I must be an active participant in my care. Medication therapy is only one part of my symptom management plan. In some cases, there may be limited scientific evidence to support the chronic use of certain medications to improve symptoms and daily function. Furthermore, in certain circumstances, there may be scientific information that suggests that use of chronic controlled substances may actually worsen my symptoms and increase my risk of unintentional death directly related to this medication therapy. I know that if my provider feels my risk from controlled medications is greater than my benefit, I will have my controlled substance medication(s) compassionately lowered or removed altogether. I agree to a controlled substance medication trial.      I further agree to allow this office to contact my HIPAA contact on file if there are concerns about my safety and use of controlled medications. I have agreed to use the following medications above as instructed by my physician and as stated in this [id number].      Patient Signature:  ______________________  Date:5/20/2019 or _____________    Provider Signature:______________________  Date:5/20/2019 or _____________

## 2019-05-29 ENCOUNTER — TELEPHONE (OUTPATIENT)
Dept: FAMILY MEDICINE CLINIC | Age: 49
End: 2019-05-29

## 2019-05-29 RX ORDER — PEN NEEDLE, DIABETIC 31 G X1/4"
1 NEEDLE, DISPOSABLE MISCELLANEOUS DAILY
Qty: 100 EACH | Refills: 3 | Status: SHIPPED | OUTPATIENT
Start: 2019-05-29 | End: 2019-11-07 | Stop reason: CLARIF

## 2019-05-29 NOTE — TELEPHONE ENCOUNTER
Client called to ask if PCP would be able to send an order into [facility] the needle tips that are used with the Byetta Pen for injection. This nurse confirmed with [facility] that a separate order is needed for the needles to go along with the medication request.

## 2019-06-17 ENCOUNTER — OFFICE VISIT (OUTPATIENT)
Dept: FAMILY MEDICINE CLINIC | Age: 49
End: 2019-06-17
Payer: COMMERCIAL

## 2019-06-17 VITALS
BODY MASS INDEX: 37.38 KG/M2 | RESPIRATION RATE: 16 BRPM | WEIGHT: 268 LBS | SYSTOLIC BLOOD PRESSURE: 124 MMHG | DIASTOLIC BLOOD PRESSURE: 62 MMHG | HEART RATE: 76 BPM

## 2019-06-17 DIAGNOSIS — E08.29 DIABETES MELLITUS DUE TO UNDERLYING CONDITION WITH OTHER KIDNEY COMPLICATION, UNSPECIFIED WHETHER LONG TERM INSULIN USE (HCC): Primary | ICD-10-CM

## 2019-06-17 DIAGNOSIS — E11.610 CHARCOT'S JOINT OF FOOT IN TYPE 2 DIABETES MELLITUS (HCC): ICD-10-CM

## 2019-06-17 DIAGNOSIS — I10 ESSENTIAL HYPERTENSION: ICD-10-CM

## 2019-06-17 PROCEDURE — 99213 OFFICE O/P EST LOW 20 MIN: CPT | Performed by: NURSE PRACTITIONER

## 2019-06-17 ASSESSMENT — ENCOUNTER SYMPTOMS
COUGH: 0
WHEEZING: 0
ABDOMINAL PAIN: 0
CHEST TIGHTNESS: 0
NAUSEA: 0
SHORTNESS OF BREATH: 0
CONSTIPATION: 0
DIARRHEA: 0
VOMITING: 0

## 2019-06-17 NOTE — PROGRESS NOTES
SUBJECTIVE:    Vincent Elliott  1970  50 y.o.  male      Chief Complaint   Patient presents with    1 Month Follow-Up     Pt here for 1 month follow up    Discuss Medications     Pt states robin is making him super tired     HPI     Allergies   Allergen Reactions    Bactrim [Sulfamethoxazole-Trimethoprim] Swelling, Dermatitis and Other (See Comments)     Causes vasculitis    Sulfa Antibiotics Anaphylaxis       Current Outpatient Medications   Medication Sig Dispense Refill    blood glucose test strips (ASCENSIA AUTODISC VI;ONE TOUCH ULTRA TEST VI) strip 1 each by In Vitro route daily As needed. 100 each 3    Insulin Pen Needle (PEN NEEDLES) 31G X 6 MM MISC 1 each by Does not apply route daily 100 each 3    exenatide (BYETTA 5 MCG PEN) 5 MCG/0.02ML injection Inject 0.02 mLs into the skin 2 times daily (with meals) 1.2 mL 1    gabapentin (NEURONTIN) 600 MG tablet Take 0.5 tablets by mouth 3 times daily for 30 days. 135 tablet 0    hydrochlorothiazide (HYDRODIURIL) 25 MG tablet TAKE 1 TABLET DAILY 90 tablet 1    metFORMIN (GLUCOPHAGE) 1000 MG tablet TAKE 1 TABLET TWICE A DAY WITH MEALS 180 tablet 1    lisinopril (PRINIVIL;ZESTRIL) 20 MG tablet TAKE 1 TABLET TWICE A  tablet 1    atorvastatin (LIPITOR) 80 MG tablet TAKE 1 TABLET DAILY (Patient taking differently: TAKE 1 TABLET DAILY/ NIGHTLTY) 90 tablet 3    amLODIPine (NORVASC) 5 MG tablet Take 5 mg by mouth daily      JANUVIA 100 MG tablet TAKE 1 TABLET DAILY 90 tablet 1    HUMIRA PEN-PSORIASIS STARTER 40 MG/0.8ML injection       escitalopram (LEXAPRO) 10 MG tablet Take 1 tablet by mouth daily 90 tablet 3    cloNIDine (CATAPRES) 0.1 MG tablet Take 1 tablet by mouth 2 times daily 180 tablet 3     No current facility-administered medications for this visit.            Past Medical History:   Diagnosis Date    Blood circulation, collateral     Callus of foot     Callus of foot 8/13/2018    Charcot's joint of foot     Charcot's joint of foot in type 2 diabetes mellitus ([address] 2/6/2014    Closed nondisplaced fracture of fifth metatarsal bone of right foot with routine healing 8/28/2017    Diabetes mellitus ([address]     Hyperlipidemia     Hypertension     Ischemic ulcer of left foot, limited to breakdown of skin ([address] 7/12/2016    Leg edema, right 8/25/2017    Neuropathy     Plantar wart, left foot 8/13/2018    Ulcer of right foot with fat layer exposed ([address] 11/28/2016    Ulcer of right foot with necrosis of muscle, Hannon Grade III 8/11/2016    WD-Chronic ulcer of left foot with fat layer exposed ([address] 8/11/2016    WD-Chronic ulcer of toe of right foot, limited to breakdown of skin ([address] 7/12/2016    WD-Diabetic ulcer of left foot associated with type 2 diabetes mellitus ([address] 8/11/2016    WD-Diabetic ulcer of right lateral foot associated with type 2 diabetes mellitus, with fat layer exposed ([address]     WD-Type 2 diabetes mellitus with right diabetic foot ulcer ([address] 7/12/2016     Past Surgical History:   Procedure Laterality Date    ABDOMEN SURGERY      COLONOSCOPY  05/06/2019    divertic, polyps, clip, repeat age 48    COLONOSCOPY N/A 5/6/2019    COLONOSCOPY CONTROL HEMORRHAGE performed by Montana Casas MD at [address] [place] [address]  7/2012    charcot    FRACTURE SURGERY Right     foot     Social History     Socioeconomic History    Marital status:      Spouse name: None    Number of children: None    Years of education: None    Highest education level: None   Occupational History    None   Social Needs    Financial resource strain: None    Food insecurity:     Worry: None     Inability: None    Transportation needs:     Medical: None     Non-medical: None   Tobacco Use    Smoking status: Never Smoker    Smokeless tobacco: Current User     Types: Chew   Substance and Sexual Activity    Alcohol use: Yes     Comment: limited    Drug use: No    Sexual activity: None   Lifestyle    Physical activity:     Days pain, constipation, diarrhea, nausea and vomiting. Musculoskeletal: Negative for arthralgias. Neurological: Negative for weakness, numbness and headaches. Hematological: Negative for adenopathy. OBJECTIVE:    /62 (Site: Left Upper Arm, Position: Sitting, Cuff Size: Large Adult)   Pulse 76   Resp 16   Wt 268 lb (121.6 kg)   BMI 37.38 kg/m²     Physical Exam   Constitutional: He is oriented to person, place, and time. He appears well-developed and well-nourished. HENT:   Head: Normocephalic and atraumatic. Neck: Normal range of motion. Neck supple. No JVD present. Carotid bruit is not present. Cardiovascular: Normal rate, regular rhythm and normal heart sounds. Exam reveals no gallop and no friction rub. No murmur heard. Pulmonary/Chest: Effort normal and breath sounds normal. No respiratory distress. He has no wheezes. He has no rhonchi. He has no rales. Abdominal: Soft. Musculoskeletal: Normal range of motion. He exhibits no edema. Neurological: He is alert and oriented to person, place, and time. Skin: Skin is warm and dry. Psychiatric: He has a normal mood and affect. His behavior is normal.       ASSESSMENT/PLAN:    1. Essential hypertension  Continue current medication    2. 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 Continue byetta. Return in one month  - blood glucose test strips (ASCENSIA AUTODISC VI;ONE TOUCH ULTRA TEST VI) strip; 1 each by In Vitro route daily As needed. Dispense: 100 each; Refill: 3    3. Charcot's joint of foot in type 2 diabetes mellitus ([facility]  Follow up with podiatry as scheduled           Return in about 1 month (around 7/15/2019).       (Please note that portions of this note may have been completed with a voice recognition program. Efforts were made to edit the dictations but occasionally words aremis-transcribed.)
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
## 2019-06-20 DIAGNOSIS — G62.9 NEUROPATHY: ICD-10-CM

## 2019-06-20 RX ORDER — GABAPENTIN 600 MG/1
300 TABLET ORAL 3 TIMES DAILY
Qty: 135 TABLET | Refills: 0 | OUTPATIENT
Start: 2019-06-20 | End: 2019-07-20

## 2019-06-24 RX ORDER — ESCITALOPRAM OXALATE 10 MG/1
TABLET ORAL
Qty: 90 TABLET | Refills: 3 | Status: SHIPPED | OUTPATIENT
Start: 2019-06-24 | End: 2020-06-01

## 2019-07-16 ENCOUNTER — TELEPHONE (OUTPATIENT)
Dept: GASTROENTEROLOGY | Age: 49
End: 2019-07-16

## 2019-07-17 ENCOUNTER — OFFICE VISIT (OUTPATIENT)
Dept: FAMILY MEDICINE CLINIC | Age: 49
End: 2019-07-17
Payer: COMMERCIAL

## 2019-07-17 VITALS
HEART RATE: 74 BPM | BODY MASS INDEX: 36.82 KG/M2 | DIASTOLIC BLOOD PRESSURE: 70 MMHG | SYSTOLIC BLOOD PRESSURE: 128 MMHG | WEIGHT: 264 LBS | RESPIRATION RATE: 16 BRPM

## 2019-07-17 DIAGNOSIS — E11.610 CHARCOT'S JOINT OF FOOT IN TYPE 2 DIABETES MELLITUS (HCC): ICD-10-CM

## 2019-07-17 DIAGNOSIS — E08.29 DIABETES MELLITUS DUE TO UNDERLYING CONDITION WITH OTHER KIDNEY COMPLICATION, UNSPECIFIED WHETHER LONG TERM INSULIN USE (HCC): ICD-10-CM

## 2019-07-17 DIAGNOSIS — Z13.220 SCREENING FOR HYPERLIPIDEMIA: Primary | ICD-10-CM

## 2019-07-17 DIAGNOSIS — I10 ESSENTIAL HYPERTENSION: ICD-10-CM

## 2019-07-17 LAB — HBA1C MFR BLD: 7.9 %

## 2019-07-17 PROCEDURE — 99214 OFFICE O/P EST MOD 30 MIN: CPT | Performed by: NURSE PRACTITIONER

## 2019-07-17 PROCEDURE — 83036 HEMOGLOBIN GLYCOSYLATED A1C: CPT | Performed by: NURSE PRACTITIONER

## 2019-07-17 ASSESSMENT — ENCOUNTER SYMPTOMS
SHORTNESS OF BREATH: 0
COUGH: 0
CONSTIPATION: 0
WHEEZING: 0
NAUSEA: 1
CHEST TIGHTNESS: 0
DIARRHEA: 0
VOMITING: 0
ABDOMINAL PAIN: 0

## 2019-07-17 NOTE — ASSESSMENT & PLAN NOTE
Blood sugar numbers trending down. Highest reading in the 150s. Constant numbness or tingling. Denies polyuria, polydipsia, polyphagia. He has lost weight.

## 2019-07-17 NOTE — PROGRESS NOTES
SUBJECTIVE:    Gertrude Crawford  1970  50 y.o.  male      Chief Complaint   Patient presents with    1 Month Follow-Up     things are better with the byetta-wants to know if you arent' to drink alcohol with it-when he foes out to dinner and has a drink with dinner it makes he very nauseated     HPI    Allergies   Allergen Reactions    Bactrim [Sulfamethoxazole-Trimethoprim] Swelling, Dermatitis and Other (See Comments)     Causes vasculitis    Sulfa Antibiotics Anaphylaxis       Current Outpatient Medications   Medication Sig Dispense Refill    SITagliptin (JANUVIA) 100 MG tablet TAKE 1 TABLET DAILY 90 tablet 1    escitalopram (LEXAPRO) 10 MG tablet TAKE 1 TABLET DAILY 90 tablet 3    blood glucose test strips (ASCENSIA AUTODISC VI;ONE TOUCH ULTRA TEST VI) strip 1 each by In Vitro route daily As needed. 100 each 3    Insulin Pen Needle (PEN NEEDLES) 31G X 6 MM MISC 1 each by Does not apply route daily 100 each 3    exenatide (BYETTA 5 MCG PEN) 5 MCG/0.02ML injection Inject 0.02 mLs into the skin 2 times daily (with meals) 1.2 mL 1    hydrochlorothiazide (HYDRODIURIL) 25 MG tablet TAKE 1 TABLET DAILY 90 tablet 1    metFORMIN (GLUCOPHAGE) 1000 MG tablet TAKE 1 TABLET TWICE A DAY WITH MEALS 180 tablet 1    lisinopril (PRINIVIL;ZESTRIL) 20 MG tablet TAKE 1 TABLET TWICE A  tablet 1    atorvastatin (LIPITOR) 80 MG tablet TAKE 1 TABLET DAILY (Patient taking differently: TAKE 1 TABLET DAILY/ NIGHTLTY) 90 tablet 3    amLODIPine (NORVASC) 5 MG tablet Take 5 mg by mouth daily      ALEXYS PEN-PSORIASIS STARTER 40 MG/0.8ML injection       cloNIDine (CATAPRES) 0.1 MG tablet Take 1 tablet by mouth 2 times daily 180 tablet 3    gabapentin (NEURONTIN) 600 MG tablet Take 0.5 tablets by mouth 3 times daily for 30 days. 135 tablet 0     No current facility-administered medications for this visit.            Past Medical History:   Diagnosis Date    Blood circulation, collateral     Callus of foot     Callus of foot 8/13/2018    Charcot's joint of foot     Charcot's joint of foot in type 2 diabetes mellitus ([address] 2/6/2014    Closed nondisplaced fracture of fifth metatarsal bone of right foot with routine healing 8/28/2017    Diabetes mellitus ([address]     Hyperlipidemia     Hypertension     Ischemic ulcer of left foot, limited to breakdown of skin ([address] 7/12/2016    Leg edema, right 8/25/2017    Neuropathy     Plantar wart, left foot 8/13/2018    Ulcer of right foot with fat layer exposed ([address] 11/28/2016    Ulcer of right foot with necrosis of muscle, Hannon Grade III 8/11/2016    WD-Chronic ulcer of left foot with fat layer exposed ([address] 8/11/2016    WD-Chronic ulcer of toe of right foot, limited to breakdown of skin ([address] 7/12/2016    WD-Diabetic ulcer of left foot associated with type 2 diabetes mellitus ([address] 8/11/2016    WD-Diabetic ulcer of right lateral foot associated with type 2 diabetes mellitus, with fat layer exposed ([address]     WD-Type 2 diabetes mellitus with right diabetic foot ulcer ([address] 7/12/2016     Past Surgical History:   Procedure Laterality Date    ABDOMEN SURGERY      COLONOSCOPY  05/06/2019    divertic, polyps, clip, repeat age 48    COLONOSCOPY N/A 5/6/2019    COLONOSCOPY CONTROL HEMORRHAGE performed by Rg Bucio MD at [address] [place] [address]  7/2012    charcot    FRACTURE SURGERY Right     foot     Social History     Socioeconomic History    Marital status:      Spouse name: None    Number of children: None    Years of education: None    Highest education level: None   Occupational History    None   Social Needs    Financial resource strain: None    Food insecurity:     Worry: None     Inability: None    Transportation needs:     Medical: None     Non-medical: None   Tobacco Use    Smoking status: Never Smoker    Smokeless tobacco: Current User     Types: Chew   Substance and Sexual Activity    Alcohol use: Yes     Comment: limited    Drug use:  No Neck: Normal range of motion. Neck supple. No JVD present. Carotid bruit is not present. Cardiovascular: Normal rate, regular rhythm and normal heart sounds. Exam reveals no gallop and no friction rub. No murmur heard. Pulmonary/Chest: Effort normal and breath sounds normal. No respiratory distress. He has no wheezes. He has no rhonchi. He has no rales. Abdominal: Soft. Musculoskeletal: Normal range of motion. He exhibits no edema. Neurological: He is alert and oriented to person, place, and time. Skin: Skin is warm and dry. Psychiatric: He has a normal mood and affect. His behavior is normal.       ASSESSMENT/PLAN:    1. Screening for hyperlipidemia  Check at next office visit if patient able to get insurance through job    2. 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 Decrease portion sizes to help with nausea with byetta. Monitor glucose. Increase physical activity  - POCT glycosylated hemoglobin (Hb A1C)    3. Essential hypertension  Continue current medication    4. Charcot's joint of foot in type 2 diabetes mellitus ([facility]  Continue to monitor           Return in about 3 months (around 10/17/2019).       (Please note that portions of this note may have been completed with a voice recognition program. Efforts were made to edit the dictations but occasionally words aremis-transcribed.)

## 2019-10-10 NOTE — ANESTHESIA PRE PROCEDURE
Department of Anesthesiology  Preprocedure Note       Name:  Gordon Ford   Age:  50 y.o.  :  1970                                          MRN:  [id number]         Date:  5/3/2019      Surgeon: Ardeen Crigler):  Tony Schulz MD    Procedure: COLONOSCOPY DIAGNOSTIC (N/A )    Medications prior to admission:   Prior to Admission medications    Medication Sig Start Date End Date Taking? Authorizing Provider   gabapentin (NEURONTIN) 600 MG tablet Take 0.5 tablets by mouth 3 times daily for 30 days. 19  ADELFO Hodge CNP   psyllium (METAMUCIL SMOOTH TEXTURE) 28 % packet Take 1 packet by mouth 2 times daily Take with full glass of h20 or juice 19  ADELFO Hodge CNP   hydrochlorothiazide (HYDRODIURIL) 25 MG tablet TAKE 1 TABLET DAILY 4/15/19   ADELFO Bolden CNP   metFORMIN (GLUCOPHAGE) 1000 MG tablet TAKE 1 TABLET TWICE A DAY WITH MEALS 4/15/19   ADELFO Bolden CNP   lisinopril (PRINIVIL;ZESTRIL) 20 MG tablet TAKE 1 TABLET TWICE A DAY 4/15/19   ADELFO Bolden CNP   atorvastatin (LIPITOR) 80 MG tablet TAKE 1 TABLET DAILY 3/27/19   ADELFO Bolden CNP   ELIQUIS 5 MG TABS tablet TAKE 1 TABLET TWICE A DAY 3/25/19   ADELFO Bolden CNP   amLODIPine (NORVASC) 5 MG tablet Take 5 mg by mouth daily    Historical Provider, MD MICHELLE 100 MG tablet TAKE 1 TABLET DAILY 18   ADELFO Bolden CNP   HUMIRA PEN-PSORIASIS STARTER 40 MG/0.8ML injection  18   Historical Provider, MD   escitalopram (LEXAPRO) 10 MG tablet Take 1 tablet by mouth daily 18   ADELFO Bolden CNP   cloNIDine (CATAPRES) 0.1 MG tablet Take 1 tablet by mouth 2 times daily 18   ADELFO Bolden CNP       Current medications:    No current facility-administered medications for this encounter. Current Outpatient Medications   Medication Sig Dispense Refill    gabapentin (NEURONTIN) 600 MG tablet Take 0.5 tablets by mouth 3 times daily for 30 days.  39 Problem: Nutrition/Hydration-ADULT  Goal: Nutrient/Hydration intake appropriate for improving, restoring or maintaining nutritional needs  Description  Monitor and assess patient's nutrition/hydration status for malnutrition  Collaborate with interdisciplinary team and initiate plan and interventions as ordered  Monitor patient's weight and dietary intake as ordered or per policy  Utilize nutrition screening tool and intervene as necessary  Determine patient's food preferences and provide high-protein, high-caloric foods as appropriate       INTERVENTIONS:  - Monitor oral intake, urinary output, labs, and treatment plans  - Assess nutrition and hydration status and recommend course of action  - Evaluate amount of meals eaten  - Assist patient with eating if necessary   - Allow adequate time for meals  - Recommend/ encourage appropriate diets, oral nutritional supplements, and vitamin/mineral supplements  - Order, calculate, and assess calorie counts as needed  - Recommend, monitor, and adjust tube feedings and TPN/PPN based on assessed needs  - Assess need for intravenous fluids  - Provide specific nutrition/hydration education as appropriate  - Include patient/family/caregiver in decisions related to nutrition  Outcome: Progressing     Problem: PAIN - ADULT  Goal: Verbalizes/displays adequate comfort level or baseline comfort level  Description  Interventions:  - Encourage patient to monitor pain and request assistance  - Assess pain using appropriate pain scale  - Administer analgesics based on type and severity of pain and evaluate response  - Implement non-pharmacological measures as appropriate and evaluate response  - Consider cultural and social influences on pain and pain management  - Notify physician/advanced practitioner if interventions unsuccessful or patient reports new pain  Outcome: Progressing     Problem: SAFETY ADULT  Goal: Patient will remain free of falls  Description  INTERVENTIONS:  - Assess patient frequently for physical needs  -  Identify cognitive and physical deficits and behaviors that affect risk of falls    -  [place] fall precautions as indicated by assessment   - Educate patient/family on patient safety including physical limitations  - Instruct patient to call for assistance with activity based on assessment  - Modify environment to reduce risk of injury  - Consider OT/PT consult to assist with strengthening/mobility  Outcome: Progressing  Goal: Maintain or return to baseline ADL function  Description  INTERVENTIONS:  -  Assess patient's ability to carry out ADLs; assess patient's baseline for ADL function and identify physical deficits which impact ability to perform ADLs (bathing, care of mouth/teeth, toileting, grooming, dressing, etc )  - Assess/evaluate cause of self-care deficits   - Assess range of motion  - Assess patient's mobility; develop plan if impaired  - Assess patient's need for assistive devices and provide as appropriate  - Encourage maximum independence but intervene and supervise when necessary  - Involve family in performance of ADLs  - Assess for home care needs following discharge   - Consider OT consult to assist with ADL evaluation and planning for discharge  - Provide patient education as appropriate  Outcome: Progressing  Goal: Maintain or return mobility status to optimal level  Description  INTERVENTIONS:  - Assess patient's baseline mobility status (ambulation, transfers, stairs, etc )    - Identify cognitive and physical deficits and behaviors that affect mobility  - Identify mobility aids required to assist with transfers and/or ambulation (gait belt, sit-to-stand, lift, walker, cane, etc )  - [place] fall precautions as indicated by assessment  - Record patient progress and toleration of activity level on Mobility SBAR; progress patient to next Phase/Stage  - Instruct patient to call for assistance with activity based on assessment  - Consider tablet 0    psyllium (METAMUCIL SMOOTH TEXTURE) 28 % packet Take 1 packet by mouth 2 times daily Take with full glass of h20 or juice 30 each 1    hydrochlorothiazide (HYDRODIURIL) 25 MG tablet TAKE 1 TABLET DAILY 90 tablet 1    metFORMIN (GLUCOPHAGE) 1000 MG tablet TAKE 1 TABLET TWICE A DAY WITH MEALS 180 tablet 1    lisinopril (PRINIVIL;ZESTRIL) 20 MG tablet TAKE 1 TABLET TWICE A  tablet 1    atorvastatin (LIPITOR) 80 MG tablet TAKE 1 TABLET DAILY 90 tablet 3    ELIQUIS 5 MG TABS tablet TAKE 1 TABLET TWICE A  tablet 1    amLODIPine (NORVASC) 5 MG tablet Take 5 mg by mouth daily      JANUVIA 100 MG tablet TAKE 1 TABLET DAILY 90 tablet 1    HUMIRA PEN-PSORIASIS STARTER 40 MG/0.8ML injection       escitalopram (LEXAPRO) 10 MG tablet Take 1 tablet by mouth daily 90 tablet 3    cloNIDine (CATAPRES) 0.1 MG tablet Take 1 tablet by mouth 2 times daily 180 tablet 3       Allergies:     Allergies   Allergen Reactions    Bactrim [Sulfamethoxazole-Trimethoprim] Swelling, Dermatitis and Other (See Comments)     Causes vasculitis    Sulfa Antibiotics Anaphylaxis       Problem List:    Patient Active Problem List   Diagnosis Code    Charcot's joint of foot in type 2 diabetes mellitus ([facility] E11.610    Skin callus L84    H/O sepsis Z86.19    DM (diabetes mellitus) ([facility] E11.9    Psoriasis L40.9    Essential hypertension I10    Leg edema, right R60.0    Right leg pain M79.604    WD-Type 2 diabetes mellitus with right lateral diabetic foot ulcer ([facility] E11.621, L97.519    Plantar wart, left foot B07.0    Callus of foot L84    Change in bowel movement R19.8       Past Medical History:        Diagnosis Date    Blood circulation, collateral     Callus of foot     Callus of foot 8/13/2018    Charcot's joint of foot     Charcot's joint of foot in type 2 diabetes mellitus ([facility] 2/6/2014    Closed nondisplaced fracture of fifth metatarsal bone of right foot with routine healing 8/28/2017    Diabetes mellitus ([address]     Hyperlipidemia     Hypertension     Ischemic ulcer of left foot, limited to breakdown of skin ([address] 7/12/2016    Leg edema, right 8/25/2017    Neuropathy     Plantar wart, left foot 8/13/2018    Ulcer of right foot with fat layer exposed ([address] 11/28/2016    Ulcer of right foot with necrosis of muscle, Hannon Grade III 8/11/2016    WD-Chronic ulcer of left foot with fat layer exposed ([address] 8/11/2016    WD-Chronic ulcer of toe of right foot, limited to breakdown of skin ([address] 7/12/2016    WD-Diabetic ulcer of left foot associated with type 2 diabetes mellitus ([address] 8/11/2016    WD-Diabetic ulcer of right lateral foot associated with type 2 diabetes mellitus, with fat layer exposed ([address]     WD-Type 2 diabetes mellitus with right diabetic foot ulcer ([address] 7/12/2016       Past Surgical History:        Procedure Laterality Date    ABDOMEN SURGERY      FOOT SURGERY  7/2012    charcot    FRACTURE SURGERY Right     foot       Social History:    Social History     Tobacco Use    Smoking status: Never Smoker    Smokeless tobacco: Current User     Types: Chew   Substance Use Topics    Alcohol use: Yes     Comment: limited                                Ready to quit: Not Answered  Counseling given: Not Answered      Vital Signs (Current): There were no vitals filed for this visit.                                            BP Readings from Last 3 Encounters:   04/25/19 124/60   04/17/19 (!) 142/76   01/03/19 122/74       NPO Status:                                                                                 BMI:   Wt Readings from Last 3 Encounters:   04/25/19 271 lb 12.8 oz (123.3 kg)   04/17/19 273 lb 6.4 oz (124 kg)   01/03/19 276 lb (125.2 kg)     There is no height or weight on file to calculate BMI.    CBC:   Lab Results   Component Value Date    WBC 7.5 04/25/2019    RBC 5.17 04/25/2019    HGB 14.7 04/25/2019    HCT 44.9 04/25/2019    MCV 86.8 04/25/2019    RDW 13.2 rehabilitation consult to assist with strengthening/weightbearing, etc   Outcome: Progressing     Problem: DISCHARGE PLANNING  Goal: Discharge to home or other facility with appropriate resources  Description  INTERVENTIONS:  - Identify barriers to discharge w/patient and caregiver  - Arrange for needed discharge resources and transportation as appropriate  - Identify discharge learning needs (meds, wound care, etc )  - Arrange for interpretive services to assist at discharge as needed  - Refer to Case Management Department for coordinating discharge planning if the patient needs post-hospital services based on physician/advanced practitioner order or complex needs related to functional status, cognitive ability, or social support system  Outcome: Progressing     Problem: Knowledge Deficit  Goal: Patient/family/caregiver demonstrates understanding of disease process, treatment plan, medications, and discharge instructions  Description  Complete learning assessment and assess knowledge base    Interventions:  - Provide teaching at level of understanding  - Provide teaching via preferred learning methods  Outcome: Progressing 04/25/2019     04/25/2019       CMP:   Lab Results   Component Value Date     04/25/2019    K 4.4 04/25/2019    CL 92 04/25/2019    CO2 28 04/25/2019    BUN 15 04/25/2019    CREATININE 0.8 04/25/2019    GFRAA >60 04/25/2019    AGRATIO 1.4 03/19/2018    LABGLOM >60 04/25/2019    LABGLOM 121 10/10/2017    GLUCOSE 164 04/25/2019    PROT 7.2 04/25/2019    PROT 7.8 05/15/2012    CALCIUM 9.6 04/25/2019    BILITOT 0.6 04/25/2019    ALKPHOS 104 04/25/2019    AST 39 04/25/2019    ALT 38 04/25/2019       POC Tests: No results for input(s): POCGLU, POCNA, POCK, POCCL, POCBUN, POCHEMO, POCHCT in the last 72 hours. Coags:   Lab Results   Component Value Date    PROTIME 18.2 11/02/2016    INR 1.57 11/02/2016    APTT 29.4 11/02/2016       HCG (If Applicable): No results found for: PREGTESTUR, PREGSERUM, HCG, HCGQUANT     ABGs:   Lab Results   Component Value Date    PO2ART 73 10/21/2016    ZYS7UFH 52.0 10/21/2016    KOV0PIQ 28.7 10/21/2016        Type & Screen (If Applicable):  No results found for: [facility]    Anesthesia Evaluation  Patient summary reviewed no history of anesthetic complications:   Airway: Mallampati: II  TM distance: >3 FB   Neck ROM: full  Mouth opening: > = 3 FB Dental:          Pulmonary:Negative Pulmonary ROS                              Cardiovascular:    (+) hypertension:, hyperlipidemia         Beta Blocker:  Not on Beta Blocker      ROS comment: Echo 11/2016:  FINDINGS:  LV function is normal.  EF 55%. No regional wall motion  abnormality. Mild LVH is noted. Left ventricular size is enlarged. Aortic  root normal.  Aortic valve area is 2.92 cm2. LV size is normal.  Left atrial  size is enlarged. There is mild mitral regurgitation and mild tricuspid  regurgitation. No pericardial effusion, mass, or thrombus. No ASD and no VSD  noted.      IMPRESSION:  Normal LV function, mild LVH.          Neuro/Psych:   Negative Neuro/Psych ROS              GI/Hepatic/Renal:   (+) bowel prep,

## 2019-10-29 ENCOUNTER — TELEPHONE (OUTPATIENT)
Dept: FAMILY MEDICINE CLINIC | Age: 49
End: 2019-10-29

## 2019-11-07 ENCOUNTER — OFFICE VISIT (OUTPATIENT)
Dept: FAMILY MEDICINE CLINIC | Age: 49
End: 2019-11-07
Payer: COMMERCIAL

## 2019-11-07 VITALS
HEIGHT: 72 IN | HEART RATE: 75 BPM | RESPIRATION RATE: 16 BRPM | WEIGHT: 280.13 LBS | BODY MASS INDEX: 37.94 KG/M2 | DIASTOLIC BLOOD PRESSURE: 92 MMHG | SYSTOLIC BLOOD PRESSURE: 146 MMHG

## 2019-11-07 DIAGNOSIS — Z72.0 SMOKELESS TOBACCO USE: ICD-10-CM

## 2019-11-07 DIAGNOSIS — I10 ESSENTIAL HYPERTENSION: ICD-10-CM

## 2019-11-07 DIAGNOSIS — R22.0 LEFT FACIAL SWELLING: ICD-10-CM

## 2019-11-07 DIAGNOSIS — E08.29 DIABETES MELLITUS DUE TO UNDERLYING CONDITION WITH OTHER KIDNEY COMPLICATION, UNSPECIFIED WHETHER LONG TERM INSULIN USE (HCC): ICD-10-CM

## 2019-11-07 DIAGNOSIS — L40.9 PSORIASIS: Primary | ICD-10-CM

## 2019-11-07 DIAGNOSIS — E11.610 CHARCOT'S JOINT OF FOOT IN TYPE 2 DIABETES MELLITUS (HCC): ICD-10-CM

## 2019-11-07 DIAGNOSIS — L84 CALLUS OF FOOT: ICD-10-CM

## 2019-11-07 PROCEDURE — 99212 OFFICE O/P EST SF 10 MIN: CPT | Performed by: NURSE PRACTITIONER

## 2019-11-07 RX ORDER — AMLODIPINE BESYLATE 10 MG/1
10 TABLET ORAL DAILY
Qty: 90 TABLET | Refills: 3 | Status: SHIPPED | OUTPATIENT
Start: 2019-11-07 | End: 2020-03-30

## 2019-11-07 ASSESSMENT — ENCOUNTER SYMPTOMS
CHEST TIGHTNESS: 0
NAUSEA: 0
SHORTNESS OF BREATH: 0
CONSTIPATION: 0
WHEEZING: 0
DIARRHEA: 0
COUGH: 0
ABDOMINAL PAIN: 0
VOMITING: 0

## 2019-11-07 ASSESSMENT — PATIENT HEALTH QUESTIONNAIRE - PHQ9
SUM OF ALL RESPONSES TO PHQ QUESTIONS 1-9: 0
1. LITTLE INTEREST OR PLEASURE IN DOING THINGS: 0
SUM OF ALL RESPONSES TO PHQ QUESTIONS 1-9: 0
2. FEELING DOWN, DEPRESSED OR HOPELESS: 0
SUM OF ALL RESPONSES TO PHQ9 QUESTIONS 1 & 2: 0

## 2020-01-31 ENCOUNTER — TELEPHONE (OUTPATIENT)
Dept: FAMILY MEDICINE CLINIC | Age: 50
End: 2020-01-31

## 2020-01-31 NOTE — TELEPHONE ENCOUNTER
I am assuming they need BMP as he is diabetic. I will put in order but we should call and verify there is nothing else they want.

## 2020-02-05 ENCOUNTER — HOSPITAL ENCOUNTER (OUTPATIENT)
Dept: CT IMAGING | Age: 50
Discharge: HOME | End: 2020-02-05
Payer: COMMERCIAL

## 2020-02-05 PROCEDURE — 70491 CT SOFT TISSUE NECK W/DYE: CPT

## 2020-02-05 PROCEDURE — 6360000004 HC RX CONTRAST MEDICATION: Performed by: NURSE PRACTITIONER

## 2020-02-05 PROCEDURE — 2580000003 HC RX 258: Performed by: NURSE PRACTITIONER

## 2020-02-05 RX ORDER — SODIUM CHLORIDE 0.9 % (FLUSH) 0.9 %
10 SYRINGE (ML) INJECTION 2 TIMES DAILY
Status: DISCONTINUED | OUTPATIENT
Start: 2020-02-05 | End: 2020-02-06 | Stop reason: HOSPADM

## 2020-02-05 RX ADMIN — IOPAMIDOL 70 ML: 755 INJECTION, SOLUTION INTRAVENOUS at 13:00

## 2020-02-05 RX ADMIN — Medication 10 ML: at 13:00

## 2020-03-23 ENCOUNTER — TELEPHONE (OUTPATIENT)
Dept: FAMILY MEDICINE CLINIC | Age: 50
End: 2020-03-23

## 2020-03-30 RX ORDER — AMLODIPINE BESYLATE 10 MG/1
TABLET ORAL
Qty: 30 TABLET | Refills: 11 | Status: SHIPPED | OUTPATIENT
Start: 2020-03-30 | End: 2020-07-16 | Stop reason: SDUPTHER

## 2020-06-01 RX ORDER — ESCITALOPRAM OXALATE 10 MG/1
TABLET ORAL
Qty: 90 TABLET | Refills: 3 | Status: SHIPPED | OUTPATIENT
Start: 2020-06-01 | End: 2020-07-16 | Stop reason: SDUPTHER

## 2020-07-16 ENCOUNTER — OFFICE VISIT (OUTPATIENT)
Dept: FAMILY MEDICINE CLINIC | Age: 50
End: 2020-07-16
Payer: COMMERCIAL

## 2020-07-16 VITALS
OXYGEN SATURATION: 98 % | WEIGHT: 259.8 LBS | BODY MASS INDEX: 35.24 KG/M2 | DIASTOLIC BLOOD PRESSURE: 84 MMHG | TEMPERATURE: 97.1 F | RESPIRATION RATE: 16 BRPM | HEART RATE: 100 BPM | SYSTOLIC BLOOD PRESSURE: 128 MMHG

## 2020-07-16 DIAGNOSIS — E08.29 DIABETES MELLITUS DUE TO UNDERLYING CONDITION WITH OTHER KIDNEY COMPLICATION, UNSPECIFIED WHETHER LONG TERM INSULIN USE (HCC): ICD-10-CM

## 2020-07-16 DIAGNOSIS — Z72.0 SMOKELESS TOBACCO USE: ICD-10-CM

## 2020-07-16 DIAGNOSIS — I10 ESSENTIAL HYPERTENSION: ICD-10-CM

## 2020-07-16 PROBLEM — N52.9 ERECTILE DYSFUNCTION: Status: ACTIVE | Noted: 2020-07-16

## 2020-07-16 LAB
BASOPHILS ABSOLUTE: 0.1 K/UL (ref 0–0.2)
BASOPHILS RELATIVE PERCENT: 0.7 %
CREATININE URINE POCT: 100
EOSINOPHILS ABSOLUTE: 0.2 K/UL (ref 0–0.6)
EOSINOPHILS RELATIVE PERCENT: 2 %
HBA1C MFR BLD: 8 %
HCT VFR BLD CALC: 49.9 % (ref 40.5–52.5)
HEMOGLOBIN: 16.5 G/DL (ref 13.5–17.5)
LYMPHOCYTES ABSOLUTE: 1.4 K/UL (ref 1–5.1)
LYMPHOCYTES RELATIVE PERCENT: 18.9 %
MCH RBC QN AUTO: 29.6 PG (ref 26–34)
MCHC RBC AUTO-ENTMCNC: 33 G/DL (ref 31–36)
MCV RBC AUTO: 89.5 FL (ref 80–100)
MICROALBUMIN/CREAT 24H UR: 30 MG/G{CREAT}
MICROALBUMIN/CREAT UR-RTO: <30
MONOCYTES ABSOLUTE: 0.4 K/UL (ref 0–1.3)
MONOCYTES RELATIVE PERCENT: 5.4 %
NEUTROPHILS ABSOLUTE: 5.5 K/UL (ref 1.7–7.7)
NEUTROPHILS RELATIVE PERCENT: 73 %
PDW BLD-RTO: 15.4 % (ref 12.4–15.4)
PLATELET # BLD: 211 K/UL (ref 135–450)
PMV BLD AUTO: 8.2 FL (ref 5–10.5)
RBC # BLD: 5.57 M/UL (ref 4.2–5.9)
WBC # BLD: 7.6 K/UL (ref 4–11)

## 2020-07-16 PROCEDURE — 3052F HG A1C>EQUAL 8.0%<EQUAL 9.0%: CPT | Performed by: NURSE PRACTITIONER

## 2020-07-16 PROCEDURE — 83036 HEMOGLOBIN GLYCOSYLATED A1C: CPT | Performed by: NURSE PRACTITIONER

## 2020-07-16 PROCEDURE — 82044 UR ALBUMIN SEMIQUANTITATIVE: CPT | Performed by: NURSE PRACTITIONER

## 2020-07-16 PROCEDURE — 99214 OFFICE O/P EST MOD 30 MIN: CPT | Performed by: NURSE PRACTITIONER

## 2020-07-16 RX ORDER — GABAPENTIN 600 MG/1
300 TABLET ORAL 3 TIMES DAILY
Qty: 135 TABLET | Refills: 0 | Status: SHIPPED | OUTPATIENT
Start: 2020-07-16 | End: 2022-10-10 | Stop reason: SDUPTHER

## 2020-07-16 RX ORDER — ESCITALOPRAM OXALATE 10 MG/1
TABLET ORAL
Qty: 90 TABLET | Refills: 3 | Status: SHIPPED | OUTPATIENT
Start: 2020-07-16 | End: 2021-08-23

## 2020-07-16 RX ORDER — ATORVASTATIN CALCIUM 80 MG/1
TABLET, FILM COATED ORAL
Qty: 90 TABLET | Refills: 3 | Status: SHIPPED | OUTPATIENT
Start: 2020-07-16 | End: 2021-08-23

## 2020-07-16 RX ORDER — SILDENAFIL 50 MG/1
50 TABLET, FILM COATED ORAL DAILY PRN
Qty: 10 TABLET | Refills: 3 | Status: SHIPPED | OUTPATIENT
Start: 2020-07-16 | End: 2021-02-17 | Stop reason: SDUPTHER

## 2020-07-16 RX ORDER — TRIAMCINOLONE ACETONIDE 1 MG/G
CREAM TOPICAL
COMMUNITY
Start: 2020-04-29 | End: 2020-07-16

## 2020-07-16 RX ORDER — HYDROCHLOROTHIAZIDE 25 MG/1
TABLET ORAL
Qty: 90 TABLET | Refills: 3 | Status: SHIPPED | OUTPATIENT
Start: 2020-07-16 | End: 2021-09-13

## 2020-07-16 RX ORDER — LISINOPRIL 20 MG/1
TABLET ORAL
Qty: 180 TABLET | Refills: 3 | Status: SHIPPED | OUTPATIENT
Start: 2020-07-16 | End: 2021-09-13

## 2020-07-16 RX ORDER — AMLODIPINE BESYLATE 10 MG/1
TABLET ORAL
Qty: 90 TABLET | Refills: 1 | Status: SHIPPED | OUTPATIENT
Start: 2020-07-16 | End: 2020-10-27 | Stop reason: SDUPTHER

## 2020-07-16 RX ORDER — CLONIDINE HYDROCHLORIDE 0.1 MG/1
TABLET ORAL
Qty: 180 TABLET | Refills: 3 | Status: CANCELLED | OUTPATIENT
Start: 2020-07-16

## 2020-07-16 ASSESSMENT — ENCOUNTER SYMPTOMS
VOMITING: 0
CHEST TIGHTNESS: 0
ABDOMINAL PAIN: 0
WHEEZING: 0
NAUSEA: 0
CONSTIPATION: 0
COUGH: 0
SHORTNESS OF BREATH: 0
DIARRHEA: 0

## 2020-07-16 ASSESSMENT — PATIENT HEALTH QUESTIONNAIRE - PHQ9
2. FEELING DOWN, DEPRESSED OR HOPELESS: 0
SUM OF ALL RESPONSES TO PHQ QUESTIONS 1-9: 0
1. LITTLE INTEREST OR PLEASURE IN DOING THINGS: 0
SUM OF ALL RESPONSES TO PHQ QUESTIONS 1-9: 0
SUM OF ALL RESPONSES TO PHQ9 QUESTIONS 1 & 2: 0

## 2020-07-16 NOTE — ASSESSMENT & PLAN NOTE
Stable. Not taking clonidine. Patient denies any exertional chest pain, dyspnea, palpitations, syncope, orthopnea, edema or paroxysmal nocturnal dyspnea.

## 2020-07-16 NOTE — PROGRESS NOTES
foot, limited to breakdown of skin ([address] 7/12/2016    Leg edema, right 8/25/2017    Neuropathy     Plantar wart, left foot 8/13/2018    Ulcer of right foot with fat layer exposed ([address] 11/28/2016    Ulcer of right foot with necrosis of muscle, [place] Grade III 8/11/2016    WD-Chronic ulcer of left foot with fat layer exposed ([address] 8/11/2016    WD-Chronic ulcer of toe of right foot, limited to breakdown of skin ([address] 7/12/2016    WD-Diabetic ulcer of left foot associated with type 2 diabetes mellitus ([address] 8/11/2016    WD-Diabetic ulcer of right lateral foot associated with type 2 diabetes mellitus, with fat layer exposed ([address]     WD-Type 2 diabetes mellitus with right diabetic foot ulcer ([address] 7/12/2016     Past Surgical History:   Procedure Laterality Date    ABDOMEN SURGERY      COLONOSCOPY  05/06/2019    divertic, polyps, clip, repeat age 48    COLONOSCOPY N/A 5/6/2019    COLONOSCOPY CONTROL HEMORRHAGE performed by Abram Cantrell MD at [address] [place] [address]  7/2012    charcot    FRACTURE SURGERY Right     foot     Social History     Socioeconomic History    Marital status:      Spouse name: None    Number of children: None    Years of education: None    Highest education level: None   Occupational History    None   Social Needs    Financial resource strain: None    Food insecurity     Worry: None     Inability: None    Transportation needs     Medical: None     Non-medical: None   Tobacco Use    Smoking status: Never Smoker    Smokeless tobacco: Current User     Types: Chew   Substance and Sexual Activity    Alcohol use: Yes     Comment: limited    Drug use: No    Sexual activity: None   Lifestyle    Physical activity     Days per week: None     Minutes per session: None    Stress: None   Relationships    Social connections     Talks on phone: None     Gets together: None     Attends [employer] service: None     Active member of club or organization: None     Attends meetings of clubs or organizations: None     Relationship status: None    Intimate partner violence     Fear of current or ex partner: None     Emotionally abused: None     Physically abused: None     Forced sexual activity: None   Other Topics Concern    None   Social History Narrative    None         Smokeless tobacco use  One can of snuff a day. Not ready to quit. Essential hypertension  Stable. Not taking clonidine. Patient denies any exertional chest pain, dyspnea, palpitations, syncope, orthopnea, edema or paroxysmal nocturnal dyspnea. DM (diabetes mellitus) ([facility]  Denies polyuria, polydipsia, polyphagia. Chronic numbness, tingling in feet. Psoriasis  Not on humira. He did see dermatology. Complains of arthralgia. Referred to rheumatology rule out RA for psoriatic arthritis. Charcot's joint of foot in type 2 diabetes mellitus ([facility]  He has not seen podiatry recently. Good pulses. Checking feet. Erectile dysfunction  No morning erections. Able to achieve erection, but not as full as before. Able to orgasm. Review of Systems   Constitutional: Negative for appetite change, chills, fatigue and unexpected weight change. Respiratory: Negative for cough, chest tightness, shortness of breath and wheezing. Cardiovascular: Negative for chest pain, palpitations and leg swelling. Gastrointestinal: Negative for abdominal pain, constipation, diarrhea, nausea and vomiting. Musculoskeletal: Negative for arthralgias. Neurological: Positive for numbness (chronic, bilateral feet). Negative for weakness and headaches. Hematological: Negative for adenopathy. OBJECTIVE:    /84 (Site: Left Upper Arm, Position: Sitting, Cuff Size: Large Adult)   Pulse 100   Temp 97.1 °F (36.2 °C) (Infrared)   Resp 16   Wt 259 lb 12.8 oz (117.8 kg)   SpO2 98%   BMI 35.24 kg/m²     Physical Exam  Constitutional:       Appearance: Normal appearance. He is well-developed.    HENT:      Head: Normocephalic and atraumatic. Right Ear: Hearing normal.      Left Ear: Hearing normal.      Nose: Nose normal.      Mouth/Throat:      Mouth: Mucous membranes are moist.      Pharynx: Oropharynx is clear. Eyes:      Extraocular Movements: Extraocular movements intact. Conjunctiva/sclera: Conjunctivae normal.      Pupils: Pupils are equal, round, and reactive to light. Neck:      Musculoskeletal: Normal range of motion and neck supple. Vascular: No carotid bruit or JVD. Cardiovascular:      Rate and Rhythm: Normal rate and regular rhythm. Pulses:           Dorsalis pedis pulses are 2+ on the right side and 2+ on the left side. Posterior tibial pulses are 2+ on the right side and 2+ on the left side. Heart sounds: Normal heart sounds. No murmur. No friction rub. No gallop. Pulmonary:      Effort: Pulmonary effort is normal. No respiratory distress. Breath sounds: Normal breath sounds. No wheezing, rhonchi or rales. Abdominal:      Palpations: Abdomen is soft. Musculoskeletal: Normal range of motion. Right foot: Charcot foot present. Feet:      Right foot:      Protective Sensation: 10 sites tested. 0 sites sensed. Left foot:      Protective Sensation: 10 sites tested. 1 site sensed. Skin:     General: Skin is warm and dry. Neurological:      General: No focal deficit present. Mental Status: He is alert and oriented to person, place, and time. Cranial Nerves: No cranial nerve deficit. Psychiatric:         Mood and Affect: Mood normal.         Behavior: Behavior normal. Behavior is cooperative. Thought Content: Thought content normal.         ASSESSMENT/PLAN:    1. 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 The patient is asked to make an attempt to improve diet and exercise patterns to aid in medical management of this problem.   - POCT glycosylated hemoglobin (Hb A1C)  - POCT microalbumin  - metFORMIN (GLUCOPHAGE) 1000 MG tablet; TAKE 1 TABLET TWICE A DAY WITH MEALS  Dispense: 180 tablet; Refill: 3  - lisinopril (PRINIVIL;ZESTRIL) 20 MG tablet; TAKE 1 TABLET TWICE A DAY  Dispense: 180 tablet; Refill: 3  - atorvastatin (LIPITOR) 80 MG tablet; TAKE 1 TABLET DAILY  Dispense: 90 tablet; Refill: 3  - Comprehensive Metabolic Panel; Future  - Lipid Panel; Future  -  DIABETES FOOT EXAM    2. Neuropathy  refilled  - gabapentin (NEURONTIN) 600 MG tablet; Take 0.5 tablets by mouth 3 times daily for 90 days. Dispense: 135 tablet; Refill: 0    3. Essential hypertension  Monitor BP. Continue current medication  - lisinopril (PRINIVIL;ZESTRIL) 20 MG tablet; TAKE 1 TABLET TWICE A DAY  Dispense: 180 tablet; Refill: 3  - hydroCHLOROthiazide (HYDRODIURIL) 25 MG tablet; TAKE 1 TABLET DAILY  Dispense: 90 tablet; Refill: 3  - amLODIPine (NORVASC) 10 MG tablet; TAKE 1 TABLET BY MOUTH EVERY DAY  Dispense: 90 tablet; Refill: 1  - Comprehensive Metabolic Panel; Future  - CBC Auto Differential; Future    4. Smokeless tobacco use  Counseled on cessation  - CBC Auto Differential; Future    5. Erectile dysfunction, unspecified erectile dysfunction type  viagra PRN. Discussed glycemic control, BP control  - sildenafil (VIAGRA) 50 MG tablet; Take 1 tablet by mouth daily as needed for Erectile Dysfunction  Dispense: 10 tablet; Refill: 3    6. Psoriasis  Follow up with dermatology    7. Charcot's joint of foot in type 2 diabetes mellitus ([facility]  Schedule follow up with podiatry. Periodic Controlled Substance Monitoring: Possible medication side effects, risk of tolerance/dependence & alternative treatments discussed., No signs of potential drug abuse or diversion identified. ADELFO Sher - CNP)      Return in about 4 months (around 11/16/2020).       (Please note that portions of this note may have been completed with a voice recognition program. Efforts were made to edit the dictations but occasionally words aremis-transcribed.)

## 2020-07-16 NOTE — ASSESSMENT & PLAN NOTE
Not on humira. He did see dermatology. Complains of arthralgia. Referred to rheumatology rule out RA for psoriatic arthritis.

## 2020-07-17 LAB
A/G RATIO: 1.2 (ref 1.1–2.2)
ALBUMIN SERPL-MCNC: 4 G/DL (ref 3.4–5)
ALP BLD-CCNC: 92 U/L (ref 40–129)
ALT SERPL-CCNC: 35 U/L (ref 10–40)
ANION GAP SERPL CALCULATED.3IONS-SCNC: 15 MMOL/L (ref 3–16)
AST SERPL-CCNC: 37 U/L (ref 15–37)
BILIRUB SERPL-MCNC: 0.9 MG/DL (ref 0–1)
BUN BLDV-MCNC: 7 MG/DL (ref 7–20)
CALCIUM SERPL-MCNC: 9.3 MG/DL (ref 8.3–10.6)
CHLORIDE BLD-SCNC: 93 MMOL/L (ref 99–110)
CHOLESTEROL, TOTAL: 243 MG/DL (ref 0–199)
CO2: 28 MMOL/L (ref 21–32)
CREAT SERPL-MCNC: 0.6 MG/DL (ref 0.9–1.3)
GFR AFRICAN AMERICAN: >60
GFR NON-AFRICAN AMERICAN: >60
GLOBULIN: 3.4 G/DL
GLUCOSE BLD-MCNC: 154 MG/DL (ref 70–99)
HDLC SERPL-MCNC: 42 MG/DL (ref 40–60)
LDL CHOLESTEROL CALCULATED: 169 MG/DL
POTASSIUM SERPL-SCNC: 3.6 MMOL/L (ref 3.5–5.1)
SODIUM BLD-SCNC: 136 MMOL/L (ref 136–145)
TOTAL PROTEIN: 7.4 G/DL (ref 6.4–8.2)
TRIGL SERPL-MCNC: 159 MG/DL (ref 0–150)
VLDLC SERPL CALC-MCNC: 32 MG/DL

## 2020-08-18 ENCOUNTER — HOSPITAL ENCOUNTER (OUTPATIENT)
Dept: GENERAL RADIOLOGY | Age: 50
Discharge: HOME | End: 2020-08-18
Payer: COMMERCIAL

## 2020-08-18 ENCOUNTER — HOSPITAL ENCOUNTER (OUTPATIENT)
Age: 50
Discharge: HOME | End: 2020-08-18
Payer: COMMERCIAL

## 2020-08-18 PROCEDURE — 72020 X-RAY EXAM OF SPINE 1 VIEW: CPT

## 2020-08-18 PROCEDURE — 72100 X-RAY EXAM L-S SPINE 2/3 VWS: CPT

## 2020-08-18 PROCEDURE — 73522 X-RAY EXAM HIPS BI 3-4 VIEWS: CPT

## 2020-10-27 RX ORDER — AMLODIPINE BESYLATE 10 MG/1
TABLET ORAL
Qty: 90 TABLET | Refills: 1 | Status: SHIPPED | OUTPATIENT
Start: 2020-10-27 | End: 2021-05-27

## 2021-02-15 DIAGNOSIS — N52.9 ERECTILE DYSFUNCTION, UNSPECIFIED ERECTILE DYSFUNCTION TYPE: ICD-10-CM

## 2021-02-17 RX ORDER — SILDENAFIL 50 MG/1
50 TABLET, FILM COATED ORAL DAILY PRN
Qty: 10 TABLET | Refills: 3 | Status: SHIPPED | OUTPATIENT
Start: 2021-02-17 | End: 2022-10-05

## 2021-07-30 DIAGNOSIS — I10 ESSENTIAL HYPERTENSION: ICD-10-CM

## 2021-07-30 RX ORDER — AMLODIPINE BESYLATE 10 MG/1
TABLET ORAL
Qty: 30 TABLET | Refills: 1 | Status: SHIPPED | OUTPATIENT
Start: 2021-07-30 | End: 2021-09-30

## 2021-10-06 ENCOUNTER — TELEPHONE (OUTPATIENT)
Dept: FAMILY MEDICINE CLINIC | Age: 51
End: 2021-10-06

## 2021-10-06 NOTE — LETTER
[address] [facility]  [address] [place] [id number]  Phone: [phone]  Fax: [phone]    ADELFO Trotter CNP        October 6, 2021    [address] [address]      Dear Cara Ordaz:    Your medications have been refilled at this time but if you require more refills you will have to schedule an appointment in the office. Please note they will not be refilled without an appointment. Please contact the office if you have any questions.     Thank you,    Brooklyn Thakur LPN/              ADELFO Trotter CNP

## 2021-10-12 ENCOUNTER — TELEPHONE (OUTPATIENT)
Dept: FAMILY MEDICINE CLINIC | Age: 51
End: 2021-10-12

## 2021-10-12 NOTE — LETTER
[address] [facility]  [address] [address]  Phone: [phone]  Fax: [phone]    ADELFO Baldwin CNP        October 12, 2021    [facility] [address] [address]      Dear Sofía Leoncel:    Your medications have been refilled at this time but if you require more refills you will have to schedule an appointment in the office with your provider  Please note they will not be refilled without an appointment. Please contact the office if you have any questions.     Thank you,    Christopher Martin LPN/            ADELFO Baldwin CNP

## 2021-10-23 DIAGNOSIS — I10 ESSENTIAL HYPERTENSION: ICD-10-CM

## 2021-10-25 RX ORDER — AMLODIPINE BESYLATE 10 MG/1
TABLET ORAL
Qty: 30 TABLET | Refills: 0 | Status: SHIPPED | OUTPATIENT
Start: 2021-10-25 | End: 2021-11-24

## 2022-05-06 LAB
ALBUMIN SERPL-MCNC: 4.1 G/DL
ALP BLD-CCNC: 96 U/L
ALT SERPL-CCNC: 41 U/L
ANION GAP SERPL CALCULATED.3IONS-SCNC: 1.3 MMOL/L
ANTIGEN INTERPRETATION: NORMAL
AST SERPL-CCNC: 47 U/L
BASOPHILS ABSOLUTE: NORMAL
BASOPHILS RELATIVE PERCENT: 0.4 %
BILIRUB SERPL-MCNC: 0.7 MG/DL (ref 0.1–1.4)
BUN BLDV-MCNC: 13 MG/DL
CALCIUM SERPL-MCNC: 9.3 MG/DL
CHLORIDE BLD-SCNC: 91 MMOL/L
CO2: 29 MMOL/L
CREAT SERPL-MCNC: 0.55 MG/DL
EOSINOPHILS ABSOLUTE: NORMAL
EOSINOPHILS RELATIVE PERCENT: 1.9 %
GFR CALCULATED: 120
GLUCOSE BLD-MCNC: 164 MG/DL
HCT VFR BLD CALC: 46.8 % (ref 41–53)
HEMOGLOBIN: 16.3 G/DL (ref 13.5–17.5)
LYMPHOCYTES ABSOLUTE: NORMAL
LYMPHOCYTES RELATIVE PERCENT: 36.6 %
MCH RBC QN AUTO: 30.7 PG
MCHC RBC AUTO-ENTMCNC: 34.8 G/DL
MCV RBC AUTO: 88.1 FL
MONOCYTES ABSOLUTE: NORMAL
MONOCYTES RELATIVE PERCENT: 5.7 %
NEUTROPHILS ABSOLUTE: 0.1 /ΜL
NEUTROPHILS RELATIVE PERCENT: 55.3 %
PDW BLD-RTO: 41.9 %
PLATELET # BLD: 187 K/ΜL
PMV BLD AUTO: NORMAL FL
POTASSIUM SERPL-SCNC: 3.3 MMOL/L
RBC # BLD: 5.31 10^6/ΜL
SODIUM BLD-SCNC: 137 MMOL/L
TOTAL PROTEIN: 7.3
WBC # BLD: 6.8 10^3/ML

## 2022-09-19 ENCOUNTER — TELEPHONE (OUTPATIENT)
Dept: FAMILY MEDICINE CLINIC | Age: 52
End: 2022-09-19

## 2022-09-19 ENCOUNTER — OFFICE VISIT (OUTPATIENT)
Dept: INTERNAL MEDICINE CLINIC | Age: 52
End: 2022-09-19
Payer: COMMERCIAL

## 2022-09-19 ENCOUNTER — HOSPITAL ENCOUNTER (OUTPATIENT)
Dept: CT IMAGING | Age: 52
Discharge: HOME | End: 2022-09-19
Payer: COMMERCIAL

## 2022-09-19 VITALS
BODY MASS INDEX: 32.51 KG/M2 | HEART RATE: 99 BPM | DIASTOLIC BLOOD PRESSURE: 108 MMHG | RESPIRATION RATE: 18 BRPM | HEIGHT: 72 IN | WEIGHT: 240 LBS | SYSTOLIC BLOOD PRESSURE: 177 MMHG | OXYGEN SATURATION: 98 %

## 2022-09-19 DIAGNOSIS — R10.32 ABDOMINAL PAIN, LLQ (LEFT LOWER QUADRANT): Primary | ICD-10-CM

## 2022-09-19 DIAGNOSIS — I10 ESSENTIAL HYPERTENSION: ICD-10-CM

## 2022-09-19 DIAGNOSIS — R10.32 ABDOMINAL PAIN, LLQ (LEFT LOWER QUADRANT): ICD-10-CM

## 2022-09-19 LAB
BASOPHILS ABSOLUTE: 0.1 K/UL (ref 0–0.2)
BASOPHILS RELATIVE PERCENT: 0.7 %
EOSINOPHILS ABSOLUTE: 0.1 K/UL (ref 0–0.6)
EOSINOPHILS RELATIVE PERCENT: 2 %
GFR AFRICAN AMERICAN: >60 ML/MIN/1.73M2
GFR NON-AFRICAN AMERICAN: >60 ML/MIN/1.73M2
HCT VFR BLD CALC: 49.1 % (ref 40.5–52.5)
HEMOGLOBIN: 17.3 G/DL (ref 13.5–17.5)
LYMPHOCYTES ABSOLUTE: 1.5 K/UL (ref 1–5.1)
LYMPHOCYTES RELATIVE PERCENT: 20.1 %
MCH RBC QN AUTO: 32.8 PG (ref 26–34)
MCHC RBC AUTO-ENTMCNC: 35.1 G/DL (ref 31–36)
MCV RBC AUTO: 93.5 FL (ref 80–100)
MONOCYTES ABSOLUTE: 0.4 K/UL (ref 0–1.3)
MONOCYTES RELATIVE PERCENT: 5.1 %
NEUTROPHILS ABSOLUTE: 5.2 K/UL (ref 1.7–7.7)
NEUTROPHILS RELATIVE PERCENT: 72.1 %
PDW BLD-RTO: 14.3 % (ref 12.4–15.4)
PLATELET # BLD: 203 K/UL (ref 135–450)
PMV BLD AUTO: 8.4 FL (ref 5–10.5)
POC CREATININE: 0.5 MG/DL (ref 0.9–1.3)
RBC # BLD: 5.26 M/UL (ref 4.2–5.9)
WBC # BLD: 7.2 K/UL (ref 4–11)

## 2022-09-19 PROCEDURE — 6360000004 HC RX CONTRAST MEDICATION: Performed by: NURSE PRACTITIONER

## 2022-09-19 PROCEDURE — 99214 OFFICE O/P EST MOD 30 MIN: CPT | Performed by: NURSE PRACTITIONER

## 2022-09-19 PROCEDURE — 74177 CT ABD & PELVIS W/CONTRAST: CPT

## 2022-09-19 RX ORDER — GUSELKUMAB 100 MG/ML
INJECTION SUBCUTANEOUS
COMMUNITY
Start: 2022-08-10 | End: 2022-10-05

## 2022-09-19 RX ORDER — HYDROCHLOROTHIAZIDE 25 MG/1
25 TABLET ORAL EVERY MORNING
Qty: 30 TABLET | Refills: 0 | Status: SHIPPED | OUTPATIENT
Start: 2022-09-19 | End: 2022-09-21 | Stop reason: DRUGHIGH

## 2022-09-19 RX ORDER — LISINOPRIL 20 MG/1
20 TABLET ORAL 2 TIMES DAILY
Qty: 60 TABLET | Refills: 0 | Status: SHIPPED | OUTPATIENT
Start: 2022-09-19 | End: 2022-10-05 | Stop reason: SDUPTHER

## 2022-09-19 RX ADMIN — IOPAMIDOL 100 ML: 755 INJECTION, SOLUTION INTRAVENOUS at 14:31

## 2022-09-19 RX ADMIN — IOPAMIDOL 20 ML: 755 INJECTION, SOLUTION INTRAVENOUS at 14:31

## 2022-09-19 ASSESSMENT — ENCOUNTER SYMPTOMS
FLATUS: 1
ABDOMINAL DISTENTION: 0
BLOOD IN STOOL: 0
CONSTIPATION: 0
BELCHING: 0
ABDOMINAL PAIN: 1
RECTAL PAIN: 0
WHEEZING: 0
DIARRHEA: 1
VOMITING: 0
NAUSEA: 0
SHORTNESS OF BREATH: 0
HEMATOCHEZIA: 0
CHEST TIGHTNESS: 0
COUGH: 0

## 2022-09-19 ASSESSMENT — CROHNS DISEASE ACTIVITY INDEX (CDAI): CDAI SCORE: 0

## 2022-09-19 NOTE — PROGRESS NOTES
Patient states he has been having continued left lower abdominal pain. He feels like he has to go a lot.

## 2022-09-19 NOTE — TELEPHONE ENCOUNTER
Pt called today stating he has been having lower left abdominal pain, on and off for the past 2 weeks. However this has been constant since Saturday. Pt states he has noticed a decrease in his appetite and small amount of diarrhea at regular intervals throughout the day.

## 2022-09-19 NOTE — PATIENT INSTRUCTIONS
Rest until you feel better. To prevent dehydration, drink plenty of fluids. Choose water and other clear liquids until you feel better. If you have kidney, heart, or liver disease and have to limit fluids, talk with your doctor before you increase the amount of fluids you drink. If your stomach is upset, eat mild foods, such as rice, dry toast or crackers, bananas, and applesauce. Try eating several small meals instead of two or three large ones. Wait until 48 hours after all symptoms have gone away before you have spicy foods, alcohol, and drinks that contain caffeine. Do not eat foods that are high in fat. Avoid anti-inflammatory medicines such as aspirin, ibuprofen (Advil, Motrin), and naproxen (Aleve). These can cause stomach upset. HYPERTENSION   Stay at a healthy weight.  This is especially important if you put on weight around the waist. Losing even 10 pounds can help you lower your blood pressure  Take medications as prescribed   Keep appt PCP in October

## 2022-09-19 NOTE — PROGRESS NOTES
Ronnie Max (:  1970) is a 46 y.o. male,Established patient, here for evaluation of the following chief complaint(s):    Abdominal Pain      SUBJECTIVE/OBJECTIVE:  Here with c/o left lower abdominal pain - had had diverticulitis in the past - approx 10 years ago - states since then he has had intermittent pain over the past - has appt with PCP 2022 states he has not been to the doctor for the past 3 years since Covid started- has been out of all his medications for at least a couple of years. Had diarrhea yesterday     Abdominal Pain  This is a new problem. The current episode started in the past 7 days (2-3 days ago pain increased was intermittent for 2 weeks -). The onset quality is gradual. The problem occurs constantly. The problem has been gradually worsening. The pain is located in the LLQ. The pain is at a severity of 7/10. The pain is moderate. The quality of the pain is aching and cramping (movement makes worse -). Pain radiation: into left testicle. Associated symptoms include diarrhea and flatus. Pertinent negatives include no anorexia, arthralgias, belching, constipation, dysuria, fever, frequency, headaches, hematochezia, hematuria, melena, myalgias, nausea, vomiting or weight loss. The pain is aggravated by movement. Relieved by: resting - sitting still. He has tried nothing for the symptoms. His past medical history is significant for abdominal surgery and GERD. There is no history of colon cancer, Crohn's disease, gallstones, irritable bowel syndrome, pancreatitis, PUD or ulcerative colitis.      Allergies   Allergen Reactions    Bactrim [Sulfamethoxazole-Trimethoprim] Swelling, Dermatitis and Other (See Comments)     Causes vasculitis    Sulfa Antibiotics Anaphylaxis        Current Outpatient Medications   Medication Sig Dispense Refill    lisinopril (PRINIVIL;ZESTRIL) 20 MG tablet Take 1 tablet by mouth 2 times daily 60 tablet 0    hydroCHLOROthiazide (HYDRODIURIL) 25 MG tablet Take 1 tablet by mouth every morning 30 tablet 0    TREMFYA 100 MG/ML SOPN  (Patient not taking: Reported on 9/19/2022)      metFORMIN (GLUCOPHAGE) 1000 MG tablet TAKE 1 TABLET TWICE A DAY WITH MEALS (Patient not taking: Reported on 9/19/2022) 180 tablet 0    amLODIPine (NORVASC) 10 MG tablet TAKE 1 TABLET BY MOUTH EVERY DAY (Patient not taking: Reported on 9/19/2022) 14 tablet 1    escitalopram (LEXAPRO) 10 MG tablet TAKE 1 TABLET DAILY (Patient not taking: Reported on 9/19/2022) 30 tablet 0    atorvastatin (LIPITOR) 80 MG tablet TAKE 1 TABLET DAILY (Patient not taking: Reported on 9/19/2022) 30 tablet 0    sildenafil (VIAGRA) 50 MG tablet Take 1 tablet by mouth daily as needed for Erectile Dysfunction (Patient not taking: Reported on 9/19/2022) 10 tablet 3    ciclopirox (LOPROX) 0.77 % cream APPLY TO RASH ON SCALP AND EARS ONCE DAILY (Patient not taking: Reported on 9/19/2022)      gabapentin (NEURONTIN) 600 MG tablet Take 0.5 tablets by mouth 3 times daily for 90 days. (Patient not taking: Reported on 9/19/2022) 135 tablet 0    blood glucose test strips (ASCENSIA AUTODISC VI;ONE TOUCH ULTRA TEST VI) strip 1 each by In Vitro route daily As needed. (Patient not taking: Reported on 9/19/2022) 100 each 3    HUMIRA PEN-PSORIASIS STARTER 40 MG/0.8ML injection  (Patient not taking: Reported on 9/19/2022)       No current facility-administered medications for this visit. Review of Systems   Constitutional:  Negative for fever and weight loss. Eyes:  Negative for visual disturbance. Respiratory:  Negative for cough, chest tightness, shortness of breath and wheezing. Cardiovascular:  Negative for chest pain and palpitations. Gastrointestinal:  Positive for abdominal pain, diarrhea and flatus. Negative for abdominal distention, anorexia, blood in stool, constipation, hematochezia, melena, nausea, rectal pain and vomiting.    Genitourinary:  Negative for dysuria, frequency, hematuria and scrotal upset, eat mild foods, such as rice, dry toast or crackers, bananas, and applesauce. Try eating several small meals instead of two or three large ones. Wait until 48 hours after all symptoms have gone away before you have spicy foods, alcohol, and drinks that contain caffeine. Do not eat foods that are high in fat. Avoid anti-inflammatory medicines such as aspirin, ibuprofen (Advil, Motrin), and naproxen (Aleve). These can cause stomach upset. - CTA ABDOMEN AND PELVIS W CONTRAST; Future  - CBC with Auto Differential  - Comprehensive Metabolic Panel; Future  - Comprehensive Metabolic Panel  - CT ABDOMEN PELVIS W IV CONTRAST Additional Contrast? Radiologist Recommendation; Future    2. Essential hypertension  Stay at a healthy weight. This is especially important if you put on weight around the waist. Losing even 10 pounds can help you lower your blood pressure  Take medications as prescribed   Keep appt PCP in October   Discussed diet     - lisinopril (PRINIVIL;ZESTRIL) 20 MG tablet; Take 1 tablet by mouth 2 times daily  Dispense: 60 tablet; Refill: 0  - hydroCHLOROthiazide (HYDRODIURIL) 25 MG tablet; Take 1 tablet by mouth every morning  Dispense: 30 tablet; Refill: 0      Return in 1 week (on 9/26/2022). An electronic signature was used to authenticate this note.     --ADELFO Meadows - CNP

## 2022-09-20 LAB
A/G RATIO: 1.2 (ref 1.1–2.2)
ALBUMIN SERPL-MCNC: 4 G/DL (ref 3.4–5)
ALP BLD-CCNC: 80 U/L (ref 40–129)
ALT SERPL-CCNC: 23 U/L (ref 10–40)
ANION GAP SERPL CALCULATED.3IONS-SCNC: 18 MMOL/L (ref 3–16)
AST SERPL-CCNC: 38 U/L (ref 15–37)
BILIRUB SERPL-MCNC: 1.3 MG/DL (ref 0–1)
BUN BLDV-MCNC: 7 MG/DL (ref 7–20)
CALCIUM SERPL-MCNC: 9.5 MG/DL (ref 8.3–10.6)
CHLORIDE BLD-SCNC: 94 MMOL/L (ref 99–110)
CO2: 26 MMOL/L (ref 21–32)
CREAT SERPL-MCNC: <0.5 MG/DL (ref 0.9–1.3)
GFR AFRICAN AMERICAN: >60
GFR NON-AFRICAN AMERICAN: >60
GLUCOSE BLD-MCNC: 146 MG/DL (ref 70–99)
POTASSIUM SERPL-SCNC: 4 MMOL/L (ref 3.5–5.1)
SODIUM BLD-SCNC: 138 MMOL/L (ref 136–145)
TOTAL PROTEIN: 7.4 G/DL (ref 6.4–8.2)

## 2022-09-21 DIAGNOSIS — N13.2 HYDRONEPHROSIS WITH RENAL CALCULOUS OBSTRUCTION: ICD-10-CM

## 2022-09-21 DIAGNOSIS — K57.92 DIVERTICULITIS: Primary | ICD-10-CM

## 2022-09-21 DIAGNOSIS — I10 ESSENTIAL HYPERTENSION: ICD-10-CM

## 2022-09-21 RX ORDER — KETOROLAC TROMETHAMINE 30 MG/ML
30 INJECTION, SOLUTION INTRAMUSCULAR; INTRAVENOUS ONCE
Status: SHIPPED | OUTPATIENT
Start: 2022-09-21 | End: 2022-09-26

## 2022-09-21 RX ORDER — CIPROFLOXACIN 500 MG/1
500 TABLET, FILM COATED ORAL 2 TIMES DAILY
Qty: 14 TABLET | Refills: 0 | Status: SHIPPED | OUTPATIENT
Start: 2022-09-21 | End: 2022-09-28

## 2022-09-21 RX ORDER — METRONIDAZOLE 500 MG/1
500 TABLET ORAL 3 TIMES DAILY
Qty: 30 TABLET | Refills: 0 | Status: SHIPPED | OUTPATIENT
Start: 2022-09-21 | End: 2022-10-01

## 2022-09-21 RX ORDER — AMLODIPINE BESYLATE 10 MG/1
TABLET ORAL
Qty: 30 TABLET | Refills: 0 | Status: SHIPPED | OUTPATIENT
Start: 2022-09-21 | End: 2022-10-05 | Stop reason: SDUPTHER

## 2022-09-21 NOTE — PROGRESS NOTES
Patient comes into clinic today with questions regarding recent CT and blood work; patient was seen at walk-in clinic 2 days ago for acute abdominal pain and back pain; CT showed right-sided 8 mm obstructing kidney stone as well as acute diverticulitis without complication; patient reports he has been having right-sided flank pain for some time; does have a history of kidney stone sometime ago and previously saw urology; patient is also taken Cipro and Flagyl previously for diverticulitis flareups. Patient reports he is urinating normally; reports large bowel movement earlier today. Patient reports that abdominal pain is mostly unchanged from when he was seen at the clinic yesterday. Patient is scheduled to follow-up with PCP next month. Patient given Toradol in office today for kidney stone; advised patient he can take ibuprofen outpatient; discontinued from hydrochlorothiazide; reinitiated on amlodipine; urgent referral placed to urology; initiated on antibiotics for diverticulitis; advised patient to report to emergency department for any blood in stool, hematuria, worsening symptoms.

## 2022-09-23 DIAGNOSIS — R17 TOTAL BILIRUBIN, ELEVATED: Primary | ICD-10-CM

## 2022-10-05 ENCOUNTER — OFFICE VISIT (OUTPATIENT)
Dept: FAMILY MEDICINE CLINIC | Age: 52
End: 2022-10-05
Payer: COMMERCIAL

## 2022-10-05 VITALS
RESPIRATION RATE: 18 BRPM | BODY MASS INDEX: 31.93 KG/M2 | SYSTOLIC BLOOD PRESSURE: 130 MMHG | DIASTOLIC BLOOD PRESSURE: 72 MMHG | TEMPERATURE: 98.1 F | WEIGHT: 235.4 LBS | OXYGEN SATURATION: 98 % | HEART RATE: 88 BPM

## 2022-10-05 DIAGNOSIS — N20.0 KIDNEY STONE: ICD-10-CM

## 2022-10-05 DIAGNOSIS — E11.610 CHARCOT'S JOINT OF FOOT IN TYPE 2 DIABETES MELLITUS (HCC): ICD-10-CM

## 2022-10-05 DIAGNOSIS — E08.29 DIABETES MELLITUS DUE TO UNDERLYING CONDITION WITH OTHER KIDNEY COMPLICATION, UNSPECIFIED WHETHER LONG TERM INSULIN USE (HCC): Primary | ICD-10-CM

## 2022-10-05 DIAGNOSIS — K57.92 DIVERTICULITIS: ICD-10-CM

## 2022-10-05 DIAGNOSIS — R11.0 NAUSEA: ICD-10-CM

## 2022-10-05 DIAGNOSIS — Z12.11 COLON CANCER SCREENING: ICD-10-CM

## 2022-10-05 DIAGNOSIS — Z23 NEED FOR PROPHYLACTIC VACCINATION AND INOCULATION AGAINST VARICELLA: ICD-10-CM

## 2022-10-05 DIAGNOSIS — L40.9 PSORIASIS: ICD-10-CM

## 2022-10-05 DIAGNOSIS — Z13.220 LIPID SCREENING: ICD-10-CM

## 2022-10-05 DIAGNOSIS — I10 ESSENTIAL HYPERTENSION: ICD-10-CM

## 2022-10-05 LAB — HBA1C MFR BLD: 6.4 %

## 2022-10-05 PROCEDURE — 99214 OFFICE O/P EST MOD 30 MIN: CPT | Performed by: NURSE PRACTITIONER

## 2022-10-05 PROCEDURE — 3044F HG A1C LEVEL LT 7.0%: CPT | Performed by: NURSE PRACTITIONER

## 2022-10-05 PROCEDURE — 83036 HEMOGLOBIN GLYCOSYLATED A1C: CPT | Performed by: NURSE PRACTITIONER

## 2022-10-05 RX ORDER — ONDANSETRON 4 MG/1
4 TABLET, FILM COATED ORAL 3 TIMES DAILY PRN
Qty: 30 TABLET | Refills: 0 | Status: SHIPPED | OUTPATIENT
Start: 2022-10-05

## 2022-10-05 RX ORDER — AMLODIPINE BESYLATE 10 MG/1
TABLET ORAL
Qty: 90 TABLET | Refills: 1 | Status: SHIPPED | OUTPATIENT
Start: 2022-10-05

## 2022-10-05 RX ORDER — LISINOPRIL 20 MG/1
20 TABLET ORAL 2 TIMES DAILY
Qty: 180 TABLET | Refills: 1 | Status: SHIPPED | OUTPATIENT
Start: 2022-10-05

## 2022-10-05 RX ORDER — ATORVASTATIN CALCIUM 80 MG/1
80 TABLET, FILM COATED ORAL DAILY
Qty: 90 TABLET | Refills: 1 | Status: SHIPPED | OUTPATIENT
Start: 2022-10-05

## 2022-10-05 ASSESSMENT — PATIENT HEALTH QUESTIONNAIRE - PHQ9
SUM OF ALL RESPONSES TO PHQ9 QUESTIONS 1 & 2: 0
SUM OF ALL RESPONSES TO PHQ QUESTIONS 1-9: 0
2. FEELING DOWN, DEPRESSED OR HOPELESS: 0
1. LITTLE INTEREST OR PLEASURE IN DOING THINGS: 0

## 2022-10-05 ASSESSMENT — ENCOUNTER SYMPTOMS
NAUSEA: 1
SHORTNESS OF BREATH: 0
CONSTIPATION: 0
COUGH: 0
ABDOMINAL PAIN: 0
VOMITING: 0
CHEST TIGHTNESS: 0
WHEEZING: 0
DIARRHEA: 0

## 2022-10-05 NOTE — ASSESSMENT & PLAN NOTE
Rarely checking glucose. Ranging 110-160. Denies polyuria, polydipsia, polyphagia. Chronic numbness and tingling that has not worsened.

## 2022-10-05 NOTE — ASSESSMENT & PLAN NOTE
He saw his dermatologist last week. He was previously on Humira and then Tremfya. He is off both of those now and is now trying a new cream.  He is not sure of the name of the cream.  He returns to dermatology next month.

## 2022-10-05 NOTE — PROGRESS NOTES
SUBJECTIVE:    Ariel Matt  1970  46 y.o.  male      Chief Complaint   Patient presents with    Annual Exam     Pt states that he has been off his meds x 2 years. Has a kidney stone currently- Was told by Urologist that they were going to schedule surgery- Has not heard anything    Flu Vaccine     Pt declines     HPI    Allergies   Allergen Reactions    Bactrim [Sulfamethoxazole-Trimethoprim] Swelling, Dermatitis and Other (See Comments)     Causes vasculitis    Sulfa Antibiotics Anaphylaxis       Current Outpatient Medications   Medication Sig Dispense Refill    zoster recombinant adjuvanted vaccine (SHINGRIX) 50 MCG/0.5ML SUSR injection Inject 0.5 mLs into the muscle once for 1 dose 50 MCG IM then repeat 2-6 months. 1 each 1    ondansetron (ZOFRAN) 4 MG tablet Take 1 tablet by mouth 3 times daily as needed for Nausea or Vomiting 30 tablet 0    lisinopril (PRINIVIL;ZESTRIL) 20 MG tablet Take 1 tablet by mouth 2 times daily 180 tablet 1    atorvastatin (LIPITOR) 80 MG tablet Take 1 tablet by mouth daily 90 tablet 1    amLODIPine (NORVASC) 10 MG tablet TAKE 1 TABLET BY MOUTH EVERY DAY 90 tablet 1    escitalopram (LEXAPRO) 10 MG tablet TAKE 1 TABLET DAILY 30 tablet 0    blood glucose test strips (ASCENSIA AUTODISC VI;ONE TOUCH ULTRA TEST VI) strip 1 each by In Vitro route daily As needed. 100 each 3    gabapentin (NEURONTIN) 600 MG tablet Take 0.5 tablets by mouth 3 times daily for 90 days. (Patient not taking: Reported on 9/19/2022) 135 tablet 0     No current facility-administered medications for this visit.           Past Medical History:   Diagnosis Date    Blood circulation, collateral     Callus of foot     Callus of foot 08/13/2018    Charcot's joint of foot     Charcot's joint of foot in type 2 diabetes mellitus ([address] 02/06/2014    Closed nondisplaced fracture of fifth metatarsal bone of right foot with routine healing 08/28/2017    Diabetes mellitus ([address]     Diverticulitis     Hyperlipidemia Hypertension     Ischemic ulcer of left foot, limited to breakdown of skin ([address] 07/12/2016    Leg edema, right 08/25/2017    Neuropathy     Plantar wart, left foot 08/13/2018    Ulcer of right foot with fat layer exposed ([address] 11/28/2016    Ulcer of right foot with necrosis of muscle, [place] Grade III 08/11/2016    WD-Chronic ulcer of left foot with fat layer exposed ([address] 08/11/2016    WD-Chronic ulcer of toe of right foot, limited to breakdown of skin ([address] 07/12/2016    WD-Diabetic ulcer of left foot associated with type 2 diabetes mellitus ([address] 08/11/2016    WD-Diabetic ulcer of right lateral foot associated with type 2 diabetes mellitus, with fat layer exposed ([address]     WD-Type 2 diabetes mellitus with right diabetic foot ulcer ([address] 07/12/2016     Past Surgical History:   Procedure Laterality Date    ABDOMEN SURGERY      COLONOSCOPY  05/06/2019    divertic, polyps, clip, repeat age 48    COLONOSCOPY N/A 5/6/2019    COLONOSCOPY CONTROL HEMORRHAGE performed by Archie Lopez MD at [facility]  7/2012    charcot    FRACTURE SURGERY Right     foot     Social History     Socioeconomic History    Marital status:      Spouse name: None    Number of children: None    Years of education: None    Highest education level: None   Tobacco Use    Smoking status: Never    Smokeless tobacco: Current     Types: Chew   Vaping Use    Vaping Use: Never used   Substance and Sexual Activity    Alcohol use: Yes     Comment: limited    Drug use: No     He was diagnosed with diverticulitis 2 weeks ago. He completed the antibiotic and his abdominal pain has improved. During imaging for his abdominal pain he was found to have a kidney stone. He did see urologist of [place] and is supposed to have surgery. He saw them last week and has not yet heard anything about when surgery will be scheduled. Complains of some nausea today.     Charcot's joint of foot in type 2 diabetes mellitus ([address]  He has not seen podiatry in a period of time. He does have a callus on his right foot that he \"needs to take better care of\". DM (diabetes mellitus) ([address]  Rarely checking glucose. Ranging 110-160. Denies polyuria, polydipsia, polyphagia. Chronic numbness and tingling that has not worsened. Psoriasis  He saw his dermatologist last week. He was previously on Humira and then Tremfya. He is off both of those now and is now trying a new cream.  He is not sure of the name of the cream.  He returns to dermatology next month. Essential hypertension  Improved. Patient denies any exertional chest pain, dyspnea, palpitations, syncope, orthopnea, edema or paroxysmal nocturnal dyspnea. Review of Systems   Constitutional:  Negative for appetite change, chills, fatigue and unexpected weight change. Respiratory:  Negative for cough, chest tightness, shortness of breath and wheezing. Cardiovascular:  Negative for chest pain, palpitations and leg swelling. Gastrointestinal:  Positive for nausea. Negative for abdominal pain, constipation, diarrhea and vomiting. Genitourinary:  Positive for flank pain. Musculoskeletal:  Negative for arthralgias. Neurological:  Negative for weakness, numbness and headaches. Hematological:  Negative for adenopathy. OBJECTIVE:    /72   Pulse 88   Temp 98.1 °F (36.7 °C)   Resp 18   Wt 235 lb 6.4 oz (106.8 kg)   SpO2 98%   BMI 31.93 kg/m²     Physical Exam  Constitutional:       Appearance: Normal appearance. He is well-developed. HENT:      Head: Normocephalic and atraumatic. Right Ear: Hearing normal.      Left Ear: Hearing normal.      Nose: Nose normal.      Mouth/Throat:      Mouth: Mucous membranes are moist.   Neck:      Vascular: No carotid bruit or JVD. Cardiovascular:      Rate and Rhythm: Normal rate and regular rhythm. Pulses:           Dorsalis pedis pulses are 2+ on the right side and 2+ on the left side.         Posterior tibial pulses are 2+ on the right side and 2+ on the left side. Heart sounds: Normal heart sounds. No murmur heard. No friction rub. No gallop. Pulmonary:      Effort: Pulmonary effort is normal. No respiratory distress. Breath sounds: Normal breath sounds. No wheezing, rhonchi or rales. Abdominal:      Palpations: Abdomen is soft. Musculoskeletal:         General: Normal range of motion. Cervical back: Normal range of motion and neck supple. Right lower leg: No edema. Left lower leg: No edema. Right foot: Charcot foot present. Feet:      Right foot:      Protective Sensation: 10 sites tested. 0 sites sensed. Skin integrity: Callus present. Toenail Condition: Right toenails are normal.      Left foot:      Protective Sensation: 10 sites tested. 3 sites sensed. Toenail Condition: Left toenails are normal.   Skin:     General: Skin is warm and dry. Neurological:      General: No focal deficit present. Mental Status: He is alert and oriented to person, place, and time. Psychiatric:         Mood and Affect: Mood normal.         Behavior: Behavior normal. Behavior is cooperative. Thought Content: Thought content normal.       ASSESSMENT/PLAN:    1. Need for prophylactic vaccination and inoculation against varicella  - zoster recombinant adjuvanted vaccine [facility]) 50 MCG/0.5ML SUSR injection; Inject 0.5 mLs into the muscle once for 1 dose 50 MCG IM then repeat 2-6 months. Dispense: 1 each; Refill: 1    2. 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 okay to remain off diabetic medications. Monitor glucose. The patient is asked to make an attempt to improve diet and exercise patterns to aid in medical management of this problem. - Microalbumin / creatinine urine ratio; Future  - POCT glycosylated hemoglobin (Hb A1C)  -  DIABETES FOOT EXAM  - Lipid Panel;  Future  - atorvastatin (LIPITOR) 80 MG tablet; Take 1 tablet by mouth daily  Dispense: 90 tablet; Refill: 1    3. Essential hypertension  DASH diet, weight loss. Monitor BP at home. Continue lisinopril, amlodipine  - Comprehensive Metabolic Panel; Future  - lisinopril (PRINIVIL;ZESTRIL) 20 MG tablet; Take 1 tablet by mouth 2 times daily  Dispense: 180 tablet; Refill: 1  - amLODIPine (NORVASC) 10 MG tablet; TAKE 1 TABLET BY MOUTH EVERY DAY  Dispense: 90 tablet; Refill: 1    4. Colon cancer screening  - Trinh Berry, [facility], Gastroenter[facility], [place]    5. Nausea  Zofran PRN for nausea. Call urology to schedule surgery  - ondansetron (ZOFRAN) 4 MG tablet; Take 1 tablet by mouth 3 times daily as needed for Nausea or Vomiting  Dispense: 30 tablet; Refill: 0    6. Kidney stone  Call urology to schedule surgery    7. Lipid screening  - Lipid Panel; Future    8. Diverticulitis  Completed antibiotics with improvement in abdominal pain. Follow up if symptoms return    9. Charcot's joint of foot in type 2 diabetes mellitus ([facility]  Schedule with podiatry    10. Psoriasis  Follow up with dermatology as scheduled               Return in about 4 months (around 2/5/2023) for diabetes.       (Please note that portions of this note may have been completed with a voice recognition program. Efforts were made to edit the dictations but occasionally words aremis-transcribed.)

## 2022-10-05 NOTE — ASSESSMENT & PLAN NOTE
He has not seen podiatry in a period of time. He does have a callus on his right foot that he \"needs to take better care of\".

## 2022-10-06 ENCOUNTER — TELEPHONE (OUTPATIENT)
Dept: GASTROENTEROLOGY | Age: 52
End: 2022-10-06

## 2022-10-06 NOTE — TELEPHONE ENCOUNTER
Called pt. In regards to a referral for a colon screening. Pt. Expressed he was having issues w/ diverticulitis and abd pain.  Made appt for pt to see abeba on 10/21/22 @10am

## 2022-10-10 ENCOUNTER — HOSPITAL ENCOUNTER (OUTPATIENT)
Age: 52
Discharge: HOME | End: 2022-10-10
Payer: COMMERCIAL

## 2022-10-10 DIAGNOSIS — G62.9 NEUROPATHY: ICD-10-CM

## 2022-10-10 LAB
EKG ATRIAL RATE: 75 BPM
EKG DIAGNOSIS: NORMAL
EKG P AXIS: 38 DEGREES
EKG P-R INTERVAL: 214 MS
EKG Q-T INTERVAL: 404 MS
EKG QRS DURATION: 106 MS
EKG QTC CALCULATION (BAZETT): 451 MS
EKG R AXIS: 4 DEGREES
EKG T AXIS: 19 DEGREES
EKG VENTRICULAR RATE: 75 BPM

## 2022-10-10 PROCEDURE — 93005 ELECTROCARDIOGRAM TRACING: CPT | Performed by: ANESTHESIOLOGY

## 2022-10-10 PROCEDURE — 93010 ELECTROCARDIOGRAM REPORT: CPT | Performed by: INTERNAL MEDICINE

## 2022-10-10 RX ORDER — ESCITALOPRAM OXALATE 10 MG/1
10 TABLET ORAL DAILY
Qty: 90 TABLET | Refills: 1 | Status: SHIPPED | OUTPATIENT
Start: 2022-10-10

## 2022-10-10 RX ORDER — GABAPENTIN 600 MG/1
300 TABLET ORAL 3 TIMES DAILY
Qty: 135 TABLET | Refills: 0 | Status: SHIPPED | OUTPATIENT
Start: 2022-10-10 | End: 2023-01-08

## 2022-10-10 NOTE — TELEPHONE ENCOUNTER
Pt. States he was here this past week and received all his meds except for gabapentin and excitalopram

## 2022-10-11 PROCEDURE — 82360 CALCULUS ASSAY QUANT: CPT

## 2022-10-12 ENCOUNTER — HOSPITAL ENCOUNTER (OUTPATIENT)
Age: 52
Setting detail: SPECIMEN
Discharge: HOME | End: 2022-10-12

## 2022-10-18 LAB
STONE COMPOSITION: NORMAL
STONE DESCRIPTION: NORMAL
STONE MASS: 27 MG

## 2022-10-21 ENCOUNTER — OFFICE VISIT (OUTPATIENT)
Dept: GASTROENTEROLOGY | Age: 52
End: 2022-10-21
Payer: COMMERCIAL

## 2022-10-21 VITALS
WEIGHT: 232.6 LBS | HEIGHT: 72 IN | TEMPERATURE: 97.5 F | DIASTOLIC BLOOD PRESSURE: 80 MMHG | BODY MASS INDEX: 31.51 KG/M2 | SYSTOLIC BLOOD PRESSURE: 148 MMHG | HEART RATE: 113 BPM | OXYGEN SATURATION: 97 %

## 2022-10-21 DIAGNOSIS — Z86.010 HX OF ADENOMATOUS POLYP OF COLON: Primary | ICD-10-CM

## 2022-10-21 DIAGNOSIS — K57.32 DIVERTICULITIS OF LARGE INTESTINE WITHOUT PERFORATION OR ABSCESS WITHOUT BLEEDING: ICD-10-CM

## 2022-10-21 DIAGNOSIS — K76.0 FATTY LIVER: ICD-10-CM

## 2022-10-21 PROCEDURE — 99204 OFFICE O/P NEW MOD 45 MIN: CPT | Performed by: NURSE PRACTITIONER

## 2022-10-21 RX ORDER — BISACODYL 5 MG
TABLET, DELAYED RELEASE (ENTERIC COATED) ORAL
Qty: 4 TABLET | Refills: 0 | Status: SHIPPED | OUTPATIENT
Start: 2022-10-21

## 2022-10-21 RX ORDER — HYDROCODONE BITARTRATE AND ACETAMINOPHEN 5; 325 MG/1; MG/1
TABLET ORAL
COMMUNITY
Start: 2022-10-11

## 2022-10-21 RX ORDER — POLYETHYLENE GLYCOL 3350 17 G/17G
238 POWDER, FOR SOLUTION ORAL ONCE
Qty: 238 G | Refills: 0 | Status: SHIPPED | OUTPATIENT
Start: 2022-10-21 | End: 2022-10-21

## 2022-10-21 RX ORDER — OMEGA-3 FATTY ACIDS/FISH OIL 300-1000MG
1 CAPSULE ORAL
COMMUNITY

## 2022-10-21 ASSESSMENT — ENCOUNTER SYMPTOMS
BACK PAIN: 1
ABDOMINAL PAIN: 1
CONSTIPATION: 0
WHEEZING: 0
SHORTNESS OF BREATH: 0
COUGH: 0
BLOOD IN STOOL: 0
NAUSEA: 0
EYE DISCHARGE: 0
VOMITING: 0
DIARRHEA: 1
EYE PAIN: 0
COLOR CHANGE: 0

## 2022-10-21 NOTE — PROGRESS NOTES
Alexis Tan 46 y.o. male was seen by JACK Sylvester on 10/21/2022     Wt Readings from Last 3 Encounters:   10/21/22 232 lb 9.6 oz (105.5 kg)   10/05/22 235 lb 6.4 oz (106.8 kg)   09/19/22 240 lb (108.9 kg)       HPI  Alexis Tan is a pleasant 46 y.o.  male who presents today for abdominal pain and abnormal finding on the CT of abdomen/pelvis. He has a past medical history of blood circulation, collateral, callus of foot, Charcot's joint of foot in type 2 diabetes mellitus, closed nondisplaced fracture of fifth metatarsal bone of right foot with routine healing, diabetes mellitus, diverticulitis, hyperlipidemia, hypertension, ischemic ulcer of left foot, limited to breakdown of skin, leg edema, right, neuropathy, obesity, plantar wart, left foot, ulcer of right foot with fat layer exposed, ulcer of right foot with necrosis of muscle, Hannon Grade III, WD-Chronic ulcer of left foot with fat layer exposed, WD-Chronic ulcer of toe of right foot, limited to breakdown of skin. His CT of the abdomen/pelvis done on 9- showed:  acute uncomplicated diverticulitis involving descending colon, obstructing 8 mm stone in proximal right ureter, mild splenomegaly and diffuse fatty infiltration of the liver. He completed Cipro and Flagyl with improvement in his abdominal pain. His last colonoscopy was done by Dr. Justin Odell on 4- showing 4 mm cecal sessile serrated polyp. His appetite is fair without early satiety. He has lost 40 pounds since the beginning of the year. He has cut back on his alcohol use he was drinking 6-8 beers with whiskey daily for three to four years and currently drinking whiskey shots five to six twice a week. No abdominal pain, bloating or distention. No nausea or vomiting. No heartburn or acid reflux. No nocturnal awakenings with acid reflux. No dysphagia or pain with swallowing. He has excess belching and flatulence.   His typical bowel pattern is three times with soft blobs to formed stools. No constipation. Intermittent diarrhea. No blood in his stools or melena. No family history of stomach or colon cancer. ROS  Review of Systems   Constitutional:  Positive for unexpected weight change. Negative for appetite change, chills, diaphoresis and fever. HENT:  Positive for hearing loss. Negative for ear pain and tinnitus. Eyes:  Negative for pain, discharge and visual disturbance. Respiratory:  Negative for cough, shortness of breath and wheezing. Cardiovascular:  Negative for chest pain, palpitations and leg swelling. Gastrointestinal:  Positive for abdominal pain and diarrhea. Negative for blood in stool, constipation, nausea and vomiting. Endocrine: Negative for cold intolerance and heat intolerance. Genitourinary:  Positive for frequency and urgency. Negative for dysuria and hematuria. Musculoskeletal:  Positive for back pain, myalgias and neck pain. Skin:  Negative for color change, pallor and rash. Allergic/Immunologic: Negative for environmental allergies and food allergies. Neurological:  Negative for dizziness, seizures, weakness and headaches. Hematological:  Does not bruise/bleed easily. Psychiatric/Behavioral:  Positive for dysphoric mood and sleep disturbance. The patient is nervous/anxious. Allergies  Allergies   Allergen Reactions    Bactrim [Sulfamethoxazole-Trimethoprim] Swelling, Dermatitis and Other (See Comments)     Causes vasculitis    Sulfa Antibiotics Anaphylaxis       Medications  Current Outpatient Medications   Medication Sig Dispense Refill    HYDROcodone-acetaminophen (NORCO) 5-325 MG per tablet TAKE 1 TABLET BY MOUTH EVERY 8 HOURS AS NEEDED FOR SEVERE PAIN      ibuprofen (ADVIL) 200 MG CAPS Take 1 capsule by mouth      gabapentin (NEURONTIN) 600 MG tablet Take 0.5 tablets by mouth 3 times daily for 90 days.  135 tablet 0    escitalopram (LEXAPRO) 10 MG tablet Take 1 tablet by mouth daily 90 tablet 1 lisinopril (PRINIVIL;ZESTRIL) 20 MG tablet Take 1 tablet by mouth 2 times daily 180 tablet 1    atorvastatin (LIPITOR) 80 MG tablet Take 1 tablet by mouth daily 90 tablet 1    amLODIPine (NORVASC) 10 MG tablet TAKE 1 TABLET BY MOUTH EVERY DAY 90 tablet 1    blood glucose test strips (ASCENSIA AUTODISC VI;ONE TOUCH ULTRA TEST VI) strip 1 each by In Vitro route daily As needed. 100 each 3    ondansetron (ZOFRAN) 4 MG tablet Take 1 tablet by mouth 3 times daily as needed for Nausea or Vomiting (Patient not taking: Reported on 10/21/2022) 30 tablet 0     No current facility-administered medications for this visit. Past medical history:   He has a past medical history of Blood circulation, collateral, Callus of foot, Callus of foot, Charcot's joint of foot, Charcot's joint of foot in type 2 diabetes mellitus ([address] Closed nondisplaced fracture of fifth metatarsal bone of right foot with routine healing, Diabetes mellitus ([address] Diverticulitis, Hyperlipidemia, Hypertension, Ischemic ulcer of left foot, limited to breakdown of skin ([address] Leg edema, right, Neuropathy, Plantar wart, left foot, Ulcer of right foot with fat layer exposed ([address] Ulcer of right foot with necrosis of muscle, Hannon Grade III, WD-Chronic ulcer of left foot with fat layer exposed ([address] WD-Chronic ulcer of toe of right foot, limited to breakdown of skin ([address] WD-Diabetic ulcer of left foot associated with type 2 diabetes mellitus ([address] WD-Diabetic ulcer of right lateral foot associated with type 2 diabetes mellitus, with fat layer exposed ([address] and WD-Type 2 diabetes mellitus with right diabetic foot ulcer ([address] Past surgical history:  He has a past surgical history that includes Foot surgery (7/2012); Abdomen surgery; fracture surgery (Right); Colonoscopy (05/06/2019); and Colonoscopy (N/A, 5/6/2019). Social History:  He reports that he has never smoked. His smokeless tobacco use includes chew. He reports current alcohol use.  He reports that he does not use drugs. Family history:  His family history includes Cancer in his maternal grandmother and paternal grandmother; Diabetes in his mother; Heart Disease in his father and sister; High Blood Pressure in his brother, father, mother, paternal grandmother, and sister; High Cholesterol in his mother; Stroke in his maternal grandfather. Objective    Vitals:    10/21/22 1007   BP: (!) 148/92   Pulse: (!) 113   Temp: 97.5 °F (36.4 °C)   SpO2: 97%        Physical exam    Physical Exam  Constitutional:       General: He is not in acute distress. Appearance: Normal appearance. He is well-developed. He is obese. He is not ill-appearing, toxic-appearing or diaphoretic. HENT:      Head: Normocephalic and atraumatic. Nose: Nose normal.      Mouth/Throat:      Mouth: Mucous membranes are moist.   Cardiovascular:      Rate and Rhythm: Normal rate and regular rhythm. Pulses: Normal pulses. Heart sounds: Normal heart sounds. No murmur heard. No gallop. Pulmonary:      Effort: Pulmonary effort is normal. No respiratory distress. Breath sounds: Normal breath sounds. No stridor. No wheezing or rhonchi. Abdominal:      General: Bowel sounds are normal. There is no distension. Palpations: Abdomen is soft. There is no mass. Tenderness: There is abdominal tenderness (mild belt line). Hernia: No hernia is present. Musculoskeletal:         General: Normal range of motion. Cervical back: Neck supple. Skin:     General: Skin is warm and dry. Neurological:      Mental Status: He is alert and oriented to person, place, and time. Psychiatric:         Mood and Affect: Mood normal.       Hospital Outpatient Visit on 10/12/2022   Component Date Value Ref Range Status    Stone Mass 10/11/2022 27  mg Final    Stone Description 10/11/2022 See Note   Final    Comment: (NOTE)  Specimen consists of two brown and tan calculi fragments. The total weight is 27 mg. Stone Composition 10/11/2022 See Note   Final    Comment: (NOTE)  Calculi composed primarily of:  70% calcium oxalate monohydrate, and  30% calcium phosphate (hydroxy- and carbonate- apatite). INTERPRETIVE INFORMATION: Calculi (Stone) analysis  Calculi are the products of physiological processes that yield   crystalline compounds in a matrix of biological compounds and   blood. Matrix components are not reported. The clinically   significant crystalline components identified in calculi specimens   are reported. Gross description may not be consistent with   composition determined by FTIR analysis.   Performed By: [address]  [place], [address] [facility]  : Jonathan Carlos MD, PhD     Hospital Outpatient Visit on 10/10/2022   Component Date Value Ref Range Status    Ventricular Rate 10/10/2022 75  BPM Final    Atrial Rate 10/10/2022 75  BPM Final    P-R Interval 10/10/2022 214  ms Final    QRS Duration 10/10/2022 106  ms Final    Q-T Interval 10/10/2022 404  ms Final    QTc Calculation (Bazett) 10/10/2022 451  ms Final    P Axis 10/10/2022 38  degrees Final    R Axis 10/10/2022 4  degrees Final    T Axis 10/10/2022 19  degrees Final    Diagnosis 10/10/2022    Final                    Value:Sinus rhythm with 1st degree AV block  Septal infarct , age undetermined  Inferior infarct (cited on or before 17-OCT-2016)  Abnormal ECG  When compared with ECG of 01-MAY-2017 15:20,  premature supraventricular complexes are no longer present  Septal infarct is now present  Confirmed by [facility] Chitra RODARTE ([id number]) on 10/10/2022 2:43:09 PM     Office Visit on 10/05/2022   Component Date Value Ref Range Status    Hemoglobin A1C 10/05/2022 6.4  % Final   Hospital Outpatient Visit on 09/19/2022   Component Date Value Ref Range Status    POC Creatinine 09/19/2022 0.5 (A)  0.9 - 1.3 MG/DL Final    GFR Non- 09/19/2022 >60  >60 mL/min/1.73m2 Final    GFR  09/19/2022 >60  >60 mL/min/1.73m2 Final   Office Visit on 09/19/2022   Component Date Value Ref Range Status    WBC 09/19/2022 7.2  4.0 - 11.0 K/uL Final    RBC 09/19/2022 5.26  4.20 - 5.90 M/uL Final    Hemoglobin 09/19/2022 17.3  13.5 - 17.5 g/dL Final    Hematocrit 09/19/2022 49.1  40.5 - 52.5 % Final    MCV 09/19/2022 93.5  80.0 - 100.0 fL Final    MCH 09/19/2022 32.8  26.0 - 34.0 pg Final    MCHC 09/19/2022 35.1  31.0 - 36.0 g/dL Final    RDW 09/19/2022 14.3  12.4 - 15.4 % Final    Platelets 95/55/2722 203  135 - 450 K/uL Final    MPV 09/19/2022 8.4  5.0 - 10.5 fL Final    Neutrophils % 09/19/2022 72.1  % Final    Lymphocytes % 09/19/2022 20.1  % Final    Monocytes % 09/19/2022 5.1  % Final    Eosinophils % 09/19/2022 2.0  % Final    Basophils % 09/19/2022 0.7  % Final    Neutrophils Absolute 09/19/2022 5.2  1.7 - 7.7 K/uL Final    Lymphocytes Absolute 09/19/2022 1.5  1.0 - 5.1 K/uL Final    Monocytes Absolute 09/19/2022 0.4  0.0 - 1.3 K/uL Final    Eosinophils Absolute 09/19/2022 0.1  0.0 - 0.6 K/uL Final    Basophils Absolute 09/19/2022 0.1  0.0 - 0.2 K/uL Final    Sodium 09/19/2022 138  136 - 145 mmol/L Final    Potassium 09/19/2022 4.0  3.5 - 5.1 mmol/L Final    Chloride 09/19/2022 94 (A)  99 - 110 mmol/L Final    CO2 09/19/2022 26  21 - 32 mmol/L Final    Anion Gap 09/19/2022 18 (A)  3 - 16 Final    Glucose 09/19/2022 146 (A)  70 - 99 mg/dL Final    BUN 09/19/2022 7  7 - 20 mg/dL Final    Creatinine 09/19/2022 <0.5 (A)  0.9 - 1.3 mg/dL Final    GFR Non- 09/19/2022 >60  >60 Final    Comment: >60 mL/min/1.73m2 EGFR, calc. for ages 25 and older using the  MDRD formula (not corrected for weight), is valid for stable  renal function. GFR  09/19/2022 >60  >60 Final    Comment: Chronic Kidney Disease: less than 60 ml/min/1.73 sq.m. Kidney Failure: less than 15 ml/min/1.73 sq.m. Results valid for patients 18 years and older.       Calcium 09/19/2022 9.5  8.3 - 10.6 mg/dL Final    Total Protein 09/19/2022 7.4  6.4 - 8.2 g/dL Final    Albumin 09/19/2022 4.0  3.4 - 5.0 g/dL Final    Albumin/Globulin Ratio 09/19/2022 1.2  1.1 - 2.2 Final    Total Bilirubin 09/19/2022 1.3 (A)  0.0 - 1.0 mg/dL Final    Alkaline Phosphatase 09/19/2022 80  40 - 129 U/L Final    ALT 09/19/2022 23  10 - 40 U/L Final    AST 09/19/2022 38 (A)  15 - 37 U/L Final    Comment: Specimen hemolysis has exceeded the interference as defined by Roche. Value may be falsely increased. Suggest recollection if clinically  indicated. Assessment and Plan:  1. Will plan for a colonoscopy with MAC anesthesia. The patient was informed of the risks and benefits of the procedure. 2.  Acute diverticulitis noted on recent CT of abdomen/pelvis and treated with Cipro and Flagyl with good results. Recommend good bowel regimen with increase in fruit, fiber and fluids. The patient was provided with information on diverticulosis and high fiber diet. 3.  History of sessile serrated colon polyp will order colonoscopy for colorectal cancer surveillance. 4.  Fatty liver noted on CT recommend weight  loss at least 10% of his body weight. His Fib 4 index 1.99 with negative predictive value for advanced fibrosis. Recommend cutting back on alcohol intake since toxic to the liver. 5.  Further recommendations for follow-up will be determined after the colonoscopy has been completed.

## 2022-10-31 ENCOUNTER — PREP FOR PROCEDURE (OUTPATIENT)
Dept: GASTROENTEROLOGY | Age: 52
End: 2022-10-31

## 2022-10-31 RX ORDER — SODIUM CHLORIDE 0.9 % (FLUSH) 0.9 %
5-40 SYRINGE (ML) INJECTION EVERY 12 HOURS SCHEDULED
Status: CANCELLED | OUTPATIENT
Start: 2022-10-31

## 2022-10-31 RX ORDER — SODIUM CHLORIDE, SODIUM LACTATE, POTASSIUM CHLORIDE, CALCIUM CHLORIDE 600; 310; 30; 20 MG/100ML; MG/100ML; MG/100ML; MG/100ML
INJECTION, SOLUTION INTRAVENOUS CONTINUOUS
Status: CANCELLED | OUTPATIENT
Start: 2022-10-31

## 2022-10-31 RX ORDER — SODIUM CHLORIDE 0.9 % (FLUSH) 0.9 %
5-40 SYRINGE (ML) INJECTION PRN
Status: CANCELLED | OUTPATIENT
Start: 2022-10-31

## 2022-10-31 RX ORDER — SODIUM CHLORIDE 9 MG/ML
25 INJECTION, SOLUTION INTRAVENOUS PRN
Status: CANCELLED | OUTPATIENT
Start: 2022-10-31

## 2022-11-08 ENCOUNTER — TELEPHONE (OUTPATIENT)
Dept: GASTROENTEROLOGY | Age: 52
End: 2022-11-08

## 2022-11-08 NOTE — TELEPHONE ENCOUNTER
BAYRON for the patient to call back regarding his cscope schedule on 12/5/22. The patient's insurance stated that Dr. Michael Ojeda and Dr. Jaimie Damon is not listed on his insurance plan, but Dr. Chery is listed. So the patient's cscope needs rescheduled onto Dr. Evelin Duffy schedule.

## 2022-11-10 ENCOUNTER — TELEPHONE (OUTPATIENT)
Dept: GASTROENTEROLOGY | Age: 52
End: 2022-11-10

## 2022-11-10 NOTE — TELEPHONE ENCOUNTER
Called and left another message explaining to the patient that I need to reschedule his procedure on 12/5/22 due to Dr. Jason Rae and Dr. Sebastian Washburn not being listed on his insurance. So I was going to reschedule his procedure with Dr. Kiara Gannon.

## 2022-11-29 ENCOUNTER — TELEPHONE (OUTPATIENT)
Dept: GASTROENTEROLOGY | Age: 52
End: 2022-11-29

## 2022-11-29 NOTE — TELEPHONE ENCOUNTER
Per my call to Roro at [facility]; no PA required for C-scope. Ref# [id number].

## 2022-12-15 NOTE — PROGRESS NOTES
Patient will be called with an arrival time on 12/19/2022 for his procedure at [facility] on 12/20/2022. 1. Do not eat or drink anything after 12 midnight (or____hours) unless instructed by your doctor prior to surgery. This includes no water, chewing gum or mints. NO chewing tobacco.  2. Follow your directions as prescribed by the doctor for your procedure and medications. 3.Check with your Doctor regarding stopping Plavix, Coumadin, Lovenox,Effient,Pradaxa,Xarelto, Fragmin or other blood thinners and follow their instructions. 4. Do not smoke, and do not drink any alcoholic beverages 24 hours prior to surgery. This includes NA Beer. 5. You may brush your teeth and gargle the morning of surgery. DO NOT SWALLOW WATER  6. You MUST make arrangements for a responsible adult to take you home after your surgery and be able to check on you every couple hours for the day. You will not be allowed     to leave alone or drive yourself home. It is strongly suggested someone stay with you the first 24 hrs. Your surgery will be cancelled if you do not have a ride home. 7. Please wear simple, loose fitting clothing to the hospital.  Yossi Sequeira not bring valuables (money, credit cards, checkbooks, etc.) Do not wear any makeup (including no eye makeup) or nail polish on your fingers or toes. 8. DO NOT wear any jewelry or piercings on day of surgery. All body piercing jewelry must be removed  9. If you have dentures, they will be removed before going to the OR; we will provide you a container. If you wear contact lenses or glasses, they will be removed; please bring a case for them. 10. If you  have a Living Will and Durable Power of  for Healthcare, please bring in a copy. 11 Please bring picture ID,  insurance card, paperwork from the doctors office    (H & P, Consent, & card for implantable devices). Patient will take his amlodipine the morning of his procedure, he will call Dr Jo Ann Goetz office with questions concerning colon prep.

## 2022-12-18 NOTE — H&P
Original H &P in soft chart. I have examined the patient immediately before the procedure and there is no change in the previous history and physical exam, which has been reviewed. There is no history of sleep apnea, snoring, or stridor. There has been no  previous adverse experience with sedation/anesthesia. There is no increased risk for aspiration of gastric contents. The patient has been instructed that all resuscitative measures (during the operative and immediate perioperative period) will be instituted in the unlikely event that they will be needed. The patient has no pertinent past surgical or family history other than listed in the original H&P. The patient was counseled about the risks of lucia Covid-19 during their perioperative period and any recovery window from their procedure. The patient was made aware that lucia Covid-19  may worsen their prognosis for recovering from their procedure  and lend to a higher morbidity and/or mortality risk. All material risks, benefits, and reasonable alternatives including postponing the procedure were discussed. The patient does wish to proceed with the procedure at this time.     ASA Class: 3  AIRWAY Class: 1

## 2022-12-19 ENCOUNTER — ANESTHESIA EVENT (OUTPATIENT)
Dept: OPERATING ROOM | Age: 52
End: 2022-12-19
Payer: COMMERCIAL

## 2022-12-19 ENCOUNTER — TELEPHONE (OUTPATIENT)
Dept: FAMILY MEDICINE CLINIC | Age: 52
End: 2022-12-19

## 2022-12-19 NOTE — PROGRESS NOTES
Spoke with patient and he will arrive at 0730 at [facility] on 12/20/2022 for his procedure at 0830.

## 2022-12-19 NOTE — ANESTHESIA PRE PROCEDURE
Department of Anesthesiology  Preprocedure Note       Name:  Hannah Gomez   Age:  46 y.o.  :  1970                                          MRN:  [id number]         Date:  2022      Surgeon: Sola Nava):  Vic Cruz MD    Procedure: Procedure(s):  COLONOSCOPY DIAGNOSTIC    Medications prior to admission:   Prior to Admission medications    Medication Sig Start Date End Date Taking? Authorizing Provider   HYDROcodone-acetaminophen (NORCO) 5-325 MG per tablet TAKE 1 TABLET BY MOUTH EVERY 8 HOURS AS NEEDED FOR SEVERE PAIN 10/11/22   Historical Provider, MD   ibuprofen (ADVIL) 200 MG CAPS Take 1 capsule by mouth    Historical Provider, MD   bisacodyl 5 MG EC tablet Take 4 tablets once for colonoscopy prep 10/21/22   ADELFO Acuna CNP   gabapentin (NEURONTIN) 600 MG tablet Take 0.5 tablets by mouth 3 times daily for 90 days. 10/10/22 1/8/23  ADELFO Lewis CNP   escitalopram (LEXAPRO) 10 MG tablet Take 1 tablet by mouth daily 10/10/22   ADELFO Lewis CNP   ondansetron (ZOFRAN) 4 MG tablet Take 1 tablet by mouth 3 times daily as needed for Nausea or Vomiting  Patient not taking: Reported on 10/21/2022 10/5/22   ADELFO Lewsi CNP   lisinopril (PRINIVIL;ZESTRIL) 20 MG tablet Take 1 tablet by mouth 2 times daily 10/5/22   ADELFO Lewis CNP   atorvastatin (LIPITOR) 80 MG tablet Take 1 tablet by mouth daily 10/5/22   ADELFO Lewis CNP   amLODIPine (NORVASC) 10 MG tablet TAKE 1 TABLET BY MOUTH EVERY DAY 10/5/22   ADELFO Lewis CNP   blood glucose test strips (ASCENSIA AUTODISC VI;ONE TOUCH ULTRA TEST VI) strip 1 each by In Vitro route daily As needed. 19   ADELFO Lewis CNP       Current medications:    No current facility-administered medications for this encounter.      Current Outpatient Medications   Medication Sig Dispense Refill    HYDROcodone-acetaminophen (NORCO) 5-325 MG per tablet TAKE 1 TABLET BY MOUTH EVERY 8 HOURS AS NEEDED FOR SEVERE PAIN      ibuprofen (ADVIL) 200 MG CAPS Take 1 capsule by mouth      bisacodyl 5 MG EC tablet Take 4 tablets once for colonoscopy prep 4 tablet 0    gabapentin (NEURONTIN) 600 MG tablet Take 0.5 tablets by mouth 3 times daily for 90 days. 135 tablet 0    escitalopram (LEXAPRO) 10 MG tablet Take 1 tablet by mouth daily 90 tablet 1    ondansetron (ZOFRAN) 4 MG tablet Take 1 tablet by mouth 3 times daily as needed for Nausea or Vomiting (Patient not taking: Reported on 10/21/2022) 30 tablet 0    lisinopril (PRINIVIL;ZESTRIL) 20 MG tablet Take 1 tablet by mouth 2 times daily 180 tablet 1    atorvastatin (LIPITOR) 80 MG tablet Take 1 tablet by mouth daily 90 tablet 1    amLODIPine (NORVASC) 10 MG tablet TAKE 1 TABLET BY MOUTH EVERY DAY 90 tablet 1    blood glucose test strips (ASCENSIA AUTODISC VI;ONE TOUCH ULTRA TEST VI) strip 1 each by In Vitro route daily As needed. 100 each 3       Allergies:     Allergies   Allergen Reactions    Bactrim [Sulfamethoxazole-Trimethoprim] Swelling, Dermatitis and Other (See Comments)     Causes vasculitis    Sulfa Antibiotics Anaphylaxis       Problem List:    Patient Active Problem List   Diagnosis Code    Charcot's joint of foot in type 2 diabetes mellitus ([facility] E11.610    Skin callus L84    H/O sepsis Z86.19    DM (diabetes mellitus) ([facility] E11.9    Psoriasis L40.9    Essential hypertension I10    Leg edema, right R60.0    Right leg pain M79.604    Plantar wart, left foot B07.0    Callus of foot L84    Change in bowel movement R19.8    Abnormal CT of the abdomen R93.5    Smokeless tobacco use Z72.0    Erectile dysfunction N52.9       Past Medical History:        Diagnosis Date    Blood circulation, collateral     Callus of foot     Callus of foot 08/13/2018    Charcot's joint of foot     Charcot's joint of foot in type 2 diabetes mellitus ([facility] 02/06/2014    Closed nondisplaced fracture of fifth metatarsal bone of right foot with routine healing 08/28/2017    Diabetes mellitus ([address]     Diverticulitis     Hyperlipidemia     Hypertension     Ischemic ulcer of left foot, limited to breakdown of skin ([address] 07/12/2016    Leg edema, right 08/25/2017    Neuropathy     Plantar wart, left foot 08/13/2018    Ulcer of right foot with fat layer exposed ([address] 11/28/2016    Ulcer of right foot with necrosis of muscle, Hannon Grade III 08/11/2016    WD-Chronic ulcer of left foot with fat layer exposed ([address] 08/11/2016    WD-Chronic ulcer of toe of right foot, limited to breakdown of skin ([address] 07/12/2016    WD-Diabetic ulcer of left foot associated with type 2 diabetes mellitus ([address] 08/11/2016    WD-Diabetic ulcer of right lateral foot associated with type 2 diabetes mellitus, with fat layer exposed ([address]     WD-Type 2 diabetes mellitus with right diabetic foot ulcer ([address] 07/12/2016       Past Surgical History:        Procedure Laterality Date    ABDOMEN SURGERY      COLONOSCOPY  05/06/2019    divertic, polyps, clip, repeat age 48    COLONOSCOPY N/A 05/06/2019    COLONOSCOPY CONTROL HEMORRHAGE performed by Garfield Yost MD at [address] [place] [address]  07/2012    charcot    FRACTURE SURGERY Right     foot       Social History:    Social History     Tobacco Use    Smoking status: Never    Smokeless tobacco: Current     Types: Chew    Tobacco comments:     Ask not to use 24-48-hours before his procedure   Substance Use Topics    Alcohol use: Yes     Comment: limited                                Ready to quit: Not Answered  Counseling given: Not Answered  Tobacco comments: Ask not to use 24-48-hours before his procedure      Vital Signs (Current):   Vitals:    12/15/22 0830   Weight: 240 lb (108.9 kg)   Height: 6' (1.829 m)                                              BP Readings from Last 3 Encounters:   10/21/22 (!) 148/80   10/05/22 130/72   09/19/22 (!) 177/108       NPO Status: BMI:   Wt Readings from Last 3 Encounters:   10/21/22 232 lb 9.6 oz (105.5 kg)   10/05/22 235 lb 6.4 oz (106.8 kg)   09/19/22 240 lb (108.9 kg)     Body mass index is 32.55 kg/m². CBC:   Lab Results   Component Value Date/Time    WBC 7.2 09/19/2022 12:16 PM    RBC 5.26 09/19/2022 12:16 PM    HGB 17.3 09/19/2022 12:16 PM    HCT 49.1 09/19/2022 12:16 PM    MCV 93.5 09/19/2022 12:16 PM    RDW 14.3 09/19/2022 12:16 PM     09/19/2022 12:16 PM       CMP:   Lab Results   Component Value Date/Time     09/19/2022 12:16 PM    K 4.0 09/19/2022 12:16 PM    CL 94 09/19/2022 12:16 PM    CO2 26 09/19/2022 12:16 PM    BUN 7 09/19/2022 12:16 PM    CREATININE 0.5 09/19/2022 02:17 PM    CREATININE <0.5 09/19/2022 12:16 PM    GFRAA >60 09/19/2022 02:17 PM    AGRATIO 1.2 09/19/2022 12:16 PM    LABGLOM >60 09/19/2022 02:17 PM    LABGLOM 121 10/10/2017 01:05 PM    GLUCOSE 146 09/19/2022 12:16 PM    PROT 7.4 09/19/2022 12:16 PM    PROT 7.4 04/30/2019 10:10 AM    CALCIUM 9.5 09/19/2022 12:16 PM    BILITOT 1.3 09/19/2022 12:16 PM    ALKPHOS 80 09/19/2022 12:16 PM    AST 38 09/19/2022 12:16 PM    ALT 23 09/19/2022 12:16 PM       POC Tests: No results for input(s): POCGLU, POCNA, POCK, POCCL, POCBUN, POCHEMO, POCHCT in the last 72 hours.     Coags:   Lab Results   Component Value Date/Time    PROTIME 18.2 11/02/2016 03:30 PM    INR 1.57 11/02/2016 03:30 PM    APTT 29.4 11/02/2016 03:30 PM       HCG (If Applicable): No results found for: PREGTESTUR, PREGSERUM, HCG, HCGQUANT     ABGs:   Lab Results   Component Value Date/Time    PO2ART 73 10/21/2016 06:00 AM    DOC1VGV 52.0 10/21/2016 06:00 AM    NOS1UFS 28.7 10/21/2016 06:00 AM        Type & Screen (If Applicable):  No results found for: LABABO, LABRH    Drug/Infectious Status (If Applicable):  No results found for: HIV, HEPCAB    COVID-19 Screening (If Applicable): No results found for: COVID19        Anesthesia Evaluation  Patient summary reviewed  Airway: Dental:          Pulmonary:Negative Pulmonary ROS                              Cardiovascular:    (+) hypertension:, hyperlipidemia         Beta Blocker:  Not on Beta Blocker         Neuro/Psych:   Negative Neuro/Psych ROS              GI/Hepatic/Renal:   (+) bowel prep, morbid obesity          Endo/Other:    (+) DiabetesType II DM, , .                 Abdominal:             Vascular: negative vascular ROS. Other Findings:           Anesthesia Plan      MAC     ASA 2       Induction: intravenous. ADELFO Rincon - ZHOU   12/19/2022         Pre Anesthesia Assessment complete.  Chart reviewed on 12/19/2022

## 2022-12-19 NOTE — TELEPHONE ENCOUNTER
Received order for diabetic shoes, orthotics from [facility]. Order has foot deformity and history of partial or complete amputation of foot. Spoke with patient and he has not had amputation since last appointment. Called [facility] without answer and left message for call back clarification. Will likely need new order faxed.

## 2022-12-20 ENCOUNTER — HOSPITAL ENCOUNTER (OUTPATIENT)
Age: 52
Setting detail: OUTPATIENT SURGERY
Discharge: HOME | End: 2022-12-20
Attending: SPECIALIST | Admitting: SPECIALIST
Payer: COMMERCIAL

## 2022-12-20 ENCOUNTER — ANESTHESIA (OUTPATIENT)
Dept: OPERATING ROOM | Age: 52
End: 2022-12-20
Payer: COMMERCIAL

## 2022-12-20 VITALS
HEART RATE: 78 BPM | RESPIRATION RATE: 16 BRPM | DIASTOLIC BLOOD PRESSURE: 89 MMHG | TEMPERATURE: 97.5 F | OXYGEN SATURATION: 100 % | WEIGHT: 240 LBS | BODY MASS INDEX: 32.51 KG/M2 | HEIGHT: 72 IN | SYSTOLIC BLOOD PRESSURE: 125 MMHG

## 2022-12-20 PROBLEM — Z86.010 HISTORY OF COLON POLYPS: Status: ACTIVE | Noted: 2022-12-20

## 2022-12-20 PROBLEM — Z86.0100 HISTORY OF COLON POLYPS: Status: ACTIVE | Noted: 2022-12-20

## 2022-12-20 LAB — GLUCOSE BLD-MCNC: 157 MG/DL (ref 70–99)

## 2022-12-20 PROCEDURE — 6360000002 HC RX W HCPCS: Performed by: NURSE ANESTHETIST, CERTIFIED REGISTERED

## 2022-12-20 PROCEDURE — 3700000001 HC ADD 15 MINUTES (ANESTHESIA): Performed by: SPECIALIST

## 2022-12-20 PROCEDURE — 3609027000 HC COLONOSCOPY: Performed by: SPECIALIST

## 2022-12-20 PROCEDURE — 7100000010 HC PHASE II RECOVERY - FIRST 15 MIN: Performed by: SPECIALIST

## 2022-12-20 PROCEDURE — 2709999900 HC NON-CHARGEABLE SUPPLY: Performed by: SPECIALIST

## 2022-12-20 PROCEDURE — 82962 GLUCOSE BLOOD TEST: CPT

## 2022-12-20 PROCEDURE — 3700000000 HC ANESTHESIA ATTENDED CARE: Performed by: SPECIALIST

## 2022-12-20 PROCEDURE — 45378 DIAGNOSTIC COLONOSCOPY: CPT | Performed by: SPECIALIST

## 2022-12-20 PROCEDURE — 7100000011 HC PHASE II RECOVERY - ADDTL 15 MIN: Performed by: SPECIALIST

## 2022-12-20 PROCEDURE — 2580000003 HC RX 258: Performed by: NURSE PRACTITIONER

## 2022-12-20 RX ORDER — SODIUM CHLORIDE, SODIUM LACTATE, POTASSIUM CHLORIDE, CALCIUM CHLORIDE 600; 310; 30; 20 MG/100ML; MG/100ML; MG/100ML; MG/100ML
INJECTION, SOLUTION INTRAVENOUS CONTINUOUS
Status: DISCONTINUED | OUTPATIENT
Start: 2022-12-20 | End: 2022-12-20 | Stop reason: HOSPADM

## 2022-12-20 RX ORDER — PROPOFOL 10 MG/ML
INJECTION, EMULSION INTRAVENOUS PRN
Status: DISCONTINUED | OUTPATIENT
Start: 2022-12-20 | End: 2022-12-20 | Stop reason: SDUPTHER

## 2022-12-20 RX ORDER — SODIUM CHLORIDE 9 MG/ML
25 INJECTION, SOLUTION INTRAVENOUS PRN
Status: DISCONTINUED | OUTPATIENT
Start: 2022-12-20 | End: 2022-12-20 | Stop reason: HOSPADM

## 2022-12-20 RX ORDER — SODIUM CHLORIDE 0.9 % (FLUSH) 0.9 %
5-40 SYRINGE (ML) INJECTION EVERY 12 HOURS SCHEDULED
Status: DISCONTINUED | OUTPATIENT
Start: 2022-12-20 | End: 2022-12-20 | Stop reason: HOSPADM

## 2022-12-20 RX ORDER — SODIUM CHLORIDE 0.9 % (FLUSH) 0.9 %
5-40 SYRINGE (ML) INJECTION PRN
Status: DISCONTINUED | OUTPATIENT
Start: 2022-12-20 | End: 2022-12-20 | Stop reason: HOSPADM

## 2022-12-20 RX ORDER — LIDOCAINE HYDROCHLORIDE 20 MG/ML
INJECTION, SOLUTION INTRAVENOUS PRN
Status: DISCONTINUED | OUTPATIENT
Start: 2022-12-20 | End: 2022-12-20 | Stop reason: SDUPTHER

## 2022-12-20 RX ADMIN — PROPOFOL 350 MG: 10 INJECTION, EMULSION INTRAVENOUS at 09:05

## 2022-12-20 RX ADMIN — LIDOCAINE HYDROCHLORIDE 100 MG: 20 INJECTION, SOLUTION INTRAVENOUS at 09:05

## 2022-12-20 RX ADMIN — SODIUM CHLORIDE, POTASSIUM CHLORIDE, SODIUM LACTATE AND CALCIUM CHLORIDE: 600; 310; 30; 20 INJECTION, SOLUTION INTRAVENOUS at 08:18

## 2022-12-20 ASSESSMENT — PAIN SCALES - GENERAL: PAINLEVEL_OUTOF10: 0

## 2022-12-20 ASSESSMENT — PAIN - FUNCTIONAL ASSESSMENT: PAIN_FUNCTIONAL_ASSESSMENT: 0-10

## 2022-12-20 NOTE — ANESTHESIA PRE PROCEDURE
Department of Anesthesiology  Preprocedure Note       Name:  Rosario Carcamo   Age:  46 y.o.  :  1970                                          MRN:  [id number]         Date:  2022      Surgeon: Ishan Leon):  Silverio Ortiz MD    Procedure: Procedure(s):  COLONOSCOPY DIAGNOSTIC    Medications prior to admission:   Prior to Admission medications    Medication Sig Start Date End Date Taking? Authorizing Provider   HYDROcodone-acetaminophen (NORCO) 5-325 MG per tablet TAKE 1 TABLET BY MOUTH EVERY 8 HOURS AS NEEDED FOR SEVERE PAIN 10/11/22   Historical Provider, MD   ibuprofen (ADVIL) 200 MG CAPS Take 1 capsule by mouth    Historical Provider, MD   bisacodyl 5 MG EC tablet Take 4 tablets once for colonoscopy prep 10/21/22   ADELFO Aguilar CNP   gabapentin (NEURONTIN) 600 MG tablet Take 0.5 tablets by mouth 3 times daily for 90 days. 10/10/22 1/8/23  ADELFO Nichols CNP   escitalopram (LEXAPRO) 10 MG tablet Take 1 tablet by mouth daily 10/10/22   ADELFO Nichols CNP   ondansetron (ZOFRAN) 4 MG tablet Take 1 tablet by mouth 3 times daily as needed for Nausea or Vomiting  Patient not taking: Reported on 10/21/2022 10/5/22   ADELFO Nichols CNP   lisinopril (PRINIVIL;ZESTRIL) 20 MG tablet Take 1 tablet by mouth 2 times daily 10/5/22   ADELFO Nichols CNP   atorvastatin (LIPITOR) 80 MG tablet Take 1 tablet by mouth daily 10/5/22   ADELFO Nichols CNP   amLODIPine (NORVASC) 10 MG tablet TAKE 1 TABLET BY MOUTH EVERY DAY 10/5/22   ADELFO Nichols CNP   blood glucose test strips (ASCENSIA AUTODISC VI;ONE TOUCH ULTRA TEST VI) strip 1 each by In Vitro route daily As needed. 19   ADELFO Nichols CNP       Current medications:    No current outpatient medications on file. No current facility-administered medications for this visit. Allergies:     Allergies   Allergen Reactions    Bactrim [Sulfamethoxazole-Trimethoprim] Swelling, Dermatitis and Other (See Comments)     Causes vasculitis    Sulfa Antibiotics Anaphylaxis       Problem List:    Patient Active Problem List   Diagnosis Code    Charcot's joint of foot in type 2 diabetes mellitus ([address] E11.610    Skin callus L84    H/O sepsis Z86.19    DM (diabetes mellitus) ([address] E11.9    Psoriasis L40.9    Essential hypertension I10    Leg edema, right R60.0    Right leg pain M79.604    Plantar wart, left foot B07.0    Callus of foot L84    Change in bowel movement R19.8    Abnormal CT of the abdomen R93.5    Smokeless tobacco use Z72.0    Erectile dysfunction N52.9       Past Medical History:        Diagnosis Date    Blood circulation, collateral     Callus of foot     Callus of foot 08/13/2018    Charcot's joint of foot     Charcot's joint of foot in type 2 diabetes mellitus ([address] 02/06/2014    Closed nondisplaced fracture of fifth metatarsal bone of right foot with routine healing 08/28/2017    Diabetes mellitus ([address]     Diverticulitis     Hyperlipidemia     Hypertension     Ischemic ulcer of left foot, limited to breakdown of skin ([address] 07/12/2016    Leg edema, right 08/25/2017    Neuropathy     Plantar wart, left foot 08/13/2018    Ulcer of right foot with fat layer exposed ([address] 11/28/2016    Ulcer of right foot with necrosis of muscle, Hannon Grade III 08/11/2016    WD-Chronic ulcer of left foot with fat layer exposed ([address] 08/11/2016    WD-Chronic ulcer of toe of right foot, limited to breakdown of skin ([address] 07/12/2016    WD-Diabetic ulcer of left foot associated with type 2 diabetes mellitus ([address] 08/11/2016    WD-Diabetic ulcer of right lateral foot associated with type 2 diabetes mellitus, with fat layer exposed ([address]     WD-Type 2 diabetes mellitus with right diabetic foot ulcer ([address] 07/12/2016       Past Surgical History:        Procedure Laterality Date    ABDOMEN SURGERY      COLONOSCOPY  05/06/2019    divertic, polyps, clip, repeat age 48    COLONOSCOPY N/A 05/06/2019    COLONOSCOPY CONTROL HEMORRHAGE performed by Travis Love MD at [address]  07/2012    charcot    FRACTURE SURGERY Right     foot       Social History:    Social History     Tobacco Use    Smoking status: Never    Smokeless tobacco: Current     Types: Chew    Tobacco comments:     Ask not to use 24-48-hours before his procedure   Substance Use Topics    Alcohol use: Yes     Comment: limited                                Ready to quit: Not Answered  Counseling given: Not Answered  Tobacco comments: Ask not to use 24-48-hours before his procedure      Vital Signs (Current): There were no vitals filed for this visit.                                            BP Readings from Last 3 Encounters:   12/20/22 (!) 144/98   10/21/22 (!) 148/80   10/05/22 130/72       NPO Status:                                                                                 BMI:   Wt Readings from Last 3 Encounters:   12/15/22 240 lb (108.9 kg)   10/21/22 232 lb 9.6 oz (105.5 kg)   10/05/22 235 lb 6.4 oz (106.8 kg)     There is no height or weight on file to calculate BMI.    CBC:   Lab Results   Component Value Date/Time    WBC 7.2 09/19/2022 12:16 PM    RBC 5.26 09/19/2022 12:16 PM    HGB 17.3 09/19/2022 12:16 PM    HCT 49.1 09/19/2022 12:16 PM    MCV 93.5 09/19/2022 12:16 PM    RDW 14.3 09/19/2022 12:16 PM     09/19/2022 12:16 PM       CMP:   Lab Results   Component Value Date/Time     09/19/2022 12:16 PM    K 4.0 09/19/2022 12:16 PM    CL 94 09/19/2022 12:16 PM    CO2 26 09/19/2022 12:16 PM    BUN 7 09/19/2022 12:16 PM    CREATININE 0.5 09/19/2022 02:17 PM    CREATININE <0.5 09/19/2022 12:16 PM    GFRAA >60 09/19/2022 02:17 PM    AGRATIO 1.2 09/19/2022 12:16 PM    LABGLOM >60 09/19/2022 02:17 PM    LABGLOM 121 10/10/2017 01:05 PM    GLUCOSE 146 09/19/2022 12:16 PM    PROT 7.4 09/19/2022 12:16 PM    PROT 7.4 04/30/2019 10:10 AM    CALCIUM 9.5 09/19/2022 12:16 PM    BILITOT 1.3 09/19/2022 12:16 PM    ALKPHOS 80 09/19/2022 12:16 PM    AST 38 09/19/2022 12:16 PM    ALT 23 09/19/2022 12:16 PM       POC Tests: No results for input(s): POCGLU, POCNA, POCK, POCCL, POCBUN, POCHEMO, POCHCT in the last 72 hours. Coags:   Lab Results   Component Value Date/Time    PROTIME 18.2 11/02/2016 03:30 PM    INR 1.57 11/02/2016 03:30 PM    APTT 29.4 11/02/2016 03:30 PM       HCG (If Applicable): No results found for: PREGTESTUR, PREGSERUM, HCG, HCGQUANT     ABGs:   Lab Results   Component Value Date/Time    PO2ART 73 10/21/2016 06:00 AM    AFU4OBP 52.0 10/21/2016 06:00 AM    YMB1HUX 28.7 10/21/2016 06:00 AM        Type & Screen (If Applicable):  No results found for: LABABO, LABRH    Drug/Infectious Status (If Applicable):  No results found for: HIV, HEPCAB    COVID-19 Screening (If Applicable): No results found for: COVID19        Anesthesia Evaluation  Patient summary reviewed  Airway: Mallampati: II          Dental:          Pulmonary:Negative Pulmonary ROS and normal exam                               Cardiovascular:  Exercise tolerance: good (>4 METS),   (+) hypertension:, hyperlipidemia        Rhythm: regular  Rate: normal           Beta Blocker:  Not on Beta Blocker         Neuro/Psych:   Negative Neuro/Psych ROS              GI/Hepatic/Renal:   (+) bowel prep, morbid obesity          Endo/Other:    (+) DiabetesType II DM, , .                 Abdominal:             Vascular: negative vascular ROS. Other Findings:             Anesthesia Plan      MAC     ASA 2       Induction: intravenous. Anesthetic plan and risks discussed with patient. Plan discussed with CRNA. ADELFO Snyder - ZHOU   12/20/2022         Pre Anesthesia Assessment complete.  Chart reviewed on 12/20/2022

## 2022-12-20 NOTE — DISCHARGE INSTRUCTIONS
ANESTHESIA    Do not drive, work around [address] [place], [place] [address]th  or use equipment. Do not drink any alcoholic beverages. Do not smoke while alone. Avoid making important decisions. Plan to spend a quiet, relaxed evening @ home. Resume normal activities as you begin to feel better. Eat lightly for your first meal, then gradually increase your diet to what is normal for you. In case of nausea, avoid food and drink only clear liquids. Resume food as nausea ceases. Notify your surgeon if you experience fever, chills, large amount of bleeding, difficulty breathing, persistent nausea and vomiting or any other disturbing problem. Call for a follow-up appointment with your surgeon. Colonoscopy: What to Expect at [address] [place] [address]  After a colonoscopy, you'll stay at the clinic until you wake up. Then you can go home. But you'll need to arrange for a ride. Your doctor will tell you when you can eat and do your other usual activities. Your doctor will talk to you about when you'll need your next colonoscopy. Your doctor can help you decide how often you need to be checked. This will depend on the results of your test and your risk for colorectal cancer. After the test, you may be bloated or have gas pains. You may need to pass gas. If a biopsy was done or a polyp was removed, you may have streaks of blood in your stool (feces) for a few days. Problems such as heavy rectal bleeding may not occur until several weeks after the test. This isn't common. But it can happen after polyps are removed. This care sheet gives you a general idea about how long it will take for you to recover. But each person recovers at a different pace. Follow the steps below to get better as quickly as possible. How can you care for yourself at home? Activity    Rest when you feel tired. You can do your normal activities when it feels okay to do so. Diet    Follow your doctor's directions for eating.      Unless your doctor has told you not to, drink plenty of fluids. This helps to replace the fluids that were lost during the colon prep. Do not drink alcohol. Medicines    Your doctor will tell you if and when you can restart your medicines. You will also be given instructions about taking any new medicines. If you stopped taking aspirin or some other blood thinner, your doctor will tell you when to start taking it again. If polyps were removed or a biopsy was done during the test, your doctor may tell you not to take aspirin or other anti-inflammatory medicines for a few days. These include ibuprofen (Advil, Motrin) and naproxen (Aleve). Other instructions    For your safety, do not drive or operate machinery until the medicine wears off and you can think clearly. Your doctor may tell you not to drive or operate machinery until the day after your test.     Do not sign legal documents or make major decisions until the medicine wears off and you can think clearly. The anesthesia can make it hard for you to fully understand what you are agreeing to. Follow-up care is a key part of your treatment and safety. Be sure to make and go to all appointments, and call your doctor if you are having problems. It's also a good idea to know your test results and keep a list of the medicines you take. When should you call for help? Call 911 anytime you think you may need emergency care. For example, call if:    You passed out (lost consciousness). You pass maroon or bloody stools. You have trouble breathing. Call your doctor now or seek immediate medical care if:    You have pain that does not get better after you take pain medicine. You are sick to your stomach or cannot drink fluids. You have new or worse belly pain. You have blood in your stools. You have a fever. You cannot pass stools or gas. Watch closely for changes in your health, and be sure to contact your doctor if you have any problems.   Where can you learn more? Go to http://www.woods.com/ and enter E264 to learn more about \"Colonoscopy: What to Expect at Home. \"  Current as of: May 4, 2022               Content Version: 13.5  © 6925-6486 [employer]. Care instructions adapted under license by [facility] ([facility]). If you have questions about a medical condition or this instruction, always ask your healthcare professional. [place] [address] any warranty or liability for your use of this information.

## 2022-12-20 NOTE — ANESTHESIA POSTPROCEDURE EVALUATION
Department of Anesthesiology  Postprocedure Note    Patient: Cassi Mcdaniel  MRN: [id number]  YOB: 1970  Date of evaluation: 12/20/2022      Procedure Summary     Date: 12/20/22 Room / Location: [address]    [address]esthesia Start: [address] Anesthesia Stop: 6261    Procedure: COLONOSCOPY DIAGNOSTIC Diagnosis:       History of colon polyps      Diverticulitis, colon      (History of colon polyps [Z86.010])      (Diverticulitis, colon [K57.32])    Surgeons: Ronak Marion MD Responsible Provider: ADELFO Hernadez CRNA    Anesthesia Type: MAC ASA Status: 2          Anesthesia Type: No value filed.     Shilo Phase I:      Shilo Phase II:        Anesthesia Post Evaluation    Patient location during evaluation: bedside  Patient participation: complete - patient participated  Level of consciousness: awake and alert  Pain score: 0  Airway patency: patent  Nausea & Vomiting: no vomiting and no nausea  Complications: no  Cardiovascular status: blood pressure returned to baseline and hemodynamically stable  Respiratory status: acceptable, room air, spontaneous ventilation and nonlabored ventilation  Hydration status: stable

## 2022-12-20 NOTE — PROGRESS NOTES
Returned to room Q2. Report received from [facility]. Alert and oriented. Respirations even and unlabored. COlor pink. Abdomen soft and nontender. Beverage provided. Call light in reach. Mother supportive at bedside.

## 2022-12-20 NOTE — BRIEF OP NOTE
BRIEF COLONOSCOPY REPORT:   The original colonoscopy report with photos can be found by going to \"chart review\" then \"procedures\" then double click on \"colonoscopy\"    Impression:         - pan-diverticulosis noted but concentrated within the sigmoid         -  Internal hemorrhoids. -  The examination was otherwise normal on direct and retroflexion views. -  No specimens collected. Recommendation:         -  Repeat colonoscopy in 5 years for surveillance.

## 2023-01-06 ENCOUNTER — OFFICE VISIT (OUTPATIENT)
Dept: GASTROENTEROLOGY | Age: 53
End: 2023-01-06
Payer: COMMERCIAL

## 2023-01-06 VITALS
HEIGHT: 72 IN | SYSTOLIC BLOOD PRESSURE: 150 MMHG | DIASTOLIC BLOOD PRESSURE: 90 MMHG | WEIGHT: 237.4 LBS | BODY MASS INDEX: 32.15 KG/M2 | TEMPERATURE: 97.2 F | HEART RATE: 96 BPM | OXYGEN SATURATION: 96 %

## 2023-01-06 DIAGNOSIS — Z87.19 HISTORY OF DIVERTICULITIS: ICD-10-CM

## 2023-01-06 DIAGNOSIS — K57.30 PANCOLONIC DIVERTICULOSIS: Primary | ICD-10-CM

## 2023-01-06 DIAGNOSIS — K76.0 FATTY LIVER: ICD-10-CM

## 2023-01-06 PROCEDURE — 99213 OFFICE O/P EST LOW 20 MIN: CPT | Performed by: NURSE PRACTITIONER

## 2023-01-06 PROCEDURE — 3074F SYST BP LT 130 MM HG: CPT | Performed by: NURSE PRACTITIONER

## 2023-01-06 PROCEDURE — 3078F DIAST BP <80 MM HG: CPT | Performed by: NURSE PRACTITIONER

## 2023-01-06 RX ORDER — CLONIDINE HYDROCHLORIDE 0.1 MG/1
TABLET ORAL
COMMUNITY

## 2023-01-06 RX ORDER — ALUMINUM ZIRCONIUM OCTACHLOROHYDREX GLY 16 G/100G
1 GEL TOPICAL DAILY
Qty: 30 CAPSULE | Refills: 4 | Status: SHIPPED | OUTPATIENT
Start: 2023-01-06 | End: 2023-02-05

## 2023-01-06 RX ORDER — WHEAT DEXTRIN 3 G/3.8 G
4 POWDER (GRAM) ORAL
Qty: 1 EACH | Refills: 3 | Status: SHIPPED | OUTPATIENT
Start: 2023-01-06

## 2023-01-06 NOTE — PROGRESS NOTES
Samanta Cobos 46 y.o. male was seen by JACK Villanueva on 1/6/2023     Wt Readings from Last 3 Encounters:   01/06/23 237 lb 6.4 oz (107.7 kg)   12/15/22 240 lb (108.9 kg)   10/21/22 232 lb 9.6 oz (105.5 kg)       HPI  Samanta Cobos is a pleasant 46 y.o.  male who presents today for follow-up on colonoscopy with history of diverticulitis. His colonoscopy showed pan-diverticulosis noted but concentrated within the sigmoid colon and grade 1 internal hemorrhoids; otherwise normal colon. His bowel pattern has normalized. His current bowel pattern is two to three times daily with soft blobs of brown stool. No diarrhea or constipation. No blood in his stools or melena. No excess belching or flatulence. His appetite is good and weight is stable. No abdominal pain, bloating or distention. No nausea or vomiting. No heartburn or acid reflux. No nocturnal awakenings with acid reflux. No dysphagia or pain with swallowing. No family history of stomach or colon cancer. ROS  Review of Systems   Constitutional:  Positive for unexpected weight change. Negative for appetite change, chills, diaphoresis and fever. HENT:  Positive for hearing loss. Negative for ear pain and tinnitus. Eyes:  Negative for pain, discharge and visual disturbance. Respiratory:  Negative for cough, shortness of breath and wheezing. Cardiovascular:  Negative for chest pain, palpitations and leg swelling. Gastrointestinal:  Positive for abdominal pain and diarrhea. Negative for blood in stool, constipation, nausea and vomiting. Endocrine: Negative for cold intolerance and heat intolerance. Genitourinary:  Positive for frequency and urgency. Negative for dysuria and hematuria. Musculoskeletal:  Positive for back pain, myalgias and neck pain. Skin:  Negative for color change, pallor and rash. Allergic/Immunologic: Negative for environmental allergies and food allergies.    Neurological:  Negative for dizziness, seizures, weakness and headaches. Hematological:  Does not bruise/bleed easily. Psychiatric/Behavioral:  Positive for dysphoric mood and sleep disturbance. The patient is nervous/anxious. Allergies  Allergies   Allergen Reactions    Bactrim [Sulfamethoxazole-Trimethoprim] Swelling, Dermatitis and Other (See Comments)     Causes vasculitis    Sulfa Antibiotics Anaphylaxis       Medications  Current Outpatient Medications   Medication Sig Dispense Refill    ibuprofen (ADVIL;MOTRIN) 200 MG CAPS Take 1 capsule by mouth      lisinopril (PRINIVIL;ZESTRIL) 20 MG tablet Take 1 tablet by mouth 2 times daily 180 tablet 1    atorvastatin (LIPITOR) 80 MG tablet Take 1 tablet by mouth daily 90 tablet 1    amLODIPine (NORVASC) 10 MG tablet TAKE 1 TABLET BY MOUTH EVERY DAY 90 tablet 1    cloNIDine (CATAPRES) 0.1 MG tablet Clonidine Oral        active      HYDROcodone-acetaminophen (NORCO) 5-325 MG per tablet TAKE 1 TABLET BY MOUTH EVERY 8 HOURS AS NEEDED FOR SEVERE PAIN (Patient not taking: No sig reported)      gabapentin (NEURONTIN) 600 MG tablet Take 0.5 tablets by mouth 3 times daily for 90 days. (Patient not taking: No sig reported) 135 tablet 0    escitalopram (LEXAPRO) 10 MG tablet Take 1 tablet by mouth daily (Patient not taking: No sig reported) 90 tablet 1    ondansetron (ZOFRAN) 4 MG tablet Take 1 tablet by mouth 3 times daily as needed for Nausea or Vomiting (Patient not taking: No sig reported) 30 tablet 0    blood glucose test strips (ASCENSIA AUTODISC VI;ONE TOUCH ULTRA TEST VI) strip 1 each by In Vitro route daily As needed. (Patient not taking: Reported on 1/6/2023) 100 each 3     No current facility-administered medications for this visit.        Past medical history:   He has a past medical history of Blood circulation, collateral, Callus of foot, Callus of foot, Charcot's joint of foot, Charcot's joint of foot in type 2 diabetes mellitus ([address] Closed nondisplaced fracture of fifth metatarsal bone of right foot with routine healing, Diabetes mellitus ([address] Diverticulitis, Hyperlipidemia, Hypertension, Ischemic ulcer of left foot, limited to breakdown of skin ([address] Leg edema, right, Neuropathy, Plantar wart, left foot, Ulcer of right foot with fat layer exposed ([address] Ulcer of right foot with necrosis of muscle, Hannon Grade III, WD-Chronic ulcer of left foot with fat layer exposed ([address] WD-Chronic ulcer of toe of right foot, limited to breakdown of skin ([address] WD-Diabetic ulcer of left foot associated with type 2 diabetes mellitus ([address] WD-Diabetic ulcer of right lateral foot associated with type 2 diabetes mellitus, with fat layer exposed ([address] and WD-Type 2 diabetes mellitus with right diabetic foot ulcer ([address] Past surgical history:  He has a past surgical history that includes Foot surgery (Right, 07/2012); Abdomen surgery; fracture surgery (Right); Colonoscopy (05/06/2019); Colonoscopy (N/A, 05/06/2019); and Colonoscopy (N/A, 12/20/2022). Social History:  He reports that he has never smoked. His smokeless tobacco use includes chew. He reports current alcohol use. He reports that he does not use drugs. Family history:  His family history includes Cancer in his maternal grandmother and paternal grandmother; Diabetes in his mother; Heart Disease in his father and sister; High Blood Pressure in his brother, father, mother, paternal grandmother, and sister; High Cholesterol in his mother; Stroke in his maternal grandfather. Objective    Vitals:    01/06/23 0933   BP: (!) 168/104   Pulse: 96   Temp: 97.2 °F (36.2 °C)   SpO2: 96%        Physical exam    Physical Exam  Constitutional:       General: He is not in acute distress. Appearance: Normal appearance. He is well-developed. He is obese. He is not ill-appearing, toxic-appearing or diaphoretic. HENT:      Head: Normocephalic and atraumatic.       Nose: Nose normal.      Mouth/Throat:      Mouth: Mucous membranes are moist.   Cardiovascular:      Rate and Rhythm: Normal rate and regular rhythm. Pulses: Normal pulses. Heart sounds: Normal heart sounds. No murmur heard. No gallop. Pulmonary:      Effort: Pulmonary effort is normal. No respiratory distress. Breath sounds: Normal breath sounds. No stridor. No wheezing or rhonchi. Abdominal:      General: Bowel sounds are normal. There is no distension. Palpations: Abdomen is soft. There is no mass. Tenderness: There is no abdominal tenderness. Hernia: No hernia is present. Musculoskeletal:         General: Normal range of motion. Cervical back: Neck supple. Skin:     General: Skin is warm and dry. Neurological:      Mental Status: He is alert and oriented to person, place, and time. Psychiatric:         Mood and Affect: Mood normal.       Admission on 12/20/2022, Discharged on 12/20/2022   Component Date Value Ref Range Status    POC Glucose 12/20/2022 157 (A)  70 - 99 MG/DL Final       Assessment and Plan:  1. Pan diverticulosis in colon with no evidence of strictures or neoplasms. Will order Benefiber take two to three times a day. Recommend good bowel regimen with increase in fruit, fiber and fluids. The patient was provided with information on diverticulosis and high fiber diet. 2.  History of diverticulitis would recommend good bowel regimen to prevent constipation. May use Align daily for improved digestive well being. 3.  Fatty liver noted on CT of abdomen/pelvis most likely due to obesity from excess calories. Recommend weight  loss at least 10% of his body weight. His Fib 4 index 1.99 with negative predictive value for advanced fibrosis. Recommend cutting back on alcohol intake since this is toxic to the liver. 4.  The patient was encouraged to follow-up as needed.

## 2023-01-11 ENCOUNTER — HOSPITAL ENCOUNTER (OUTPATIENT)
Age: 53
Discharge: HOME | End: 2023-01-11
Payer: COMMERCIAL

## 2023-01-11 LAB
ANION GAP SERPL CALCULATED.3IONS-SCNC: 23 MMOL/L (ref 4–16)
BASOPHILS ABSOLUTE: 0.1 K/CU MM
BASOPHILS RELATIVE PERCENT: 0.5 % (ref 0–1)
BUN BLDV-MCNC: 6 MG/DL (ref 6–23)
CALCIUM SERPL-MCNC: 9.5 MG/DL (ref 8.3–10.6)
CHLORIDE BLD-SCNC: 91 MMOL/L (ref 99–110)
CO2: 16 MMOL/L (ref 21–32)
CREAT SERPL-MCNC: 0.7 MG/DL (ref 0.9–1.3)
DIFFERENTIAL TYPE: ABNORMAL
EOSINOPHILS ABSOLUTE: 0.1 K/CU MM
EOSINOPHILS RELATIVE PERCENT: 0.5 % (ref 0–3)
ERYTHROCYTE SEDIMENTATION RATE: 28 MM/HR (ref 0–20)
GFR SERPL CREATININE-BSD FRML MDRD: >60 ML/MIN/1.73M2
GLUCOSE BLD-MCNC: 239 MG/DL (ref 70–99)
HCT VFR BLD CALC: 49.2 % (ref 42–52)
HEMOGLOBIN: 16.8 GM/DL (ref 13.5–18)
IMMATURE NEUTROPHIL %: 0.3 % (ref 0–0.43)
LYMPHOCYTES ABSOLUTE: 1.3 K/CU MM
LYMPHOCYTES RELATIVE PERCENT: 13.1 % (ref 24–44)
MCH RBC QN AUTO: 30.3 PG (ref 27–31)
MCHC RBC AUTO-ENTMCNC: 34.1 % (ref 32–36)
MCV RBC AUTO: 88.8 FL (ref 78–100)
MONOCYTES ABSOLUTE: 0.5 K/CU MM
MONOCYTES RELATIVE PERCENT: 5.2 % (ref 0–4)
PDW BLD-RTO: 14.6 % (ref 11.7–14.9)
PLATELET # BLD: 198 K/CU MM (ref 140–440)
PMV BLD AUTO: 8.6 FL (ref 7.5–11.1)
POTASSIUM SERPL-SCNC: 3.8 MMOL/L (ref 3.5–5.1)
RBC # BLD: 5.54 M/CU MM (ref 4.6–6.2)
SEGMENTED NEUTROPHILS ABSOLUTE COUNT: 7.9 K/CU MM
SEGMENTED NEUTROPHILS RELATIVE PERCENT: 80.4 % (ref 36–66)
SODIUM BLD-SCNC: 130 MMOL/L (ref 135–145)
TOTAL IMMATURE NEUTOROPHIL: 0.03 K/CU MM
WBC # BLD: 9.8 K/CU MM (ref 4–10.5)

## 2023-01-11 PROCEDURE — 85025 COMPLETE CBC W/AUTO DIFF WBC: CPT

## 2023-01-11 PROCEDURE — 36415 COLL VENOUS BLD VENIPUNCTURE: CPT

## 2023-01-11 PROCEDURE — 85652 RBC SED RATE AUTOMATED: CPT

## 2023-01-11 PROCEDURE — 80048 BASIC METABOLIC PNL TOTAL CA: CPT

## 2023-01-15 DIAGNOSIS — G62.9 NEUROPATHY: ICD-10-CM

## 2023-01-16 RX ORDER — GABAPENTIN 600 MG/1
300 TABLET ORAL 3 TIMES DAILY
Qty: 135 TABLET | Refills: 0 | OUTPATIENT
Start: 2023-01-16 | End: 2023-04-16

## 2023-01-16 NOTE — TELEPHONE ENCOUNTER
Called patient and updated needed appointment to refill medication. Patient voices understanding.   He states he has enough medication until appointment on 1/30/2023

## 2023-01-30 ENCOUNTER — OFFICE VISIT (OUTPATIENT)
Dept: FAMILY MEDICINE CLINIC | Age: 53
End: 2023-01-30
Payer: COMMERCIAL

## 2023-01-30 VITALS
OXYGEN SATURATION: 98 % | BODY MASS INDEX: 31.17 KG/M2 | TEMPERATURE: 97 F | WEIGHT: 229.8 LBS | DIASTOLIC BLOOD PRESSURE: 72 MMHG | SYSTOLIC BLOOD PRESSURE: 128 MMHG | RESPIRATION RATE: 18 BRPM | HEART RATE: 80 BPM

## 2023-01-30 DIAGNOSIS — E08.29 DIABETES MELLITUS DUE TO UNDERLYING CONDITION WITH OTHER KIDNEY COMPLICATION, UNSPECIFIED WHETHER LONG TERM INSULIN USE (HCC): Primary | ICD-10-CM

## 2023-01-30 DIAGNOSIS — L97.519 ULCER OF BOTH FEET, UNSPECIFIED ULCER STAGE (HCC): ICD-10-CM

## 2023-01-30 DIAGNOSIS — R21 RASH AND NONSPECIFIC SKIN ERUPTION: ICD-10-CM

## 2023-01-30 DIAGNOSIS — E11.610 CHARCOT'S JOINT OF FOOT IN TYPE 2 DIABETES MELLITUS (HCC): ICD-10-CM

## 2023-01-30 DIAGNOSIS — L97.529 ULCER OF BOTH FEET, UNSPECIFIED ULCER STAGE (HCC): ICD-10-CM

## 2023-01-30 DIAGNOSIS — Z72.0 SMOKELESS TOBACCO USE: ICD-10-CM

## 2023-01-30 DIAGNOSIS — I10 ESSENTIAL HYPERTENSION: ICD-10-CM

## 2023-01-30 DIAGNOSIS — G62.9 NEUROPATHY: ICD-10-CM

## 2023-01-30 DIAGNOSIS — Z23 FLU VACCINE NEED: ICD-10-CM

## 2023-01-30 LAB — HBA1C MFR BLD: 7.7 %

## 2023-01-30 PROCEDURE — 90471 IMMUNIZATION ADMIN: CPT | Performed by: NURSE PRACTITIONER

## 2023-01-30 PROCEDURE — 3051F HG A1C>EQUAL 7.0%<8.0%: CPT | Performed by: NURSE PRACTITIONER

## 2023-01-30 PROCEDURE — 3078F DIAST BP <80 MM HG: CPT | Performed by: NURSE PRACTITIONER

## 2023-01-30 PROCEDURE — 90674 CCIIV4 VAC NO PRSV 0.5 ML IM: CPT | Performed by: NURSE PRACTITIONER

## 2023-01-30 PROCEDURE — 3074F SYST BP LT 130 MM HG: CPT | Performed by: NURSE PRACTITIONER

## 2023-01-30 PROCEDURE — 83036 HEMOGLOBIN GLYCOSYLATED A1C: CPT | Performed by: NURSE PRACTITIONER

## 2023-01-30 PROCEDURE — 99214 OFFICE O/P EST MOD 30 MIN: CPT | Performed by: NURSE PRACTITIONER

## 2023-01-30 RX ORDER — METFORMIN HYDROCHLORIDE 500 MG/1
500 TABLET, EXTENDED RELEASE ORAL
Qty: 90 TABLET | Refills: 1 | Status: SHIPPED | OUTPATIENT
Start: 2023-01-30

## 2023-01-30 RX ORDER — ALUMINUM ZIRCONIUM OCTACHLOROHYDREX GLY 16 G/100G
1 GEL TOPICAL DAILY
Qty: 30 CAPSULE | Refills: 4 | Status: SHIPPED | OUTPATIENT
Start: 2023-01-30 | End: 2023-03-01

## 2023-01-30 RX ORDER — TRIAMCINOLONE ACETONIDE 0.25 MG/G
OINTMENT TOPICAL
Qty: 1 EACH | Refills: 1 | Status: SHIPPED | OUTPATIENT
Start: 2023-01-30 | End: 2023-02-06

## 2023-01-30 RX ORDER — GABAPENTIN 600 MG/1
300 TABLET ORAL 3 TIMES DAILY
Qty: 135 TABLET | Refills: 0 | Status: SHIPPED | OUTPATIENT
Start: 2023-01-30 | End: 2023-04-30

## 2023-01-30 RX ORDER — CIPROFLOXACIN 500 MG/1
1 TABLET, FILM COATED ORAL
COMMUNITY
End: 2023-01-30

## 2023-01-30 RX ORDER — CIPROFLOXACIN 500 MG/1
TABLET, FILM COATED ORAL
COMMUNITY
Start: 2023-01-21

## 2023-01-30 ASSESSMENT — ENCOUNTER SYMPTOMS
WHEEZING: 0
NAUSEA: 0
ABDOMINAL PAIN: 0
VOMITING: 0
CHEST TIGHTNESS: 0
SHORTNESS OF BREATH: 0
DIARRHEA: 0
CONSTIPATION: 0
COUGH: 0

## 2023-01-30 ASSESSMENT — PATIENT HEALTH QUESTIONNAIRE - PHQ9
1. LITTLE INTEREST OR PLEASURE IN DOING THINGS: 0
SUM OF ALL RESPONSES TO PHQ9 QUESTIONS 1 & 2: 0
SUM OF ALL RESPONSES TO PHQ QUESTIONS 1-9: 0
2. FEELING DOWN, DEPRESSED OR HOPELESS: 0
SUM OF ALL RESPONSES TO PHQ QUESTIONS 1-9: 0

## 2023-01-30 NOTE — ASSESSMENT & PLAN NOTE
Followed by podiatry. Needs diabetic shoes prescribed. Primary podiatrist currently out and covering podiatrist mentioned surgery for charcot foot.

## 2023-01-30 NOTE — PROGRESS NOTES
SUBJECTIVE:    Hannah Gomez  1970  46 y.o.  male      Chief Complaint   Patient presents with    Follow-up     Has some spots on hands that are peeling would like checked. HPI    Allergies   Allergen Reactions    Bactrim [Sulfamethoxazole-Trimethoprim] Swelling, Dermatitis and Other (See Comments)     Causes vasculitis    Sulfa Antibiotics Anaphylaxis       Current Outpatient Medications   Medication Sig Dispense Refill    gabapentin (NEURONTIN) 600 MG tablet Take 0.5 tablets by mouth 3 times daily for 90 days. 135 tablet 0    CLINDAMYCIN HCL PO Take by mouth      Probiotic Product (ACIDOPHILUS PROBIOTIC) CAPS capsule Take 1 capsule by mouth daily 30 capsule 4    metFORMIN (GLUCOPHAGE-XR) 500 MG extended release tablet Take 1 tablet by mouth daily (with breakfast) 90 tablet 1    triamcinolone (KENALOG) 0.025 % ointment Apply topically 2 times daily for 1-2 weeks 1 each 1    mupirocin (BACTROBAN) 2 % ointment Apply topically 3 times daily for 10 days 1 each 1    cloNIDine (CATAPRES) 0.1 MG tablet Clonidine Oral        active      Wheat Dextrin (BENEFIBER) POWD Take 4 g by mouth 3 times daily (with meals) 1 each 3    ibuprofen (ADVIL;MOTRIN) 200 MG CAPS Take 1 capsule by mouth      escitalopram (LEXAPRO) 10 MG tablet Take 1 tablet by mouth daily 90 tablet 1    ondansetron (ZOFRAN) 4 MG tablet Take 1 tablet by mouth 3 times daily as needed for Nausea or Vomiting 30 tablet 0    lisinopril (PRINIVIL;ZESTRIL) 20 MG tablet Take 1 tablet by mouth 2 times daily 180 tablet 1    atorvastatin (LIPITOR) 80 MG tablet Take 1 tablet by mouth daily 90 tablet 1    amLODIPine (NORVASC) 10 MG tablet TAKE 1 TABLET BY MOUTH EVERY DAY 90 tablet 1    blood glucose test strips (ASCENSIA AUTODISC VI;ONE TOUCH ULTRA TEST VI) strip 1 each by In Vitro route daily As needed. 100 each 3    ciprofloxacin (CIPRO) 500 MG tablet TAKE 1 TABLET BY MOUTH TWICE A DAY       No current facility-administered medications for this visit. Past Medical History:   Diagnosis Date    Blood circulation, collateral     Callus of foot     Callus of foot 08/13/2018    Charcot's joint of foot     Charcot's joint of foot in type 2 diabetes mellitus ([address] 02/06/2014    Closed nondisplaced fracture of fifth metatarsal bone of right foot with routine healing 08/28/2017    Diabetes mellitus ([address]     Diverticulitis     Hyperlipidemia     Hypertension     Ischemic ulcer of left foot, limited to breakdown of skin ([address] 07/12/2016    Leg edema, right 08/25/2017    Neuropathy     Plantar wart, left foot 08/13/2018    Ulcer of right foot with fat layer exposed ([address] 11/28/2016    Ulcer of right foot with necrosis of muscle, Hannon Grade III 08/11/2016    WD-Chronic ulcer of left foot with fat layer exposed ([address] 08/11/2016    WD-Chronic ulcer of toe of right foot, limited to breakdown of skin ([address] 07/12/2016    WD-Diabetic ulcer of left foot associated with type 2 diabetes mellitus ([address] 08/11/2016    WD-Diabetic ulcer of right lateral foot associated with type 2 diabetes mellitus, with fat layer exposed ([address]     WD-Type 2 diabetes mellitus with right diabetic foot ulcer ([address] 07/12/2016     Past Surgical History:   Procedure Laterality Date    ABDOMEN SURGERY      COLONOSCOPY  05/06/2019    divertic, polyps, clip, repeat age 48    COLONOSCOPY N/A 05/06/2019    COLONOSCOPY CONTROL HEMORRHAGE performed by Emmanuel Trinidad MD at [address] [place] [address],First Floor N/A 12/20/2022    COLONOSCOPY DIAGNOSTIC performed by Silverio Ortiz MD at [facility] 07/2012    charcot    FRACTURE SURGERY Right     foot     Social History     Socioeconomic History    Marital status:      Spouse name: None    Number of children: None    Years of education: None    Highest education level: None   Tobacco Use    Smoking status: Never    Smokeless tobacco: Current     Types: Chew   Vaping Use    Vaping Use: Never used   Substance and Sexual Activity    Alcohol use:  Yes     Comment: limited    Drug use: No     Noticed dry, peeling skin to bilateral hands, fingers for the last two weeks. Pulled some skin off. No blisters or itching. Has tried O'Alycia's with minimal relief.     Charcot's joint of foot in type 2 diabetes mellitus (HCC)  Followed by podiatry. Needs diabetic shoes prescribed. Primary podiatrist currently out and covering podiatrist mentioned surgery for charcot foot.     DM (diabetes mellitus) ([facility])  Not regularly checking blood sugar. Once a week will and ranges 140-150. Activity level is down because of current ulcers. Currently off work. States at the beginning of December he was being seen by podiatry. Had a callus on lateral aspect of right foot that was removed due to blister underneath. Now an ulcer and being managed weekly. He also has a ulcer on ball of left foot. Currently supposed to be non-weightbearing.     Essential hypertension  Stable. Patient denies any exertional chest pain, dyspnea, palpitations, syncope, orthopnea, edema or paroxysmal nocturnal dyspnea.       Smokeless tobacco use  Not ready to quit smokeless tobacco use.         Review of Systems   Constitutional:  Negative for appetite change, chills, fatigue and unexpected weight change.   Respiratory:  Negative for cough, chest tightness, shortness of breath and wheezing.    Cardiovascular:  Negative for chest pain, palpitations and leg swelling.   Gastrointestinal:  Negative for abdominal pain, constipation, diarrhea, nausea and vomiting.   Musculoskeletal:  Negative for arthralgias.   Skin:  Positive for wound.   Neurological:  Positive for numbness. Negative for weakness and headaches.   Hematological:  Negative for adenopathy.     OBJECTIVE:    /72 (Site: Left Upper Arm, Position: Sitting, Cuff Size: Large Adult)   Pulse 80   Temp 97 °F (36.1 °C) (Infrared)   Resp 18   Wt 229 lb 12.8 oz (104.2 kg)   SpO2 98%   BMI 31.17 kg/m²     Physical Exam  Constitutional:       Appearance:  Normal appearance. He is well-developed. HENT:      Head: Normocephalic and atraumatic. Right Ear: Hearing normal.      Left Ear: Hearing normal.      Nose: Nose normal.   Neck:      Vascular: No carotid bruit or JVD. Cardiovascular:      Rate and Rhythm: Normal rate and regular rhythm. Heart sounds: Normal heart sounds. No murmur heard. No friction rub. No gallop. Pulmonary:      Effort: Pulmonary effort is normal. No respiratory distress. Breath sounds: Normal breath sounds. No wheezing, rhonchi or rales. Abdominal:      Palpations: Abdomen is soft. Musculoskeletal:         General: Normal range of motion. Cervical back: Normal range of motion and neck supple. Right foot: Charcot foot present. Feet:      Right foot:      Skin integrity: Ulcer (approximate 2-3 cm ulcer), callus and dry skin present. Left foot:      Skin integrity: Ulcer (approximate 8 mm ulcer) and dry skin present. Skin:     General: Skin is warm and dry. Comments: Bilateral hands with some areas of thickened skin, peeling. Redness without warmth, drainage   Neurological:      Mental Status: He is alert and oriented to person, place, and time. Psychiatric:         Behavior: Behavior normal.       ASSESSMENT/PLAN:    1. 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 Monitor glucose. - Microalbumin / creatinine urine ratio; Future  - POCT glycosylated hemoglobin (Hb A1C)  - metFORMIN (GLUCOPHAGE-XR) 500 MG extended release tablet; Take 1 tablet by mouth daily (with breakfast)  Dispense: 90 tablet; Refill: 1    2. Flu vaccine need  - Influenza, FLUCELVAX, (age 10 mo+), IM, Preservative Free, 0.5 mL    3. Neuropathy  refilled  - gabapentin (NEURONTIN) 600 MG tablet; Take 0.5 tablets by mouth 3 times daily for 90 days. Dispense: 135 tablet; Refill: 0    4.  Charcot's joint of foot in type 2 diabetes mellitus ([facility]  Follow up with podiatry    5. Essential hypertension  Continue norvasc, lisinopril    6. Smokeless tobacco use  Counseled on cessation    7. Rash and nonspecific skin eruption  Kenalog ointment as prescribed. Bactroban in case underlying bacterial infection. Follow up with dermatology if not improving  - triamcinolone (KENALOG) 0.025 % ointment; Apply topically 2 times daily for 1-2 weeks  Dispense: 1 each; Refill: 1  - mupirocin (BACTROBAN) 2 % ointment; Apply topically 3 times daily for 10 days  Dispense: 1 each; Refill: 1    8. Ulcer of both feet, unspecified ulcer stage ([facility]  Follow up with podiatry as scheduled. Periodic Controlled Substance Monitoring: No signs of potential drug abuse or diversion identified. Sun Sanders, APRN - CNP)      Return in about 3 months (around 4/30/2023) for diabetes.       (Please note that portions of this note may have been completed with a voice recognition program. Efforts were made to edit the dictations but occasionally words aremis-transcribed.)

## 2023-01-30 NOTE — PROGRESS NOTES
Vaccine Information Sheet, \"Influenza - Inactivated\"  given to Ted Kidd, or parent/legal guardian of  Ted Kidd and verbalized understanding. Patient responses:    Have you ever had a reaction to a flu vaccine? No  Do you have any current illness? No  Have you ever had Guillian [place] Syndrome? No  Do you have a serious allergy to any of the follow: Neomycin, Polymyxin, Thimerosal, eggs or egg products? No    Flu vaccine given per order. Please see immunization tab. Risks and benefits explained. Current VIS given.

## 2023-01-30 NOTE — ASSESSMENT & PLAN NOTE
Not regularly checking blood sugar. Once a week will and ranges 140-150. Activity level is down because of current ulcers. Currently off work. States at the beginning of December he was being seen by podiatry. Had a callus on lateral aspect of right foot that was removed due to blister underneath. Now an ulcer and being managed weekly. He also has a ulcer on ball of left foot. Currently supposed to be non-weightbearing.

## 2023-02-03 ENCOUNTER — HOSPITAL ENCOUNTER (OUTPATIENT)
Dept: MRI IMAGING | Age: 53
Discharge: HOME | End: 2023-02-03
Payer: COMMERCIAL

## 2023-02-03 DIAGNOSIS — E11.69 DIABETIC FOOT ULCER WITH OSTEOMYELITIS (HCC): ICD-10-CM

## 2023-02-03 DIAGNOSIS — L97.412 ULCER OF RIGHT HEEL, WITH FAT LAYER EXPOSED (HCC): ICD-10-CM

## 2023-02-03 DIAGNOSIS — M86.9 DIABETIC FOOT ULCER WITH OSTEOMYELITIS (HCC): ICD-10-CM

## 2023-02-03 DIAGNOSIS — L97.509 DIABETIC FOOT ULCER WITH OSTEOMYELITIS (HCC): ICD-10-CM

## 2023-02-03 DIAGNOSIS — E11.610 DIABETIC NEUROGENIC ARTHROPATHY (HCC): ICD-10-CM

## 2023-02-03 DIAGNOSIS — L97.522 ULCER OF LEFT FOOT, WITH FAT LAYER EXPOSED (HCC): ICD-10-CM

## 2023-02-03 DIAGNOSIS — E11.621 DIABETIC FOOT ULCER WITH OSTEOMYELITIS (HCC): ICD-10-CM

## 2023-02-03 PROCEDURE — 73718 MRI LOWER EXTREMITY W/O DYE: CPT

## 2023-02-08 ENCOUNTER — HOSPITAL ENCOUNTER (OUTPATIENT)
Age: 53
Discharge: HOME | End: 2023-02-08
Payer: COMMERCIAL

## 2023-02-08 LAB
ANION GAP SERPL CALCULATED.3IONS-SCNC: 9 MMOL/L (ref 4–16)
BASOPHILS ABSOLUTE: 0.1 K/CU MM
BASOPHILS RELATIVE PERCENT: 0.6 % (ref 0–1)
BUN SERPL-MCNC: 10 MG/DL (ref 6–23)
CALCIUM SERPL-MCNC: 9.8 MG/DL (ref 8.3–10.6)
CHLORIDE BLD-SCNC: 98 MMOL/L (ref 99–110)
CO2: 28 MMOL/L (ref 21–32)
CREAT SERPL-MCNC: 0.7 MG/DL (ref 0.9–1.3)
DIFFERENTIAL TYPE: ABNORMAL
EOSINOPHILS ABSOLUTE: 0.3 K/CU MM
EOSINOPHILS RELATIVE PERCENT: 3.7 % (ref 0–3)
ERYTHROCYTE SEDIMENTATION RATE: 9 MM/HR (ref 0–20)
GFR SERPL CREATININE-BSD FRML MDRD: >60 ML/MIN/1.73M2
GLUCOSE P FAST SERPL-MCNC: 135 MG/DL (ref 70–99)
HCT VFR BLD CALC: 48 % (ref 42–52)
HEMOGLOBIN: 15.9 GM/DL (ref 13.5–18)
IMMATURE NEUTROPHIL %: 0.1 % (ref 0–0.43)
LYMPHOCYTES ABSOLUTE: 2.6 K/CU MM
LYMPHOCYTES RELATIVE PERCENT: 33 % (ref 24–44)
MCH RBC QN AUTO: 29 PG (ref 27–31)
MCHC RBC AUTO-ENTMCNC: 33.1 % (ref 32–36)
MCV RBC AUTO: 87.6 FL (ref 78–100)
MONOCYTES ABSOLUTE: 0.4 K/CU MM
MONOCYTES RELATIVE PERCENT: 5.5 % (ref 0–4)
PDW BLD-RTO: 13 % (ref 11.7–14.9)
PLATELET # BLD: 225 K/CU MM (ref 140–440)
PMV BLD AUTO: 9.7 FL (ref 7.5–11.1)
POTASSIUM SERPL-SCNC: 4.4 MMOL/L (ref 3.5–5.1)
RBC # BLD: 5.48 M/CU MM (ref 4.6–6.2)
SEGMENTED NEUTROPHILS ABSOLUTE COUNT: 4.5 K/CU MM
SEGMENTED NEUTROPHILS RELATIVE PERCENT: 57.1 % (ref 36–66)
SODIUM BLD-SCNC: 135 MMOL/L (ref 135–145)
TOTAL IMMATURE NEUTOROPHIL: 0.01 K/CU MM
WBC # BLD: 7.9 K/CU MM (ref 4–10.5)

## 2023-02-08 PROCEDURE — 85025 COMPLETE CBC W/AUTO DIFF WBC: CPT

## 2023-02-08 PROCEDURE — 36415 COLL VENOUS BLD VENIPUNCTURE: CPT

## 2023-02-08 PROCEDURE — 80048 BASIC METABOLIC PNL TOTAL CA: CPT

## 2023-02-08 PROCEDURE — 85652 RBC SED RATE AUTOMATED: CPT

## 2023-02-24 ENCOUNTER — HOSPITAL ENCOUNTER (OUTPATIENT)
Age: 53
Discharge: HOME | End: 2023-02-24
Payer: COMMERCIAL

## 2023-02-24 LAB
ANION GAP SERPL CALCULATED.3IONS-SCNC: 9 MMOL/L (ref 4–16)
BASOPHILS ABSOLUTE: 0.1 K/CU MM
BASOPHILS RELATIVE PERCENT: 0.6 % (ref 0–1)
BUN SERPL-MCNC: 9 MG/DL (ref 6–23)
CALCIUM SERPL-MCNC: 9.6 MG/DL (ref 8.3–10.6)
CHLORIDE BLD-SCNC: 94 MMOL/L (ref 99–110)
CO2: 27 MMOL/L (ref 21–32)
CREAT SERPL-MCNC: 0.6 MG/DL (ref 0.9–1.3)
DIFFERENTIAL TYPE: ABNORMAL
EOSINOPHILS ABSOLUTE: 0.3 K/CU MM
EOSINOPHILS RELATIVE PERCENT: 3.4 % (ref 0–3)
ERYTHROCYTE SEDIMENTATION RATE: 21 MM/HR (ref 0–20)
GFR SERPL CREATININE-BSD FRML MDRD: >60 ML/MIN/1.73M2
GLUCOSE SERPL-MCNC: 147 MG/DL (ref 70–99)
HCT VFR BLD CALC: 45.7 % (ref 42–52)
HEMOGLOBIN: 15.3 GM/DL (ref 13.5–18)
IMMATURE NEUTROPHIL %: 0.1 % (ref 0–0.43)
LYMPHOCYTES ABSOLUTE: 2.6 K/CU MM
LYMPHOCYTES RELATIVE PERCENT: 28.3 % (ref 24–44)
MCH RBC QN AUTO: 28.3 PG (ref 27–31)
MCHC RBC AUTO-ENTMCNC: 33.5 % (ref 32–36)
MCV RBC AUTO: 84.6 FL (ref 78–100)
MONOCYTES ABSOLUTE: 0.5 K/CU MM
MONOCYTES RELATIVE PERCENT: 5.7 % (ref 0–4)
PDW BLD-RTO: 13.1 % (ref 11.7–14.9)
PLATELET # BLD: 224 K/CU MM (ref 140–440)
PMV BLD AUTO: 9.8 FL (ref 7.5–11.1)
POTASSIUM SERPL-SCNC: 3.9 MMOL/L (ref 3.5–5.1)
RBC # BLD: 5.4 M/CU MM (ref 4.6–6.2)
SEGMENTED NEUTROPHILS ABSOLUTE COUNT: 5.6 K/CU MM
SEGMENTED NEUTROPHILS RELATIVE PERCENT: 61.9 % (ref 36–66)
SODIUM BLD-SCNC: 130 MMOL/L (ref 135–145)
TOTAL IMMATURE NEUTOROPHIL: 0.01 K/CU MM
WBC # BLD: 9 K/CU MM (ref 4–10.5)

## 2023-02-24 PROCEDURE — 85025 COMPLETE CBC W/AUTO DIFF WBC: CPT

## 2023-02-24 PROCEDURE — 80048 BASIC METABOLIC PNL TOTAL CA: CPT

## 2023-02-24 PROCEDURE — 36415 COLL VENOUS BLD VENIPUNCTURE: CPT

## 2023-02-24 PROCEDURE — 85652 RBC SED RATE AUTOMATED: CPT

## 2023-04-20 DIAGNOSIS — E08.29 DIABETES MELLITUS DUE TO UNDERLYING CONDITION WITH OTHER KIDNEY COMPLICATION, UNSPECIFIED WHETHER LONG TERM INSULIN USE (HCC): ICD-10-CM

## 2023-04-21 RX ORDER — ESCITALOPRAM OXALATE 10 MG/1
TABLET ORAL
Qty: 90 TABLET | Refills: 1 | Status: SHIPPED | OUTPATIENT
Start: 2023-04-21

## 2023-04-21 RX ORDER — ATORVASTATIN CALCIUM 80 MG/1
TABLET, FILM COATED ORAL
Qty: 90 TABLET | Refills: 1 | Status: SHIPPED | OUTPATIENT
Start: 2023-04-21

## 2023-05-03 DIAGNOSIS — G62.9 NEUROPATHY: ICD-10-CM

## 2023-05-03 RX ORDER — GABAPENTIN 600 MG/1
300 TABLET ORAL 3 TIMES DAILY
Qty: 135 TABLET | Refills: 0 | OUTPATIENT
Start: 2023-05-03 | End: 2023-08-01

## 2023-06-02 DIAGNOSIS — G62.9 NEUROPATHY: ICD-10-CM

## 2023-06-05 RX ORDER — GABAPENTIN 600 MG/1
300 TABLET ORAL 3 TIMES DAILY
Qty: 135 TABLET | Refills: 0 | OUTPATIENT
Start: 2023-06-05 | End: 2023-09-03

## 2023-06-24 ENCOUNTER — HOSPITAL ENCOUNTER (INPATIENT)
Age: 53
LOS: 4 days | Discharge: HOME | DRG: 066 | End: 2023-06-28
Attending: STUDENT IN AN ORGANIZED HEALTH CARE EDUCATION/TRAINING PROGRAM | Admitting: STUDENT IN AN ORGANIZED HEALTH CARE EDUCATION/TRAINING PROGRAM
Payer: COMMERCIAL

## 2023-06-24 ENCOUNTER — APPOINTMENT (OUTPATIENT)
Dept: GENERAL RADIOLOGY | Age: 53
End: 2023-06-24
Attending: EMERGENCY MEDICINE
Payer: COMMERCIAL

## 2023-06-24 ENCOUNTER — APPOINTMENT (OUTPATIENT)
Dept: CT IMAGING | Age: 53
End: 2023-06-24
Payer: COMMERCIAL

## 2023-06-24 ENCOUNTER — HOSPITAL ENCOUNTER (EMERGENCY)
Age: 53
Discharge: ANOTHER HOSPITAL | End: 2023-06-24
Attending: EMERGENCY MEDICINE
Payer: COMMERCIAL

## 2023-06-24 VITALS
TEMPERATURE: 98.2 F | BODY MASS INDEX: 32.51 KG/M2 | DIASTOLIC BLOOD PRESSURE: 93 MMHG | WEIGHT: 240 LBS | OXYGEN SATURATION: 97 % | RESPIRATION RATE: 15 BRPM | HEART RATE: 69 BPM | SYSTOLIC BLOOD PRESSURE: 162 MMHG | HEIGHT: 72 IN

## 2023-06-24 DIAGNOSIS — I63.9 CEREBROVASCULAR ACCIDENT (CVA), UNSPECIFIED MECHANISM (HCC): Primary | ICD-10-CM

## 2023-06-24 LAB
ALBUMIN SERPL-MCNC: 3.9 GM/DL (ref 3.4–5)
ALP BLD-CCNC: 60 IU/L (ref 40–129)
ALT SERPL-CCNC: 23 U/L (ref 10–40)
ANION GAP SERPL CALCULATED.3IONS-SCNC: 9 MMOL/L (ref 4–16)
APTT: 26.7 SECONDS (ref 25.1–37.1)
AST SERPL-CCNC: 27 IU/L (ref 15–37)
BASOPHILS ABSOLUTE: 0.1 K/CU MM
BASOPHILS RELATIVE PERCENT: 0.6 % (ref 0–1)
BILIRUB SERPL-MCNC: 0.7 MG/DL (ref 0–1)
BUN SERPL-MCNC: 8 MG/DL (ref 6–23)
CALCIUM SERPL-MCNC: 9.9 MG/DL (ref 8.3–10.6)
CHLORIDE BLD-SCNC: 98 MMOL/L (ref 99–110)
CO2: 29 MMOL/L (ref 21–32)
CREAT SERPL-MCNC: 0.6 MG/DL (ref 0.9–1.3)
DIFFERENTIAL TYPE: ABNORMAL
EKG ATRIAL RATE: 71 BPM
EKG DIAGNOSIS: NORMAL
EKG P AXIS: 21 DEGREES
EKG P-R INTERVAL: 220 MS
EKG Q-T INTERVAL: 408 MS
EKG QRS DURATION: 98 MS
EKG QTC CALCULATION (BAZETT): 443 MS
EKG R AXIS: -21 DEGREES
EKG T AXIS: 14 DEGREES
EKG VENTRICULAR RATE: 71 BPM
EOSINOPHILS ABSOLUTE: 0.3 K/CU MM
EOSINOPHILS RELATIVE PERCENT: 3.4 % (ref 0–3)
GFR SERPL CREATININE-BSD FRML MDRD: >60 ML/MIN/1.73M2
GLUCOSE BLD-MCNC: 111 MG/DL (ref 70–99)
GLUCOSE BLD-MCNC: 126 MG/DL (ref 70–99)
GLUCOSE SERPL-MCNC: 135 MG/DL (ref 70–99)
HCT VFR BLD CALC: 52.2 % (ref 42–52)
HEMOGLOBIN: 18 GM/DL (ref 13.5–18)
IMMATURE NEUTROPHIL %: 0.2 % (ref 0–0.43)
INR BLD: 1 INDEX
LYMPHOCYTES ABSOLUTE: 2.6 K/CU MM
LYMPHOCYTES RELATIVE PERCENT: 31.9 % (ref 24–44)
MCH RBC QN AUTO: 29.2 PG (ref 27–31)
MCHC RBC AUTO-ENTMCNC: 34.5 % (ref 32–36)
MCV RBC AUTO: 84.6 FL (ref 78–100)
MONOCYTES ABSOLUTE: 0.5 K/CU MM
MONOCYTES RELATIVE PERCENT: 5.6 % (ref 0–4)
PDW BLD-RTO: 14.1 % (ref 11.7–14.9)
PLATELET # BLD: 198 K/CU MM (ref 140–440)
PMV BLD AUTO: 9.8 FL (ref 7.5–11.1)
POTASSIUM SERPL-SCNC: 3.5 MMOL/L (ref 3.5–5.1)
PROTHROMBIN TIME: 13.2 SECONDS (ref 11.7–14.5)
RBC # BLD: 6.17 M/CU MM (ref 4.6–6.2)
SEGMENTED NEUTROPHILS ABSOLUTE COUNT: 4.8 K/CU MM
SEGMENTED NEUTROPHILS RELATIVE PERCENT: 58.3 % (ref 36–66)
SODIUM BLD-SCNC: 136 MMOL/L (ref 135–145)
TOTAL IMMATURE NEUTOROPHIL: 0.02 K/CU MM
TOTAL PROTEIN: 7.9 GM/DL (ref 6.4–8.2)
TROPONIN T: <0.01 NG/ML
WBC # BLD: 8.2 K/CU MM (ref 4–10.5)

## 2023-06-24 PROCEDURE — 70498 CT ANGIOGRAPHY NECK: CPT

## 2023-06-24 PROCEDURE — 6370000000 HC RX 637 (ALT 250 FOR IP): Performed by: EMERGENCY MEDICINE

## 2023-06-24 PROCEDURE — 85025 COMPLETE CBC W/AUTO DIFF WBC: CPT

## 2023-06-24 PROCEDURE — 99285 EMERGENCY DEPT VISIT HI MDM: CPT

## 2023-06-24 PROCEDURE — 6360000004 HC RX CONTRAST MEDICATION: Performed by: EMERGENCY MEDICINE

## 2023-06-24 PROCEDURE — 82962 GLUCOSE BLOOD TEST: CPT

## 2023-06-24 PROCEDURE — 71045 X-RAY EXAM CHEST 1 VIEW: CPT

## 2023-06-24 PROCEDURE — 85730 THROMBOPLASTIN TIME PARTIAL: CPT

## 2023-06-24 PROCEDURE — 84484 ASSAY OF TROPONIN QUANT: CPT

## 2023-06-24 PROCEDURE — 85610 PROTHROMBIN TIME: CPT

## 2023-06-24 PROCEDURE — 70450 CT HEAD/BRAIN W/O DYE: CPT

## 2023-06-24 PROCEDURE — 2580000003 HC RX 258: Performed by: STUDENT IN AN ORGANIZED HEALTH CARE EDUCATION/TRAINING PROGRAM

## 2023-06-24 PROCEDURE — 80053 COMPREHEN METABOLIC PANEL: CPT

## 2023-06-24 PROCEDURE — 1200000000 HC SEMI PRIVATE

## 2023-06-24 PROCEDURE — 6370000000 HC RX 637 (ALT 250 FOR IP): Performed by: STUDENT IN AN ORGANIZED HEALTH CARE EDUCATION/TRAINING PROGRAM

## 2023-06-24 PROCEDURE — 93005 ELECTROCARDIOGRAM TRACING: CPT | Performed by: EMERGENCY MEDICINE

## 2023-06-24 PROCEDURE — 2500000003 HC RX 250 WO HCPCS: Performed by: EMERGENCY MEDICINE

## 2023-06-24 RX ORDER — ATORVASTATIN CALCIUM 40 MG/1
40 TABLET, FILM COATED ORAL NIGHTLY
Status: DISCONTINUED | OUTPATIENT
Start: 2023-06-24 | End: 2023-06-24

## 2023-06-24 RX ORDER — POTASSIUM CHLORIDE 7.45 MG/ML
10 INJECTION INTRAVENOUS PRN
Status: DISCONTINUED | OUTPATIENT
Start: 2023-06-24 | End: 2023-06-27

## 2023-06-24 RX ORDER — GUSELKUMAB 100 MG/ML
INJECTION SUBCUTANEOUS
COMMUNITY
Start: 2023-06-19

## 2023-06-24 RX ORDER — ONDANSETRON 4 MG/1
4 TABLET, ORALLY DISINTEGRATING ORAL EVERY 8 HOURS PRN
Status: DISCONTINUED | OUTPATIENT
Start: 2023-06-24 | End: 2023-06-28 | Stop reason: HOSPADM

## 2023-06-24 RX ORDER — ACETAMINOPHEN 650 MG/1
650 SUPPOSITORY RECTAL EVERY 6 HOURS PRN
Status: DISCONTINUED | OUTPATIENT
Start: 2023-06-24 | End: 2023-06-28 | Stop reason: HOSPADM

## 2023-06-24 RX ORDER — LABETALOL HYDROCHLORIDE 5 MG/ML
10 INJECTION, SOLUTION INTRAVENOUS ONCE
Status: DISCONTINUED | OUTPATIENT
Start: 2023-06-24 | End: 2023-06-24 | Stop reason: HOSPADM

## 2023-06-24 RX ORDER — GLUCAGON 1 MG/ML
1 KIT INJECTION PRN
Status: DISCONTINUED | OUTPATIENT
Start: 2023-06-24 | End: 2023-06-28 | Stop reason: HOSPADM

## 2023-06-24 RX ORDER — ATORVASTATIN CALCIUM 40 MG/1
80 TABLET, FILM COATED ORAL NIGHTLY
Status: DISCONTINUED | OUTPATIENT
Start: 2023-06-25 | End: 2023-06-28 | Stop reason: HOSPADM

## 2023-06-24 RX ORDER — HYDRALAZINE HYDROCHLORIDE 20 MG/ML
5 INJECTION INTRAMUSCULAR; INTRAVENOUS ONCE
Status: DISCONTINUED | OUTPATIENT
Start: 2023-06-24 | End: 2023-06-25

## 2023-06-24 RX ORDER — MAGNESIUM SULFATE IN WATER 40 MG/ML
2000 INJECTION, SOLUTION INTRAVENOUS PRN
Status: DISCONTINUED | OUTPATIENT
Start: 2023-06-24 | End: 2023-06-27

## 2023-06-24 RX ORDER — INSULIN LISPRO 100 [IU]/ML
0-4 INJECTION, SOLUTION INTRAVENOUS; SUBCUTANEOUS NIGHTLY
Status: DISCONTINUED | OUTPATIENT
Start: 2023-06-24 | End: 2023-06-28 | Stop reason: HOSPADM

## 2023-06-24 RX ORDER — LISINOPRIL 20 MG/1
20 TABLET ORAL 2 TIMES DAILY
Status: DISCONTINUED | OUTPATIENT
Start: 2023-06-25 | End: 2023-06-28 | Stop reason: HOSPADM

## 2023-06-24 RX ORDER — SODIUM CHLORIDE 0.9 % (FLUSH) 0.9 %
5-40 SYRINGE (ML) INJECTION EVERY 12 HOURS SCHEDULED
Status: DISCONTINUED | OUTPATIENT
Start: 2023-06-24 | End: 2023-06-28 | Stop reason: HOSPADM

## 2023-06-24 RX ORDER — ASPIRIN 300 MG/1
300 SUPPOSITORY RECTAL DAILY
Status: DISCONTINUED | OUTPATIENT
Start: 2023-06-25 | End: 2023-06-28 | Stop reason: HOSPADM

## 2023-06-24 RX ORDER — ENOXAPARIN SODIUM 100 MG/ML
40 INJECTION SUBCUTANEOUS EVERY EVENING
Status: DISCONTINUED | OUTPATIENT
Start: 2023-06-25 | End: 2023-06-26

## 2023-06-24 RX ORDER — ESCITALOPRAM OXALATE 10 MG/1
10 TABLET ORAL DAILY
Status: DISCONTINUED | OUTPATIENT
Start: 2023-06-24 | End: 2023-06-28 | Stop reason: HOSPADM

## 2023-06-24 RX ORDER — SODIUM CHLORIDE 0.9 % (FLUSH) 0.9 %
5-40 SYRINGE (ML) INJECTION PRN
Status: DISCONTINUED | OUTPATIENT
Start: 2023-06-24 | End: 2023-06-28 | Stop reason: HOSPADM

## 2023-06-24 RX ORDER — AMLODIPINE BESYLATE 10 MG/1
10 TABLET ORAL DAILY
Status: DISCONTINUED | OUTPATIENT
Start: 2023-06-25 | End: 2023-06-28 | Stop reason: HOSPADM

## 2023-06-24 RX ORDER — ACETAMINOPHEN 325 MG/1
650 TABLET ORAL EVERY 6 HOURS PRN
Status: DISCONTINUED | OUTPATIENT
Start: 2023-06-24 | End: 2023-06-28 | Stop reason: HOSPADM

## 2023-06-24 RX ORDER — ONDANSETRON 2 MG/ML
4 INJECTION INTRAMUSCULAR; INTRAVENOUS EVERY 6 HOURS PRN
Status: DISCONTINUED | OUTPATIENT
Start: 2023-06-24 | End: 2023-06-28 | Stop reason: HOSPADM

## 2023-06-24 RX ORDER — ASPIRIN 81 MG/1
81 TABLET, CHEWABLE ORAL DAILY
Status: DISCONTINUED | OUTPATIENT
Start: 2023-06-25 | End: 2023-06-28 | Stop reason: HOSPADM

## 2023-06-24 RX ORDER — POLYETHYLENE GLYCOL 3350 17 G/17G
17 POWDER, FOR SOLUTION ORAL DAILY PRN
Status: DISCONTINUED | OUTPATIENT
Start: 2023-06-24 | End: 2023-06-28 | Stop reason: HOSPADM

## 2023-06-24 RX ORDER — DEXTROSE MONOHYDRATE 100 MG/ML
INJECTION, SOLUTION INTRAVENOUS CONTINUOUS PRN
Status: DISCONTINUED | OUTPATIENT
Start: 2023-06-24 | End: 2023-06-28 | Stop reason: HOSPADM

## 2023-06-24 RX ORDER — CLOPIDOGREL BISULFATE 75 MG/1
300 TABLET ORAL ONCE
Status: COMPLETED | OUTPATIENT
Start: 2023-06-24 | End: 2023-06-24

## 2023-06-24 RX ORDER — INSULIN LISPRO 100 [IU]/ML
0-4 INJECTION, SOLUTION INTRAVENOUS; SUBCUTANEOUS
Status: DISCONTINUED | OUTPATIENT
Start: 2023-06-25 | End: 2023-06-28 | Stop reason: HOSPADM

## 2023-06-24 RX ORDER — ASPIRIN 81 MG/1
81 TABLET, CHEWABLE ORAL ONCE
Status: COMPLETED | OUTPATIENT
Start: 2023-06-24 | End: 2023-06-24

## 2023-06-24 RX ORDER — SODIUM CHLORIDE 9 MG/ML
INJECTION, SOLUTION INTRAVENOUS PRN
Status: DISCONTINUED | OUTPATIENT
Start: 2023-06-24 | End: 2023-06-28 | Stop reason: HOSPADM

## 2023-06-24 RX ADMIN — CLOPIDOGREL BISULFATE 300 MG: 75 TABLET ORAL at 16:48

## 2023-06-24 RX ADMIN — SODIUM CHLORIDE, PRESERVATIVE FREE 10 ML: 5 INJECTION INTRAVENOUS at 22:12

## 2023-06-24 RX ADMIN — ASPIRIN 81 MG CHEWABLE TABLET 81 MG: 81 TABLET CHEWABLE at 16:48

## 2023-06-24 RX ADMIN — IOPAMIDOL 75 ML: 755 INJECTION, SOLUTION INTRAVENOUS at 15:49

## 2023-06-24 RX ADMIN — ESCITALOPRAM OXALATE 10 MG: 10 TABLET ORAL at 22:12

## 2023-06-24 RX ADMIN — CLOPIDOGREL BISULFATE 300 MG: 75 TABLET ORAL at 17:43

## 2023-06-24 RX ADMIN — ATORVASTATIN CALCIUM 40 MG: 40 TABLET, FILM COATED ORAL at 22:12

## 2023-06-24 ASSESSMENT — PAIN - FUNCTIONAL ASSESSMENT: PAIN_FUNCTIONAL_ASSESSMENT: NONE - DENIES PAIN

## 2023-06-24 ASSESSMENT — LIFESTYLE VARIABLES
HOW MANY STANDARD DRINKS CONTAINING ALCOHOL DO YOU HAVE ON A TYPICAL DAY: PATIENT DOES NOT DRINK
HOW OFTEN DO YOU HAVE A DRINK CONTAINING ALCOHOL: NEVER

## 2023-06-24 NOTE — ED NOTES
Attempted to give report without success.      Inocencio Damian RN  06/24/23 [address] [facility], RN  06/24/23 [address] [facility], RN  06/24/23 [id number]

## 2023-06-24 NOTE — ED TRIAGE NOTES
Started with numbness and tingling to Rt arm yesterday about 1500. This morning Rt arm and leg are weak and having trouble with coordination. Has not been taking meds for atleast a month except for Tremfya.

## 2023-06-24 NOTE — ED PROVIDER NOTES
BPM    Atrial Rate 71 BPM    P-R Interval 220 ms    QRS Duration 98 ms    Q-T Interval 408 ms    QTc Calculation (Bazett) 443 ms    P Axis 21 degrees    R Axis -21 degrees    T Axis 14 degrees    Diagnosis       Sinus rhythm with 1st degree AV block  Septal infarct (cited on or before 17-OCT-2016)  Inferior infarct (cited on or before 17-OCT-2016)  Abnormal ECG  When compared with ECG of 10-OCT-2022 09:18,  Questionable change in initial forces of Inferior leads         EKG (if obtained): (All EKG's are interpreted by myself in the absence of a cardiologist)  The Ekg interpreted by me in the absence of a cardiologist shows. normal sinus rhythm with a rate of 71, first-degree AV block  Axis is   Normal  QTc is  normal  Intervals and Durations are unremarkable. No specific ST-T wave changes appreciated. No evidence of acute ischemia. No significant change from prior EKG dated 10 October 2022    Radiographs (if obtained):  [] The following radiograph was interpreted by myself in the absence of a radiologist:  [x] Radiologist's Report reviewed at time of ED visit:  XR CHEST PORTABLE   Preliminary Result   No evidence of edema or pneumonia. Mild cardiac silhouette enlargement. 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 Asymmetrically   decreased contrast opacification in the left cervical vertebral artery,   likely related to distal occlusion. Moderate stenosis in the P2 segments of the bilateral PCAs. Severe stenosis in the distal M1 segment of the left MCA. Mild to moderate stenosis at the origin of M2 segment of the right MCA. CT HEAD WO CONTRAST   Preliminary Result   No acute intracranial abnormality. Suspected old basal ganglia lacunar infarcts. The impression was relayed to Dr. Kev Serrato at 4:17 p.m. on 06/24/2023.              ED Course and MDM:  Patient stroke scale was performed right after I saw him upon his

## 2023-06-25 ENCOUNTER — APPOINTMENT (OUTPATIENT)
Dept: MRI IMAGING | Age: 53
DRG: 066 | End: 2023-06-25
Attending: STUDENT IN AN ORGANIZED HEALTH CARE EDUCATION/TRAINING PROGRAM
Payer: COMMERCIAL

## 2023-06-25 LAB
ANION GAP SERPL CALCULATED.3IONS-SCNC: 16 MMOL/L (ref 4–16)
BASOPHILS ABSOLUTE: 0 K/CU MM
BASOPHILS RELATIVE PERCENT: 0.4 % (ref 0–1)
BUN SERPL-MCNC: 9 MG/DL (ref 6–23)
CALCIUM SERPL-MCNC: 9 MG/DL (ref 8.3–10.6)
CHLORIDE BLD-SCNC: 95 MMOL/L (ref 99–110)
CHOLEST SERPL-MCNC: 264 MG/DL
CO2: 22 MMOL/L (ref 21–32)
CREAT SERPL-MCNC: 0.7 MG/DL (ref 0.9–1.3)
DIFFERENTIAL TYPE: ABNORMAL
EOSINOPHILS ABSOLUTE: 0.1 K/CU MM
EOSINOPHILS RELATIVE PERCENT: 1.6 % (ref 0–3)
ESTIMATED AVERAGE GLUCOSE: 157 MG/DL
FOLATE SERPL-MCNC: 4.9 NG/ML (ref 3.1–17.5)
GFR SERPL CREATININE-BSD FRML MDRD: >60 ML/MIN/1.73M2
GLUCOSE BLD-MCNC: 130 MG/DL (ref 70–99)
GLUCOSE BLD-MCNC: 154 MG/DL (ref 70–99)
GLUCOSE BLD-MCNC: 164 MG/DL (ref 70–99)
GLUCOSE BLD-MCNC: 167 MG/DL (ref 70–99)
GLUCOSE BLD-MCNC: 184 MG/DL (ref 70–99)
GLUCOSE SERPL-MCNC: 175 MG/DL (ref 70–99)
HBA1C MFR BLD: 7.1 % (ref 4.2–6.3)
HCT VFR BLD CALC: 50.8 % (ref 42–52)
HDLC SERPL-MCNC: 36 MG/DL
HEMOGLOBIN: 17.4 GM/DL (ref 13.5–18)
IMMATURE NEUTROPHIL %: 0.2 % (ref 0–0.43)
LDLC SERPL CALC-MCNC: 200 MG/DL
LYMPHOCYTES ABSOLUTE: 2.3 K/CU MM
LYMPHOCYTES RELATIVE PERCENT: 28.9 % (ref 24–44)
MCH RBC QN AUTO: 28.7 PG (ref 27–31)
MCHC RBC AUTO-ENTMCNC: 34.3 % (ref 32–36)
MCV RBC AUTO: 83.8 FL (ref 78–100)
MONOCYTES ABSOLUTE: 0.5 K/CU MM
MONOCYTES RELATIVE PERCENT: 5.6 % (ref 0–4)
NUCLEATED RBC %: 0 %
PDW BLD-RTO: 13.8 % (ref 11.7–14.9)
PLATELET # BLD: 204 K/CU MM (ref 140–440)
PMV BLD AUTO: 10 FL (ref 7.5–11.1)
POTASSIUM SERPL-SCNC: 4 MMOL/L (ref 3.5–5.1)
RBC # BLD: 6.06 M/CU MM (ref 4.6–6.2)
SEGMENTED NEUTROPHILS ABSOLUTE COUNT: 5.1 K/CU MM
SEGMENTED NEUTROPHILS RELATIVE PERCENT: 63.3 % (ref 36–66)
SODIUM BLD-SCNC: 133 MMOL/L (ref 135–145)
TOTAL IMMATURE NEUTOROPHIL: 0.02 K/CU MM
TOTAL NUCLEATED RBC: 0 K/CU MM
TRIGL SERPL-MCNC: 139 MG/DL
TSH SERPL DL<=0.005 MIU/L-ACNC: 1.67 UIU/ML (ref 0.27–4.2)
VITAMIN B-12: 346.3 PG/ML (ref 211–911)
WBC # BLD: 8.1 K/CU MM (ref 4–10.5)

## 2023-06-25 PROCEDURE — 2580000003 HC RX 258: Performed by: STUDENT IN AN ORGANIZED HEALTH CARE EDUCATION/TRAINING PROGRAM

## 2023-06-25 PROCEDURE — 84443 ASSAY THYROID STIM HORMONE: CPT

## 2023-06-25 PROCEDURE — 85025 COMPLETE CBC W/AUTO DIFF WBC: CPT

## 2023-06-25 PROCEDURE — 36415 COLL VENOUS BLD VENIPUNCTURE: CPT

## 2023-06-25 PROCEDURE — 97166 OT EVAL MOD COMPLEX 45 MIN: CPT

## 2023-06-25 PROCEDURE — 6360000002 HC RX W HCPCS: Performed by: STUDENT IN AN ORGANIZED HEALTH CARE EDUCATION/TRAINING PROGRAM

## 2023-06-25 PROCEDURE — 6370000000 HC RX 637 (ALT 250 FOR IP): Performed by: STUDENT IN AN ORGANIZED HEALTH CARE EDUCATION/TRAINING PROGRAM

## 2023-06-25 PROCEDURE — 99223 1ST HOSP IP/OBS HIGH 75: CPT | Performed by: PSYCHIATRY & NEUROLOGY

## 2023-06-25 PROCEDURE — 82746 ASSAY OF FOLIC ACID SERUM: CPT

## 2023-06-25 PROCEDURE — 80048 BASIC METABOLIC PNL TOTAL CA: CPT

## 2023-06-25 PROCEDURE — 70551 MRI BRAIN STEM W/O DYE: CPT

## 2023-06-25 PROCEDURE — 97116 GAIT TRAINING THERAPY: CPT

## 2023-06-25 PROCEDURE — 97162 PT EVAL MOD COMPLEX 30 MIN: CPT

## 2023-06-25 PROCEDURE — 85610 PROTHROMBIN TIME: CPT

## 2023-06-25 PROCEDURE — 80061 LIPID PANEL: CPT

## 2023-06-25 PROCEDURE — 82607 VITAMIN B-12: CPT

## 2023-06-25 PROCEDURE — 1200000000 HC SEMI PRIVATE

## 2023-06-25 PROCEDURE — 83036 HEMOGLOBIN GLYCOSYLATED A1C: CPT

## 2023-06-25 PROCEDURE — 82962 GLUCOSE BLOOD TEST: CPT

## 2023-06-25 PROCEDURE — 97530 THERAPEUTIC ACTIVITIES: CPT

## 2023-06-25 RX ORDER — CLOPIDOGREL BISULFATE 75 MG/1
75 TABLET ORAL DAILY
Status: DISCONTINUED | OUTPATIENT
Start: 2023-06-25 | End: 2023-06-28 | Stop reason: HOSPADM

## 2023-06-25 RX ADMIN — AMLODIPINE BESYLATE 10 MG: 10 TABLET ORAL at 08:29

## 2023-06-25 RX ADMIN — LISINOPRIL 20 MG: 20 TABLET ORAL at 08:29

## 2023-06-25 RX ADMIN — ENOXAPARIN SODIUM 40 MG: 100 INJECTION SUBCUTANEOUS at 17:06

## 2023-06-25 RX ADMIN — CLOPIDOGREL BISULFATE 75 MG: 75 TABLET ORAL at 16:55

## 2023-06-25 RX ADMIN — ESCITALOPRAM OXALATE 10 MG: 10 TABLET ORAL at 08:29

## 2023-06-25 RX ADMIN — SODIUM CHLORIDE, PRESERVATIVE FREE 10 ML: 5 INJECTION INTRAVENOUS at 08:33

## 2023-06-25 RX ADMIN — ASPIRIN 81 MG: 81 TABLET, CHEWABLE ORAL at 08:29

## 2023-06-25 RX ADMIN — SODIUM CHLORIDE, PRESERVATIVE FREE 10 ML: 5 INJECTION INTRAVENOUS at 21:32

## 2023-06-25 RX ADMIN — LISINOPRIL 20 MG: 20 TABLET ORAL at 21:31

## 2023-06-25 RX ADMIN — ATORVASTATIN CALCIUM 80 MG: 40 TABLET, FILM COATED ORAL at 21:31

## 2023-06-26 LAB
ALBUMIN SERPL-MCNC: 3.8 GM/DL (ref 3.4–5)
ALP BLD-CCNC: 69 IU/L (ref 40–128)
ALT SERPL-CCNC: 23 U/L (ref 10–40)
ANION GAP SERPL CALCULATED.3IONS-SCNC: 11 MMOL/L (ref 4–16)
AST SERPL-CCNC: 27 IU/L (ref 15–37)
BASOPHILS ABSOLUTE: 0 K/CU MM
BASOPHILS RELATIVE PERCENT: 0.4 % (ref 0–1)
BILIRUB SERPL-MCNC: 0.8 MG/DL (ref 0–1)
BUN SERPL-MCNC: 8 MG/DL (ref 6–23)
CALCIUM SERPL-MCNC: 9.3 MG/DL (ref 8.3–10.6)
CHLORIDE BLD-SCNC: 96 MMOL/L (ref 99–110)
CO2: 27 MMOL/L (ref 21–32)
CREAT SERPL-MCNC: 0.8 MG/DL (ref 0.9–1.3)
DIFFERENTIAL TYPE: ABNORMAL
EKG ATRIAL RATE: 71 BPM
EKG DIAGNOSIS: NORMAL
EKG P AXIS: 21 DEGREES
EKG P-R INTERVAL: 220 MS
EKG Q-T INTERVAL: 408 MS
EKG QRS DURATION: 98 MS
EKG QTC CALCULATION (BAZETT): 443 MS
EKG R AXIS: -21 DEGREES
EKG T AXIS: 14 DEGREES
EKG VENTRICULAR RATE: 71 BPM
EOSINOPHILS ABSOLUTE: 0.3 K/CU MM
EOSINOPHILS RELATIVE PERCENT: 3 % (ref 0–3)
GFR SERPL CREATININE-BSD FRML MDRD: >60 ML/MIN/1.73M2
GLUCOSE BLD-MCNC: 133 MG/DL (ref 70–99)
GLUCOSE BLD-MCNC: 139 MG/DL (ref 70–99)
GLUCOSE BLD-MCNC: 165 MG/DL (ref 70–99)
GLUCOSE BLD-MCNC: 185 MG/DL (ref 70–99)
GLUCOSE SERPL-MCNC: 129 MG/DL (ref 70–99)
HCT VFR BLD CALC: 50.2 % (ref 42–52)
HEMOGLOBIN: 16.6 GM/DL (ref 13.5–18)
IMMATURE NEUTROPHIL %: 0.2 % (ref 0–0.43)
LV EF: 58 %
LVEF MODALITY: NORMAL
LYMPHOCYTES ABSOLUTE: 2.9 K/CU MM
LYMPHOCYTES RELATIVE PERCENT: 32.6 % (ref 24–44)
MCH RBC QN AUTO: 28.2 PG (ref 27–31)
MCHC RBC AUTO-ENTMCNC: 33.1 % (ref 32–36)
MCV RBC AUTO: 85.2 FL (ref 78–100)
MONOCYTES ABSOLUTE: 0.6 K/CU MM
MONOCYTES RELATIVE PERCENT: 7.1 % (ref 0–4)
NUCLEATED RBC %: 0 %
PDW BLD-RTO: 13.8 % (ref 11.7–14.9)
PLATELET # BLD: 192 K/CU MM (ref 140–440)
PMV BLD AUTO: 9.7 FL (ref 7.5–11.1)
POTASSIUM SERPL-SCNC: 3.6 MMOL/L (ref 3.5–5.1)
RBC # BLD: 5.89 M/CU MM (ref 4.6–6.2)
SEGMENTED NEUTROPHILS ABSOLUTE COUNT: 5.1 K/CU MM
SEGMENTED NEUTROPHILS RELATIVE PERCENT: 56.7 % (ref 36–66)
SODIUM BLD-SCNC: 134 MMOL/L (ref 135–145)
TOTAL IMMATURE NEUTOROPHIL: 0.02 K/CU MM
TOTAL NUCLEATED RBC: 0 K/CU MM
TOTAL PROTEIN: 7.2 GM/DL (ref 6.4–8.2)
WBC # BLD: 8.9 K/CU MM (ref 4–10.5)

## 2023-06-26 PROCEDURE — 80053 COMPREHEN METABOLIC PANEL: CPT

## 2023-06-26 PROCEDURE — 6370000000 HC RX 637 (ALT 250 FOR IP): Performed by: STUDENT IN AN ORGANIZED HEALTH CARE EDUCATION/TRAINING PROGRAM

## 2023-06-26 PROCEDURE — 97116 GAIT TRAINING THERAPY: CPT

## 2023-06-26 PROCEDURE — 36415 COLL VENOUS BLD VENIPUNCTURE: CPT

## 2023-06-26 PROCEDURE — 85025 COMPLETE CBC W/AUTO DIFF WBC: CPT

## 2023-06-26 PROCEDURE — 99233 SBSQ HOSP IP/OBS HIGH 50: CPT | Performed by: NURSE PRACTITIONER

## 2023-06-26 PROCEDURE — 97110 THERAPEUTIC EXERCISES: CPT

## 2023-06-26 PROCEDURE — 6360000002 HC RX W HCPCS: Performed by: STUDENT IN AN ORGANIZED HEALTH CARE EDUCATION/TRAINING PROGRAM

## 2023-06-26 PROCEDURE — 1200000000 HC SEMI PRIVATE

## 2023-06-26 PROCEDURE — 82962 GLUCOSE BLOOD TEST: CPT

## 2023-06-26 PROCEDURE — 2580000003 HC RX 258: Performed by: STUDENT IN AN ORGANIZED HEALTH CARE EDUCATION/TRAINING PROGRAM

## 2023-06-26 PROCEDURE — 93010 ELECTROCARDIOGRAM REPORT: CPT | Performed by: INTERNAL MEDICINE

## 2023-06-26 PROCEDURE — 94761 N-INVAS EAR/PLS OXIMETRY MLT: CPT

## 2023-06-26 PROCEDURE — 93306 TTE W/DOPPLER COMPLETE: CPT

## 2023-06-26 PROCEDURE — 99211 OFF/OP EST MAY X REQ PHY/QHP: CPT

## 2023-06-26 RX ORDER — ENOXAPARIN SODIUM 100 MG/ML
30 INJECTION SUBCUTANEOUS 2 TIMES DAILY
Status: DISCONTINUED | OUTPATIENT
Start: 2023-06-26 | End: 2023-06-28 | Stop reason: HOSPADM

## 2023-06-26 RX ADMIN — SODIUM CHLORIDE, PRESERVATIVE FREE 10 ML: 5 INJECTION INTRAVENOUS at 21:21

## 2023-06-26 RX ADMIN — AMLODIPINE BESYLATE 10 MG: 10 TABLET ORAL at 08:37

## 2023-06-26 RX ADMIN — ASPIRIN 81 MG: 81 TABLET, CHEWABLE ORAL at 08:37

## 2023-06-26 RX ADMIN — LISINOPRIL 20 MG: 20 TABLET ORAL at 08:37

## 2023-06-26 RX ADMIN — CLOPIDOGREL BISULFATE 75 MG: 75 TABLET ORAL at 08:37

## 2023-06-26 RX ADMIN — SODIUM CHLORIDE, PRESERVATIVE FREE 10 ML: 5 INJECTION INTRAVENOUS at 08:37

## 2023-06-26 RX ADMIN — ESCITALOPRAM OXALATE 10 MG: 10 TABLET ORAL at 08:37

## 2023-06-26 RX ADMIN — ENOXAPARIN SODIUM 30 MG: 100 INJECTION SUBCUTANEOUS at 21:21

## 2023-06-26 RX ADMIN — ATORVASTATIN CALCIUM 80 MG: 40 TABLET, FILM COATED ORAL at 21:21

## 2023-06-26 RX ADMIN — ENOXAPARIN SODIUM 30 MG: 100 INJECTION SUBCUTANEOUS at 09:48

## 2023-06-26 RX ADMIN — LISINOPRIL 20 MG: 20 TABLET ORAL at 21:20

## 2023-06-27 DIAGNOSIS — G45.9 TIA (TRANSIENT ISCHEMIC ATTACK): Primary | ICD-10-CM

## 2023-06-27 LAB
ALBUMIN SERPL-MCNC: 3.9 GM/DL (ref 3.4–5)
ALP BLD-CCNC: 66 IU/L (ref 40–129)
ALT SERPL-CCNC: 23 U/L (ref 10–40)
ANION GAP SERPL CALCULATED.3IONS-SCNC: 11 MMOL/L (ref 4–16)
AST SERPL-CCNC: 28 IU/L (ref 15–37)
BASOPHILS ABSOLUTE: 0 K/CU MM
BASOPHILS RELATIVE PERCENT: 0.5 % (ref 0–1)
BILIRUB SERPL-MCNC: 0.8 MG/DL (ref 0–1)
BUN SERPL-MCNC: 10 MG/DL (ref 6–23)
CALCIUM SERPL-MCNC: 9 MG/DL (ref 8.3–10.6)
CHLORIDE BLD-SCNC: 99 MMOL/L (ref 99–110)
CO2: 23 MMOL/L (ref 21–32)
CREAT SERPL-MCNC: 0.6 MG/DL (ref 0.9–1.3)
DIFFERENTIAL TYPE: ABNORMAL
EOSINOPHILS ABSOLUTE: 0.2 K/CU MM
EOSINOPHILS RELATIVE PERCENT: 1.9 % (ref 0–3)
GFR SERPL CREATININE-BSD FRML MDRD: >60 ML/MIN/1.73M2
GLUCOSE BLD-MCNC: 130 MG/DL (ref 70–99)
GLUCOSE BLD-MCNC: 151 MG/DL (ref 70–99)
GLUCOSE BLD-MCNC: 163 MG/DL (ref 70–99)
GLUCOSE BLD-MCNC: 166 MG/DL (ref 70–99)
GLUCOSE SERPL-MCNC: 146 MG/DL (ref 70–99)
HCT VFR BLD CALC: 50.4 % (ref 42–52)
HEMOGLOBIN: 17.3 GM/DL (ref 13.5–18)
IMMATURE NEUTROPHIL %: 0.2 % (ref 0–0.43)
LYMPHOCYTES ABSOLUTE: 2.6 K/CU MM
LYMPHOCYTES RELATIVE PERCENT: 31 % (ref 24–44)
MAGNESIUM: 1.5 MG/DL (ref 1.8–2.4)
MCH RBC QN AUTO: 28.8 PG (ref 27–31)
MCHC RBC AUTO-ENTMCNC: 34.3 % (ref 32–36)
MCV RBC AUTO: 84 FL (ref 78–100)
MONOCYTES ABSOLUTE: 0.6 K/CU MM
MONOCYTES RELATIVE PERCENT: 6.7 % (ref 0–4)
NUCLEATED RBC %: 0 %
PDW BLD-RTO: 13.7 % (ref 11.7–14.9)
PLATELET # BLD: 200 K/CU MM (ref 140–440)
PMV BLD AUTO: 9.7 FL (ref 7.5–11.1)
POTASSIUM SERPL-SCNC: 3.4 MMOL/L (ref 3.5–5.1)
RBC # BLD: 6 M/CU MM (ref 4.6–6.2)
SEGMENTED NEUTROPHILS ABSOLUTE COUNT: 4.9 K/CU MM
SEGMENTED NEUTROPHILS RELATIVE PERCENT: 59.7 % (ref 36–66)
SODIUM BLD-SCNC: 133 MMOL/L (ref 135–145)
TOTAL IMMATURE NEUTOROPHIL: 0.02 K/CU MM
TOTAL NUCLEATED RBC: 0 K/CU MM
TOTAL PROTEIN: 7 GM/DL (ref 6.4–8.2)
WBC # BLD: 8.3 K/CU MM (ref 4–10.5)

## 2023-06-27 PROCEDURE — 83735 ASSAY OF MAGNESIUM: CPT

## 2023-06-27 PROCEDURE — 36415 COLL VENOUS BLD VENIPUNCTURE: CPT

## 2023-06-27 PROCEDURE — 82962 GLUCOSE BLOOD TEST: CPT

## 2023-06-27 PROCEDURE — 6370000000 HC RX 637 (ALT 250 FOR IP): Performed by: STUDENT IN AN ORGANIZED HEALTH CARE EDUCATION/TRAINING PROGRAM

## 2023-06-27 PROCEDURE — 97530 THERAPEUTIC ACTIVITIES: CPT

## 2023-06-27 PROCEDURE — 92610 EVALUATE SWALLOWING FUNCTION: CPT

## 2023-06-27 PROCEDURE — 6360000002 HC RX W HCPCS: Performed by: STUDENT IN AN ORGANIZED HEALTH CARE EDUCATION/TRAINING PROGRAM

## 2023-06-27 PROCEDURE — 2580000003 HC RX 258: Performed by: STUDENT IN AN ORGANIZED HEALTH CARE EDUCATION/TRAINING PROGRAM

## 2023-06-27 PROCEDURE — 85025 COMPLETE CBC W/AUTO DIFF WBC: CPT

## 2023-06-27 PROCEDURE — 99222 1ST HOSP IP/OBS MODERATE 55: CPT | Performed by: INTERNAL MEDICINE

## 2023-06-27 PROCEDURE — 97116 GAIT TRAINING THERAPY: CPT

## 2023-06-27 PROCEDURE — 94761 N-INVAS EAR/PLS OXIMETRY MLT: CPT

## 2023-06-27 PROCEDURE — 1200000000 HC SEMI PRIVATE

## 2023-06-27 PROCEDURE — 80053 COMPREHEN METABOLIC PANEL: CPT

## 2023-06-27 PROCEDURE — 6360000002 HC RX W HCPCS

## 2023-06-27 RX ORDER — MAGNESIUM SULFATE 4 G/50ML
4000 INJECTION INTRAVENOUS ONCE
Status: COMPLETED | OUTPATIENT
Start: 2023-06-27 | End: 2023-06-27

## 2023-06-27 RX ORDER — POTASSIUM CHLORIDE 20 MEQ/1
40 TABLET, EXTENDED RELEASE ORAL ONCE
Status: COMPLETED | OUTPATIENT
Start: 2023-06-27 | End: 2023-06-27

## 2023-06-27 RX ORDER — POTASSIUM CHLORIDE 7.45 MG/ML
10 INJECTION INTRAVENOUS PRN
Status: DISCONTINUED | OUTPATIENT
Start: 2023-06-27 | End: 2023-06-28 | Stop reason: HOSPADM

## 2023-06-27 RX ORDER — MAGNESIUM SULFATE IN WATER 40 MG/ML
2000 INJECTION, SOLUTION INTRAVENOUS PRN
Status: DISCONTINUED | OUTPATIENT
Start: 2023-06-27 | End: 2023-06-28 | Stop reason: HOSPADM

## 2023-06-27 RX ORDER — POTASSIUM CHLORIDE 20 MEQ/1
40 TABLET, EXTENDED RELEASE ORAL PRN
Status: DISCONTINUED | OUTPATIENT
Start: 2023-06-27 | End: 2023-06-28 | Stop reason: HOSPADM

## 2023-06-27 RX ADMIN — ENOXAPARIN SODIUM 30 MG: 100 INJECTION SUBCUTANEOUS at 20:57

## 2023-06-27 RX ADMIN — LISINOPRIL 20 MG: 20 TABLET ORAL at 20:57

## 2023-06-27 RX ADMIN — SODIUM CHLORIDE, PRESERVATIVE FREE 10 ML: 5 INJECTION INTRAVENOUS at 10:52

## 2023-06-27 RX ADMIN — MAGNESIUM SULFATE IN WATER 4000 MG: 80 INJECTION, SOLUTION INTRAVENOUS at 12:15

## 2023-06-27 RX ADMIN — AMLODIPINE BESYLATE 10 MG: 10 TABLET ORAL at 09:06

## 2023-06-27 RX ADMIN — CLOPIDOGREL BISULFATE 75 MG: 75 TABLET ORAL at 09:06

## 2023-06-27 RX ADMIN — ATORVASTATIN CALCIUM 80 MG: 40 TABLET, FILM COATED ORAL at 20:57

## 2023-06-27 RX ADMIN — ENOXAPARIN SODIUM 30 MG: 100 INJECTION SUBCUTANEOUS at 09:07

## 2023-06-27 RX ADMIN — LISINOPRIL 20 MG: 20 TABLET ORAL at 09:06

## 2023-06-27 RX ADMIN — ASPIRIN 81 MG: 81 TABLET, CHEWABLE ORAL at 09:06

## 2023-06-27 RX ADMIN — POTASSIUM CHLORIDE 40 MEQ: 1500 TABLET, EXTENDED RELEASE ORAL at 09:11

## 2023-06-27 RX ADMIN — SODIUM CHLORIDE, PRESERVATIVE FREE 10 ML: 5 INJECTION INTRAVENOUS at 20:57

## 2023-06-27 RX ADMIN — ESCITALOPRAM OXALATE 10 MG: 10 TABLET ORAL at 09:06

## 2023-06-28 ENCOUNTER — APPOINTMENT (OUTPATIENT)
Dept: CT IMAGING | Age: 53
DRG: 066 | End: 2023-06-28
Attending: STUDENT IN AN ORGANIZED HEALTH CARE EDUCATION/TRAINING PROGRAM
Payer: COMMERCIAL

## 2023-06-28 ENCOUNTER — TELEPHONE (OUTPATIENT)
Dept: FAMILY MEDICINE CLINIC | Age: 53
End: 2023-06-28

## 2023-06-28 VITALS
DIASTOLIC BLOOD PRESSURE: 79 MMHG | OXYGEN SATURATION: 94 % | SYSTOLIC BLOOD PRESSURE: 131 MMHG | RESPIRATION RATE: 15 BRPM | HEART RATE: 90 BPM | BODY MASS INDEX: 31.08 KG/M2 | WEIGHT: 229.44 LBS | TEMPERATURE: 98.6 F | HEIGHT: 72 IN

## 2023-06-28 LAB
ALBUMIN SERPL-MCNC: 3.8 GM/DL (ref 3.4–5)
ALP BLD-CCNC: 68 IU/L (ref 40–128)
ALT SERPL-CCNC: 27 U/L (ref 10–40)
ANION GAP SERPL CALCULATED.3IONS-SCNC: 11 MMOL/L (ref 4–16)
AST SERPL-CCNC: 31 IU/L (ref 15–37)
BASOPHILS ABSOLUTE: 0 K/CU MM
BASOPHILS RELATIVE PERCENT: 0.5 % (ref 0–1)
BILIRUB SERPL-MCNC: 0.8 MG/DL (ref 0–1)
BUN SERPL-MCNC: 10 MG/DL (ref 6–23)
CALCIUM SERPL-MCNC: 9.1 MG/DL (ref 8.3–10.6)
CHLORIDE BLD-SCNC: 100 MMOL/L (ref 99–110)
CO2: 27 MMOL/L (ref 21–32)
CREAT SERPL-MCNC: 0.7 MG/DL (ref 0.9–1.3)
DIFFERENTIAL TYPE: ABNORMAL
EOSINOPHILS ABSOLUTE: 0.2 K/CU MM
EOSINOPHILS RELATIVE PERCENT: 2.4 % (ref 0–3)
GFR SERPL CREATININE-BSD FRML MDRD: >60 ML/MIN/1.73M2
GLUCOSE BLD-MCNC: 140 MG/DL (ref 70–99)
GLUCOSE BLD-MCNC: 228 MG/DL (ref 70–99)
GLUCOSE SERPL-MCNC: 125 MG/DL (ref 70–99)
HCT VFR BLD CALC: 50.1 % (ref 42–52)
HEMOGLOBIN: 17 GM/DL (ref 13.5–18)
IMMATURE NEUTROPHIL %: 0.2 % (ref 0–0.43)
LYMPHOCYTES ABSOLUTE: 2.7 K/CU MM
LYMPHOCYTES RELATIVE PERCENT: 31.1 % (ref 24–44)
MCH RBC QN AUTO: 28.8 PG (ref 27–31)
MCHC RBC AUTO-ENTMCNC: 33.9 % (ref 32–36)
MCV RBC AUTO: 84.8 FL (ref 78–100)
MONOCYTES ABSOLUTE: 0.8 K/CU MM
MONOCYTES RELATIVE PERCENT: 8.5 % (ref 0–4)
NUCLEATED RBC %: 0 %
PDW BLD-RTO: 13.7 % (ref 11.7–14.9)
PLATELET # BLD: 209 K/CU MM (ref 140–440)
PMV BLD AUTO: 9.6 FL (ref 7.5–11.1)
POTASSIUM SERPL-SCNC: 4.4 MMOL/L (ref 3.5–5.1)
RBC # BLD: 5.91 M/CU MM (ref 4.6–6.2)
SEGMENTED NEUTROPHILS ABSOLUTE COUNT: 5 K/CU MM
SEGMENTED NEUTROPHILS RELATIVE PERCENT: 57.3 % (ref 36–66)
SODIUM BLD-SCNC: 138 MMOL/L (ref 135–145)
TOTAL IMMATURE NEUTOROPHIL: 0.02 K/CU MM
TOTAL NUCLEATED RBC: 0 K/CU MM
TOTAL PROTEIN: 7.1 GM/DL (ref 6.4–8.2)
WBC # BLD: 8.8 K/CU MM (ref 4–10.5)

## 2023-06-28 PROCEDURE — 82962 GLUCOSE BLOOD TEST: CPT

## 2023-06-28 PROCEDURE — 36415 COLL VENOUS BLD VENIPUNCTURE: CPT

## 2023-06-28 PROCEDURE — 80053 COMPREHEN METABOLIC PANEL: CPT

## 2023-06-28 PROCEDURE — 94761 N-INVAS EAR/PLS OXIMETRY MLT: CPT

## 2023-06-28 PROCEDURE — 2580000003 HC RX 258: Performed by: STUDENT IN AN ORGANIZED HEALTH CARE EDUCATION/TRAINING PROGRAM

## 2023-06-28 PROCEDURE — 97116 GAIT TRAINING THERAPY: CPT

## 2023-06-28 PROCEDURE — 70450 CT HEAD/BRAIN W/O DYE: CPT

## 2023-06-28 PROCEDURE — 6370000000 HC RX 637 (ALT 250 FOR IP): Performed by: STUDENT IN AN ORGANIZED HEALTH CARE EDUCATION/TRAINING PROGRAM

## 2023-06-28 PROCEDURE — 97530 THERAPEUTIC ACTIVITIES: CPT

## 2023-06-28 PROCEDURE — 85025 COMPLETE CBC W/AUTO DIFF WBC: CPT

## 2023-06-28 PROCEDURE — 6360000002 HC RX W HCPCS: Performed by: STUDENT IN AN ORGANIZED HEALTH CARE EDUCATION/TRAINING PROGRAM

## 2023-06-28 RX ORDER — CLOPIDOGREL BISULFATE 75 MG/1
75 TABLET ORAL DAILY
Qty: 30 TABLET | Refills: 3 | Status: SHIPPED | OUTPATIENT
Start: 2023-06-28

## 2023-06-28 RX ORDER — ASPIRIN 81 MG/1
81 TABLET, CHEWABLE ORAL DAILY
Qty: 30 TABLET | Refills: 3 | Status: SHIPPED | OUTPATIENT
Start: 2023-06-28

## 2023-06-28 RX ADMIN — AMLODIPINE BESYLATE 10 MG: 10 TABLET ORAL at 11:17

## 2023-06-28 RX ADMIN — ESCITALOPRAM OXALATE 10 MG: 10 TABLET ORAL at 11:18

## 2023-06-28 RX ADMIN — CLOPIDOGREL BISULFATE 75 MG: 75 TABLET ORAL at 11:18

## 2023-06-28 RX ADMIN — SODIUM CHLORIDE, PRESERVATIVE FREE 10 ML: 5 INJECTION INTRAVENOUS at 11:53

## 2023-06-28 RX ADMIN — INSULIN LISPRO 1 UNITS: 100 INJECTION, SOLUTION INTRAVENOUS; SUBCUTANEOUS at 12:54

## 2023-06-28 RX ADMIN — ENOXAPARIN SODIUM 30 MG: 100 INJECTION SUBCUTANEOUS at 11:18

## 2023-06-28 RX ADMIN — ASPIRIN 81 MG: 81 TABLET, CHEWABLE ORAL at 11:18

## 2023-06-28 RX ADMIN — LISINOPRIL 20 MG: 20 TABLET ORAL at 11:17

## 2023-07-11 ENCOUNTER — OFFICE VISIT (OUTPATIENT)
Dept: FAMILY MEDICINE CLINIC | Age: 53
End: 2023-07-11

## 2023-07-11 VITALS
WEIGHT: 234.6 LBS | SYSTOLIC BLOOD PRESSURE: 130 MMHG | OXYGEN SATURATION: 97 % | DIASTOLIC BLOOD PRESSURE: 72 MMHG | BODY MASS INDEX: 31.82 KG/M2 | RESPIRATION RATE: 18 BRPM | HEART RATE: 74 BPM

## 2023-07-11 DIAGNOSIS — Z09 HOSPITAL DISCHARGE FOLLOW-UP: ICD-10-CM

## 2023-07-11 DIAGNOSIS — F41.9 ANXIETY AND DEPRESSION: ICD-10-CM

## 2023-07-11 DIAGNOSIS — Z74.09 IMPAIRED MOBILITY: ICD-10-CM

## 2023-07-11 DIAGNOSIS — E08.29 DIABETES MELLITUS DUE TO UNDERLYING CONDITION WITH OTHER KIDNEY COMPLICATION, UNSPECIFIED WHETHER LONG TERM INSULIN USE (HCC): Primary | ICD-10-CM

## 2023-07-11 DIAGNOSIS — Z74.09: ICD-10-CM

## 2023-07-11 DIAGNOSIS — G62.9 NEUROPATHY: ICD-10-CM

## 2023-07-11 DIAGNOSIS — I10 ESSENTIAL HYPERTENSION: ICD-10-CM

## 2023-07-11 DIAGNOSIS — G81.91 RIGHT HEMIPARESIS (HCC): ICD-10-CM

## 2023-07-11 DIAGNOSIS — F32.A ANXIETY AND DEPRESSION: ICD-10-CM

## 2023-07-11 DIAGNOSIS — I63.9 ACUTE CEREBROVASCULAR ACCIDENT (CVA) (HCC): ICD-10-CM

## 2023-07-11 LAB
CREATININE URINE POCT: NORMAL
MICROALBUMIN/CREAT 24H UR: NORMAL MG/G{CREAT}
MICROALBUMIN/CREAT UR-RTO: NORMAL

## 2023-07-11 RX ORDER — GABAPENTIN 300 MG/1
300 CAPSULE ORAL 3 TIMES DAILY
Qty: 90 CAPSULE | Refills: 2 | Status: SHIPPED | OUTPATIENT
Start: 2023-07-11 | End: 2023-10-09

## 2023-07-11 RX ORDER — AMLODIPINE BESYLATE 10 MG/1
10 TABLET ORAL DAILY
Qty: 90 TABLET | Refills: 1 | Status: SHIPPED | OUTPATIENT
Start: 2023-07-11

## 2023-07-11 RX ORDER — ESCITALOPRAM OXALATE 10 MG/1
10 TABLET ORAL DAILY
Qty: 90 TABLET | Refills: 1 | Status: SHIPPED | OUTPATIENT
Start: 2023-07-11

## 2023-07-11 RX ORDER — LISINOPRIL 20 MG/1
20 TABLET ORAL 2 TIMES DAILY
Qty: 180 TABLET | Refills: 1 | Status: SHIPPED | OUTPATIENT
Start: 2023-07-11

## 2023-07-11 RX ORDER — CLOPIDOGREL BISULFATE 75 MG/1
75 TABLET ORAL DAILY
Qty: 90 TABLET | Refills: 1 | Status: SHIPPED | OUTPATIENT
Start: 2023-07-11

## 2023-07-11 RX ORDER — ASPIRIN 81 MG/1
81 TABLET, CHEWABLE ORAL DAILY
Qty: 90 TABLET | Refills: 1 | Status: SHIPPED | OUTPATIENT
Start: 2023-07-11

## 2023-07-11 RX ORDER — ATORVASTATIN CALCIUM 80 MG/1
80 TABLET, FILM COATED ORAL DAILY
Qty: 90 TABLET | Refills: 1 | Status: SHIPPED | OUTPATIENT
Start: 2023-07-11

## 2023-07-11 RX ORDER — METFORMIN HYDROCHLORIDE 500 MG/1
500 TABLET, EXTENDED RELEASE ORAL
Qty: 90 TABLET | Refills: 1 | Status: SHIPPED | OUTPATIENT
Start: 2023-07-11

## 2023-07-11 ASSESSMENT — ENCOUNTER SYMPTOMS
WHEEZING: 0
EYES NEGATIVE: 1
DIARRHEA: 1
NAUSEA: 1
SHORTNESS OF BREATH: 0
COUGH: 0

## 2023-07-11 ASSESSMENT — PATIENT HEALTH QUESTIONNAIRE - PHQ9
SUM OF ALL RESPONSES TO PHQ QUESTIONS 1-9: 0
SUM OF ALL RESPONSES TO PHQ QUESTIONS 1-9: 0
2. FEELING DOWN, DEPRESSED OR HOPELESS: 0
SUM OF ALL RESPONSES TO PHQ QUESTIONS 1-9: 0
SUM OF ALL RESPONSES TO PHQ9 QUESTIONS 1 & 2: 0
1. LITTLE INTEREST OR PLEASURE IN DOING THINGS: 0
SUM OF ALL RESPONSES TO PHQ QUESTIONS 1-9: 0

## 2023-07-11 NOTE — PATIENT INSTRUCTIONS
Please See Below a List of Family Medicine Providers:    Dr. Cristina Giles  [id number] [place] [address]    Mary Carmen Valentine, LEWIS  [facility]    Internal Medicine- Dr. Glenn Hernandez  [address] [place] [address]  [id number]    Dr. Oneida Ospinawill Mccraryry, [address] [place] [address]       We are committed to providing you the best care possible. If you receive a survey after visiting one of our offices, please take time to share your experience concerning your physician office visit. These surveys are confidential and no health information about you is shared. We are eager to improve for you and continue to give you satisfactory care, we are counting on your feedback to help make that happen. Welcome to [facility]:    Did you know we now have a faster way for you to move through your appointment? For your convenience, we now have digital registration available. When you schedule your next appointment, you will receive a link via your email as well as a text message that will allow you to complete any paperwork digitally before your appointment.

## 2023-07-11 NOTE — PROGRESS NOTES
Post-Discharge Transitional Care Follow Up      Fabio Diaz   YOB: 1970    Date of Office Visit:  7/11/2023  Date of Hospital Admission: 6/24/23  Date of Hospital Discharge: 6/28/23  Readmission Risk Score (high >=14%. 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 , Disp-90 capsule, R-2Normal  Acute cerebrovascular accident (CVA) ([address])  -     [facility] DISCHARGE MEDS RECONCILED W/ CURRENT OUTPATIENT MED LIST  -     clopidogrel (PLAVIX) 75 MG tablet; Take 1 tablet by mouth daily, Disp-90 tablet, R-1Normal  -     aspirin 81 MG chewable tablet; Take 1 tablet by mouth daily, Disp-90 tablet, R-1Normal  Hospital discharge follow-up  -     [facility] DISCHARGE MEDS RECONCILED W/ CURRENT OUTPATIENT MED LIST  Right hemiparesis (HCC)  Essential hypertension  -     lisinopril (PRINIVIL;ZESTRIL) 20 MG tablet; Take 1 tablet by mouth 2 times daily, Disp-180 tablet, R-1Normal  -     amLODIPine (NORVASC) 10 MG tablet; Take 1 tablet by mouth daily, Disp-90 tablet, R-1Normal  Neuropathy  Anxiety and depression  -     escitalopram (LEXAPRO) 10 MG tablet;  Take 1 tablet by mouth

## 2023-07-12 ENCOUNTER — NURSE ONLY (OUTPATIENT)
Dept: CARDIOLOGY CLINIC | Age: 53
End: 2023-07-12

## 2023-07-12 DIAGNOSIS — I63.9 ACUTE CEREBROVASCULAR ACCIDENT (CVA) (HCC): Primary | ICD-10-CM

## 2023-07-12 NOTE — PROGRESS NOTES
30 days e-cardio monitor placed.  # [id number]. Instructed patient on how to record the event and to call monitoring center at [phone] if any problems arise. Instructed patient to disconnect the lead wires from the electrodes before bathing or showering and reattach them afterwards. Instructed patient that the electrodes should be changed every 3 days or if they no longer adhere to the skin. Patient to mail package after the monitor has ended. Patient verbalized understanding.

## 2023-07-14 ENCOUNTER — OFFICE VISIT (OUTPATIENT)
Dept: NEUROLOGY | Age: 53
End: 2023-07-14

## 2023-07-14 VITALS
BODY MASS INDEX: 27.77 KG/M2 | HEIGHT: 72 IN | SYSTOLIC BLOOD PRESSURE: 110 MMHG | HEART RATE: 69 BPM | OXYGEN SATURATION: 97 % | WEIGHT: 205 LBS | DIASTOLIC BLOOD PRESSURE: 70 MMHG

## 2023-07-14 DIAGNOSIS — E11.610 CHARCOT'S JOINT OF FOOT IN TYPE 2 DIABETES MELLITUS (HCC): Primary | ICD-10-CM

## 2023-07-14 DIAGNOSIS — I65.02 STENOSIS OF LEFT VERTEBRAL ARTERY: ICD-10-CM

## 2023-07-14 DIAGNOSIS — E08.29 DIABETES MELLITUS DUE TO UNDERLYING CONDITION WITH OTHER KIDNEY COMPLICATION, UNSPECIFIED WHETHER LONG TERM INSULIN USE (HCC): ICD-10-CM

## 2023-07-14 DIAGNOSIS — G81.91 RIGHT HEMIPARESIS (HCC): ICD-10-CM

## 2023-07-14 DIAGNOSIS — G62.9 NEUROPATHY: ICD-10-CM

## 2023-07-14 DIAGNOSIS — I63.9 ACUTE CEREBROVASCULAR ACCIDENT (CVA) (HCC): ICD-10-CM

## 2023-07-14 NOTE — PROGRESS NOTES
7/14/23    Amisha Allison  1970    Chief Complaint   Patient presents with    Follow-Up from Hospital     CVA 6/24/23       History of Present Illness  Jermain Tripp is a 46 y.o. male presenting today for hospital follow-up 6/25/2023 of: RUE and RLE numbness, tingling and weakness rule out R-MCA infarction v. Lacunar infarction v. HTN urgency superimposed on non compliance with medication and hx of HLD, HTN and DM2. MRI brain showed multiple small acute to subacute infarctions in the parasagittal posterior right frontal lobe and the right ARTIE territory. Small subacute infarct in the anterior left medulla, asymmetrically decreased normal flow void in the left vertebral artery, likely related to severe stenosis. CTA head and neck showed occlusion of the intracranial left vertebral artery, moderate stenosis in the P2 segment of the bilateral PCAs, severe stenosis in the distal M1 segment of the left MCA, mild to moderate stenosis at the origin of the M2 segment of the right MCA. Echo was noncontributory. Risk factors stroke include DM, HTN, sleep apnea, tobacco use. Patient does have significant intracranial stenosis, feel he would benefit from outpatient follow-up with neuro intervention, Dr. Mari aEsther Krishnamurthy. Started on dual antiplatelet therapy with aspirin and Plavix, statin for secondary stroke prevention. Cardiology evaluation for 30-day monitor and LUCIA were recommended due to areas of bilateral stroke. A1C 7.1,     Today, he is accompanied by his mom and sister. He is participating in PT and OT. His speech has improved. He still has significant weakness in his  right upper and lower extremity. He is wearing his cardiac monitor today. He is compliant with his medications. He is using a walker today.     Current Outpatient Medications   Medication Sig Dispense Refill    metFORMIN (GLUCOPHAGE-XR) 500 MG extended release tablet Take 1 tablet by mouth daily (with breakfast) 90 tablet 1    lisinopril

## 2023-07-17 ENCOUNTER — TELEPHONE (OUTPATIENT)
Age: 53
End: 2023-07-17

## 2023-07-17 NOTE — TELEPHONE ENCOUNTER
Received a referral from Jes SHEPPARD for acute CVA, left vertebral artery stenosis, bilateral carotid stenosis. Spoke to patient. Appointment scheduled with Niecy SHEPPARD on Thursday , 7/27/23 @ 1 pm. Directions to our office were given to patient in detail. Patient agreeable and verbalized understanding. Nothing further needed.

## 2023-07-19 ENCOUNTER — TELEPHONE (OUTPATIENT)
Age: 53
End: 2023-07-19

## 2023-07-27 ENCOUNTER — PREP FOR PROCEDURE (OUTPATIENT)
Age: 53
End: 2023-07-27

## 2023-07-27 ENCOUNTER — CLINICAL DOCUMENTATION (OUTPATIENT)
Age: 53
End: 2023-07-27

## 2023-07-27 ENCOUNTER — HOSPITAL ENCOUNTER (OUTPATIENT)
Age: 53
Discharge: HOME | End: 2023-07-27
Payer: COMMERCIAL

## 2023-07-27 ENCOUNTER — INITIAL CONSULT (OUTPATIENT)
Age: 53
End: 2023-07-27
Payer: COMMERCIAL

## 2023-07-27 VITALS
OXYGEN SATURATION: 96 % | HEART RATE: 79 BPM | SYSTOLIC BLOOD PRESSURE: 122 MMHG | WEIGHT: 232.38 LBS | BODY MASS INDEX: 31.52 KG/M2 | DIASTOLIC BLOOD PRESSURE: 82 MMHG | RESPIRATION RATE: 16 BRPM

## 2023-07-27 DIAGNOSIS — I65.09 VERTEBRAL ARTERY STENOSIS, UNSPECIFIED LATERALITY: ICD-10-CM

## 2023-07-27 DIAGNOSIS — I66.02 STENOSIS OF LEFT MIDDLE CEREBRAL ARTERY: ICD-10-CM

## 2023-07-27 DIAGNOSIS — R53.1 RIGHT SIDED WEAKNESS: ICD-10-CM

## 2023-07-27 DIAGNOSIS — I63.9 ARTERIAL ISCHEMIC STROKE (HCC): ICD-10-CM

## 2023-07-27 DIAGNOSIS — Z86.718 HISTORY OF DVT (DEEP VEIN THROMBOSIS): ICD-10-CM

## 2023-07-27 DIAGNOSIS — I67.9 INTRACRANIAL VASCULAR STENOSIS: ICD-10-CM

## 2023-07-27 DIAGNOSIS — I63.9 ARTERIAL ISCHEMIC STROKE (HCC): Primary | ICD-10-CM

## 2023-07-27 LAB
CRP SERPL HS-MCNC: 8.2 MG/L
ERYTHROCYTE SEDIMENTATION RATE: 66 MM/HR (ref 0–20)

## 2023-07-27 PROCEDURE — 3079F DIAST BP 80-89 MM HG: CPT | Performed by: NURSE PRACTITIONER

## 2023-07-27 PROCEDURE — 81241 F5 GENE: CPT

## 2023-07-27 PROCEDURE — 85652 RBC SED RATE AUTOMATED: CPT

## 2023-07-27 PROCEDURE — 86038 ANTINUCLEAR ANTIBODIES: CPT

## 2023-07-27 PROCEDURE — 99205 OFFICE O/P NEW HI 60 MIN: CPT | Performed by: NURSE PRACTITIONER

## 2023-07-27 PROCEDURE — 3074F SYST BP LT 130 MM HG: CPT | Performed by: NURSE PRACTITIONER

## 2023-07-27 PROCEDURE — 36415 COLL VENOUS BLD VENIPUNCTURE: CPT

## 2023-07-27 PROCEDURE — 86147 CARDIOLIPIN ANTIBODY EA IG: CPT

## 2023-07-27 PROCEDURE — 85300 ANTITHROMBIN III ACTIVITY: CPT

## 2023-07-27 PROCEDURE — 86140 C-REACTIVE PROTEIN: CPT

## 2023-07-27 PROCEDURE — 85301 ANTITHROMBIN III ANTIGEN: CPT

## 2023-07-27 RX ORDER — SODIUM CHLORIDE 0.9 % (FLUSH) 0.9 %
5-40 SYRINGE (ML) INJECTION EVERY 12 HOURS SCHEDULED
Status: CANCELLED | OUTPATIENT
Start: 2023-07-27

## 2023-07-27 RX ORDER — SODIUM CHLORIDE 9 MG/ML
INJECTION, SOLUTION INTRAVENOUS CONTINUOUS
Status: CANCELLED | OUTPATIENT
Start: 2023-07-27

## 2023-07-27 RX ORDER — SODIUM CHLORIDE 0.9 % (FLUSH) 0.9 %
5-40 SYRINGE (ML) INJECTION PRN
Status: CANCELLED | OUTPATIENT
Start: 2023-07-27

## 2023-07-27 RX ORDER — SODIUM CHLORIDE 9 MG/ML
INJECTION, SOLUTION INTRAVENOUS PRN
Status: CANCELLED | OUTPATIENT
Start: 2023-07-27

## 2023-07-27 RX ORDER — ALUMINUM ZIRCONIUM OCTACHLOROHYDREX GLY 16 G/100G
1 GEL TOPICAL DAILY
COMMUNITY

## 2023-07-27 NOTE — PROGRESS NOTES
Spoke to patient in office regarding upcoming diagnostic cerebral angiogram procedure scheduled with Dr Larry Bates on Tuesday, 8/1/23 @ 11 am.     Patient was informed of the following:  - Arrive 2 hours before the scheduled procedure - 9 am  - Check in at the main registration desk  - Do not eat or drink anything 8 hours before the scheduled procedure  - Confirmed that patient does NOT have any allergies to IV contrast, shellfish, metal or latex  - Patient does take oral diabetic medication (metformin) and informed to hold this medication starting 24 hours prior to procedure and not to restart until 48 hours after the procedure   - Take all other prescription medication as directed with a small sip of water  - Continue ASA and Plavix   - Make sure you have someone to drive you home after the procedure as you will not be permitted to drive home    Patient agreeable and verbalized understanding.

## 2023-07-27 NOTE — PROGRESS NOTES
Provided stroke education folder. Discussed OP therapies, after effects of stroke,  readiness evaluation and stroke support group with pt. Made pt aware that I can be contacted for any additional questions regarding stroke and left my contact information.
06/25/2023     Lab Results   Component Value Date    TRIG 139 06/25/2023     Lab Results   Component Value Date    HDL 36 (L) 06/25/2023     Lab Results   Component Value Date    LDLCALC 200 (H) 06/25/2023     Lab Results   Component Value Date    LABVLDL 32 07/16/2020     No results found for: CHOLHDLRATIO      IMAGING:      CTA:    IMPRESSION:  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 Asymmetrically  decreased contrast opacification in the left cervical vertebral artery,  likely related to distal occlusion. Moderate stenosis in the P2 segments of the bilateral PCAs. Severe stenosis in the distal M1 segment of the left MCA. Mild to moderate stenosis at the origin of M2 segment of the right MCA. MRI:    IMPRESSION:  Multiple small acute to subacute infarctions in the parasagittal posterior  right frontal lobe in the right ARTIE territory. Small subacute infarction in the anterior left medulla. Mild chronic microvascular disease. Asymmetrically decreased normal flow void in left vertebral artery, likely  related to severe stenosis. Specimen Collected: 06/25/23 13:04 EDT Last Resulted: 06/25/23 14:16                   ASSESSMENT/PLAN:     Arterial ischemic stroke  Right-sided weakness    Continue ASA, Plavix, and Lipitor for secondary prevention of stroke    Continue PT/OT    The patient has a history of DVT right subclavian and IJ veins in addition to stroke at a young age will obtain hypercoagulable work up. Patient with left MCA, left vertebral artery, and bilateral PCA stenosis on CTA. Given age with luminal changes and stroke in multiple vascular distributions will obtain CRP and ESR as part of vasculitis work up. Recent stroke of the right frontal lobe etiology concerning for cardioembolic source. Agree with extended heart monitor for evaluation of PAF. Follow up with cardiology.     Stroke risk factor modification:    Long-term goal BP < 130/80  Per

## 2023-07-28 NOTE — PROGRESS NOTES
IR Procedure at [facility]:  Left message for patient to arrive at 0900 at [facility] on 8/1/2023 for his procedure at 1100. Also went over below instructions and told patient to hold his metformin 24 hours before procedure and for 48 hours after his procedure. Per instructions on order sheet. NPO at [address]       2. Follow your directions as prescribed by the doctor for your procedure and medications. 3.   Consult your provider as to when to stop blood thinner  4. Do not take any pain medication within 6 hours of your procedure  5. Do not drink any alcoholic beverages or use any street drugs 24 hours before procedure. 6.   Please wear simple, loose fitting clothing to the hospital.  Do not bring valuables (money,             credit cards, checkbooks, etc.)     7. If you  have a Living Will and Durable Power of  for Healthcare, please bring in a copy. 8.   Please bring picture ID,  insurance card, paperwork from the doctors office            (H & P, Consent,  & card for implantable devices). 9.   Report to the information desk on the ground floor. 10. Take a shower the night before or morning of your procedure, do not apply any lotion, oil or powder. 11. If you are going to be sedated for the procedure, you will need a responsible adult to drive you home.

## 2023-07-29 LAB
ANTITHROMBIN ANTIGEN: 101 % (ref 82–136)
AT III ACT/NOR PPP CHRO: 122 % (ref 76–128)
Lab: ABNORMAL
TEST NAME: ABNORMAL

## 2023-07-30 LAB
ANTICARDIOLIPIN IGG ANTIBODY: <10 GPL
CARDIOLIPIN IGA SER IA-ACNC: <10 APL
CARDIOLIPIN IGM SER IA-ACNC: 18 MPL

## 2023-07-31 ENCOUNTER — TELEPHONE (OUTPATIENT)
Age: 53
End: 2023-07-31

## 2023-07-31 DIAGNOSIS — Z86.73 HISTORY OF STROKE: ICD-10-CM

## 2023-07-31 DIAGNOSIS — Z86.718 HISTORY OF DVT (DEEP VEIN THROMBOSIS): ICD-10-CM

## 2023-07-31 DIAGNOSIS — R89.9 ABNORMAL LABORATORY TEST: Primary | ICD-10-CM

## 2023-07-31 NOTE — TELEPHONE ENCOUNTER
Spoke to patient and informed him of Lauren EMANUEL-CNP recommendations. Patient agreeable and verbalized understanding. Referral printed and faxed to hematology @ [phone]. Scanned confirmation in media.

## 2023-07-31 NOTE — TELEPHONE ENCOUNTER
----- Message from ADELFO Garza CNP sent at 7/31/2023 10:40 AM EDT -----  Regarding: follow up  Amina Davila    Can we let this patient know that some of his hypercoagulable work up was abnormal.  Given his history of stroke and DVT, along with abnormal lab work I would like him to follow up with hematology.   Can we let the patient know I have placed a referral and follow up with hematology to make sure they have the referral.    Thanks  [address]

## 2023-07-31 NOTE — PROGRESS NOTES
Elevated Cardiolipin IgM and IgA. The patient has a pmh of  right subclavian and IJ DVT as well as stroke at a young age. Refer to hematology. CTA with left MCA, left vertebral artery, and bilateral PCA stenosis. ESR and CRP are elevated. History of stroke at a young age recommend diagnostic cerebral angiogram with Dr. Carlos Mendosa for evaluation of cerebral vasculitis/and or intracranial stenosis.

## 2023-08-01 ENCOUNTER — HOSPITAL ENCOUNTER (OUTPATIENT)
Dept: INTERVENTIONAL RADIOLOGY/VASCULAR | Age: 53
Discharge: HOME | End: 2023-08-01
Payer: COMMERCIAL

## 2023-08-01 ENCOUNTER — TELEPHONE (OUTPATIENT)
Age: 53
End: 2023-08-01

## 2023-08-01 VITALS
BODY MASS INDEX: 31.42 KG/M2 | DIASTOLIC BLOOD PRESSURE: 77 MMHG | RESPIRATION RATE: 16 BRPM | OXYGEN SATURATION: 97 % | TEMPERATURE: 96.7 F | HEART RATE: 62 BPM | WEIGHT: 232 LBS | SYSTOLIC BLOOD PRESSURE: 109 MMHG | HEIGHT: 72 IN

## 2023-08-01 DIAGNOSIS — Z86.73 HISTORY OF STROKE: ICD-10-CM

## 2023-08-01 DIAGNOSIS — I77.6 VASCULITIS (HCC): Primary | ICD-10-CM

## 2023-08-01 DIAGNOSIS — I63.9 ARTERIAL ISCHEMIC STROKE (HCC): ICD-10-CM

## 2023-08-01 LAB
ANION GAP SERPL CALCULATED.3IONS-SCNC: 8 MMOL/L (ref 4–16)
APTT: 30.2 SECONDS (ref 25.1–37.1)
BUN SERPL-MCNC: 9 MG/DL (ref 6–23)
CALCIUM SERPL-MCNC: 9.4 MG/DL (ref 8.3–10.6)
CHLORIDE BLD-SCNC: 99 MMOL/L (ref 99–110)
CO2: 30 MMOL/L (ref 21–32)
CREAT SERPL-MCNC: 0.6 MG/DL (ref 0.9–1.3)
GFR SERPL CREATININE-BSD FRML MDRD: >60 ML/MIN/1.73M2
GLUCOSE SERPL-MCNC: 153 MG/DL (ref 70–99)
HCT VFR BLD CALC: 45.9 % (ref 42–52)
HEMOGLOBIN: 14.9 GM/DL (ref 13.5–18)
INR BLD: 1 INDEX
MCH RBC QN AUTO: 27.8 PG (ref 27–31)
MCHC RBC AUTO-ENTMCNC: 32.5 % (ref 32–36)
MCV RBC AUTO: 85.6 FL (ref 78–100)
PDW BLD-RTO: 12.5 % (ref 11.7–14.9)
PLATELET # BLD: 200 K/CU MM (ref 140–440)
PMV BLD AUTO: 10.2 FL (ref 7.5–11.1)
POTASSIUM SERPL-SCNC: 4.4 MMOL/L (ref 3.5–5.1)
PROTHROMBIN TIME: 13.1 SECONDS (ref 11.7–14.5)
RBC # BLD: 5.36 M/CU MM (ref 4.6–6.2)
SODIUM BLD-SCNC: 137 MMOL/L (ref 135–145)
WBC # BLD: 7.5 K/CU MM (ref 4–10.5)

## 2023-08-01 PROCEDURE — 6360000004 HC RX CONTRAST MEDICATION

## 2023-08-01 PROCEDURE — 85027 COMPLETE CBC AUTOMATED: CPT

## 2023-08-01 PROCEDURE — 99222 1ST HOSP IP/OBS MODERATE 55: CPT | Performed by: NURSE PRACTITIONER

## 2023-08-01 PROCEDURE — 6360000002 HC RX W HCPCS: Performed by: PSYCHIATRY & NEUROLOGY

## 2023-08-01 PROCEDURE — 2500000003 HC RX 250 WO HCPCS: Performed by: PSYCHIATRY & NEUROLOGY

## 2023-08-01 PROCEDURE — 2709999900 IR ANGIOGRAM CAROTID CEREBRAL BILATERAL

## 2023-08-01 PROCEDURE — 80048 BASIC METABOLIC PNL TOTAL CA: CPT

## 2023-08-01 PROCEDURE — 85730 THROMBOPLASTIN TIME PARTIAL: CPT

## 2023-08-01 PROCEDURE — 61650 EVASC PRLNG ADMN RX AGNT 1ST: CPT

## 2023-08-01 PROCEDURE — 6360000002 HC RX W HCPCS

## 2023-08-01 PROCEDURE — 2500000003 HC RX 250 WO HCPCS

## 2023-08-01 PROCEDURE — 36226 PLACE CATH VERTEBRAL ART: CPT

## 2023-08-01 PROCEDURE — 36227 PLACE CATH XTRNL CAROTID: CPT

## 2023-08-01 PROCEDURE — 36224 PLACE CATH CAROTD ART: CPT

## 2023-08-01 PROCEDURE — 85610 PROTHROMBIN TIME: CPT

## 2023-08-01 RX ORDER — SODIUM CHLORIDE 0.9 % (FLUSH) 0.9 %
5-40 SYRINGE (ML) INJECTION PRN
Status: DISCONTINUED | OUTPATIENT
Start: 2023-08-01 | End: 2023-08-02 | Stop reason: HOSPADM

## 2023-08-01 RX ORDER — ONDANSETRON 4 MG/1
4 TABLET, ORALLY DISINTEGRATING ORAL EVERY 8 HOURS PRN
Status: DISCONTINUED | OUTPATIENT
Start: 2023-08-01 | End: 2023-08-02 | Stop reason: HOSPADM

## 2023-08-01 RX ORDER — SODIUM CHLORIDE 9 MG/ML
INJECTION, SOLUTION INTRAVENOUS PRN
Status: DISCONTINUED | OUTPATIENT
Start: 2023-08-01 | End: 2023-08-02 | Stop reason: HOSPADM

## 2023-08-01 RX ORDER — FENTANYL CITRATE 50 UG/ML
INJECTION, SOLUTION INTRAMUSCULAR; INTRAVENOUS PRN
Status: COMPLETED | OUTPATIENT
Start: 2023-08-01 | End: 2023-08-01

## 2023-08-01 RX ORDER — VERAPAMIL HYDROCHLORIDE 2.5 MG/ML
INJECTION, SOLUTION INTRAVENOUS PRN
Status: COMPLETED | OUTPATIENT
Start: 2023-08-01 | End: 2023-08-01

## 2023-08-01 RX ORDER — ONDANSETRON 2 MG/ML
4 INJECTION INTRAMUSCULAR; INTRAVENOUS EVERY 6 HOURS PRN
Status: DISCONTINUED | OUTPATIENT
Start: 2023-08-01 | End: 2023-08-02 | Stop reason: HOSPADM

## 2023-08-01 RX ORDER — SODIUM CHLORIDE 0.9 % (FLUSH) 0.9 %
5-40 SYRINGE (ML) INJECTION EVERY 12 HOURS SCHEDULED
Status: DISCONTINUED | OUTPATIENT
Start: 2023-08-01 | End: 2023-08-02 | Stop reason: HOSPADM

## 2023-08-01 RX ORDER — MIDAZOLAM HYDROCHLORIDE 5 MG/ML
INJECTION INTRAMUSCULAR; INTRAVENOUS PRN
Status: COMPLETED | OUTPATIENT
Start: 2023-08-01 | End: 2023-08-01

## 2023-08-01 RX ORDER — ACETAMINOPHEN 325 MG/1
650 TABLET ORAL EVERY 4 HOURS PRN
Status: DISCONTINUED | OUTPATIENT
Start: 2023-08-01 | End: 2023-08-02 | Stop reason: HOSPADM

## 2023-08-01 RX ORDER — SODIUM CHLORIDE 9 MG/ML
INJECTION, SOLUTION INTRAVENOUS CONTINUOUS
Status: DISCONTINUED | OUTPATIENT
Start: 2023-08-01 | End: 2023-08-02 | Stop reason: HOSPADM

## 2023-08-01 RX ADMIN — MIDAZOLAM 1 MG: 5 INJECTION INTRAMUSCULAR; INTRAVENOUS at 11:25

## 2023-08-01 RX ADMIN — VERAPAMIL HYDROCHLORIDE 10 MG: 2.5 INJECTION, SOLUTION INTRAVENOUS at 11:40

## 2023-08-01 RX ADMIN — FENTANYL CITRATE 100 MCG: 50 INJECTION, SOLUTION INTRAMUSCULAR; INTRAVENOUS at 11:25

## 2023-08-01 NOTE — PROGRESS NOTES
Discharge instructions discussed with patient per Dr Arleen Cardona. Written instructions given, patient signed. Taken to private vehicle per wheel chair.

## 2023-08-01 NOTE — PROGRESS NOTES
TRANSFER - OUT REPORT:    Verbal report given at bedside on Letitia Parra being transferred to Post- procedure cath holding for routine post-op       Report consisted of patient's Situation, Background, Assessment and   Recommendations(SBAR). Information from the following report(s) Nurse Handoff Report was reviewed with the receiving nurse. Opportunity for questions and clarification was provided.       Patient transported with:   Registered Nurse

## 2023-08-01 NOTE — OP NOTE
Cerebral and Cervical Angiogram    Queen José Luis  1970     Procedure Date:  8/1/23    Access:  Right common femoral artery    Closure:  Angioseal    Indication: Intracranial stenosis and stroke    No significant blood loss  Malampati 2    Procedures performed:  Ultrasound-guided needle access into the right common femoral artery  Right common femoral artery angiogram with Angio-Seal for closure  Selective bilateral biplane carotid artery cervical angiograms  Selective bilateral biplane internal carotid artery cerebral angiograms  Selective biplane external carotid artery head angiogram.  Selective bilateral biplane vertebral cerebral angiograms  Slow infusion of verapamil into the left internal carotid artery    Procedure: The patient was appropriately consented. The patient was brought to the angiography suite laid in a supine position on the angiography table. The right groin was prepped and draped in a normal sterile fashion. Lidocaine was used for local anesthetic. Fentanyl and Versed were used for conscious sedation. A micropuncture was used with ultrasound-guided needle access into the right common femoral artery. A 5 [place] sheath was then placed. Next a 5 [place] diagnostic catheter was brought up over an angled Glidewire into the aortic arch and used to catheter the great vessels under fluoroscopic guidance. The catheter was brought into the right common carotid artery and a biplane cervical angiogram was done which shows contrast flowing antegrade direction to the common internal and external carotid arteries. There is no significant stenosis. Next the catheter was taken into the right external carotid artery head angiogram was done which shows contrast flowing antegrade direction to the external carotid artery and its branches including the internal maxillary,superficial temporal artery, middle meningeal artery and occipital arteries. No abnormalities were identified.     Next the

## 2023-08-01 NOTE — DISCHARGE INSTRUCTIONS
Cardiac catheterization  Home Instuctions  Name:Tomas La  :1970  Your instructions:    Shower only for the first 5 days, NO TUB BATHS. Wash procedure site with mild soap, rinse and pat dry. Do not rub over the procedure site for two (2) days. Remove dressing and replace with a band-aid in 2 days. Site observations-Observe the area for bleeding or hematoma (lump under the skin caused by bleeding.)      A. Painless bruising is normal.  B. If a firmness/swelling seems to be increasing or there is bright red bleeding from site       (hold pressure over procedure site) and  CALL 911 IMMEDIATELY. What to do after you leave the hospital:    Recommended activity: no lifting, Driving, or Strenuous exercise for 3 days. DO NOT lift more than 10lbs. Follow-up with physician in 7-10 days. Take your medication as ordered. If you have any questions, you may call [phone] or your physicians office    Information obtained by:  By signing below, I understand that if any problems occur once I leave the hospital I am to contact my cardiologist or family physician. I understand and acknowledge receipt of the instructions indicated above.

## 2023-08-01 NOTE — TELEPHONE ENCOUNTER
----- Message from ADELFO Castle CNP sent at 8/1/2023  3:41 PM EDT -----  Regarding: follow up  Robert Goldman,    This patient needs to follow up in our office. I ordered a lumbar puncture for him. He should follow up sometime after his LP.       Thanks  [employer]

## 2023-08-01 NOTE — TELEPHONE ENCOUNTER
Spoke to Luis Daniel in procedure scheduling. She will schedule the patient and inform me of day and time so I can let the patient know.

## 2023-08-01 NOTE — OR NURSING
PROCEDURE START: 1125  PROCEDURE TQD:[id number]  FLUORO TIME:  6.2  AK: 957  DAP: 2894  CONTRAST VOLUME:90cc    ARRHYTHMIA: none  INTERVENTION:  none

## 2023-08-01 NOTE — PROGRESS NOTES
Dc instructions reviewed with pt and family, pt verbalizes understanding.   Pt ambulated to br and voided r groin drsg dry and intact no hematoma

## 2023-08-01 NOTE — H&P
Outpatient Vascular Neurology Service Consult Note  [facility]   Patient Name: Neha Hair  : 1970        Subjective:   Reason for consult:   Neha Hair is a 46 y.o. male with a past medical history of Charcot right joint of foot, DM 2, diverticulitis, HLD, HTN, stroke, leg edema, neuropathy, and diabetic ulcers on the right foot presenting for diagnostic cerebral angiogram.  The patient has a pmh of a stroke in . MRI:  Multiple small acute to subacute infarctions in the parasagittal posterior right frontal lobe in the right ARTIE territory. Small subacute infarction in the anterior left medulla. Mild chronic microvascular disease. Asymmetrically decreased normal flow void in left vertebral artery, likely related to severe stenosis. CT head:  No acute intracranial procesess  CTA:  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 Asymmetrically decreased contrast opacification in the left cervical vertebral artery,  likely related to distal occlusion. Moderate stenosis in the P2 segments of the bilateral PCAs. Severe stenosis in the distal M1 segment of the left MCA. Mild to moderate stenosis at the origin of M2 segment of the right MCA.ion of intracranial and extracranial stenosis. He presents today for diagnostic cerebral angiogram for evaluation of left MCA stenosis, occlusion of the left vertebral artery and b/l PCA stenosis.       Past Medical History:   Diagnosis Date    Blood circulation, collateral     Callus of foot     Callus of foot 2018    Charcot's joint of foot     Charcot's joint of foot in type 2 diabetes mellitus ([address]) 2014    Closed nondisplaced fracture of fifth metatarsal bone of right foot with routine healing 2017    Diabetes mellitus ([address])     Diverticulitis     Hyperlipidemia     Hypertension     Ischemic ulcer of left foot, limited to breakdown of skin ([address]) 2016    Leg edema, right 2017    Neuropathy

## 2023-08-02 LAB — F5 P.R506Q BLD/T QL: NEGATIVE

## 2023-08-02 NOTE — TELEPHONE ENCOUNTER
Spoke to patient and informed him of lumbar puncture date and time and that PAT will be calling him with instructions and arrival time. Patient agreeable and verbalized understanding.  Follow up appointment with Crystal SHEPPARD in office on Wednesday, 8/9/23 @ 11 am.

## 2023-08-04 ENCOUNTER — TELEPHONE (OUTPATIENT)
Age: 53
End: 2023-08-04

## 2023-08-04 DIAGNOSIS — I77.6 VASCULITIS (HCC): Primary | ICD-10-CM

## 2023-08-04 NOTE — TELEPHONE ENCOUNTER
Patient called office inquiring on when to D/C plavix. Contacted the scheduling whom states the provider should give these instructions. Provider requests being off a minium of 3 days but staying on ASA. Patient did take his medication today. We moved the procedure to Tuesday August 8th at 2 pm and he will need to arrive at 12 pm.  Patient will d.c plavix x3 days and continue ASA. He will need to have his INR redrawn on Monday.

## 2023-08-07 ENCOUNTER — HOSPITAL ENCOUNTER (OUTPATIENT)
Age: 53
Discharge: HOME | End: 2023-08-07
Payer: COMMERCIAL

## 2023-08-07 ENCOUNTER — TELEPHONE (OUTPATIENT)
Dept: CARDIOLOGY CLINIC | Age: 53
End: 2023-08-07

## 2023-08-07 DIAGNOSIS — Z86.73 HISTORY OF STROKE: ICD-10-CM

## 2023-08-07 DIAGNOSIS — I77.6 VASCULITIS (HCC): ICD-10-CM

## 2023-08-07 DIAGNOSIS — I63.9 ARTERIAL ISCHEMIC STROKE (HCC): ICD-10-CM

## 2023-08-07 LAB
APTT: 31 SECONDS (ref 25.1–37.1)
HAV IGM SERPL QL IA: NON REACTIVE
HBV CORE IGM SERPL QL IA: NON REACTIVE
HBV SURFACE AG SERPL QL IA: NON REACTIVE
HCV AB SERPL QL IA: NON REACTIVE
INR BLD: 0.9 INDEX
PROTHROMBIN TIME: 13 SECONDS (ref 11.7–14.5)

## 2023-08-07 PROCEDURE — 86255 FLUORESCENT ANTIBODY SCREEN: CPT

## 2023-08-07 PROCEDURE — 85610 PROTHROMBIN TIME: CPT

## 2023-08-07 PROCEDURE — 86430 RHEUMATOID FACTOR TEST QUAL: CPT

## 2023-08-07 PROCEDURE — 36415 COLL VENOUS BLD VENIPUNCTURE: CPT

## 2023-08-07 PROCEDURE — 80074 ACUTE HEPATITIS PANEL: CPT

## 2023-08-07 PROCEDURE — 86038 ANTINUCLEAR ANTIBODIES: CPT

## 2023-08-07 PROCEDURE — 85730 THROMBOPLASTIN TIME PARTIAL: CPT

## 2023-08-07 PROCEDURE — 86162 COMPLEMENT TOTAL (CH50): CPT

## 2023-08-07 PROCEDURE — 87389 HIV-1 AG W/HIV-1&-2 AB AG IA: CPT

## 2023-08-07 NOTE — TELEPHONE ENCOUNTER
Called patient because the office was sent some paperwork from [address] [place] [address] for records and it looks as if Snow Aldana has not been seen in office. He was seen in the hospital and went to the office to have a 30 day monitor applied which no results are ready as of yet. Per patient please wait tikl he has his OV on 8/21/23 to fill out paperwork.   I verbalized understanding of patients request.

## 2023-08-08 ENCOUNTER — HOSPITAL ENCOUNTER (OUTPATIENT)
Dept: GENERAL RADIOLOGY | Age: 53
Discharge: HOME | End: 2023-08-08
Payer: COMMERCIAL

## 2023-08-08 VITALS
HEIGHT: 72 IN | HEART RATE: 66 BPM | SYSTOLIC BLOOD PRESSURE: 128 MMHG | WEIGHT: 235 LBS | TEMPERATURE: 97.5 F | BODY MASS INDEX: 31.83 KG/M2 | OXYGEN SATURATION: 96 % | DIASTOLIC BLOOD PRESSURE: 84 MMHG | RESPIRATION RATE: 16 BRPM

## 2023-08-08 DIAGNOSIS — I63.9 ARTERIAL ISCHEMIC STROKE (HCC): ICD-10-CM

## 2023-08-08 DIAGNOSIS — I77.6 VASCULITIS (HCC): ICD-10-CM

## 2023-08-08 LAB
CRYPTOC AG CSF QL IA.RAPID: NORMAL
CULTURE: NORMAL
GLUCOSE, CSF: 117 MG/DL (ref 40–75)
HIV 1+2 AB+HIV1P24 AG SERPLBLD IA.RAPID: NON REACTIVE
LYMPHS CSF: 0 %
Lab: NORMAL
OTHER CELLS CSF: 0 CU MM
OTHER CELLS, CSF: 0 %
PROTEIN CSF: 76 MG/DL (ref 15–45)
RBC CSF: 47 CU MM
RHEUMATOID FACTOR: <10 IU/ML (ref 0–14)
SEGS, CSF: 0 %
SPECIMEN: NORMAL
WBC CSF: 19 CU MM (ref 0–5)

## 2023-08-08 PROCEDURE — 87483 CNS DNA AMP PROBE TYPE 12-25: CPT

## 2023-08-08 PROCEDURE — 87070 CULTURE OTHR SPECIMN AEROBIC: CPT

## 2023-08-08 PROCEDURE — 89051 BODY FLUID CELL COUNT: CPT

## 2023-08-08 PROCEDURE — 84157 ASSAY OF PROTEIN OTHER: CPT

## 2023-08-08 PROCEDURE — 83916 OLIGOCLONAL BANDS: CPT

## 2023-08-08 PROCEDURE — 82945 GLUCOSE OTHER FLUID: CPT

## 2023-08-08 PROCEDURE — 86592 SYPHILIS TEST NON-TREP QUAL: CPT

## 2023-08-08 PROCEDURE — 62328 DX LMBR SPI PNXR W/FLUOR/CT: CPT

## 2023-08-08 PROCEDURE — 87327 CRYPTOCOCCUS NEOFORM AG IA: CPT

## 2023-08-08 RX ORDER — ACETAMINOPHEN 325 MG/1
650 TABLET ORAL EVERY 4 HOURS PRN
Status: DISCONTINUED | OUTPATIENT
Start: 2023-08-08 | End: 2023-08-09 | Stop reason: HOSPADM

## 2023-08-08 ASSESSMENT — PAIN SCALES - GENERAL
PAINLEVEL_OUTOF10: 0
PAINLEVEL_OUTOF10: 0

## 2023-08-08 ASSESSMENT — PAIN - FUNCTIONAL ASSESSMENT: PAIN_FUNCTIONAL_ASSESSMENT: 0-10

## 2023-08-08 NOTE — PROGRESS NOTES
1524 Patient returned to room from Radiology  A+Ox4,  VSS (see doc flow), assessment completed as per doc flow. Patient has no c/o pain, and denies any needs at this time. Band aid covering puncture site, scant amount of blood noted, no swelling. Beverage offered. Call light in reach, bed in low position. RN to continue to monitor. Family in hospital visiting other family. 1610 Red top sent to lab  1615 scant amount of blood noted on band aid at puncture site, no swelling noted. 1620 Patient discharge instructions reviewed and verified. RN reviewed d/c instructions with patient and family. All questions answered. Discharge paperwork signed by RN and patient's sister. 1630 Discharged to car via wheelchair, home with sister.

## 2023-08-08 NOTE — DISCHARGE INSTRUCTIONS
Watch for signs of infection    Elevated temperature, redness or pain at site,   Drainage at incision.

## 2023-08-09 LAB
ANCA AB PATTERN SER IF-IMP: NORMAL
ANCA IGG TITR SER IF: NORMAL {TITER}
COMPLEMENT TOTAL (CH50): 72 U/ML (ref 38.7–89.9)
CULTURE: ABNORMAL
GRAM SMEAR: ABNORMAL
Lab: ABNORMAL
SPECIMEN: ABNORMAL

## 2023-08-10 ENCOUNTER — OFFICE VISIT (OUTPATIENT)
Age: 53
End: 2023-08-10
Payer: COMMERCIAL

## 2023-08-10 VITALS
HEART RATE: 68 BPM | OXYGEN SATURATION: 95 % | BODY MASS INDEX: 31.75 KG/M2 | SYSTOLIC BLOOD PRESSURE: 122 MMHG | RESPIRATION RATE: 16 BRPM | WEIGHT: 234.13 LBS | DIASTOLIC BLOOD PRESSURE: 76 MMHG

## 2023-08-10 DIAGNOSIS — R53.1 RIGHT SIDED WEAKNESS: ICD-10-CM

## 2023-08-10 DIAGNOSIS — I77.6 VASCULITIS (HCC): ICD-10-CM

## 2023-08-10 DIAGNOSIS — I63.9 ARTERIAL ISCHEMIC STROKE (HCC): Primary | ICD-10-CM

## 2023-08-10 DIAGNOSIS — I66.02 STENOSIS OF LEFT MIDDLE CEREBRAL ARTERY: ICD-10-CM

## 2023-08-10 LAB — NUCLEAR IGG SER QL IA: NORMAL

## 2023-08-10 PROCEDURE — 3074F SYST BP LT 130 MM HG: CPT | Performed by: NURSE PRACTITIONER

## 2023-08-10 PROCEDURE — 99215 OFFICE O/P EST HI 40 MIN: CPT | Performed by: NURSE PRACTITIONER

## 2023-08-10 PROCEDURE — 3078F DIAST BP <80 MM HG: CPT | Performed by: NURSE PRACTITIONER

## 2023-08-10 NOTE — PROGRESS NOTES
Outpatient Vascular/Neurointerventional Neurology Progress Note    Patient Name: Good Lazar     : 1970      Subjective: The patient was seen and examined. The patient is seen in the office today for follow up. On 23 he underwent a diagnostic cerebral angiogram which demonstrated severe stenosis of the left middle cerebral artery distal M1 segment at the origin of the superior and inferior divisions. Additionally there are diffuse luminal irregularities throughout the the intracranial circulation. The etiology to these luminal irregularities is unknown at this point. Further work-up will be done. The high-grade stenosis of the left middle cerebral artery was not improved after 10 mg infusion of verapamil. 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 In 2016 the patient was admitted with renal failure, abdominal pain, hypotension and swelling of his right upper extremity. He was found to have a right subclavian and IJ DVT's and his CT scan showed SB wall thickening concerning for ischemia. He underwent exploratory laparotomy and was found to have hemorrhagic SB. His primary care provider did a work up for Henoch-Schonlein and biopsy of the skin was done. LM did not show leukocytoclastic vasculitis but lymphocytes and IF was neg for Ig A     Vasculitis work up ordered following angiogram on 23 included spinal tap and labs for evaluation of infectious vs auto-immune vasculitis etiology. Elevated ESR of 66   Elevated CRP of 8.2  Cardiolipin Ab IgM elevated at 18  Anticardiolipin IgA elevated at 10    CSF profile indicated an   elevated glucose of 117  Elevated protein of 76,   Elevated RBC's of 47   Elevated WBC's of 19.     The patient's sister states that on 23

## 2023-08-11 LAB — VDRL CSF QL: NON REACTIVE

## 2023-08-12 LAB
ALB CSF/SERPL: 13.7 RATIO (ref 0–9)
ALBUMIN CSF-MCNC: 50 MG/DL (ref 0–35)
ALBUMIN SERPL-MCNC: 3644 MG/DL (ref 3500–5200)
CRYOGLOB SER QL: NORMAL
IGG CSF-MCNC: 11.1 MG/DL (ref 0–6)
IGG SERPL-MCNC: 1589 MG/DL (ref 768–1632)
IGG SYNTH RATE SER+CSF CALC-MRATE: 1.9 MG/D
IGG/ALB CLEAR SER+CSF-RTO: 0.51 RATIO (ref 0.28–0.66)
IGG/ALB CSF: 0.22 RATIO (ref 0.09–0.25)
OLIGOCLONAL BANDS CSF ELPH-IMP: ABNORMAL
OLIGOCLONAL BANDS CSF ELPH-IMP: NEGATIVE
OLIGOCLONAL BANDS CSF IEF: ABNORMAL BANDS (ref 0–1)

## 2023-08-14 ENCOUNTER — CARE COORDINATION (OUTPATIENT)
Dept: MEDSURG UNIT | Age: 53
End: 2023-08-14

## 2023-08-14 ENCOUNTER — HOSPITAL ENCOUNTER (INPATIENT)
Age: 53
LOS: 4 days | Discharge: HOME | End: 2023-08-18
Attending: PSYCHIATRY & NEUROLOGY | Admitting: PSYCHIATRY & NEUROLOGY
Payer: COMMERCIAL

## 2023-08-14 DIAGNOSIS — I63.9 ACUTE CEREBROVASCULAR ACCIDENT (CVA) (HCC): ICD-10-CM

## 2023-08-14 DIAGNOSIS — I77.6 VASCULITIS (HCC): Primary | ICD-10-CM

## 2023-08-14 DIAGNOSIS — G81.91 RIGHT HEMIPARESIS (HCC): ICD-10-CM

## 2023-08-14 LAB
ANION GAP SERPL CALCULATED.3IONS-SCNC: 11 MMOL/L (ref 4–16)
BASOPHILS ABSOLUTE: 0 K/CU MM
BASOPHILS RELATIVE PERCENT: 0.4 % (ref 0–1)
BUN SERPL-MCNC: 12 MG/DL (ref 6–23)
CALCIUM SERPL-MCNC: 9.9 MG/DL (ref 8.3–10.6)
CHLORIDE BLD-SCNC: 98 MMOL/L (ref 99–110)
CO2: 28 MMOL/L (ref 21–32)
CREAT SERPL-MCNC: 0.6 MG/DL (ref 0.9–1.3)
DIFFERENTIAL TYPE: ABNORMAL
EOSINOPHILS ABSOLUTE: 0.2 K/CU MM
EOSINOPHILS RELATIVE PERCENT: 2.6 % (ref 0–3)
GFR SERPL CREATININE-BSD FRML MDRD: >60 ML/MIN/1.73M2
GLUCOSE BLD-MCNC: 181 MG/DL (ref 70–99)
GLUCOSE BLD-MCNC: 184 MG/DL (ref 70–99)
GLUCOSE SERPL-MCNC: 186 MG/DL (ref 70–99)
HCT VFR BLD CALC: 47.3 % (ref 42–52)
HEMOGLOBIN: 15.8 GM/DL (ref 13.5–18)
IMMATURE NEUTROPHIL %: 0.1 % (ref 0–0.43)
LYMPHOCYTES ABSOLUTE: 2.3 K/CU MM
LYMPHOCYTES RELATIVE PERCENT: 28.5 % (ref 24–44)
MCH RBC QN AUTO: 27.8 PG (ref 27–31)
MCHC RBC AUTO-ENTMCNC: 33.4 % (ref 32–36)
MCV RBC AUTO: 83.1 FL (ref 78–100)
MONOCYTES ABSOLUTE: 0.4 K/CU MM
MONOCYTES RELATIVE PERCENT: 4.7 % (ref 0–4)
NUCLEATED RBC %: 0 %
PDW BLD-RTO: 12.6 % (ref 11.7–14.9)
PLATELET # BLD: 217 K/CU MM (ref 140–440)
PMV BLD AUTO: 9.8 FL (ref 7.5–11.1)
POTASSIUM SERPL-SCNC: 3.8 MMOL/L (ref 3.5–5.1)
RBC # BLD: 5.69 M/CU MM (ref 4.6–6.2)
SEGMENTED NEUTROPHILS ABSOLUTE COUNT: 5.1 K/CU MM
SEGMENTED NEUTROPHILS RELATIVE PERCENT: 63.7 % (ref 36–66)
SODIUM BLD-SCNC: 137 MMOL/L (ref 135–145)
TOTAL IMMATURE NEUTOROPHIL: 0.01 K/CU MM
TOTAL NUCLEATED RBC: 0 K/CU MM
WBC # BLD: 7.9 K/CU MM (ref 4–10.5)

## 2023-08-14 PROCEDURE — 1200000000 HC SEMI PRIVATE

## 2023-08-14 PROCEDURE — 6360000002 HC RX W HCPCS: Performed by: NURSE PRACTITIONER

## 2023-08-14 PROCEDURE — 85025 COMPLETE CBC W/AUTO DIFF WBC: CPT

## 2023-08-14 PROCEDURE — 2580000003 HC RX 258: Performed by: NURSE PRACTITIONER

## 2023-08-14 PROCEDURE — 80048 BASIC METABOLIC PNL TOTAL CA: CPT

## 2023-08-14 PROCEDURE — 99223 1ST HOSP IP/OBS HIGH 75: CPT | Performed by: NURSE PRACTITIONER

## 2023-08-14 PROCEDURE — 6370000000 HC RX 637 (ALT 250 FOR IP): Performed by: NURSE PRACTITIONER

## 2023-08-14 PROCEDURE — 82962 GLUCOSE BLOOD TEST: CPT

## 2023-08-14 PROCEDURE — 36415 COLL VENOUS BLD VENIPUNCTURE: CPT

## 2023-08-14 RX ORDER — LISINOPRIL 20 MG/1
20 TABLET ORAL 2 TIMES DAILY
Status: DISCONTINUED | OUTPATIENT
Start: 2023-08-14 | End: 2023-08-18 | Stop reason: HOSPADM

## 2023-08-14 RX ORDER — GLUCAGON 1 MG/ML
1 KIT INJECTION PRN
Status: DISCONTINUED | OUTPATIENT
Start: 2023-08-14 | End: 2023-08-18 | Stop reason: HOSPADM

## 2023-08-14 RX ORDER — ACETAMINOPHEN 325 MG/1
650 TABLET ORAL EVERY 4 HOURS PRN
Status: DISCONTINUED | OUTPATIENT
Start: 2023-08-14 | End: 2023-08-18 | Stop reason: HOSPADM

## 2023-08-14 RX ORDER — SODIUM CHLORIDE 9 MG/ML
INJECTION, SOLUTION INTRAVENOUS PRN
Status: DISCONTINUED | OUTPATIENT
Start: 2023-08-14 | End: 2023-08-18 | Stop reason: HOSPADM

## 2023-08-14 RX ORDER — ESCITALOPRAM OXALATE 10 MG/1
10 TABLET ORAL DAILY
Status: DISCONTINUED | OUTPATIENT
Start: 2023-08-14 | End: 2023-08-18 | Stop reason: HOSPADM

## 2023-08-14 RX ORDER — CLOPIDOGREL BISULFATE 75 MG/1
75 TABLET ORAL DAILY
Status: DISCONTINUED | OUTPATIENT
Start: 2023-08-14 | End: 2023-08-18 | Stop reason: HOSPADM

## 2023-08-14 RX ORDER — POLYETHYLENE GLYCOL 3350 17 G/17G
17 POWDER, FOR SOLUTION ORAL DAILY PRN
Status: DISCONTINUED | OUTPATIENT
Start: 2023-08-14 | End: 2023-08-18 | Stop reason: HOSPADM

## 2023-08-14 RX ORDER — SODIUM CHLORIDE 0.9 % (FLUSH) 0.9 %
5-40 SYRINGE (ML) INJECTION PRN
Status: DISCONTINUED | OUTPATIENT
Start: 2023-08-14 | End: 2023-08-18 | Stop reason: HOSPADM

## 2023-08-14 RX ORDER — ONDANSETRON 4 MG/1
4 TABLET, ORALLY DISINTEGRATING ORAL EVERY 8 HOURS PRN
Status: DISCONTINUED | OUTPATIENT
Start: 2023-08-14 | End: 2023-08-18 | Stop reason: HOSPADM

## 2023-08-14 RX ORDER — AMLODIPINE BESYLATE 10 MG/1
10 TABLET ORAL DAILY
Status: DISCONTINUED | OUTPATIENT
Start: 2023-08-15 | End: 2023-08-18 | Stop reason: HOSPADM

## 2023-08-14 RX ORDER — INSULIN LISPRO 100 [IU]/ML
0-4 INJECTION, SOLUTION INTRAVENOUS; SUBCUTANEOUS NIGHTLY
Status: DISCONTINUED | OUTPATIENT
Start: 2023-08-14 | End: 2023-08-15

## 2023-08-14 RX ORDER — ATORVASTATIN CALCIUM 40 MG/1
80 TABLET, FILM COATED ORAL NIGHTLY
Status: DISCONTINUED | OUTPATIENT
Start: 2023-08-14 | End: 2023-08-18 | Stop reason: HOSPADM

## 2023-08-14 RX ORDER — METFORMIN HYDROCHLORIDE 500 MG/1
500 TABLET, EXTENDED RELEASE ORAL
Status: DISCONTINUED | OUTPATIENT
Start: 2023-08-15 | End: 2023-08-18 | Stop reason: HOSPADM

## 2023-08-14 RX ORDER — ONDANSETRON 2 MG/ML
4 INJECTION INTRAMUSCULAR; INTRAVENOUS EVERY 6 HOURS PRN
Status: DISCONTINUED | OUTPATIENT
Start: 2023-08-14 | End: 2023-08-18 | Stop reason: HOSPADM

## 2023-08-14 RX ORDER — DEXTROSE MONOHYDRATE 100 MG/ML
INJECTION, SOLUTION INTRAVENOUS CONTINUOUS PRN
Status: DISCONTINUED | OUTPATIENT
Start: 2023-08-14 | End: 2023-08-18 | Stop reason: HOSPADM

## 2023-08-14 RX ORDER — INSULIN LISPRO 100 [IU]/ML
0-4 INJECTION, SOLUTION INTRAVENOUS; SUBCUTANEOUS
Status: DISCONTINUED | OUTPATIENT
Start: 2023-08-14 | End: 2023-08-15

## 2023-08-14 RX ORDER — LACTOBACILLUS RHAMNOSUS GG 10B CELL
1 CAPSULE ORAL DAILY
Status: DISCONTINUED | OUTPATIENT
Start: 2023-08-14 | End: 2023-08-18 | Stop reason: HOSPADM

## 2023-08-14 RX ORDER — SODIUM CHLORIDE 0.9 % (FLUSH) 0.9 %
5-40 SYRINGE (ML) INJECTION EVERY 12 HOURS SCHEDULED
Status: DISCONTINUED | OUTPATIENT
Start: 2023-08-14 | End: 2023-08-18 | Stop reason: HOSPADM

## 2023-08-14 RX ORDER — GABAPENTIN 300 MG/1
300 CAPSULE ORAL 3 TIMES DAILY
Status: DISCONTINUED | OUTPATIENT
Start: 2023-08-14 | End: 2023-08-18 | Stop reason: HOSPADM

## 2023-08-14 RX ORDER — ASPIRIN 81 MG/1
81 TABLET, CHEWABLE ORAL DAILY
Status: DISCONTINUED | OUTPATIENT
Start: 2023-08-14 | End: 2023-08-18 | Stop reason: HOSPADM

## 2023-08-14 RX ADMIN — LISINOPRIL 20 MG: 20 TABLET ORAL at 20:14

## 2023-08-14 RX ADMIN — Medication 1 CAPSULE: at 21:57

## 2023-08-14 RX ADMIN — ASPIRIN 81 MG: 81 TABLET, CHEWABLE ORAL at 21:57

## 2023-08-14 RX ADMIN — ATORVASTATIN CALCIUM 80 MG: 40 TABLET, FILM COATED ORAL at 20:14

## 2023-08-14 RX ADMIN — GABAPENTIN 300 MG: 300 CAPSULE ORAL at 20:14

## 2023-08-14 RX ADMIN — ESCITALOPRAM OXALATE 10 MG: 10 TABLET ORAL at 21:57

## 2023-08-14 RX ADMIN — METHYLPREDNISOLONE SODIUM SUCCINATE 1000 MG: 1 INJECTION, POWDER, LYOPHILIZED, FOR SOLUTION INTRAMUSCULAR; INTRAVENOUS at 18:27

## 2023-08-14 RX ADMIN — CLOPIDOGREL BISULFATE 75 MG: 75 TABLET ORAL at 21:57

## 2023-08-14 RX ADMIN — SODIUM CHLORIDE, PRESERVATIVE FREE 10 ML: 5 INJECTION INTRAVENOUS at 20:15

## 2023-08-14 NOTE — CONSULTS
FLUOROSCOPY DOSE AND TYPE: Radiation Exposure Index: Kerma mGy, 10.663 mGy. PROCEDURE: :  Yehudamy Addison Informed consent was obtained after the risks and benefits of the procedure were discussed with the patient and all questions were answered fully. [place] protocol was observed and a standard timeout was performed. The patient was positioned prone and the back was prepped and draped in the normal sterile fashion. 1% lidocaine was used for local anesthesia. The subarachnoid space was accessed with a 22-gauge  spinal needle at the L2/L3 level. Free flow of clear CSF was noted. Approximately 10 ml of CSF was removed and sent for analysis. The stylet was reinserted, spinal needle was removed and brief pressure was applied at the puncture site. There were no immediate complications and the patient tolerated the procedure well. Successful fluoroscopic-guided lumbar puncture. CBC: No results for input(s): WBC, HGB, PLT in the last 72 hours. BMP:  No results for input(s): NA, K, CL, CO2, BUN, CREATININE, GLUCOSE in the last 72 hours. Hepatic: No results for input(s): AST, ALT, ALB, BILITOT, ALKPHOS in the last 72 hours. Lipids:   Lab Results   Component Value Date/Time    CHOL 264 06/25/2023 05:06 PM    CHOL 243 07/16/2020 10:36 AM    HDL 36 06/25/2023 05:06 PM    TRIG 139 06/25/2023 05:06 PM     Hemoglobin A1C:   Lab Results   Component Value Date/Time    LABA1C 7.1 06/25/2023 05:06 PM     TSH: No results found for: TSH  Troponin:   Lab Results   Component Value Date/Time    TROPONINT <0.010 06/24/2023 03:30 PM     Lactic Acid: No results for input(s): LACTA in the last 72 hours. BNP: No results for input(s): PROBNP in the last 72 hours.   UA:  Lab Results   Component Value Date/Time    NITRU NEGATIVE 10/10/2017 01:05 PM    COLORU YELLOW 10/10/2017 01:05 PM    PHUR 6.5 10/10/2017 01:05 PM    LABCAST NONE SEEN 10/10/2017 01:05 PM    WBCUA NONE SEEN 10/10/2017 01:05 PM    RBCUA 1-5 10/10/2017 01:05 PM MUCUS MODERATE 11/02/2016 05:35 PM    BACTERIA NONE SEEN 10/10/2017 01:05 PM    CLARITYU SL HAZY 12/05/2016 12:40 PM    SPECGRAV 1.015 10/10/2017 01:05 PM    LEUKOCYTESUR NEGATIVE 10/10/2017 01:05 PM    UROBILINOGEN <2 10/10/2017 01:05 PM    BILIRUBINUR NEGATIVE 10/10/2017 01:05 PM    BLOODU TRACE 10/10/2017 01:05 PM    KETUA NEGATIVE 10/10/2017 01:05 PM     Urine Cultures:   Lab Results   Component Value Date/Time    LABURIN 200 mg/dl 07/11/2023 02:22 PM     Blood Cultures: No results found for: BC  No results found for: BLOODCULT2  Organism:   Lab Results   Component Value Date/Time    ORG SAUR 08/06/2018 10:20 AM    ORG ENC 08/06/2018 10:20 AM    ORG ECOL 08/06/2018 10:20 AM       Personally reviewed Lab Studies, Imaging, and discussed with pt, neuro interventional surgery    Electronically signed by Ines Funes MD on 8/14/2023 at 5:18 PM

## 2023-08-14 NOTE — H&P
Vascular/Interventional Neurology Service Consult Note  [address] [place] [address]   Patient Name: Alessandra Moreno  : 1970        Subjective:   Reason for consult:   Nain[age] Meliz[age] y.o.male PMH charcot right foot, DM2, diverticulitis, HLD, HTN, leg edema, neuropathy, diabetic ulcers on the right foot, stroke presenting to [address] [place] [address] for high-dose steroids for possible cerebral vasculitis. On 2023 the patient underwent diagnostic cerebral angiogram with Dr. Ray Jonas which demonstrated severe stenosis of the left MCA distal M1 segment at the origin of the superior and inferior divisions. Additionally there is diffuse luminal irregularities throughout the intracranial circulation. The etiology to these luminal irregularities was unknown at that time. High-grade stenosis of the left MCA was not improved after 10 mg infusion of verapamil. 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 In 2016 the patient was found to have a right subclavian and IJ DVTs and on his CT showed small bowel wall thickening concerning for ischemia. He underwent exploratory lap was found to have a hemorrhagic SB. His primary care doctor did work-up forHenuch-Schunlein and biopsy of the skin was done. Did not show leukocytoclastic vasculitis but lymphocytes and IF was negative for IgA. Vasculitis work-up showed elevated ESR and CRP. Elevated Cardiolipin Ab IgM Anticardiolipin IgA . CSF profile indicated an elevated glucose of 117, protein 76,  RBC's47 and WBC's 19. Severe left MCA stenosis on cerebral angiogram does not correlate with MRI findings in  of an acute right frontal lobe and left medullary infarct.   Since the patient was discharged from the hospital he reported right upper and lower 8/14/2023

## 2023-08-14 NOTE — CARE COORDINATION
Message received from Marcin @ pt placement. Pt has been assigned a bed. Spoke with pt and made him aware.

## 2023-08-15 ENCOUNTER — APPOINTMENT (OUTPATIENT)
Dept: MRI IMAGING | Age: 53
End: 2023-08-15
Attending: PSYCHIATRY & NEUROLOGY
Payer: COMMERCIAL

## 2023-08-15 LAB
CULTURE: ABNORMAL
GLUCOSE BLD-MCNC: 243 MG/DL (ref 70–99)
GLUCOSE BLD-MCNC: 274 MG/DL (ref 70–99)
GLUCOSE BLD-MCNC: 283 MG/DL (ref 70–99)
GLUCOSE BLD-MCNC: 306 MG/DL (ref 70–99)
GLUCOSE BLD-MCNC: 344 MG/DL (ref 70–99)
GRAM SMEAR: ABNORMAL
Lab: ABNORMAL
SPECIMEN: ABNORMAL

## 2023-08-15 PROCEDURE — 1200000000 HC SEMI PRIVATE

## 2023-08-15 PROCEDURE — 6370000000 HC RX 637 (ALT 250 FOR IP): Performed by: NURSE PRACTITIONER

## 2023-08-15 PROCEDURE — 92610 EVALUATE SWALLOWING FUNCTION: CPT | Performed by: SPEECH-LANGUAGE PATHOLOGIST

## 2023-08-15 PROCEDURE — 82962 GLUCOSE BLOOD TEST: CPT

## 2023-08-15 PROCEDURE — 94761 N-INVAS EAR/PLS OXIMETRY MLT: CPT

## 2023-08-15 PROCEDURE — 97530 THERAPEUTIC ACTIVITIES: CPT

## 2023-08-15 PROCEDURE — 97166 OT EVAL MOD COMPLEX 45 MIN: CPT

## 2023-08-15 PROCEDURE — 97162 PT EVAL MOD COMPLEX 30 MIN: CPT

## 2023-08-15 PROCEDURE — 6360000002 HC RX W HCPCS: Performed by: NURSE PRACTITIONER

## 2023-08-15 PROCEDURE — 97116 GAIT TRAINING THERAPY: CPT

## 2023-08-15 PROCEDURE — 6360000002 HC RX W HCPCS: Performed by: STUDENT IN AN ORGANIZED HEALTH CARE EDUCATION/TRAINING PROGRAM

## 2023-08-15 PROCEDURE — 6370000000 HC RX 637 (ALT 250 FOR IP): Performed by: STUDENT IN AN ORGANIZED HEALTH CARE EDUCATION/TRAINING PROGRAM

## 2023-08-15 PROCEDURE — 99232 SBSQ HOSP IP/OBS MODERATE 35: CPT | Performed by: NURSE PRACTITIONER

## 2023-08-15 PROCEDURE — 70551 MRI BRAIN STEM W/O DYE: CPT

## 2023-08-15 PROCEDURE — 2580000003 HC RX 258: Performed by: NURSE PRACTITIONER

## 2023-08-15 RX ORDER — INSULIN GLARGINE 100 [IU]/ML
10 INJECTION, SOLUTION SUBCUTANEOUS NIGHTLY
Status: DISCONTINUED | OUTPATIENT
Start: 2023-08-15 | End: 2023-08-16

## 2023-08-15 RX ORDER — INSULIN LISPRO 100 [IU]/ML
0-8 INJECTION, SOLUTION INTRAVENOUS; SUBCUTANEOUS
Status: DISCONTINUED | OUTPATIENT
Start: 2023-08-15 | End: 2023-08-15

## 2023-08-15 RX ORDER — INSULIN LISPRO 100 [IU]/ML
0-4 INJECTION, SOLUTION INTRAVENOUS; SUBCUTANEOUS NIGHTLY
Status: DISCONTINUED | OUTPATIENT
Start: 2023-08-15 | End: 2023-08-15

## 2023-08-15 RX ORDER — INSULIN LISPRO 100 [IU]/ML
0-4 INJECTION, SOLUTION INTRAVENOUS; SUBCUTANEOUS NIGHTLY
Status: DISCONTINUED | OUTPATIENT
Start: 2023-08-15 | End: 2023-08-18 | Stop reason: HOSPADM

## 2023-08-15 RX ORDER — ENOXAPARIN SODIUM 100 MG/ML
30 INJECTION SUBCUTANEOUS 2 TIMES DAILY
Status: DISCONTINUED | OUTPATIENT
Start: 2023-08-15 | End: 2023-08-18 | Stop reason: HOSPADM

## 2023-08-15 RX ORDER — INSULIN LISPRO 100 [IU]/ML
0-16 INJECTION, SOLUTION INTRAVENOUS; SUBCUTANEOUS
Status: DISCONTINUED | OUTPATIENT
Start: 2023-08-15 | End: 2023-08-18 | Stop reason: HOSPADM

## 2023-08-15 RX ADMIN — AMLODIPINE BESYLATE 10 MG: 10 TABLET ORAL at 09:46

## 2023-08-15 RX ADMIN — CLOPIDOGREL BISULFATE 75 MG: 75 TABLET ORAL at 09:46

## 2023-08-15 RX ADMIN — ENOXAPARIN SODIUM 30 MG: 100 INJECTION SUBCUTANEOUS at 20:58

## 2023-08-15 RX ADMIN — METHYLPREDNISOLONE SODIUM SUCCINATE 1000 MG: 1 INJECTION, POWDER, LYOPHILIZED, FOR SOLUTION INTRAMUSCULAR; INTRAVENOUS at 16:45

## 2023-08-15 RX ADMIN — SODIUM CHLORIDE, PRESERVATIVE FREE 10 ML: 5 INJECTION INTRAVENOUS at 20:55

## 2023-08-15 RX ADMIN — GABAPENTIN 300 MG: 300 CAPSULE ORAL at 09:46

## 2023-08-15 RX ADMIN — INSULIN LISPRO 4 UNITS: 100 INJECTION, SOLUTION INTRAVENOUS; SUBCUTANEOUS at 20:55

## 2023-08-15 RX ADMIN — LISINOPRIL 20 MG: 20 TABLET ORAL at 20:55

## 2023-08-15 RX ADMIN — INSULIN GLARGINE 10 UNITS: 100 INJECTION, SOLUTION SUBCUTANEOUS at 20:55

## 2023-08-15 RX ADMIN — LISINOPRIL 20 MG: 20 TABLET ORAL at 09:46

## 2023-08-15 RX ADMIN — ATORVASTATIN CALCIUM 80 MG: 40 TABLET, FILM COATED ORAL at 20:55

## 2023-08-15 RX ADMIN — GABAPENTIN 300 MG: 300 CAPSULE ORAL at 16:04

## 2023-08-15 RX ADMIN — Medication 1 CAPSULE: at 09:46

## 2023-08-15 RX ADMIN — SODIUM CHLORIDE, PRESERVATIVE FREE 10 ML: 5 INJECTION INTRAVENOUS at 09:47

## 2023-08-15 RX ADMIN — METFORMIN HYDROCHLORIDE 500 MG: 500 TABLET, EXTENDED RELEASE ORAL at 09:46

## 2023-08-15 RX ADMIN — INSULIN LISPRO 4 UNITS: 100 INJECTION, SOLUTION INTRAVENOUS; SUBCUTANEOUS at 09:46

## 2023-08-15 RX ADMIN — ASPIRIN 81 MG: 81 TABLET, CHEWABLE ORAL at 09:46

## 2023-08-15 RX ADMIN — INSULIN LISPRO 4 UNITS: 100 INJECTION, SOLUTION INTRAVENOUS; SUBCUTANEOUS at 18:09

## 2023-08-15 RX ADMIN — INSULIN LISPRO 6 UNITS: 100 INJECTION, SOLUTION INTRAVENOUS; SUBCUTANEOUS at 13:30

## 2023-08-15 RX ADMIN — ESCITALOPRAM OXALATE 10 MG: 10 TABLET ORAL at 09:46

## 2023-08-15 RX ADMIN — ENOXAPARIN SODIUM 30 MG: 100 INJECTION SUBCUTANEOUS at 15:48

## 2023-08-15 RX ADMIN — GABAPENTIN 300 MG: 300 CAPSULE ORAL at 20:55

## 2023-08-15 NOTE — PROGRESS NOTES
SLP ALL NOTES  Facility/Department: [address] [facility] 3E   CLINICAL BEDSIDE SWALLOW EVALUATION    NAME: Eyad Gutierrez  : 1970  MRN: [id number]    ADMISSION DATE: 2023  ADMITTING DIAGNOSIS: has Charcot's joint of foot in type 2 diabetes mellitus ([address]); Skin callus; H/O sepsis; DM (diabetes mellitus) ([address]); Psoriasis; Essential hypertension; Leg edema, right; Right leg pain; Plantar wart, left foot; Callus of foot; Change in bowel movement; Abnormal CT of the abdomen; Smokeless tobacco use; Erectile dysfunction; History of colon polyps; Acute cerebrovascular accident (CVA) ([address]); Right hemiparesis ([address]); Difficulty transferring location; Anxiety and depression; Neuropathy; and Vasculitis ([address]) on their problem list.    ONSET DATE: 2023     Impression  Dysphagia Diagnosis: Swallow function appears WFL;No clinical indicators of dysphagia  Dysphagia Outcome Severity Scale: Level 7: Normal in all situations     Eyad Gutierrez was seen for a clinical bedside swallow evaluation following admission for c/o new left sided weakness and Cerebral vasculitis. Pt has a h/o recent right frontal lobe and left medullar CVA in 2023 and h/o Charcot Jimena Tooth disease. Pt reports he has had mild difficulty swallowing since his strokes in , specifically a cough with PO intake when eating/drinking too fast.  He followed all complex commands for the oral motor exam which demonstrated no asymmetry. Vocal quality and cough strength were normal.  PO trials of thin liquids by straw and regular solids completed with normal mastication and clearance and 0 s/s aspiration for all trials. Swallow timing and hyolaryngeal excursion/elevation appeared [facility]. Speech and language were screened informally and judged to be WNL. Receptive and expressive language were normal with appropriate responses to questions at a conversational level. Speech output was normal.    Recommend:  Regular/Thins. No needs identified.     Recent Chest

## 2023-08-15 NOTE — CONSULTS
Anika, 1970, 3019/3019-A, 8/15/2023    History  [place]:  There were no encounter diagnoses. Patient  has a past medical history of Blood circulation, collateral, Callus of foot, Callus of foot, Charcot's joint of foot, Charcot's joint of foot in type 2 diabetes mellitus ([address]), Closed nondisplaced fracture of fifth metatarsal bone of right foot with routine healing, Diabetes mellitus ([address]), Diverticulitis, Hyperlipidemia, Hypertension, Ischemic ulcer of left foot, limited to breakdown of skin ([address]), Leg edema, right, Neuropathy, Plantar wart, left foot, Ulcer of right foot with fat layer exposed ([address]), Ulcer of right foot with necrosis of muscle, Hannon Grade III, WD-Chronic ulcer of left foot with fat layer exposed ([address]), WD-Chronic ulcer of toe of right foot, limited to breakdown of skin ([address]), WD-Diabetic ulcer of left foot associated with type 2 diabetes mellitus ([address]), WD-Diabetic ulcer of right lateral foot associated with type 2 diabetes mellitus, with fat layer exposed ([address]), and WD-Type 2 diabetes mellitus with right diabetic foot ulcer ([address]). Patient  has a past surgical history that includes Foot surgery (Right, 07/2012); Abdomen surgery; fracture surgery (Right); Colonoscopy (05/06/2019); Colonoscopy (N/A, 05/06/2019); and Colonoscopy (N/A, 12/20/2022). Subjective:  Patient states:  \"I was here for my stroke in [id number] [place] [address]", \"I've walked with this walker since\". Pain:  pt reports none.     Communication with other providers:  Handoff to RN, co-eval with Guillermina ASHLEY Floor  Restrictions: Low fall risk, R-sided weakness, general     Home Setup/Prior level of function   Lives With: Mother   Type of Home: House  Home Layout: Two level (Pt reported he is able to stay on 1st floor)  Home Access: Stairs to enter with rails  Entrance Stairs - Number of Steps: 4  Entrance Stairs - Rails: Right  Bathroom Shower/Tub: Walk-in shower  Bathroom Term Goal 1: Pt will complete all STS transfers with RW, SUP  Short Term Goal 2: Pt will complete standing RLE eccentric quad ther Ex x10 ea with UE support CGA  Short Term Goal 3: Pt will complete step-ups RLE biased x10 reps  x3 sets  with UE support CGA  Short Term Goal 4: Pt will AMB x400 ft with RW, SBA       Treatment plan:  Bed mobility, transfers, balance, gait, TA, TX, steps    Recommendations for NURSING mobility: AMB with RW SBA    Time:   Time in: 14:13  Time out: 14:33  Timed treatment minutes: 10  Total time: 20    Electronically signed by:    Bonna Schlatter, PT  8/68/5914, 9:05 PM  PT Lic #: [id number]

## 2023-08-15 NOTE — PROGRESS NOTES
LOVENOX PROPHYLAXIS EVALUATION  (Populations not addressed in this protocol: trauma, obstetrics, or COVID-19)    Wt Readings from Last 3 Encounters:   08/15/23 231 lb (104.8 kg)   08/10/23 234 lb 2 oz (106.2 kg)   08/08/23 235 lb (106.6 kg)       Estimated Creatinine Clearance: 180 mL/min (A) (based on SCr of 0.6 mg/dL (L)).   Recent Labs     08/14/23  1736   BUN 12   CREATININE 0.6*      HGB 15.8   HCT 47.3       Weight Range: 101-150.9 kg    CRCL = 30 or greater    50.9 kg   and below     .9  kg   101-150.9 kg   151-174.9  kg   175 kg  or greater     30mg subq  daily     40mg subq daily    (or 30mg subq BID orthopedic)     30mg subq  BID   40mg subq  BID   60mg subq BID       Per P/T protocol for appropriate subq anticoagulation by weight and CRCL change to:  Enoxaparin 30mg subq BID      [address],[facility], [facility]  8/15/2023 2:20 PM

## 2023-08-15 NOTE — PROGRESS NOTES
V2.0  [facility] Hospitalist Progress Note      Name:  Queen José Luis /Age/Sex: 1970  (46 y.o. male)   MRN & CSN:  [id number] & [id number] Encounter Date/Time: 8/15/2023 9:57 AM EDT    Location:  8810/8407- PCP: Marilin Ayala, [facility] Day: 2      Subjective:     Chief Complaint: dysarthria     Patient seen and examined at bedside. Has no new complaints or concerns. Denies headache, n/v. Denies of right sided weakness or dysarthria. 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 Cerebral angio  showed severe stenosis of the L MCA well as diffuse luminal irregularities throughout the intracranial circulation, DD luminal irregularities include atherosclerosis for cerebral vasculitis. Pt has hx of psoriasis. CRP 8.2, Sed rate 63 Neuro interventional managing. On IV steroids, monitor glucose        CVA. Came w left sided weakness. Now has aphasia. Mild dysphagia. MRI brain 2023 showed infarctions anterior left medulla, multiple subacute infarctions parasagittal posterior front lobe right ARTIE, and decreased flow left vertebral artery. Neuro checks, continue home ASA, plavix, statin. Type II diabetes. With hx of Charcot. On home metformin. Last HA1C 7.1 . Increase to MDSSI, continue metformin, monitor glucose closely, will likely need adjustment as pt is now on IV steroids     Essential HTN. On home lisinopril, amlodipine. Continue      Personally reviewed Lab Studies and Imaging     Drugs that require monitoring for toxicity include lovenox and the method of monitoring was cbc      Diet ADULT DIET; Regular; 3 carb choices (45 gm/meal)   DVT Prophylaxis [x] Lovenox, []  Heparin, [] SCDs, [] Ambulation,  [] Eliquis, [] Xarelto  [] Coumadin   Code Status Full Code       Surrogate Decision Maker/ POA      Review of Systems:    Review of Systems    See above    Objective:      Intake/Output Summary (Last 24 hours) at 8/15/2023 1001  Last data filed at 8/15/2023 0955  Gross per 24 hour

## 2023-08-15 NOTE — PROGRESS NOTES
Vascular/Interventional Neurology Progress Note  [address] [facility]  Patient Name: Juan Mendez     : 1970      Subjective: The patient was seen and examined. Chart reviewed. No acute events documented overnight. Tolerated IV steroids. Plan for MRI today. He denies any new neurological changes. Discussed with nursing at bedside    Objective:   Scheduled Meds:   insulin lispro  0-8 Units SubCUTAneous TID WC    insulin lispro  0-4 Units SubCUTAneous Nightly    sodium chloride flush  5-40 mL IntraVENous 2 times per day    methylPREDNISolone (SOLU-MEDROL) IVPB  1,000 mg IntraVENous Q24H    amLODIPine  10 mg Oral Daily    aspirin  81 mg Oral Daily    atorvastatin  80 mg Oral Nightly    clopidogrel  75 mg Oral Daily    escitalopram  10 mg Oral Daily    gabapentin  300 mg Oral TID    lisinopril  20 mg Oral BID    metFORMIN  500 mg Oral Daily with breakfast    lactobacillus  1 capsule Oral Daily     Continuous Infusions:   dextrose      sodium chloride       PRN Meds:.glucose, dextrose bolus **OR** dextrose bolus, glucagon (rDNA), dextrose, sodium chloride flush, sodium chloride, acetaminophen, ondansetron **OR** ondansetron, polyethylene glycol    Vital Signs:  [unfilled]     General: A&O x 4, NAD, cooperative  HEENT: NC/AT, EOMI, PERRL, mmm, neck supple  Extremities: no edema, no calf tenderness b/l    Neurological Exam:         Mental Status:  A&O to self, location, and year.  NAD, mild slurred speech, language fluent, repetition and naming intact, follows commands appropriately     Cranial Nerves:  CN II-XII intact     Sensation: intact LT/temp UE/LE's b/l     Motor: 5/5 UE/LE's b/l, tone and bulk normal, no pronator drift     Reflexes: 2/4 biceps, triceps, brachioradialis, patellar, and achilles bilaterally; flexor plantar responses bilaterally     Coordination:       Tremors-- None       Rapidly alternating movements (KATHLEEN): No dysdiadonchokinesis b/l      Heel-Shin: No dysmetria

## 2023-08-15 NOTE — PROGRESS NOTES
[id number] ACUTE CARE OCCUPATIONAL THERAPY EVALUATION  Ranjeet Pacheco, 1970, 3019/3019-A, 8/15/2023    Discharge Recommendation: Outpatient Neuro    History  [place]:  There were no encounter diagnoses. Patient  has a past medical history of Blood circulation, collateral, Callus of foot, Callus of foot, Charcot's joint of foot, Charcot's joint of foot in type 2 diabetes mellitus ([address]), Closed nondisplaced fracture of fifth metatarsal bone of right foot with routine healing, Diabetes mellitus ([address]), Diverticulitis, Hyperlipidemia, Hypertension, Ischemic ulcer of left foot, limited to breakdown of skin ([address]), Leg edema, right, Neuropathy, Plantar wart, left foot, Ulcer of right foot with fat layer exposed ([address]), Ulcer of right foot with necrosis of muscle, Hannon Grade III, WD-Chronic ulcer of left foot with fat layer exposed ([address]), WD-Chronic ulcer of toe of right foot, limited to breakdown of skin ([address]), WD-Diabetic ulcer of left foot associated with type 2 diabetes mellitus ([address]), WD-Diabetic ulcer of right lateral foot associated with type 2 diabetes mellitus, with fat layer exposed ([address]), and WD-Type 2 diabetes mellitus with right diabetic foot ulcer ([address]). Patient  has a past surgical history that includes Foot surgery (Right, 07/2012); Abdomen surgery; fracture surgery (Right); Colonoscopy (05/06/2019); Colonoscopy (N/A, 05/06/2019); and Colonoscopy (N/A, 12/20/2022). Subjective:  Patient states: \"I hope to go back to work, I work at [employer] Pain: Denied. Communication with other providers: Co-eval with PT Elijah Messer.   Restrictions: General Precautions, Fall Risk     Home Setup/Prior level of function     Social/Functional History  Lives With: Mother   Type of Home: House  Home Layout: Two level (Pt reported he is able to stay on 1st floor)  Home Access: Stairs to enter with rails  Entrance Stairs - Number of Steps: 4  Entrance Stairs - Rails: Right  Bathroom

## 2023-08-16 LAB
GLUCOSE BLD-MCNC: 229 MG/DL (ref 70–99)
GLUCOSE BLD-MCNC: 322 MG/DL (ref 70–99)
GLUCOSE BLD-MCNC: 346 MG/DL (ref 70–99)
GLUCOSE BLD-MCNC: 346 MG/DL (ref 70–99)

## 2023-08-16 PROCEDURE — 6370000000 HC RX 637 (ALT 250 FOR IP): Performed by: STUDENT IN AN ORGANIZED HEALTH CARE EDUCATION/TRAINING PROGRAM

## 2023-08-16 PROCEDURE — 82962 GLUCOSE BLOOD TEST: CPT

## 2023-08-16 PROCEDURE — 6370000000 HC RX 637 (ALT 250 FOR IP): Performed by: NURSE PRACTITIONER

## 2023-08-16 PROCEDURE — 99232 SBSQ HOSP IP/OBS MODERATE 35: CPT | Performed by: NURSE PRACTITIONER

## 2023-08-16 PROCEDURE — 94761 N-INVAS EAR/PLS OXIMETRY MLT: CPT

## 2023-08-16 PROCEDURE — 1200000000 HC SEMI PRIVATE

## 2023-08-16 PROCEDURE — 6360000002 HC RX W HCPCS: Performed by: STUDENT IN AN ORGANIZED HEALTH CARE EDUCATION/TRAINING PROGRAM

## 2023-08-16 PROCEDURE — 6360000002 HC RX W HCPCS: Performed by: NURSE PRACTITIONER

## 2023-08-16 PROCEDURE — 2580000003 HC RX 258: Performed by: NURSE PRACTITIONER

## 2023-08-16 RX ORDER — INSULIN GLARGINE 100 [IU]/ML
20 INJECTION, SOLUTION SUBCUTANEOUS NIGHTLY
Status: DISCONTINUED | OUTPATIENT
Start: 2023-08-16 | End: 2023-08-17

## 2023-08-16 RX ORDER — INSULIN LISPRO 100 [IU]/ML
5 INJECTION, SOLUTION INTRAVENOUS; SUBCUTANEOUS
Status: DISCONTINUED | OUTPATIENT
Start: 2023-08-16 | End: 2023-08-17

## 2023-08-16 RX ADMIN — INSULIN LISPRO 12 UNITS: 100 INJECTION, SOLUTION INTRAVENOUS; SUBCUTANEOUS at 12:57

## 2023-08-16 RX ADMIN — METFORMIN HYDROCHLORIDE 500 MG: 500 TABLET, EXTENDED RELEASE ORAL at 09:13

## 2023-08-16 RX ADMIN — LISINOPRIL 20 MG: 20 TABLET ORAL at 09:13

## 2023-08-16 RX ADMIN — CLOPIDOGREL BISULFATE 75 MG: 75 TABLET ORAL at 09:13

## 2023-08-16 RX ADMIN — INSULIN LISPRO 16 UNITS: 100 INJECTION, SOLUTION INTRAVENOUS; SUBCUTANEOUS at 09:12

## 2023-08-16 RX ADMIN — ENOXAPARIN SODIUM 30 MG: 100 INJECTION SUBCUTANEOUS at 09:12

## 2023-08-16 RX ADMIN — ASPIRIN 81 MG: 81 TABLET, CHEWABLE ORAL at 09:13

## 2023-08-16 RX ADMIN — SODIUM CHLORIDE, PRESERVATIVE FREE 10 ML: 5 INJECTION INTRAVENOUS at 09:14

## 2023-08-16 RX ADMIN — METHYLPREDNISOLONE SODIUM SUCCINATE 1000 MG: 1 INJECTION, POWDER, LYOPHILIZED, FOR SOLUTION INTRAMUSCULAR; INTRAVENOUS at 13:24

## 2023-08-16 RX ADMIN — INSULIN LISPRO 4 UNITS: 100 INJECTION, SOLUTION INTRAVENOUS; SUBCUTANEOUS at 21:38

## 2023-08-16 RX ADMIN — LISINOPRIL 20 MG: 20 TABLET ORAL at 21:36

## 2023-08-16 RX ADMIN — GABAPENTIN 300 MG: 300 CAPSULE ORAL at 21:34

## 2023-08-16 RX ADMIN — ESCITALOPRAM OXALATE 10 MG: 10 TABLET ORAL at 09:13

## 2023-08-16 RX ADMIN — INSULIN GLARGINE 20 UNITS: 100 INJECTION, SOLUTION SUBCUTANEOUS at 21:37

## 2023-08-16 RX ADMIN — GABAPENTIN 300 MG: 300 CAPSULE ORAL at 13:24

## 2023-08-16 RX ADMIN — INSULIN LISPRO 4 UNITS: 100 INJECTION, SOLUTION INTRAVENOUS; SUBCUTANEOUS at 18:16

## 2023-08-16 RX ADMIN — GABAPENTIN 300 MG: 300 CAPSULE ORAL at 09:13

## 2023-08-16 RX ADMIN — INSULIN LISPRO 5 UNITS: 100 INJECTION, SOLUTION INTRAVENOUS; SUBCUTANEOUS at 18:16

## 2023-08-16 RX ADMIN — ENOXAPARIN SODIUM 30 MG: 100 INJECTION SUBCUTANEOUS at 21:33

## 2023-08-16 RX ADMIN — ATORVASTATIN CALCIUM 80 MG: 40 TABLET, FILM COATED ORAL at 21:35

## 2023-08-16 RX ADMIN — Medication 1 CAPSULE: at 09:13

## 2023-08-16 RX ADMIN — AMLODIPINE BESYLATE 10 MG: 10 TABLET ORAL at 09:13

## 2023-08-16 RX ADMIN — INSULIN LISPRO 5 UNITS: 100 INJECTION, SOLUTION INTRAVENOUS; SUBCUTANEOUS at 12:56

## 2023-08-16 NOTE — CARE COORDINATION
Pt lives at home. Pt support is from his parents. Pt has a walker, scooter, cane and shower chair at home. Pt is active with [facility]. Pt was seen by therapy and pt walked 450ft. Pt plans home and to continue with [facility]. from [facility]. CM will need a inpt HC order at discharge. If pt is discharged after hours please call [facility] [phone] and inform them of discharge and the need to resume services. Fax HC order and AVS to [phone].

## 2023-08-16 NOTE — PROGRESS NOTES
Vascular/Interventional Neurology Progress Note  [address] [facility]  Patient Name: Willie Llamas     : 1970      Subjective: The patient was seen and examined. Chart reviewed. MRI completed overnight noted punctate acute infarct in the left centrum semiovale. Small subacute infarct in the left corona radiata. He is tolerating IV steroids. Insulin sliding scale increased per medicine team.  He denies any new neurological changes. Objective:   Scheduled Meds:   insulin glargine  20 Units SubCUTAneous Nightly    insulin lispro  5 Units SubCUTAneous TID WC    enoxaparin  30 mg SubCUTAneous BID    insulin lispro  0-16 Units SubCUTAneous TID WC    insulin lispro  0-4 Units SubCUTAneous Nightly    sodium chloride flush  5-40 mL IntraVENous 2 times per day    methylPREDNISolone (SOLU-MEDROL) IVPB  1,000 mg IntraVENous Q24H    amLODIPine  10 mg Oral Daily    aspirin  81 mg Oral Daily    atorvastatin  80 mg Oral Nightly    clopidogrel  75 mg Oral Daily    escitalopram  10 mg Oral Daily    gabapentin  300 mg Oral TID    lisinopril  20 mg Oral BID    metFORMIN  500 mg Oral Daily with breakfast    lactobacillus  1 capsule Oral Daily     Continuous Infusions:   dextrose      sodium chloride       PRN Meds:.sodium chloride, glucose, dextrose bolus **OR** dextrose bolus, glucagon (rDNA), dextrose, sodium chloride flush, sodium chloride, acetaminophen, ondansetron **OR** ondansetron, polyethylene glycol    Vital Signs:  [unfilled]     General: A&O x 4, NAD, cooperative  HEENT: NC/AT, EOMI, PERRL, mmm, neck supple  Extremities: no edema, no calf tenderness b/l    Neurological Exam:         Mental Status:  A&O to self, location, and year.  NAD, mild slurred speech, language fluent, repetition and naming intact, follows commands appropriately     Cranial Nerves:  CN II-XII intact     Sensation: Decreased sensation to right upper and lower extremity to touch     Motor: 5/5 LUE/LE, 4/5 RUE/RLE tone and bulk normal, no pronator drift     Coordination:       Tremors-- None       Rapidly alternating movements (KATHLEEN): No dysdiadonchokinesis b/l      Heel-Shin: No dysmetria b/l      Finger-Nose: No dysmetria b/l     Gait/Station: Deferred    Labs:   Recent Labs     08/14/23  1736   WBC 7.9      K 3.8   CL 98*   CO2 28   BUN 12   CREATININE 0.6*   GLUCOSE 186*   ,Last TSH [email or web address]  Last Liver Function Results:  @LABRCNTIP(ALT:3,AST:3,BILITOTAL:3,BILIDIR:3,ALKPHOS:3)@     Imaging Studies:     CTA 6/24/23  IMPRESSION:  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 Asymmetrically  decreased contrast opacification in the left cervical vertebral artery,  likely related to distal occlusion. Moderate stenosis in the P2 segments of the bilateral PCAs. Severe stenosis in the distal M1 segment of the left MCA. Mild to moderate stenosis at the origin of M2 segment of the right MCA. MRI 6/25/23  IMPRESSION:  Multiple small acute to subacute infarctions in the parasagittal posterior  right frontal lobe in the right ARTIE territory. Small subacute infarction in the anterior left medulla. Mild chronic microvascular disease. Asymmetrically decreased normal flow void in left vertebral artery, likely  related to severe stenosis. Cerebral angiogram by Dr. Kevin Villegas on 8/1/2023  Summary:   Severe stenosis of the left middle cerebral artery distal M1 segment at the origin of the superior and inferior divisions. Additionally there are diffuse luminal irregularities throughout the the intracranial circulation. The etiology to these luminal irregularities is unknown at this point. Further work-up will be done. The high-grade stenosis of the left middle cerebral artery was not improved after 10 mg infusion of verapamil. Electronically signed by Angel Sanches DO on 8/1/2023 at 2:48 PM     MRI brain 8/15/2023  IMPRESSION:  1.  Small acute/subacute

## 2023-08-16 NOTE — PROGRESS NOTES
V2.0  [facility] Hospitalist Progress Note      Name:  Whitney Moore /Age/Sex: 1970  (46 y.o. male)   MRN & CSN:  [id number] & [id number] Encounter Date/Time: 2023 9:57 AM EDT    Location:  5371/9948-[id number] PCP: Wally Delgado [facility] Day: 3      Subjective:     Chief Complaint: dysarthria     Pt had MRI brain yesterday  Patient seen and examined at bedside. Has no new complaints or concerns. Denies headache, n/v. Denies of right sided weakness or dysarthria. 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 Cerebral angio  showed severe stenosis of the L MCA well as diffuse luminal irregularities throughout the intracranial circulation, DD luminal irregularities include atherosclerosis for cerebral vasculitis. Pt has hx of psoriasis. CRP 8.2, Sed rate 63 Neuro interventional managing. On IV steroids, monitor glucose        CVA. Came w left sided weakness. Now has mild  aphasia and dysphagia. MRI brain  subacute infarcts right side and small acute infarct left side. Neuro checks, continue home ASA, plavix, statin. Type II diabetes. With hx of Charcot. On home metformin. Last HA1C 7.1 . Glucose still high 300's likely 2/2 steroids. Increase to HDSSI, increase lantus to 20U qhs, add lispro 5U TID, continue metformin, monitor glucose closely      Essential HTN. On home lisinopril, amlodipine. Continue      Personally reviewed Lab Studies and Imaging     Drugs that require monitoring for toxicity include lovenox and the method of monitoring was cbc      Diet ADULT DIET;  Regular; 3 carb choices (45 gm/meal)   DVT Prophylaxis [x] Lovenox, []  Heparin, [] SCDs, [] Ambulation,  [] Eliquis, [] Xarelto  [] Coumadin   Code Status Full Code       Surrogate Decision Maker/ POA      Review of Systems:    Review of Systems    See above    Objective:   No intake or output data in the 24 hours ending 23 1042       Vitals:   Vitals:    23 0731   BP:    Pulse:    Resp:    Temp:

## 2023-08-17 LAB
GLUCOSE BLD-MCNC: 234 MG/DL (ref 70–99)
GLUCOSE BLD-MCNC: 252 MG/DL (ref 70–99)
GLUCOSE BLD-MCNC: 266 MG/DL (ref 70–99)
GLUCOSE BLD-MCNC: 268 MG/DL (ref 70–99)
GLUCOSE BLD-MCNC: 310 MG/DL (ref 70–99)

## 2023-08-17 PROCEDURE — 6360000002 HC RX W HCPCS: Performed by: STUDENT IN AN ORGANIZED HEALTH CARE EDUCATION/TRAINING PROGRAM

## 2023-08-17 PROCEDURE — 6370000000 HC RX 637 (ALT 250 FOR IP): Performed by: NURSE PRACTITIONER

## 2023-08-17 PROCEDURE — 2580000003 HC RX 258: Performed by: NURSE PRACTITIONER

## 2023-08-17 PROCEDURE — 82962 GLUCOSE BLOOD TEST: CPT

## 2023-08-17 PROCEDURE — 1200000000 HC SEMI PRIVATE

## 2023-08-17 PROCEDURE — 6360000002 HC RX W HCPCS: Performed by: NURSE PRACTITIONER

## 2023-08-17 PROCEDURE — 6370000000 HC RX 637 (ALT 250 FOR IP): Performed by: STUDENT IN AN ORGANIZED HEALTH CARE EDUCATION/TRAINING PROGRAM

## 2023-08-17 PROCEDURE — 94761 N-INVAS EAR/PLS OXIMETRY MLT: CPT

## 2023-08-17 PROCEDURE — 99233 SBSQ HOSP IP/OBS HIGH 50: CPT | Performed by: NURSE PRACTITIONER

## 2023-08-17 RX ORDER — FLASH GLUCOSE SCANNING READER
1 EACH MISCELLANEOUS
Qty: 2 EACH | Refills: 0 | Status: SHIPPED | OUTPATIENT
Start: 2023-08-17 | End: 2023-08-18 | Stop reason: HOSPADM

## 2023-08-17 RX ORDER — INSULIN GLARGINE 100 [IU]/ML
25 INJECTION, SOLUTION SUBCUTANEOUS NIGHTLY
Status: DISCONTINUED | OUTPATIENT
Start: 2023-08-17 | End: 2023-08-18 | Stop reason: HOSPADM

## 2023-08-17 RX ORDER — INSULIN LISPRO 100 [IU]/ML
8 INJECTION, SOLUTION INTRAVENOUS; SUBCUTANEOUS
Status: DISCONTINUED | OUTPATIENT
Start: 2023-08-17 | End: 2023-08-18

## 2023-08-17 RX ADMIN — INSULIN LISPRO 8 UNITS: 100 INJECTION, SOLUTION INTRAVENOUS; SUBCUTANEOUS at 17:40

## 2023-08-17 RX ADMIN — INSULIN LISPRO 8 UNITS: 100 INJECTION, SOLUTION INTRAVENOUS; SUBCUTANEOUS at 09:55

## 2023-08-17 RX ADMIN — GABAPENTIN 300 MG: 300 CAPSULE ORAL at 08:51

## 2023-08-17 RX ADMIN — AMLODIPINE BESYLATE 10 MG: 10 TABLET ORAL at 08:51

## 2023-08-17 RX ADMIN — INSULIN GLARGINE 25 UNITS: 100 INJECTION, SOLUTION SUBCUTANEOUS at 20:38

## 2023-08-17 RX ADMIN — SODIUM CHLORIDE, PRESERVATIVE FREE 10 ML: 5 INJECTION INTRAVENOUS at 00:18

## 2023-08-17 RX ADMIN — SODIUM CHLORIDE, PRESERVATIVE FREE 5 ML: 5 INJECTION INTRAVENOUS at 20:38

## 2023-08-17 RX ADMIN — ENOXAPARIN SODIUM 30 MG: 100 INJECTION SUBCUTANEOUS at 08:52

## 2023-08-17 RX ADMIN — ENOXAPARIN SODIUM 30 MG: 100 INJECTION SUBCUTANEOUS at 20:37

## 2023-08-17 RX ADMIN — CLOPIDOGREL BISULFATE 75 MG: 75 TABLET ORAL at 08:51

## 2023-08-17 RX ADMIN — INSULIN LISPRO 4 UNITS: 100 INJECTION, SOLUTION INTRAVENOUS; SUBCUTANEOUS at 17:40

## 2023-08-17 RX ADMIN — LISINOPRIL 20 MG: 20 TABLET ORAL at 20:38

## 2023-08-17 RX ADMIN — LISINOPRIL 20 MG: 20 TABLET ORAL at 08:51

## 2023-08-17 RX ADMIN — METFORMIN HYDROCHLORIDE 500 MG: 500 TABLET, EXTENDED RELEASE ORAL at 08:51

## 2023-08-17 RX ADMIN — ESCITALOPRAM OXALATE 10 MG: 10 TABLET ORAL at 08:51

## 2023-08-17 RX ADMIN — INSULIN LISPRO 12 UNITS: 100 INJECTION, SOLUTION INTRAVENOUS; SUBCUTANEOUS at 13:56

## 2023-08-17 RX ADMIN — GABAPENTIN 300 MG: 300 CAPSULE ORAL at 13:59

## 2023-08-17 RX ADMIN — Medication 1 CAPSULE: at 08:51

## 2023-08-17 RX ADMIN — ASPIRIN 81 MG: 81 TABLET, CHEWABLE ORAL at 08:51

## 2023-08-17 RX ADMIN — GABAPENTIN 300 MG: 300 CAPSULE ORAL at 20:38

## 2023-08-17 RX ADMIN — ATORVASTATIN CALCIUM 80 MG: 40 TABLET, FILM COATED ORAL at 20:38

## 2023-08-17 RX ADMIN — INSULIN LISPRO 8 UNITS: 100 INJECTION, SOLUTION INTRAVENOUS; SUBCUTANEOUS at 13:56

## 2023-08-17 RX ADMIN — METHYLPREDNISOLONE SODIUM SUCCINATE 1000 MG: 1 INJECTION, POWDER, LYOPHILIZED, FOR SOLUTION INTRAMUSCULAR; INTRAVENOUS at 14:02

## 2023-08-17 RX ADMIN — SODIUM CHLORIDE, PRESERVATIVE FREE 10 ML: 5 INJECTION INTRAVENOUS at 08:52

## 2023-08-17 ASSESSMENT — PAIN SCALES - GENERAL
PAINLEVEL_OUTOF10: 0
PAINLEVEL_OUTOF10: 0

## 2023-08-17 NOTE — PROGRESS NOTES
Vascular/Interventional Neurology Progress Note  [address] [facility]  Patient Name: Abdirashid Mcduffie     : 1970      Subjective: The patient was seen and examined. Chart reviewed. No acute events documented overnight. He is tolerating the IV steroids. States he was able to sleep last night. He denies any new neurological changes. Objective:   Scheduled Meds:   insulin glargine  25 Units SubCUTAneous Nightly    insulin lispro  8 Units SubCUTAneous TID WC    enoxaparin  30 mg SubCUTAneous BID    insulin lispro  0-16 Units SubCUTAneous TID WC    insulin lispro  0-4 Units SubCUTAneous Nightly    sodium chloride flush  5-40 mL IntraVENous 2 times per day    methylPREDNISolone (SOLU-MEDROL) IVPB  1,000 mg IntraVENous Q24H    amLODIPine  10 mg Oral Daily    aspirin  81 mg Oral Daily    atorvastatin  80 mg Oral Nightly    clopidogrel  75 mg Oral Daily    escitalopram  10 mg Oral Daily    gabapentin  300 mg Oral TID    lisinopril  20 mg Oral BID    metFORMIN  500 mg Oral Daily with breakfast    lactobacillus  1 capsule Oral Daily     Continuous Infusions:   dextrose      sodium chloride       PRN Meds:.sodium chloride, glucose, dextrose bolus **OR** dextrose bolus, glucagon (rDNA), dextrose, sodium chloride flush, sodium chloride, acetaminophen, ondansetron **OR** ondansetron, polyethylene glycol    Vital Signs:  [unfilled]     General: A&O x 4, NAD, cooperative  HEENT: NC/AT, EOMI, PERRL, mmm, neck supple  Extremities: no edema, no calf tenderness b/l    Neurological Exam:         Mental Status:  A&O to self, location, and year.  NAD, mild slurred speech, language fluent, repetition and naming intact, follows commands appropriately     Cranial Nerves:  CN II-XII intact     Sensation: intact LT/temp UE/LE's b/l     Motor: 5/5 LUE/LLE, 4/5 RUE/RLE, no pronator drift     Coordination:       Tremors-- None       Rapidly alternating movements (KATHLEEN): No dysdiadonchokinesis b/l      Heel-Shin: intensity statin for secondary prevention of stroke. 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 Elevated Cardiolipin Ab IgM Anticardiolipin IgA . Outpatient follow up with heme-onc  -PT/OT/ST      Pertinent images discussed/reviewed with Dr. Carlos Mendosa who has fully participated in the care of this patient and agrees with plan.       Electronically signed by ADELFO Valenzuela CNP on 8/17/2023 at 9:05 AM   8/17/2023

## 2023-08-17 NOTE — PROGRESS NOTES
08/17/23 1121   Encounter Summary   Encounter Overview/Reason  Initial Encounter   Service Provided For: Patient   Referral/Consult From: [address] Family members   Last Encounter  08/17/23  (Patient receptive of  visit. Beth Vicente is man of marybeth and apppreciated prayer. He hopes to go home tomorrow.)   Complexity of Encounter Low   Begin Time 1116   End Time  1123   Total Time Calculated 7 min   Spiritual/Emotional needs   Type Spiritual Support   Assessment/Intervention/Outcome   Assessment Calm;Coping; Hopeful   Intervention Active listening;Discussed relationship with God;Nurtured Hope;Prayer (assurance of)/Erin;Sustaining Presence/Ministry of presence   Outcome Acceptance;Comfort;Coping;Encouraged;Engaged in conversation;Expressed feelings, needs, and concerns;Expressed Gratitude   Plan and Referrals   Plan/Referrals Continue Support (comment)  (Patient informed how to contact )

## 2023-08-18 ENCOUNTER — PREP FOR PROCEDURE (OUTPATIENT)
Age: 53
End: 2023-08-18

## 2023-08-18 ENCOUNTER — TELEPHONE (OUTPATIENT)
Age: 53
End: 2023-08-18

## 2023-08-18 VITALS
TEMPERATURE: 97.8 F | RESPIRATION RATE: 16 BRPM | SYSTOLIC BLOOD PRESSURE: 133 MMHG | WEIGHT: 231 LBS | HEART RATE: 60 BPM | DIASTOLIC BLOOD PRESSURE: 87 MMHG | OXYGEN SATURATION: 97 % | HEIGHT: 72 IN | BODY MASS INDEX: 31.29 KG/M2

## 2023-08-18 LAB
GLUCOSE BLD-MCNC: 245 MG/DL (ref 70–99)
GLUCOSE BLD-MCNC: 269 MG/DL (ref 70–99)

## 2023-08-18 PROCEDURE — 2580000003 HC RX 258: Performed by: NURSE PRACTITIONER

## 2023-08-18 PROCEDURE — 6370000000 HC RX 637 (ALT 250 FOR IP): Performed by: STUDENT IN AN ORGANIZED HEALTH CARE EDUCATION/TRAINING PROGRAM

## 2023-08-18 PROCEDURE — 6360000002 HC RX W HCPCS: Performed by: STUDENT IN AN ORGANIZED HEALTH CARE EDUCATION/TRAINING PROGRAM

## 2023-08-18 PROCEDURE — 99233 SBSQ HOSP IP/OBS HIGH 50: CPT | Performed by: NURSE PRACTITIONER

## 2023-08-18 PROCEDURE — 94761 N-INVAS EAR/PLS OXIMETRY MLT: CPT

## 2023-08-18 PROCEDURE — 6360000002 HC RX W HCPCS: Performed by: NURSE PRACTITIONER

## 2023-08-18 PROCEDURE — 6370000000 HC RX 637 (ALT 250 FOR IP): Performed by: NURSE PRACTITIONER

## 2023-08-18 PROCEDURE — 82962 GLUCOSE BLOOD TEST: CPT

## 2023-08-18 RX ORDER — SODIUM CHLORIDE 0.9 % (FLUSH) 0.9 %
5-40 SYRINGE (ML) INJECTION PRN
Status: CANCELLED | OUTPATIENT
Start: 2023-08-18

## 2023-08-18 RX ORDER — SODIUM CHLORIDE 9 MG/ML
INJECTION, SOLUTION INTRAVENOUS PRN
Status: CANCELLED | OUTPATIENT
Start: 2023-08-18

## 2023-08-18 RX ORDER — SODIUM CHLORIDE 0.9 % (FLUSH) 0.9 %
5-40 SYRINGE (ML) INJECTION EVERY 12 HOURS SCHEDULED
Status: CANCELLED | OUTPATIENT
Start: 2023-08-18

## 2023-08-18 RX ORDER — PREDNISONE 20 MG/1
60 TABLET ORAL DAILY
Qty: 21 TABLET | Refills: 0 | Status: SHIPPED | OUTPATIENT
Start: 2023-08-19 | End: 2023-08-22 | Stop reason: SDUPTHER

## 2023-08-18 RX ORDER — BLOOD-GLUCOSE METER
1 KIT MISCELLANEOUS DAILY
Qty: 1 KIT | Refills: 0 | Status: SHIPPED | OUTPATIENT
Start: 2023-08-18 | End: 2023-08-23

## 2023-08-18 RX ORDER — INSULIN LISPRO 100 [IU]/ML
11 INJECTION, SOLUTION INTRAVENOUS; SUBCUTANEOUS
Status: DISCONTINUED | OUTPATIENT
Start: 2023-08-18 | End: 2023-08-18 | Stop reason: HOSPADM

## 2023-08-18 RX ORDER — INSULIN GLARGINE 100 [IU]/ML
25 INJECTION, SOLUTION SUBCUTANEOUS NIGHTLY
Qty: 10 ML | Refills: 3 | Status: SHIPPED | OUTPATIENT
Start: 2023-08-18

## 2023-08-18 RX ORDER — GLUCOSAMINE HCL/CHONDROITIN SU 500-400 MG
CAPSULE ORAL
Qty: 120 STRIP | Refills: 0 | Status: SHIPPED | OUTPATIENT
Start: 2023-08-18

## 2023-08-18 RX ORDER — INSULIN LISPRO 100 [IU]/ML
INJECTION, SOLUTION INTRAVENOUS; SUBCUTANEOUS
Qty: 20 ML | Refills: 0 | Status: SHIPPED | OUTPATIENT
Start: 2023-08-18

## 2023-08-18 RX ADMIN — METHYLPREDNISOLONE SODIUM SUCCINATE 1000 MG: 1 INJECTION, POWDER, LYOPHILIZED, FOR SOLUTION INTRAMUSCULAR; INTRAVENOUS at 10:42

## 2023-08-18 RX ADMIN — CLOPIDOGREL BISULFATE 75 MG: 75 TABLET ORAL at 09:00

## 2023-08-18 RX ADMIN — INSULIN LISPRO 8 UNITS: 100 INJECTION, SOLUTION INTRAVENOUS; SUBCUTANEOUS at 09:06

## 2023-08-18 RX ADMIN — ENOXAPARIN SODIUM 30 MG: 100 INJECTION SUBCUTANEOUS at 09:01

## 2023-08-18 RX ADMIN — GABAPENTIN 300 MG: 300 CAPSULE ORAL at 09:00

## 2023-08-18 RX ADMIN — LISINOPRIL 20 MG: 20 TABLET ORAL at 09:00

## 2023-08-18 RX ADMIN — INSULIN LISPRO 11 UNITS: 100 INJECTION, SOLUTION INTRAVENOUS; SUBCUTANEOUS at 12:55

## 2023-08-18 RX ADMIN — INSULIN LISPRO 4 UNITS: 100 INJECTION, SOLUTION INTRAVENOUS; SUBCUTANEOUS at 09:06

## 2023-08-18 RX ADMIN — INSULIN LISPRO 8 UNITS: 100 INJECTION, SOLUTION INTRAVENOUS; SUBCUTANEOUS at 12:52

## 2023-08-18 RX ADMIN — ESCITALOPRAM OXALATE 10 MG: 10 TABLET ORAL at 09:00

## 2023-08-18 RX ADMIN — METFORMIN HYDROCHLORIDE 500 MG: 500 TABLET, EXTENDED RELEASE ORAL at 09:00

## 2023-08-18 RX ADMIN — Medication 1 CAPSULE: at 09:00

## 2023-08-18 RX ADMIN — ASPIRIN 81 MG: 81 TABLET, CHEWABLE ORAL at 09:00

## 2023-08-18 RX ADMIN — AMLODIPINE BESYLATE 10 MG: 10 TABLET ORAL at 09:00

## 2023-08-18 NOTE — CARE COORDINATION
Pt's discharge plan remains the same for home with [address] CM will need a inpt HC order at discharge. If pt is discharged after hours please call [address] [phone] and inform them of discharge and the need to resume services. Fax HC order and AVS to [phone].

## 2023-08-18 NOTE — DISCHARGE SUMMARY
[facility]  Vascular and Interventional Neurology   Discharge Summary  TOSIN Agudelo      Alfonso Mercy  [id number]  8/18/2023  46 y.o. male    8/14/2023  2:44 PM   No discharge date for patient encounter. ADMISSION DIAGNOSIS: Severe stenosis of the left MCA as well as diffuse luminal irregularities throughout the intracranial circulation.   Concern for cerebral vasculitis on IV steroids    SECONDARY DIAGNOSIS:    Past Medical History:   Diagnosis Date    Blood circulation, collateral     Callus of foot     Callus of foot 08/13/2018    Charcot's joint of foot     Charcot's joint of foot in type 2 diabetes mellitus ([address]) 02/06/2014    Closed nondisplaced fracture of fifth metatarsal bone of right foot with routine healing 08/28/2017    Diabetes mellitus ([address])     Diverticulitis     Hyperlipidemia     Hypertension     Ischemic ulcer of left foot, limited to breakdown of skin ([address]) 07/12/2016    Leg edema, right 08/25/2017    Neuropathy     Plantar wart, left foot 08/13/2018    Ulcer of right foot with fat layer exposed ([address]) 11/28/2016    Ulcer of right foot with necrosis of muscle, [place] Grade III 08/11/2016    WD-Chronic ulcer of left foot with fat layer exposed ([address]) 08/11/2016    WD-Chronic ulcer of toe of right foot, limited to breakdown of skin ([address]) 07/12/2016    WD-Diabetic ulcer of left foot associated with type 2 diabetes mellitus ([address]) 08/11/2016    WD-Diabetic ulcer of right lateral foot associated with type 2 diabetes mellitus, with fat layer exposed ([address])     WD-Type 2 diabetes mellitus with right diabetic foot ulcer ([address]) 07/12/2016        DISCHARGE DIAGNOSIS: Cerebral vasculitis    MEDICATIONS:       Medication List        START taking these medications      FreeStyle Annabel 2 [place]  1 each by Does not apply route every 14 days for 28 days Order all products necessary     FreeStyle Annabel 2 Sensor Misc  1 each by Does not apply route every 14 days for 28 days Order all

## 2023-08-18 NOTE — PROGRESS NOTES
V2.0  [facility] Hospitalist Progress Note      Name:  Willie Llamas /Age/Sex: 1970  (46 y.o. male)   MRN & CSN:  [id number] & [id number] Encounter Date/Time: 2023 9:57 AM EDT    Location:  4636/5862-P PCP: Jeri Marx, [facility] Day: 4      Subjective:     Chief Complaint: dysarthria     Glucose remains uncontrolled 300's. Patient seen and examined at bedside. Has no new complaints or concerns. Case was discussed with primary team; likely to be discharged home tomorrow. Denies worsening of his right sided weakness or dysarthria. 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 Cerebral angio  showed severe stenosis of the L MCA well as diffuse luminal irregularities throughout the intracranial circulation, DD luminal irregularities include atherosclerosis for cerebral vasculitis. Pt has hx of psoriasis. CRP 8.2, Sed rate 63 Neuro interventional managing. On IV steroids, monitor glucose        CVA. Came w left sided weakness. Now has mild  aphasia and dysphagia. MRI brain  subacute infarcts right side and small acute infarct left side. Neuro checks, continue home ASA, plavix, statin. Type II diabetes. With hx of Charcot. On home metformin. Last HA1C 7.1 . Glucose still high 300's 2/2 steroids. Continue HDSSI, increase lantus to 25U qhs, increase lispro 8U TID, continue metformin, monitor glucose closely      Essential HTN. On home lisinopril, amlodipine. Continue      Personally reviewed Lab Studies and Imaging     Drugs that require monitoring for toxicity include lovenox and the method of monitoring was cbc      Diet ADULT DIET; Regular; 3 carb choices (45 gm/meal)   DVT Prophylaxis [x] Lovenox, []  Heparin, [] SCDs, [] Ambulation,  [] Eliquis, [] Xarelto  [] Coumadin   Code Status Full Code       Surrogate Decision Maker/ POA      Review of Systems:    Review of Systems    See above    Objective:      Intake/Output Summary (Last 24 hours) at 2023  Last data filed at 8/17/2023 0851  Gross per 24 hour   Intake 510 ml   Output --   Net 510 ml          Vitals:   Vitals:    08/17/23 2043   BP: (!) 142/86   Pulse:    Resp:    Temp:    SpO2:        Physical Exam:     General: NAD  Eyes: EOMI  ENT: neck supple  Cardiovascular: Regular rate. Respiratory: Clear to auscultation  Gastrointestinal: Soft, non tender  Genitourinary: no suprapubic tenderness  Musculoskeletal: No edema  Skin: warm, dry  Neuro: Alert. Psych: Mood appropriate.      Medications:   Medications:    insulin glargine  25 Units SubCUTAneous Nightly    insulin lispro  8 Units SubCUTAneous TID WC    enoxaparin  30 mg SubCUTAneous BID    insulin lispro  0-16 Units SubCUTAneous TID WC    insulin lispro  0-4 Units SubCUTAneous Nightly    sodium chloride flush  5-40 mL IntraVENous 2 times per day    methylPREDNISolone (SOLU-MEDROL) IVPB  1,000 mg IntraVENous Q24H    amLODIPine  10 mg Oral Daily    aspirin  81 mg Oral Daily    atorvastatin  80 mg Oral Nightly    clopidogrel  75 mg Oral Daily    escitalopram  10 mg Oral Daily    gabapentin  300 mg Oral TID    lisinopril  20 mg Oral BID    metFORMIN  500 mg Oral Daily with breakfast    lactobacillus  1 capsule Oral Daily      Infusions:    dextrose      sodium chloride       PRN Meds: sodium chloride, 1 spray, Q2H PRN  glucose, 4 tablet, PRN  dextrose bolus, 125 mL, PRN   Or  dextrose bolus, 250 mL, PRN  glucagon (rDNA), 1 mg, PRN  dextrose, , Continuous PRN  sodium chloride flush, 5-40 mL, PRN  sodium chloride, , PRN  acetaminophen, 650 mg, Q4H PRN  ondansetron, 4 mg, Q8H PRN   Or  ondansetron, 4 mg, Q6H PRN  polyethylene glycol, 17 g, Daily PRN        Labs      Recent Results (from the past 24 hour(s))   POCT Glucose    Collection Time: 08/16/23  9:21 PM   Result Value Ref Range    POC Glucose 346 (H) 70 - 99 MG/DL   POCT Glucose    Collection Time: 08/17/23  5:56 AM   Result Value Ref Range    POC Glucose 266 (H) 70 - 99 MG/DL   POCT Glucose    Collection Time:

## 2023-08-18 NOTE — PROGRESS NOTES
Vascular/Interventional Neurology Progress Note  [facility]  Patient Name: Olya Brown     : 1970      Subjective: The patient was seen and examined. Chart reviewed. No acute events documented overnight. Day 5 of IV steroids. Patient's glucose being managed per hospitalist.  Patient will need to be discharged on insulin with close follow-up with PCP. Plan for outpatient cerebral angiogram on Monday. Discussed with the patient. Objective:   Scheduled Meds:   insulin glargine  25 Units SubCUTAneous Nightly    insulin lispro  8 Units SubCUTAneous TID WC    enoxaparin  30 mg SubCUTAneous BID    insulin lispro  0-16 Units SubCUTAneous TID WC    insulin lispro  0-4 Units SubCUTAneous Nightly    sodium chloride flush  5-40 mL IntraVENous 2 times per day    methylPREDNISolone (SOLU-MEDROL) IVPB  1,000 mg IntraVENous Q24H    amLODIPine  10 mg Oral Daily    aspirin  81 mg Oral Daily    atorvastatin  80 mg Oral Nightly    clopidogrel  75 mg Oral Daily    escitalopram  10 mg Oral Daily    gabapentin  300 mg Oral TID    lisinopril  20 mg Oral BID    metFORMIN  500 mg Oral Daily with breakfast    lactobacillus  1 capsule Oral Daily     Continuous Infusions:   dextrose      sodium chloride       PRN Meds:.sodium chloride, glucose, dextrose bolus **OR** dextrose bolus, glucagon (rDNA), dextrose, sodium chloride flush, sodium chloride, acetaminophen, ondansetron **OR** ondansetron, polyethylene glycol    Vital Signs:  [unfilled]     General: A&O x 4, NAD, cooperative  HEENT: NC/AT, EOMI, PERRL, mmm, neck supple  Extremities: no edema, no calf tenderness b/l    Neurological Exam:         Mental Status:  A&O to self, location, and year.  NAD, Mild slurred speech language fluent, repetition and naming intact, follows commands appropriately     Cranial Nerves:  CN II-XII intact     Sensation: intact LT/temp UE/LE's b/l     Motor: 5/5 LUE/LLE 4/5 RUE/RLE, no pronator drift Coordination:       Tremors-- None       Rapidly alternating movements (KATHLEEN): No dysdiadonchokinesis b/l      Heel-Shin: No dysmetria b/l      Finger-Nose: No dysmetria b/l     Gait/Station: Deferred    Labs:   No results for input(s): WBC, HEMOGLOBIN, NA, K, CL, CO2, BUN, CREATININE, GLUCOSE, CA, INR, PTT, CPK, CKMB in the last 72 hours. 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 Asymmetrically  decreased contrast opacification in the left cervical vertebral artery,  likely related to distal occlusion. Moderate stenosis in the P2 segments of the bilateral PCAs. Severe stenosis in the distal M1 segment of the left MCA. Mild to moderate stenosis at the origin of M2 segment of the right MCA. MRI 6/25/23  IMPRESSION:  Multiple small acute to subacute infarctions in the parasagittal posterior  right frontal lobe in the right ARTIE territory. Small subacute infarction in the anterior left medulla. Mild chronic microvascular disease. Asymmetrically decreased normal flow void in left vertebral artery, likely  related to severe stenosis. Cerebral angiogram by Dr. Joneen Heimlich on 8/1/2023  Summary:   Severe stenosis of the left middle cerebral artery distal M1 segment at the origin of the superior and inferior divisions. Additionally there are diffuse luminal irregularities throughout the the intracranial circulation. The etiology to these luminal irregularities is unknown at this point. Further work-up will be done. The high-grade stenosis of the left middle cerebral artery was not improved after 10 mg infusion of verapamil.      Electronically signed by Theodora Fountain DO on 8/1/2023 at 2:48 PM      MRI brain

## 2023-08-18 NOTE — DISCHARGE INSTRUCTIONS
Internal Medicine Instructions:  - please monitor your glucose closely  - please contact your PCP if you are maintaining glucose > 140 and if you are seeing numbers below 100.  - take insulin as the following:    ** Lantus (long acting) 25U night time  ** Take 5U Lispro insulin (short acting) before meals 3x/day    Take insulin lispro (short acting) according to glucose check 30 mins before meals   0 No Insulin  200-249 4  250-299 8  300-349 12  >349 16     Take insulin according to glucose check before sleep:   0  >300 4        Diagnostic cerebral angiogram scheduled on Monday at 10 AM at [facility].   Please arrive to the hospital at 8 AM.

## 2023-08-18 NOTE — PROGRESS NOTES
Outpatient Pharmacy Progress Note for Meds-to-Beds    Total number of Prescriptions Filled: 7  The following medications were dispensed to the patient during the discharge process:  Glucometer  Test strips  Lancets  Alcohol swabs  Basaglar  Pen needles  Novolog     Additional Documentation:  Patient picked-up the medication(s) in the OP Pharmacy         Thank you for letting us serve your patients.   [address]    [address] [place] [address], [address] [place] [address]    Phone: [phone]    Fax: [phone]

## 2023-08-18 NOTE — PROGRESS NOTES
Education provided to patient upon discharge. Patient is going home on insulin. Education provided on insulin dosing and administration. Patient was able to draw up his lunch time dosage and administer himself without difficulty. All questions and concerns addressed. Patient feels confident in his ability to manage his insulin medication at this time. Patient has a glucometer/teststrips/lacets at home, and on Tuesday [facility] pharmacy will have a continuous glucose monitoring device available for  for him. It was approved at no cost and will be in at that time. Education provided regarding metformin hold for planned angiogram Tuesday. Medications filled by outpatient pharmacy and will go home with medications. Patient IV removed per protocol without difficulty.

## 2023-08-18 NOTE — PROGRESS NOTES
Requested vial of insulin from pharmacy for diabetes/insulin use education.  Patient has never given himself insulin and will be going home with it from hospital.

## 2023-08-18 NOTE — PROGRESS NOTES
IR Procedure at [facility]:  Spoke with patient and he will arrive at 0800 at [facility] on 8/21/2023 for his procedure at 1000. Patient will hold metformin 24 hours before procedure and will not resume it for 48 hours after his procedure. Also went over below instructions. NPO at [address]      2. Follow your directions as prescribed by the doctor for your procedure and medications. 3.   Consult your provider as to when to stop blood thinner  4. Do not take any pain medication within 6 hours of your procedure  5. Do not drink any alcoholic beverages or use any street drugs 24 hours before procedure. 6.   Please wear simple, loose fitting clothing to the hospital.  Do not bring valuables (money,             credit cards, checkbooks, etc.)     7. If you  have a Living Will and Durable Power of  for Healthcare, please bring in a copy. 8.   Please bring picture ID,  insurance card, paperwork from the doctors office            (H & P, Consent,  & card for implantable devices). 9.   Report to the information desk on the ground floor. 10. Take a shower the night before or morning of your procedure, do not apply any lotion, oil or powder. 11. If you are going to be sedated for the procedure, you will need a responsible adult to drive you home.

## 2023-08-18 NOTE — PROGRESS NOTES
V2.0  [facility] Hospitalist Progress Note      Name:  Joshua Espinoza /Age/Sex: 1970  (46 y.o. male)   MRN & CSN:  [id number] & [id number] Encounter Date/Time: 2023 9:57 AM EDT    Location:  [id number] PCP: Kat Pierson, [facility] Day: 5      Subjective:     Chief Complaint: dysarthria     Glucose is better 250's still high. Patient seen and examined at bedside. Has no new complaints or concerns. Contacted pharmacy regarding arranging for FreeLibre on discharge. Prescription send to pt's pharmacy ([facility]). 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 Cerebral angio  showed severe stenosis of the L MCA well as diffuse luminal irregularities throughout the intracranial circulation, DD luminal irregularities include atherosclerosis for cerebral vasculitis. Pt has hx of psoriasis. CRP 8.2, Sed rate 63 Neuro interventional managing. On IV steroids, monitor glucose        CVA. Came w left sided weakness. Now has mild  aphasia and dysphagia. MRI brain  subacute infarcts right side and small acute infarct left side. Neuro checks, continue home ASA, plavix, statin. Type II diabetes. With hx of Charcot. On home metformin. Last HA1C 7.1 . Glucose still high 250's 2/2 steroids. Discharge on HDSSI, Lantus 25U QHS, Lispro 5U TID, continue metformin. Pt needs close glucose monitoring. Concern it will be high for sometime and will start to come down as the steroids are tapered down. Pt will benefit from having freeLibre glucose monitoring for at least one month. Essential HTN. On home lisinopril, amlodipine. Continue      Personally reviewed Lab Studies and Imaging     Drugs that require monitoring for toxicity include lovenox and the method of monitoring was cbc      Diet ADULT DIET;  Regular; 3 carb choices (45 gm/meal)   DVT Prophylaxis [x] Lovenox, []  Heparin, [] SCDs, [] Ambulation,  [] Eliquis, [] Xarelto  [] Coumadin   Code Status Full Code       Surrogate Decision

## 2023-08-18 NOTE — PROGRESS NOTES
Per Nursing Supervisor, we do not have diabetes educated. Notified hospitalist of this. He is calling pharmacy about blood sugar monitoring device.

## 2023-08-18 NOTE — CARE COORDINATION
Pt is on discharge for home with [address] LSW PS Cori with HC and informed her of the discharge. Chris Mccartney will initiate HC services.

## 2023-08-18 NOTE — PLAN OF CARE
Problem: Chronic Conditions and Co-morbidities  Goal: Patient's chronic conditions and co-morbidity symptoms are monitored and maintained or improved  Outcome: Completed     Problem: Discharge Planning  Goal: Discharge to home or other facility with appropriate resources  Outcome: Completed     Problem: Neurosensory - Adult  Goal: Achieves stable or improved neurological status  Outcome: Completed  Goal: Achieves maximal functionality and self care  Outcome: Completed     Problem: Neurosensory - Adult  Goal: Achieves maximal functionality and self care  Outcome: Completed     Problem: Safety - Adult  Goal: Free from fall injury  Outcome: Completed

## 2023-08-18 NOTE — TELEPHONE ENCOUNTER
Per verbal Medina SHEPPARD, patient needs to be seen for hospital f/u by PCP next week. Spoke to Arnulfo Barrientos PA-C office and patient is scheduled in their walk in clinic on Wednesday, 8/23/23 @ 10 am. Patient notified - agreeable and verbalized understanding. Nothing further needed.

## 2023-08-21 ENCOUNTER — HOSPITAL ENCOUNTER (OUTPATIENT)
Dept: INTERVENTIONAL RADIOLOGY/VASCULAR | Age: 53
Discharge: HOME | End: 2023-08-21
Payer: COMMERCIAL

## 2023-08-21 VITALS
OXYGEN SATURATION: 97 % | DIASTOLIC BLOOD PRESSURE: 82 MMHG | TEMPERATURE: 96.2 F | SYSTOLIC BLOOD PRESSURE: 117 MMHG | HEART RATE: 55 BPM | RESPIRATION RATE: 18 BRPM

## 2023-08-21 LAB
ANION GAP SERPL CALCULATED.3IONS-SCNC: 12 MMOL/L (ref 4–16)
BUN SERPL-MCNC: 18 MG/DL (ref 6–23)
CALCIUM SERPL-MCNC: 9.2 MG/DL (ref 8.3–10.6)
CHLORIDE BLD-SCNC: 96 MMOL/L (ref 99–110)
CO2: 26 MMOL/L (ref 21–32)
CREAT SERPL-MCNC: 0.6 MG/DL (ref 0.9–1.3)
GFR SERPL CREATININE-BSD FRML MDRD: >60 ML/MIN/1.73M2
GLUCOSE SERPL-MCNC: 175 MG/DL (ref 70–99)
HCT VFR BLD CALC: 49.3 % (ref 42–52)
HEMOGLOBIN: 16.9 GM/DL (ref 13.5–18)
MCH RBC QN AUTO: 27.7 PG (ref 27–31)
MCHC RBC AUTO-ENTMCNC: 34.3 % (ref 32–36)
MCV RBC AUTO: 80.8 FL (ref 78–100)
PDW BLD-RTO: 12.3 % (ref 11.7–14.9)
PLATELET # BLD: 200 K/CU MM (ref 140–440)
PMV BLD AUTO: 10.1 FL (ref 7.5–11.1)
POTASSIUM SERPL-SCNC: 3.7 MMOL/L (ref 3.5–5.1)
RBC # BLD: 6.1 M/CU MM (ref 4.6–6.2)
SODIUM BLD-SCNC: 134 MMOL/L (ref 135–145)
WBC # BLD: 11.2 K/CU MM (ref 4–10.5)

## 2023-08-21 PROCEDURE — 36227 PLACE CATH XTRNL CAROTID: CPT

## 2023-08-21 PROCEDURE — 36224 PLACE CATH CAROTD ART: CPT

## 2023-08-21 PROCEDURE — 36225 PLACE CATH SUBCLAVIAN ART: CPT

## 2023-08-21 PROCEDURE — 2500000003 HC RX 250 WO HCPCS

## 2023-08-21 PROCEDURE — 6360000002 HC RX W HCPCS: Performed by: PSYCHIATRY & NEUROLOGY

## 2023-08-21 PROCEDURE — C1769 GUIDE WIRE: HCPCS

## 2023-08-21 PROCEDURE — 6360000004 HC RX CONTRAST MEDICATION

## 2023-08-21 PROCEDURE — 85027 COMPLETE CBC AUTOMATED: CPT

## 2023-08-21 PROCEDURE — 6360000004 HC RX CONTRAST MEDICATION: Performed by: PSYCHIATRY & NEUROLOGY

## 2023-08-21 PROCEDURE — 80048 BASIC METABOLIC PNL TOTAL CA: CPT

## 2023-08-21 PROCEDURE — 99222 1ST HOSP IP/OBS MODERATE 55: CPT | Performed by: NURSE PRACTITIONER

## 2023-08-21 PROCEDURE — 6360000002 HC RX W HCPCS

## 2023-08-21 PROCEDURE — 36226 PLACE CATH VERTEBRAL ART: CPT

## 2023-08-21 RX ORDER — FENTANYL CITRATE 50 UG/ML
INJECTION, SOLUTION INTRAMUSCULAR; INTRAVENOUS PRN
Status: COMPLETED | OUTPATIENT
Start: 2023-08-21 | End: 2023-08-21

## 2023-08-21 RX ORDER — ACETAMINOPHEN 325 MG/1
650 TABLET ORAL EVERY 4 HOURS PRN
Status: DISCONTINUED | OUTPATIENT
Start: 2023-08-21 | End: 2023-08-22 | Stop reason: HOSPADM

## 2023-08-21 RX ORDER — SODIUM CHLORIDE 0.9 % (FLUSH) 0.9 %
5-40 SYRINGE (ML) INJECTION PRN
Status: DISCONTINUED | OUTPATIENT
Start: 2023-08-21 | End: 2023-08-22 | Stop reason: HOSPADM

## 2023-08-21 RX ORDER — ONDANSETRON 2 MG/ML
4 INJECTION INTRAMUSCULAR; INTRAVENOUS EVERY 6 HOURS PRN
Status: DISCONTINUED | OUTPATIENT
Start: 2023-08-21 | End: 2023-08-22 | Stop reason: HOSPADM

## 2023-08-21 RX ORDER — SODIUM CHLORIDE 0.9 % (FLUSH) 0.9 %
5-40 SYRINGE (ML) INJECTION EVERY 12 HOURS SCHEDULED
Status: DISCONTINUED | OUTPATIENT
Start: 2023-08-21 | End: 2023-08-22 | Stop reason: HOSPADM

## 2023-08-21 RX ORDER — ONDANSETRON 4 MG/1
4 TABLET, ORALLY DISINTEGRATING ORAL EVERY 8 HOURS PRN
Status: DISCONTINUED | OUTPATIENT
Start: 2023-08-21 | End: 2023-08-22 | Stop reason: HOSPADM

## 2023-08-21 RX ORDER — SODIUM CHLORIDE 9 MG/ML
INJECTION, SOLUTION INTRAVENOUS PRN
Status: DISCONTINUED | OUTPATIENT
Start: 2023-08-21 | End: 2023-08-22 | Stop reason: HOSPADM

## 2023-08-21 RX ORDER — MIDAZOLAM HYDROCHLORIDE 5 MG/ML
INJECTION INTRAMUSCULAR; INTRAVENOUS PRN
Status: COMPLETED | OUTPATIENT
Start: 2023-08-21 | End: 2023-08-21

## 2023-08-21 RX ADMIN — FENTANYL CITRATE 50 MCG: 50 INJECTION, SOLUTION INTRAMUSCULAR; INTRAVENOUS at 10:52

## 2023-08-21 RX ADMIN — IOPAMIDOL 150 ML: 755 INJECTION, SOLUTION INTRAVENOUS at 11:25

## 2023-08-21 RX ADMIN — MIDAZOLAM 1 MG: 5 INJECTION INTRAMUSCULAR; INTRAVENOUS at 10:52

## 2023-08-21 NOTE — OR NURSING
PROCEDURE START: 1053  PROCEDURE END:1125  FLUORO TIME: 10.8  AK:1031  DAP: 206  CONTRAST VOLUME: 150    ARRHYTHMIA: none  INTERVENTION: none

## 2023-08-21 NOTE — H&P
Severe stenosis of the left middle cerebral artery distal M1 segment at the origin of the superior and inferior divisions. Additionally there are diffuse luminal irregularities throughout the the intracranial circulation. The etiology to these luminal irregularities is unknown at this point. Further work-up will be done. The high-grade stenosis of the left middle cerebral artery was not improved after 10 mg infusion of verapamil. Electronically signed by Debra Kenney DO on 8/1/2023 at 2:48 PM      MRI brain 8/15/2023  IMPRESSION:  1. Small acute/subacute infarcts present throughout the right corpus callosum  body. No evidence of hemorrhagic transformation. 2. Punctate acute infarct present in the left centrum semiovale. No evidence  of hemorrhagic transformation. 3. Small subacute infarct present in the left corona radiata. No evidence of  hemorrhagic transformation. 4. Involutional parenchymal changes with mild microvascular ischemic disease. 5. Abnormal appearance of the left vertebral artery flow void, may be related  to slow flow or occlusion. The findings were sent to the Radiology Results [address] at 7:57  pm on 8/15/2023 to be communicated to a licensed caregiver. ASSESSMENT/PLAN:     51-year-old male with a recent history of ischemic strokes who underwent diagnostic cerebral angiogram on 8/1/2023 found of severe stenosis of the the left MCA as well as diffuse luminal irregularities throughout intracranial circulation. DD luminal irregularities include atherosclerosis versus cerebral vasculitis. Patient completed 5 days of high-dose IV steroids. The patient returns today for repeat cerebral angiogram to evaluate improvement in cerebral vasculature luminal irregularities. -Diagnostic cerebral angiogram to evaluate cerebral vasculature  -Continue aspirin, Plavix and statin for secondary prevention of stroke.     Pertinent images discussed/reviewed with Dr. Kelvin Aponte who has

## 2023-08-21 NOTE — PROGRESS NOTES
Assisted to semi fowlers position. Kimani crackers and peanut butter set up. No changes in groin puncture site, without ooze or hematoma.

## 2023-08-21 NOTE — PROGRESS NOTES
Pt ambulated to br and voided returned to room r groin drsg dry and intact no hematoma. Dc instructions reviewed with pt and family, pt verbalizes understanding.   Pt changed into clothes

## 2023-08-21 NOTE — PROGRESS NOTES
TRANSFER - OUT REPORT:    Verbal report given to Cath lab holding [address] [facility] being transferred [facility] for routine post-op       Report consisted of patient's Situation, Background, Assessment and   Recommendations(SBAR). Information from the following report(s) Nurse Handoff Report was reviewed with the receiving nurse. Opportunity for questions and clarification was provided.       Patient transported with:   Registered Nurse

## 2023-08-21 NOTE — DISCHARGE INSTRUCTIONS
Cardiac catheterization  Home Instuctions  Name:Tomas La  :1970  Your instructions:    Shower only for the first 5 days, NO TUB BATHS. Wash procedure site with mild soap, rinse and pat dry. Do not rub over the procedure site for two (2) days. Remove dressing and replace with a band-aid in 2 days. Site observations-Observe the area for bleeding or hematoma (lump under the skin caused by bleeding.)      A. Painless bruising is normal.  B. If a firmness/swelling seems to be increasing or there is bright red bleeding from site       (hold pressure over procedure site) and  CALL 911 IMMEDIATELY. What to do after you leave the hospital:    Recommended activity: no lifting, Driving, or Strenuous exercise for 3 days. DO NOT lift more than 10lbs. Follow-up with physician in 7-10 days. Take your medication as ordered. If you have any questions, you may call [phone] or your physicians office    Information obtained by:  By signing below, I understand that if any problems occur once I leave the hospital I am to contact my cardiologist or family physician. I understand and acknowledge receipt of the instructions indicated above.

## 2023-08-22 ENCOUNTER — TELEPHONE (OUTPATIENT)
Dept: FAMILY MEDICINE CLINIC | Age: 53
End: 2023-08-22

## 2023-08-22 ENCOUNTER — OFFICE VISIT (OUTPATIENT)
Dept: CARDIOLOGY CLINIC | Age: 53
End: 2023-08-22
Payer: COMMERCIAL

## 2023-08-22 VITALS
DIASTOLIC BLOOD PRESSURE: 78 MMHG | HEIGHT: 72 IN | SYSTOLIC BLOOD PRESSURE: 110 MMHG | HEART RATE: 75 BPM | BODY MASS INDEX: 30.69 KG/M2 | WEIGHT: 226.6 LBS

## 2023-08-22 DIAGNOSIS — I63.9 ISCHEMIC STROKE (HCC): ICD-10-CM

## 2023-08-22 DIAGNOSIS — E78.5 DYSLIPIDEMIA: ICD-10-CM

## 2023-08-22 DIAGNOSIS — I10 ESSENTIAL HYPERTENSION: Primary | ICD-10-CM

## 2023-08-22 DIAGNOSIS — I73.9 PERIPHERAL VASCULAR DISEASE (HCC): ICD-10-CM

## 2023-08-22 DIAGNOSIS — I63.9 ACUTE CEREBROVASCULAR ACCIDENT (HCC): Primary | ICD-10-CM

## 2023-08-22 PROCEDURE — 3078F DIAST BP <80 MM HG: CPT | Performed by: INTERNAL MEDICINE

## 2023-08-22 PROCEDURE — 3074F SYST BP LT 130 MM HG: CPT | Performed by: INTERNAL MEDICINE

## 2023-08-22 PROCEDURE — 93000 ELECTROCARDIOGRAM COMPLETE: CPT | Performed by: INTERNAL MEDICINE

## 2023-08-22 PROCEDURE — 99214 OFFICE O/P EST MOD 30 MIN: CPT | Performed by: INTERNAL MEDICINE

## 2023-08-22 RX ORDER — PREDNISONE 20 MG/1
60 TABLET ORAL DAILY
Qty: 45 TABLET | Refills: 0 | Status: SHIPPED | OUTPATIENT
Start: 2023-08-22 | End: 2023-08-23

## 2023-08-22 NOTE — OP NOTE
Cerebral and Cervical Angiogram    Arvin Whittington  1970     Procedure Date:  8/21/23    Access:  Right common femoral artery    Closure:  Angioseal    Indication: Intracranial luminal irregularities    No significant blood loss  Malampati 2    Procedures performed:  Ultrasound-guided needle access into the right common femoral artery  Right common femoral artery angiogram with Angio-Seal for closure  Selective bilateral biplane carotid artery cervical angiograms  Selective bilateral biplane internal carotid artery cerebral angiograms  Selective biplane external carotid artery head angiogram.  Selective bilateral biplane vertebral cerebral angiograms    Procedure: The patient was appropriately consented. The patient was brought to the angiography suite laid in a supine position on the angiography table. The right groin was prepped and draped in a normal sterile fashion. Lidocaine was used for local anesthetic. Fentanyl and Versed were used for conscious sedation. A micropuncture was used with ultrasound-guided needle access into the right common femoral artery. A 5 Sami sheath was then placed. Next a 5 [place] diagnostic catheter was brought up over an angled Glidewire into the aortic arch and used to catheter the great vessels under fluoroscopic guidance. The catheter was brought into the right common carotid artery and a biplane cervical angiogram was done which shows contrast flowing antegrade direction to the common internal and external carotid arteries. There is no significant stenosis. Next the catheter was taken into the right external carotid artery head angiogram was done which shows contrast flowing antegrade direction to the external carotid artery and its branches including the internal maxillary,superficial temporal artery, middle meningeal artery and occipital arteries. No abnormalities were identified.     Next the catheter was taken into the right internal carotid artery

## 2023-08-22 NOTE — PROGRESS NOTES
Cardiology follow-up note    Primary care physician: Maya Newman PA-C      History of present illness: Brittany Miranda is a 46 y. o.year old male  who has prior history of hypertension, diabetes mellitus, Charcot's joint, severe peripheral vascular disease and neuropathy who is here for follow-up. Patient was admitted in the hospital with ischemic stroke. His work-up revealed that he had multiple intracranial stenoses. There was suspicion for inflammatory vasculitis and he was treated with steroids for that. I saw him in the hospital as a cardiology consult for cryptogenic stroke. At that time an event monitor was placed for 30 days. Today he is here for follow-up. His event monitor did not reveal any arrhythmias. He is recovering from his stroke and getting to physical therapy done. He denies any palpitations, chest pain, shortness of breath. He occasionally has hiccups and feels like he is choking when he eats or drinks. Past medical history:    has a past medical history of Blood circulation, collateral, Callus of foot, Callus of foot, Charcot's joint of foot, Charcot's joint of foot in type 2 diabetes mellitus ([address]), Closed nondisplaced fracture of fifth metatarsal bone of right foot with routine healing, Diabetes mellitus ([address]), Diverticulitis, Hyperlipidemia, Hypertension, Ischemic ulcer of left foot, limited to breakdown of skin ([address]), Leg edema, right, Neuropathy, Plantar wart, left foot, Ulcer of right foot with fat layer exposed ([address]), Ulcer of right foot with necrosis of muscle, Hannon Grade III, WD-Chronic ulcer of left foot with fat layer exposed ([address]), WD-Chronic ulcer of toe of right foot, limited to breakdown of skin ([address]), WD-Diabetic ulcer of left foot associated with type 2 diabetes mellitus ([address]), WD-Diabetic ulcer of right lateral foot associated with type 2 diabetes mellitus, with fat layer exposed ([address]), and WD-Type 2 diabetes mellitus with right diabetic foot ulcer ([address]).     Past

## 2023-08-22 NOTE — TELEPHONE ENCOUNTER
Care Transitions Initial Follow Up Call    Outreach made within 2 business days of discharge: Yes    Patient: Juan Mendez Patient : 1970   MRN: [id number]  Reason for Admission: There are no discharge diagnoses documented for the most recent discharge. Discharge Date: 23       Spoke with: lm on secured vm-lengthy instructins given-instructed to call with any questions    Discharge department/facility: [facility] Interactive Patient Contact:  Was patient able to fill all prescriptions: Yes  Was patient instructed to bring all medications to the follow-up visit: Yes  Is patient taking all medications as directed in the discharge summary?  Yes  Does patient understand their discharge instructions: Yes  Does patient have questions or concerns that need addressed prior to 7-14 day follow up office visit: no    Scheduled appointment with PCP within 7-14 days    Follow Up  Future Appointments   Date Time Provider [address] [place] [address]   2023 10:30 AM Pascale Sykes MD [facility]   2023 10:00 AM Santosh Segura [facility]   2023  2:15 PM Danny Diaz MD [facility] [facility]   2023  2:45 PM SCHEDULE, [address] [facility] ON[facility] [place]   2023  1:40 PM Stanley Barlow PA-C [facility]   10/16/2023  2:45 PM ADELFO Casillas - CNP [address] Neurology -       Simba PATI Webber

## 2023-08-22 NOTE — TELEPHONE ENCOUNTER
Dickson Mathews from home health called asking for PCP to send a referral for patient for OT and PT as well as aquatic therapy and drive evaluation to [address] and Sports Medicine. Their fax number is [phone]. Please advise.

## 2023-08-22 NOTE — TELEPHONE ENCOUNTER
Mamta Preston called back to see if PCP can also refer for speech therapy due to issues with \"hiccups. \"

## 2023-08-23 ENCOUNTER — OFFICE VISIT (OUTPATIENT)
Dept: INTERNAL MEDICINE CLINIC | Age: 53
End: 2023-08-23

## 2023-08-23 ENCOUNTER — HOSPITAL ENCOUNTER (OUTPATIENT)
Dept: INFUSION THERAPY | Age: 53
Discharge: HOME | End: 2023-08-23
Payer: COMMERCIAL

## 2023-08-23 ENCOUNTER — INITIAL CONSULT (OUTPATIENT)
Dept: ONCOLOGY | Age: 53
End: 2023-08-23
Payer: COMMERCIAL

## 2023-08-23 VITALS
SYSTOLIC BLOOD PRESSURE: 128 MMHG | WEIGHT: 222 LBS | HEART RATE: 82 BPM | OXYGEN SATURATION: 97 % | HEIGHT: 72 IN | BODY MASS INDEX: 30.07 KG/M2 | DIASTOLIC BLOOD PRESSURE: 74 MMHG

## 2023-08-23 VITALS
WEIGHT: 226.4 LBS | TEMPERATURE: 97.8 F | SYSTOLIC BLOOD PRESSURE: 119 MMHG | RESPIRATION RATE: 18 BRPM | OXYGEN SATURATION: 97 % | HEIGHT: 72 IN | BODY MASS INDEX: 30.66 KG/M2 | DIASTOLIC BLOOD PRESSURE: 61 MMHG | HEART RATE: 75 BPM

## 2023-08-23 DIAGNOSIS — I63.9 ACUTE CEREBROVASCULAR ACCIDENT (CVA) (HCC): ICD-10-CM

## 2023-08-23 DIAGNOSIS — I77.6 VASCULITIS (HCC): ICD-10-CM

## 2023-08-23 DIAGNOSIS — R06.6 HICCUPS: Primary | ICD-10-CM

## 2023-08-23 DIAGNOSIS — I63.9 ACUTE CEREBROVASCULAR ACCIDENT (CVA) (HCC): Primary | ICD-10-CM

## 2023-08-23 DIAGNOSIS — Z09 HOSPITAL DISCHARGE FOLLOW-UP: ICD-10-CM

## 2023-08-23 PROCEDURE — 86146 BETA-2 GLYCOPROTEIN ANTIBODY: CPT

## 2023-08-23 PROCEDURE — 85613 RUSSELL VIPER VENOM DILUTED: CPT

## 2023-08-23 PROCEDURE — 36415 COLL VENOUS BLD VENIPUNCTURE: CPT

## 2023-08-23 PROCEDURE — 86147 CARDIOLIPIN ANTIBODY EA IG: CPT

## 2023-08-23 PROCEDURE — 81240 F2 GENE: CPT

## 2023-08-23 PROCEDURE — 3078F DIAST BP <80 MM HG: CPT | Performed by: INTERNAL MEDICINE

## 2023-08-23 PROCEDURE — 85303 CLOT INHIBIT PROT C ACTIVITY: CPT

## 2023-08-23 PROCEDURE — 3074F SYST BP LT 130 MM HG: CPT | Performed by: INTERNAL MEDICINE

## 2023-08-23 PROCEDURE — 99211 OFF/OP EST MAY X REQ PHY/QHP: CPT

## 2023-08-23 PROCEDURE — 99204 OFFICE O/P NEW MOD 45 MIN: CPT | Performed by: INTERNAL MEDICINE

## 2023-08-23 PROCEDURE — 85305 CLOT INHIBIT PROT S TOTAL: CPT

## 2023-08-23 RX ORDER — IBUPROFEN 200 MG
200 TABLET ORAL EVERY 6 HOURS PRN
COMMUNITY

## 2023-08-23 RX ORDER — ESOMEPRAZOLE MAGNESIUM 40 MG/1
40 CAPSULE, DELAYED RELEASE ORAL DAILY
Qty: 30 CAPSULE | Refills: 3 | Status: SHIPPED | OUTPATIENT
Start: 2023-08-23

## 2023-08-23 ASSESSMENT — ENCOUNTER SYMPTOMS
RHINORRHEA: 0
EYE REDNESS: 0
NAUSEA: 0
COLOR CHANGE: 0
COUGH: 0
CHEST TIGHTNESS: 0
EYE PAIN: 0
SORE THROAT: 0
BACK PAIN: 0
DIARRHEA: 0
WHEEZING: 0
CONSTIPATION: 0
VOMITING: 0
EYE DISCHARGE: 0
BLOOD IN STOOL: 0
PHOTOPHOBIA: 0
ABDOMINAL PAIN: 0
SHORTNESS OF BREATH: 0

## 2023-08-23 NOTE — PROGRESS NOTES
Patient Name:  Bijan Crouch  Patient :  1970  Patient MRN:  [id number]     Primary Oncologist: Viviana Vasquez MD  Referring Provider: Victor Hugo Douglas PA-C     Date of Service: 2023      Reason for Consult: To evaluate the patient with cryptogenic stroke, h/o DVT, to r/o hypercoagulable state. Chief Complaint:    Chief Complaint   Patient presents with    New Patient     Patient Active Problem List:     Charcot's joint of foot in type 2 diabetes mellitus ([address])     Skin callus     H/O sepsis     DM (diabetes mellitus) ([address])     Psoriasis     Essential hypertension     Leg edema, right     Right leg pain     Plantar wart, left foot     Callus of foot     Change in bowel movement     Abnormal CT of the abdomen     Smokeless tobacco use     Erectile dysfunction     History of colon polyps     Acute cerebrovascular accident (CVA) ([address])     Right hemiparesis ([address])     Difficulty transferring location     Anxiety and depression     Neuropathy     Vasculitis (HCC)    HPI:   Bijan Crouch is a 46 y.o. male with medical history significant for HTN, DM, psoriasis, anxiety, depression and arterial ischemic stroke, referred to me on 23 for evaluation of his cryptogenic stroke. He had right frontal lobe stroke as well as anterior left medulla ischemic stroke on 23. On 2023, he underwent diagnostic cerebral angiogram with Dr. María Webber which demonstrated severe stenosis of the left MCA distal M1 segment at the origin of the superior and inferior divisions. Additionally there is diffuse luminal irregularities throughout the intracranial circulation. The etiology to these luminal irregularities was unknown at that time. High-grade stenosis of the left MCA was not improved after 10 mg infusion of verapamil. He then received high-dose methylprednisone 1 g/day for 5 days. Repeat cerebral angiogram on 2023 following course of high-dose steroids showed no improvement.        Cerebral vasculitis

## 2023-08-23 NOTE — PROGRESS NOTES
Lan Moulton (:  1970) is a 46 y.o. male,Established patient, here for evaluation of the following chief complaint(s):    No chief complaint on file. This is my first patient encounter with Lan Moulton; chart reviewed. SUBJECTIVE/OBJECTIVE:  JONATHAN Moulton is a pleasant 46 y.o. male presenting to clinic today for posthospital follow-up cerebral vasculitis. Patient had right frontal lobe stroke in 2023; patient underwent diagnostic cerebral angiogram 2023 which demonstrated severe stenosis of left MCA and luminal irregularities throughout intracranial circulation; stenosis improved after 10 mg infusion of verapamil; patient was admitted for high-dose steroids, repeat cerebral angiogram was performed on 2023 which did not show any significant change in potential vasculitis; patient was continued on prednisone for additional 15 days; followed up with cardiology yesterday, event monitor did not reveal any significant arrhythmias; patient has appointment to follow-up with hematology oncology today. Underlying etiology remains somewhat unclear. Patient does have history of psoriasis and is on biologic Tremfya for the past several months. Denies any other medication changes. Patient reports monitoring blood sugars closely, reports blood sugars have been ranging from 260-300 while on steroids; reports ongoing hiccups for the past few months since stroke, reports occasional acid reflux, states hiccups are worse after eating. Reports ongoing occasional trouble with word finding/confusion, right-sided weakness since stroke. Will be starting outpatient PT, OT next week. Cerebral Angiogram 2023:  Summary: Diffuse luminal irregularities in the intracranial circulation consistent with a vasculopathy. The patient was on high-dose steroids for over 1 week and follow-up angiogram did not show any major or significant improvement.   Etiology to these intracranial

## 2023-08-25 LAB
BETA 2 GLYCOPROT.1 IGA AB: <10 SAU
BETA 2 GLYCOPROT.1 IGM AB: <10 SMU
BETA-2 GLYCOPROTEIN 1 IGG ANTIBODY: <10 SGU

## 2023-08-26 LAB
ANTICARDIOLIPIN IGG ANTIBODY: <10 GPL
CARDIOLIPIN IGA SER IA-ACNC: <10 APL
CARDIOLIPIN IGM SER IA-ACNC: 10 MPL
PROTEIN S ANTIGEN, TOTAL: 126 % (ref 84–134)

## 2023-08-27 LAB
CONFIRM DRVVT: NORMAL RATIO
MIXING DRVVT: NORMAL SEC (ref 33–44)
PROTEIN C ACTIVITY: 197 % (ref 83–168)
SCREEN DRVVT: 37 SEC (ref 33–44)

## 2023-08-28 ENCOUNTER — APPOINTMENT (OUTPATIENT)
Dept: GENERAL RADIOLOGY | Age: 53
DRG: 065 | End: 2023-08-28
Payer: COMMERCIAL

## 2023-08-28 ENCOUNTER — APPOINTMENT (OUTPATIENT)
Dept: CT IMAGING | Age: 53
DRG: 065 | End: 2023-08-28
Payer: COMMERCIAL

## 2023-08-28 ENCOUNTER — HOSPITAL ENCOUNTER (INPATIENT)
Age: 53
LOS: 2 days | Discharge: ANOTHER HOSPITAL | DRG: 065 | End: 2023-08-30
Attending: EMERGENCY MEDICINE | Admitting: INTERNAL MEDICINE
Payer: COMMERCIAL

## 2023-08-28 ENCOUNTER — TELEPHONE (OUTPATIENT)
Age: 53
End: 2023-08-28

## 2023-08-28 DIAGNOSIS — I63.9 CEREBROVASCULAR ACCIDENT (CVA), UNSPECIFIED MECHANISM (HCC): ICD-10-CM

## 2023-08-28 DIAGNOSIS — R53.1 RIGHT SIDED WEAKNESS: Primary | ICD-10-CM

## 2023-08-28 LAB
ALBUMIN SERPL-MCNC: 3.9 GM/DL (ref 3.4–5)
ALP BLD-CCNC: 87 IU/L (ref 40–129)
ALT SERPL-CCNC: 43 U/L (ref 10–40)
ANION GAP SERPL CALCULATED.3IONS-SCNC: 11 MMOL/L (ref 4–16)
APTT: 27.6 SECONDS (ref 25.1–37.1)
AST SERPL-CCNC: 18 IU/L (ref 15–37)
BASOPHILS ABSOLUTE: 0 K/CU MM
BASOPHILS RELATIVE PERCENT: 0.1 % (ref 0–1)
BILIRUB SERPL-MCNC: 0.6 MG/DL (ref 0–1)
BUN SERPL-MCNC: 17 MG/DL (ref 6–23)
CALCIUM SERPL-MCNC: 9.5 MG/DL (ref 8.3–10.6)
CALR MUTATION: NORMAL
CHLORIDE BLD-SCNC: 97 MMOL/L (ref 99–110)
CHP ED QC CHECK: NORMAL
CO2: 25 MMOL/L (ref 21–32)
CREAT SERPL-MCNC: 0.6 MG/DL (ref 0.9–1.3)
DIFFERENTIAL TYPE: ABNORMAL
EOSINOPHILS ABSOLUTE: 0 K/CU MM
EOSINOPHILS RELATIVE PERCENT: 0.1 % (ref 0–3)
F2 C.20210G>A GENO BLD/T: NEGATIVE
GFR SERPL CREATININE-BSD FRML MDRD: >60 ML/MIN/1.73M2
GLUCOSE BLD-MCNC: 161 MG/DL (ref 70–99)
GLUCOSE BLD-MCNC: 320 MG/DL
GLUCOSE BLD-MCNC: 320 MG/DL (ref 70–99)
GLUCOSE SERPL-MCNC: 376 MG/DL (ref 70–99)
HCT VFR BLD CALC: 45.8 % (ref 42–52)
HEMOGLOBIN: 15.5 GM/DL (ref 13.5–18)
IMMATURE NEUTROPHIL %: 0.6 % (ref 0–0.43)
INR BLD: 1 INDEX
JAK2 EXONS 12-15 MUTATION: NORMAL
JAK2 P.V617F BLD/T QL: NORMAL
LYMPHOCYTES ABSOLUTE: 1.2 K/CU MM
LYMPHOCYTES RELATIVE PERCENT: 7.1 % (ref 24–44)
MAGNESIUM: 1.8 MG/DL (ref 1.8–2.4)
MCH RBC QN AUTO: 27.8 PG (ref 27–31)
MCHC RBC AUTO-ENTMCNC: 33.8 % (ref 32–36)
MCV RBC AUTO: 82.2 FL (ref 78–100)
MONOCYTES ABSOLUTE: 0.4 K/CU MM
MONOCYTES RELATIVE PERCENT: 2.1 % (ref 0–4)
MPL 515 MUTATION: NORMAL
NUCLEATED RBC %: 0 %
PDW BLD-RTO: 12.9 % (ref 11.7–14.9)
PLATELET # BLD: 221 K/CU MM (ref 140–440)
PMV BLD AUTO: 10.5 FL (ref 7.5–11.1)
POTASSIUM SERPL-SCNC: 4.5 MMOL/L (ref 3.5–5.1)
PRO-BNP: 80.88 PG/ML
PROTHROMBIN TIME: 12.9 SECONDS (ref 11.7–14.5)
RBC # BLD: 5.57 M/CU MM (ref 4.6–6.2)
SEGMENTED NEUTROPHILS ABSOLUTE COUNT: 15.6 K/CU MM
SEGMENTED NEUTROPHILS RELATIVE PERCENT: 90 % (ref 36–66)
SODIUM BLD-SCNC: 133 MMOL/L (ref 135–145)
TOTAL IMMATURE NEUTOROPHIL: 0.1 K/CU MM
TOTAL NUCLEATED RBC: 0 K/CU MM
TOTAL PROTEIN: 6.7 GM/DL (ref 6.4–8.2)
TROPONIN T: <0.01 NG/ML
WBC # BLD: 17.3 K/CU MM (ref 4–10.5)

## 2023-08-28 PROCEDURE — 84484 ASSAY OF TROPONIN QUANT: CPT

## 2023-08-28 PROCEDURE — 93005 ELECTROCARDIOGRAM TRACING: CPT | Performed by: EMERGENCY MEDICINE

## 2023-08-28 PROCEDURE — 4A03X5D MEASUREMENT OF ARTERIAL FLOW, INTRACRANIAL, EXTERNAL APPROACH: ICD-10-PCS | Performed by: EMERGENCY MEDICINE

## 2023-08-28 PROCEDURE — 0042T CT BRAIN PERFUSION: CPT

## 2023-08-28 PROCEDURE — 85025 COMPLETE CBC W/AUTO DIFF WBC: CPT

## 2023-08-28 PROCEDURE — 83880 ASSAY OF NATRIURETIC PEPTIDE: CPT

## 2023-08-28 PROCEDURE — 94761 N-INVAS EAR/PLS OXIMETRY MLT: CPT

## 2023-08-28 PROCEDURE — 85610 PROTHROMBIN TIME: CPT

## 2023-08-28 PROCEDURE — 6360000002 HC RX W HCPCS: Performed by: INTERNAL MEDICINE

## 2023-08-28 PROCEDURE — 6370000000 HC RX 637 (ALT 250 FOR IP): Performed by: INTERNAL MEDICINE

## 2023-08-28 PROCEDURE — 83735 ASSAY OF MAGNESIUM: CPT

## 2023-08-28 PROCEDURE — 1200000000 HC SEMI PRIVATE

## 2023-08-28 PROCEDURE — 70450 CT HEAD/BRAIN W/O DYE: CPT

## 2023-08-28 PROCEDURE — 2580000003 HC RX 258: Performed by: INTERNAL MEDICINE

## 2023-08-28 PROCEDURE — 82962 GLUCOSE BLOOD TEST: CPT

## 2023-08-28 PROCEDURE — 99285 EMERGENCY DEPT VISIT HI MDM: CPT

## 2023-08-28 PROCEDURE — 85730 THROMBOPLASTIN TIME PARTIAL: CPT

## 2023-08-28 PROCEDURE — 71045 X-RAY EXAM CHEST 1 VIEW: CPT

## 2023-08-28 PROCEDURE — 80053 COMPREHEN METABOLIC PANEL: CPT

## 2023-08-28 PROCEDURE — 6360000004 HC RX CONTRAST MEDICATION: Performed by: EMERGENCY MEDICINE

## 2023-08-28 PROCEDURE — 70498 CT ANGIOGRAPHY NECK: CPT

## 2023-08-28 RX ORDER — PREDNISONE 20 MG/1
20 TABLET ORAL 3 TIMES DAILY
Status: DISCONTINUED | OUTPATIENT
Start: 2023-08-28 | End: 2023-08-30

## 2023-08-28 RX ORDER — INSULIN LISPRO 100 [IU]/ML
0-4 INJECTION, SOLUTION INTRAVENOUS; SUBCUTANEOUS NIGHTLY
Status: DISCONTINUED | OUTPATIENT
Start: 2023-08-28 | End: 2023-08-29

## 2023-08-28 RX ORDER — CLOPIDOGREL BISULFATE 75 MG/1
75 TABLET ORAL DAILY
Status: DISCONTINUED | OUTPATIENT
Start: 2023-08-29 | End: 2023-08-30 | Stop reason: HOSPADM

## 2023-08-28 RX ORDER — POLYETHYLENE GLYCOL 3350 17 G/17G
17 POWDER, FOR SOLUTION ORAL DAILY PRN
Status: DISCONTINUED | OUTPATIENT
Start: 2023-08-28 | End: 2023-08-30 | Stop reason: HOSPADM

## 2023-08-28 RX ORDER — PANTOPRAZOLE SODIUM 40 MG/1
40 TABLET, DELAYED RELEASE ORAL
Status: DISCONTINUED | OUTPATIENT
Start: 2023-08-29 | End: 2023-08-30 | Stop reason: HOSPADM

## 2023-08-28 RX ORDER — INSULIN GLARGINE 100 [IU]/ML
4 INJECTION, SOLUTION SUBCUTANEOUS NIGHTLY
Status: DISCONTINUED | OUTPATIENT
Start: 2023-08-28 | End: 2023-08-29

## 2023-08-28 RX ORDER — GABAPENTIN 300 MG/1
300 CAPSULE ORAL 3 TIMES DAILY
Status: DISCONTINUED | OUTPATIENT
Start: 2023-08-28 | End: 2023-08-30 | Stop reason: HOSPADM

## 2023-08-28 RX ORDER — SODIUM CHLORIDE 0.9 % (FLUSH) 0.9 %
5-40 SYRINGE (ML) INJECTION PRN
Status: DISCONTINUED | OUTPATIENT
Start: 2023-08-28 | End: 2023-08-30 | Stop reason: HOSPADM

## 2023-08-28 RX ORDER — ONDANSETRON 2 MG/ML
4 INJECTION INTRAMUSCULAR; INTRAVENOUS EVERY 6 HOURS PRN
Status: DISCONTINUED | OUTPATIENT
Start: 2023-08-28 | End: 2023-08-30 | Stop reason: HOSPADM

## 2023-08-28 RX ORDER — ASPIRIN 81 MG/1
81 TABLET, CHEWABLE ORAL DAILY
Status: DISCONTINUED | OUTPATIENT
Start: 2023-08-29 | End: 2023-08-30 | Stop reason: HOSPADM

## 2023-08-28 RX ORDER — GLUCAGON 1 MG/ML
1 KIT INJECTION PRN
Status: DISCONTINUED | OUTPATIENT
Start: 2023-08-28 | End: 2023-08-30 | Stop reason: HOSPADM

## 2023-08-28 RX ORDER — ATORVASTATIN CALCIUM 40 MG/1
80 TABLET, FILM COATED ORAL NIGHTLY
Status: DISCONTINUED | OUTPATIENT
Start: 2023-08-28 | End: 2023-08-30 | Stop reason: HOSPADM

## 2023-08-28 RX ORDER — INSULIN LISPRO 100 [IU]/ML
0-8 INJECTION, SOLUTION INTRAVENOUS; SUBCUTANEOUS
Status: DISCONTINUED | OUTPATIENT
Start: 2023-08-29 | End: 2023-08-29

## 2023-08-28 RX ORDER — PREDNISONE 20 MG/1
20 TABLET ORAL 3 TIMES DAILY
COMMUNITY
Start: 2023-08-24

## 2023-08-28 RX ORDER — SODIUM CHLORIDE 0.9 % (FLUSH) 0.9 %
5-40 SYRINGE (ML) INJECTION EVERY 12 HOURS SCHEDULED
Status: DISCONTINUED | OUTPATIENT
Start: 2023-08-28 | End: 2023-08-30 | Stop reason: HOSPADM

## 2023-08-28 RX ORDER — ONDANSETRON 4 MG/1
4 TABLET, ORALLY DISINTEGRATING ORAL EVERY 8 HOURS PRN
Status: DISCONTINUED | OUTPATIENT
Start: 2023-08-28 | End: 2023-08-30 | Stop reason: HOSPADM

## 2023-08-28 RX ORDER — DEXTROSE MONOHYDRATE 100 MG/ML
INJECTION, SOLUTION INTRAVENOUS CONTINUOUS PRN
Status: DISCONTINUED | OUTPATIENT
Start: 2023-08-28 | End: 2023-08-30 | Stop reason: HOSPADM

## 2023-08-28 RX ORDER — SODIUM CHLORIDE 9 MG/ML
INJECTION, SOLUTION INTRAVENOUS PRN
Status: DISCONTINUED | OUTPATIENT
Start: 2023-08-28 | End: 2023-08-30 | Stop reason: HOSPADM

## 2023-08-28 RX ORDER — ESCITALOPRAM OXALATE 10 MG/1
10 TABLET ORAL DAILY
Status: DISCONTINUED | OUTPATIENT
Start: 2023-08-29 | End: 2023-08-30 | Stop reason: HOSPADM

## 2023-08-28 RX ORDER — INSULIN ASPART 100 [IU]/ML
INJECTION, SOLUTION INTRAVENOUS; SUBCUTANEOUS 3 TIMES DAILY
COMMUNITY

## 2023-08-28 RX ORDER — ENOXAPARIN SODIUM 100 MG/ML
40 INJECTION SUBCUTANEOUS DAILY
Status: DISCONTINUED | OUTPATIENT
Start: 2023-08-28 | End: 2023-08-30 | Stop reason: HOSPADM

## 2023-08-28 RX ADMIN — IOPAMIDOL 75 ML: 755 INJECTION, SOLUTION INTRAVENOUS at 10:56

## 2023-08-28 RX ADMIN — GABAPENTIN 300 MG: 300 CAPSULE ORAL at 21:09

## 2023-08-28 RX ADMIN — INSULIN GLARGINE 4 UNITS: 100 INJECTION, SOLUTION SUBCUTANEOUS at 21:09

## 2023-08-28 RX ADMIN — ATORVASTATIN CALCIUM 80 MG: 40 TABLET, FILM COATED ORAL at 21:09

## 2023-08-28 RX ADMIN — ENOXAPARIN SODIUM 40 MG: 100 INJECTION SUBCUTANEOUS at 21:18

## 2023-08-28 RX ADMIN — IOPAMIDOL 80 ML: 755 INJECTION, SOLUTION INTRAVENOUS at 12:02

## 2023-08-28 RX ADMIN — PREDNISONE 20 MG: 20 TABLET ORAL at 21:09

## 2023-08-28 RX ADMIN — SODIUM CHLORIDE, PRESERVATIVE FREE 10 ML: 5 INJECTION INTRAVENOUS at 21:09

## 2023-08-28 ASSESSMENT — ENCOUNTER SYMPTOMS
GASTROINTESTINAL NEGATIVE: 1
EYES NEGATIVE: 1
RESPIRATORY NEGATIVE: 1
ALLERGIC/IMMUNOLOGIC NEGATIVE: 1

## 2023-08-28 ASSESSMENT — PAIN SCALES - GENERAL: PAINLEVEL_OUTOF10: 0

## 2023-08-28 NOTE — ACP (ADVANCE CARE PLANNING)
Patient does not have any ACP documents/Medical Power of . LSW notes hospital will follow [place] Next of Kin hierarchy in the following descending order for priority:    Guardian  Spouse  Majority of adult Children  Parents  Majority of adult Siblings  Nearest Relative not described above    Per [place] Next of Kin hierarchy: Patients' parent will be [address]

## 2023-08-28 NOTE — ED PROVIDER NOTES
(ACIDOPHILUS PROBIOTIC) CAPS capsule Take 1 capsule by mouth daily      metFORMIN (GLUCOPHAGE-XR) 500 MG extended release tablet Take 1 tablet by mouth daily (with breakfast) 90 tablet 1    lisinopril (PRINIVIL;ZESTRIL) 20 MG tablet Take 1 tablet by mouth 2 times daily 180 tablet 1    amLODIPine (NORVASC) 10 MG tablet Take 1 tablet by mouth daily 90 tablet 1    escitalopram (LEXAPRO) 10 MG tablet Take 1 tablet by mouth daily 90 tablet 1    atorvastatin (LIPITOR) 80 MG tablet Take 1 tablet by mouth daily (Patient taking differently: Take 1 tablet by mouth nightly) 90 tablet 1    clopidogrel (PLAVIX) 75 MG tablet Take 1 tablet by mouth daily 90 tablet 1    aspirin 81 MG chewable tablet Take 1 tablet by mouth daily 90 tablet 1    gabapentin (NEURONTIN) 300 MG capsule Take 1 capsule by mouth 3 times daily for 90 days. 90 capsule 2    TREMFYA 100 MG/ML SOPN Inject 100 mg into the skin Every 2 months 08/28/23 Reports last dose 08/01/23      blood glucose test strips (ASCENSIA AUTODISC VI;ONE TOUCH ULTRA TEST VI) strip 1 each by In Vitro route daily As needed. 100 each 3       Nursing Notes Reviewed    VITAL SIGNS:  ED Triage Vitals   Enc Vitals Group      BP       Pulse       Resp       Temp       Temp src       SpO2       Weight       Height       Head Circumference       Peak Flow       Pain Score       Pain Loc       Pain Edu? Excl. in [address]? PHYSICAL EXAM:  Physical Exam  Vitals and nursing note reviewed. Constitutional:       General: He is not in acute distress. Appearance: Normal appearance. He is well-developed and well-groomed. He is not ill-appearing, toxic-appearing or diaphoretic. HENT:      Head: Normocephalic and atraumatic. Right Ear: External ear normal.      Left Ear: External ear normal.   Eyes:      General:         Right eye: No discharge. Left eye: No discharge. Extraocular Movements: Extraocular movements intact.       Conjunctiva/sclera: Conjunctivae normal.

## 2023-08-28 NOTE — CARE COORDINATION
Pt noted for possible readmission. Pt was recently admitted 8/14-8/18/23 vasculitis. Pt is from home with parents, has PCP and insurance. Pt has a walker, scooter, cane and shower chair at home. Pt is active with [address]. Pt returns with extremity weakness. Pt is being admitted for further work up.

## 2023-08-28 NOTE — TELEPHONE ENCOUNTER
Patient's sister Luke (HIPAA) called the office stating patient is heading to [facility] ED. Patient called her this morning stating he has lost feeling of everything on his right side and he has been struggling this past week. States she could barely understand him on the phone due to slurred speech. Rubi SHEPPARD has been informed.

## 2023-08-28 NOTE — H&P
communicated to a licensed caregiver. The results were subsequently relayed to Dr. Dex Morales by the CORE team at 11:05 a.m. on 08/28/2023. XR CHEST PORTABLE    Result Date: 8/28/2023  EXAMINATION: ONE XRAY VIEW OF THE CHEST 8/28/2023 12:59 pm COMPARISON: 06/24/2023 HISTORY: ORDERING SYSTEM PROVIDED HISTORY: other TECHNOLOGIST PROVIDED HISTORY: Reason for exam:->other Reason for Exam: other Additional signs and symptoms: other Relevant Medical/Surgical History: other FINDINGS: The cardiomediastinal silhouette is within normal limits. There is no consolidation, pneumothorax or evidence for edema. No evidence for effusion. No acute osseous abnormality is identified. No acute airspace disease identified. CTA HEAD NECK W CONTRAST    Addendum Date: 8/28/2023    ADDENDUM: The intracranial V4 segment of the left vertebral artery is occluded. Result Date: 8/28/2023  EXAMINATION: CTA OF THE HEAD AND NECK WITH CONTRAST, 8/28/2023 10:53 am TECHNIQUE: CTA of the head and neck was performed with the administration of intravenous contrast. Multiplanar reformatted images are provided for review. MIP images are provided for review. Stenosis of the internal carotid arteries measured using NASCET criteria. Automated exposure control, iterative reconstruction, and/or weight based adjustment of the mA/kV was utilized to reduce the radiation dose to as low as reasonably achievable. This scan was analyzed using Viz. ai contact LVO. Identification of suspected findings is not for diagnostic use beyond notification. Viz LVO is limited to analysis of imaging data and should not be used in-lieu of full patient evaluation or relied upon to make or confirm diagnosis. COMPARISON: CT head 06/28/2023 and 08/28/2023. HISTORY: ORDERING SYSTEM PROVIDED HISTORY:  Weak right side TECHNOLOGIST PROVIDED HISTORY: Reason for Exam:  Weak right side Has a \"code stroke\" or \"stroke alert\" been called?   Yes Decision Support Exception - unselect

## 2023-08-29 ENCOUNTER — TELEPHONE (OUTPATIENT)
Dept: FAMILY MEDICINE CLINIC | Age: 53
End: 2023-08-29

## 2023-08-29 ENCOUNTER — APPOINTMENT (OUTPATIENT)
Dept: MRI IMAGING | Age: 53
DRG: 065 | End: 2023-08-29
Payer: COMMERCIAL

## 2023-08-29 PROBLEM — R53.1 RIGHT SIDED WEAKNESS: Status: ACTIVE | Noted: 2023-08-29

## 2023-08-29 LAB
ANION GAP SERPL CALCULATED.3IONS-SCNC: 14 MMOL/L (ref 4–16)
BUN SERPL-MCNC: 14 MG/DL (ref 6–23)
CALCIUM SERPL-MCNC: 9.3 MG/DL (ref 8.3–10.6)
CHLORIDE BLD-SCNC: 97 MMOL/L (ref 99–110)
CHOLEST SERPL-MCNC: 232 MG/DL
CO2: 24 MMOL/L (ref 21–32)
CREAT SERPL-MCNC: 0.5 MG/DL (ref 0.9–1.3)
EKG ATRIAL RATE: 63 BPM
EKG DIAGNOSIS: NORMAL
EKG P AXIS: 30 DEGREES
EKG P-R INTERVAL: 210 MS
EKG Q-T INTERVAL: 394 MS
EKG QRS DURATION: 108 MS
EKG QTC CALCULATION (BAZETT): 403 MS
EKG R AXIS: 5 DEGREES
EKG T AXIS: 0 DEGREES
EKG VENTRICULAR RATE: 63 BPM
ESTIMATED AVERAGE GLUCOSE: 214 MG/DL
GFR SERPL CREATININE-BSD FRML MDRD: >60 ML/MIN/1.73M2
GLUCOSE BLD-MCNC: 200 MG/DL (ref 70–99)
GLUCOSE BLD-MCNC: 231 MG/DL (ref 70–99)
GLUCOSE BLD-MCNC: 326 MG/DL (ref 70–99)
GLUCOSE BLD-MCNC: 365 MG/DL (ref 70–99)
GLUCOSE SERPL-MCNC: 210 MG/DL (ref 70–99)
HBA1C MFR BLD: 9.1 % (ref 4.2–6.3)
HCT VFR BLD CALC: 47.9 % (ref 42–52)
HDLC SERPL-MCNC: 45 MG/DL
HEMOGLOBIN: 15.9 GM/DL (ref 13.5–18)
INR BLD: 1 INDEX
LDLC SERPL CALC-MCNC: 156 MG/DL
MCH RBC QN AUTO: 27.7 PG (ref 27–31)
MCHC RBC AUTO-ENTMCNC: 33.2 % (ref 32–36)
MCV RBC AUTO: 83.4 FL (ref 78–100)
PDW BLD-RTO: 13 % (ref 11.7–14.9)
PLATELET # BLD: 209 K/CU MM (ref 140–440)
PMV BLD AUTO: 10.3 FL (ref 7.5–11.1)
POTASSIUM SERPL-SCNC: 4.5 MMOL/L (ref 3.5–5.1)
PROTHROMBIN TIME: 13.6 SECONDS (ref 11.7–14.5)
RBC # BLD: 5.74 M/CU MM (ref 4.6–6.2)
SODIUM BLD-SCNC: 135 MMOL/L (ref 135–145)
TRIGL SERPL-MCNC: 157 MG/DL
WBC # BLD: 13.7 K/CU MM (ref 4–10.5)

## 2023-08-29 PROCEDURE — 99223 1ST HOSP IP/OBS HIGH 75: CPT | Performed by: STUDENT IN AN ORGANIZED HEALTH CARE EDUCATION/TRAINING PROGRAM

## 2023-08-29 PROCEDURE — 6370000000 HC RX 637 (ALT 250 FOR IP): Performed by: INTERNAL MEDICINE

## 2023-08-29 PROCEDURE — 83036 HEMOGLOBIN GLYCOSYLATED A1C: CPT

## 2023-08-29 PROCEDURE — 36415 COLL VENOUS BLD VENIPUNCTURE: CPT

## 2023-08-29 PROCEDURE — 70551 MRI BRAIN STEM W/O DYE: CPT

## 2023-08-29 PROCEDURE — 82962 GLUCOSE BLOOD TEST: CPT

## 2023-08-29 PROCEDURE — 85027 COMPLETE CBC AUTOMATED: CPT

## 2023-08-29 PROCEDURE — 94761 N-INVAS EAR/PLS OXIMETRY MLT: CPT

## 2023-08-29 PROCEDURE — 85610 PROTHROMBIN TIME: CPT

## 2023-08-29 PROCEDURE — 80061 LIPID PANEL: CPT

## 2023-08-29 PROCEDURE — 6360000002 HC RX W HCPCS: Performed by: INTERNAL MEDICINE

## 2023-08-29 PROCEDURE — 6370000000 HC RX 637 (ALT 250 FOR IP): Performed by: STUDENT IN AN ORGANIZED HEALTH CARE EDUCATION/TRAINING PROGRAM

## 2023-08-29 PROCEDURE — 93010 ELECTROCARDIOGRAM REPORT: CPT | Performed by: INTERNAL MEDICINE

## 2023-08-29 PROCEDURE — 2580000003 HC RX 258: Performed by: INTERNAL MEDICINE

## 2023-08-29 PROCEDURE — 1200000000 HC SEMI PRIVATE

## 2023-08-29 PROCEDURE — 97162 PT EVAL MOD COMPLEX 30 MIN: CPT

## 2023-08-29 PROCEDURE — 97116 GAIT TRAINING THERAPY: CPT

## 2023-08-29 PROCEDURE — 80048 BASIC METABOLIC PNL TOTAL CA: CPT

## 2023-08-29 RX ORDER — INSULIN LISPRO 100 [IU]/ML
0-4 INJECTION, SOLUTION INTRAVENOUS; SUBCUTANEOUS NIGHTLY
Status: DISCONTINUED | OUTPATIENT
Start: 2023-08-29 | End: 2023-08-30 | Stop reason: HOSPADM

## 2023-08-29 RX ORDER — INSULIN LISPRO 100 [IU]/ML
3 INJECTION, SOLUTION INTRAVENOUS; SUBCUTANEOUS ONCE
Status: COMPLETED | OUTPATIENT
Start: 2023-08-29 | End: 2023-08-29

## 2023-08-29 RX ORDER — INSULIN GLARGINE 100 [IU]/ML
6 INJECTION, SOLUTION SUBCUTANEOUS NIGHTLY
Status: DISCONTINUED | OUTPATIENT
Start: 2023-08-29 | End: 2023-08-30 | Stop reason: HOSPADM

## 2023-08-29 RX ORDER — INSULIN LISPRO 100 [IU]/ML
0-4 INJECTION, SOLUTION INTRAVENOUS; SUBCUTANEOUS
Status: DISCONTINUED | OUTPATIENT
Start: 2023-08-29 | End: 2023-08-30 | Stop reason: HOSPADM

## 2023-08-29 RX ORDER — INSULIN LISPRO 100 [IU]/ML
2 INJECTION, SOLUTION INTRAVENOUS; SUBCUTANEOUS
Status: DISCONTINUED | OUTPATIENT
Start: 2023-08-30 | End: 2023-08-30 | Stop reason: HOSPADM

## 2023-08-29 RX ADMIN — GABAPENTIN 300 MG: 300 CAPSULE ORAL at 21:06

## 2023-08-29 RX ADMIN — GABAPENTIN 300 MG: 300 CAPSULE ORAL at 09:22

## 2023-08-29 RX ADMIN — ASPIRIN 81 MG: 81 TABLET, CHEWABLE ORAL at 09:22

## 2023-08-29 RX ADMIN — SODIUM CHLORIDE, PRESERVATIVE FREE 10 ML: 5 INJECTION INTRAVENOUS at 21:07

## 2023-08-29 RX ADMIN — INSULIN LISPRO 4 UNITS: 100 INJECTION, SOLUTION INTRAVENOUS; SUBCUTANEOUS at 21:06

## 2023-08-29 RX ADMIN — INSULIN LISPRO 4 UNITS: 100 INJECTION, SOLUTION INTRAVENOUS; SUBCUTANEOUS at 18:10

## 2023-08-29 RX ADMIN — PREDNISONE 20 MG: 20 TABLET ORAL at 13:53

## 2023-08-29 RX ADMIN — ATORVASTATIN CALCIUM 80 MG: 40 TABLET, FILM COATED ORAL at 21:05

## 2023-08-29 RX ADMIN — INSULIN GLARGINE 6 UNITS: 100 INJECTION, SOLUTION SUBCUTANEOUS at 21:06

## 2023-08-29 RX ADMIN — SODIUM CHLORIDE, PRESERVATIVE FREE 10 ML: 5 INJECTION INTRAVENOUS at 09:22

## 2023-08-29 RX ADMIN — ENOXAPARIN SODIUM 40 MG: 100 INJECTION SUBCUTANEOUS at 09:22

## 2023-08-29 RX ADMIN — INSULIN LISPRO 3 UNITS: 100 INJECTION, SOLUTION INTRAVENOUS; SUBCUTANEOUS at 18:11

## 2023-08-29 RX ADMIN — PREDNISONE 20 MG: 20 TABLET ORAL at 09:22

## 2023-08-29 RX ADMIN — GABAPENTIN 300 MG: 300 CAPSULE ORAL at 13:53

## 2023-08-29 RX ADMIN — INSULIN LISPRO 2 UNITS: 100 INJECTION, SOLUTION INTRAVENOUS; SUBCUTANEOUS at 09:23

## 2023-08-29 RX ADMIN — CLOPIDOGREL BISULFATE 75 MG: 75 TABLET ORAL at 09:22

## 2023-08-29 RX ADMIN — PANTOPRAZOLE SODIUM 40 MG: 40 TABLET, DELAYED RELEASE ORAL at 09:22

## 2023-08-29 RX ADMIN — ESCITALOPRAM OXALATE 10 MG: 10 TABLET ORAL at 09:22

## 2023-08-29 RX ADMIN — INSULIN LISPRO 2 UNITS: 100 INJECTION, SOLUTION INTRAVENOUS; SUBCUTANEOUS at 13:53

## 2023-08-29 RX ADMIN — PREDNISONE 20 MG: 20 TABLET ORAL at 21:05

## 2023-08-29 ASSESSMENT — ENCOUNTER SYMPTOMS
ABDOMINAL PAIN: 0
DIARRHEA: 0
SHORTNESS OF BREATH: 0
NAUSEA: 0
CONSTIPATION: 0
TROUBLE SWALLOWING: 0
COUGH: 0
BACK PAIN: 0
VOMITING: 0
SORE THROAT: 0
SINUS PRESSURE: 0
SINUS PAIN: 0
VOICE CHANGE: 0
CHEST TIGHTNESS: 0
WHEEZING: 0
COLOR CHANGE: 0

## 2023-08-29 NOTE — TELEPHONE ENCOUNTER
JULIA- Spoke with Irina de la cruz [address] and she was wanting to know if it would be ok to resume home care once pt is discharged from hospital. Gave verbal that it would be ok to resume home care.

## 2023-08-29 NOTE — CONSULTS
[place], 1970, 3030/3030-A, 8/29/2023    History  [place]:  The primary encounter diagnosis was Right sided weakness. A diagnosis of Cerebrovascular accident (CVA), unspecified mechanism ([address]) was also pertinent to this visit. Patient  has a past medical history of Blood circulation, collateral, Callus of foot, Callus of foot, Charcot's joint of foot, Charcot's joint of foot in type 2 diabetes mellitus ([address]), Closed nondisplaced fracture of fifth metatarsal bone of right foot with routine healing, Diabetes mellitus ([address]), Diverticulitis, Hyperlipidemia, Hypertension, Ischemic ulcer of left foot, limited to breakdown of skin ([address]), Leg edema, right, Neuropathy, Plantar wart, left foot, Ulcer of right foot with fat layer exposed ([address]), Ulcer of right foot with necrosis of muscle, Hannon Grade III, WD-Chronic ulcer of left foot with fat layer exposed ([address]), WD-Chronic ulcer of toe of right foot, limited to breakdown of skin ([address]), WD-Diabetic ulcer of left foot associated with type 2 diabetes mellitus ([address]), WD-Diabetic ulcer of right lateral foot associated with type 2 diabetes mellitus, with fat layer exposed ([address]), and WD-Type 2 diabetes mellitus with right diabetic foot ulcer ([address]). Patient  has a past surgical history that includes Foot surgery (Right, 07/2012); Abdomen surgery; fracture surgery (Right); Colonoscopy (05/06/2019); Colonoscopy (N/A, 05/06/2019); Colonoscopy (N/A, 12/20/2022); and Cerebral angiogram (2023). Subjective:    Patient states:  \"I was doing better but now I'm back to this. \"      Pain:  denies pain.       Communication with other providers:  Handoff to RN    Restrictions: general precautions, fall risk     Home Setup/Prior level of function    Lives With: Mother   Type of Home: House  Home Layout: Two level (Pt reported he is able to stay on 1st floor)  Home Access: Stairs to enter with rails  Entrance Stairs -
Results:  @LABRCNTIP(ALT:3,AST:3,BILITOTAL:3,BILIDIR:3,ALKPHOS:3)@          IMAGING:      CT Head:       IMPRESSION:  New ovoid 1.5 cm focus of hypoattenuation within the left corona radiata  extending to the left periventricular white matter suggestive of an  acute/subacute infarct, new from the previous MRI. Consider MRI of the brain  for further evaluation. No acute intracranial hemorrhage. The findings were sent to the Radiology Results [facility] at 10:58  am on 8/28/2023 to be communicated to a licensed caregiver. The results were subsequently relayed to Dr. Golden Mccarty by the CORE team at  11:05 a.m. on 08/28/2023. Specimen Collected: 08/28/23 10:54 EDT Last Resulted: 08/28/23 11:11 EDT               CTA: IMPRESSION:  Suspected moderate to severe stenosis within the bifurcation of the left MCA. No distal occlusion is evident. Occlusion of the P2 segment of the left PCA which is suspected to be chronic  as there appears to be new collateral flow. New hypodense lesion within the left corona radiata within the left parietal  lobe. MRI of the brain without contrast is suggested for further evaluation. Specimen Collected: 08/28/23 11:32 EDT Last Resulted: 08/28/23 12:05 EDT             ASSESSMENT/PLAN:     55-year-old male with history of stroke in multiple vascular distributions presenting with worsening right-sided weakness and paresthesias. -NIH: 5  -Neuroimaging:  CT of the head: New ovoid 1.5 cm focus of hypoattenuation within the left corona radiata extending to the left periventricular white matter suggestive of an acute/subacute infarct. CTA head and neck with moderate to severe stenosis within the bifurcation of the left MCA. No distal occlusion is evident. Occlusion of the P2 segment of the left PCA which is suspected to be chronic as there appears to be new collateral flow.   New hypodense lesion within the left corona radiata within
callosum no  evidence of hemorrhagic transformation. 4. Involutional parenchymal changes with mild microvascular ischemic disease. 5. Occluded left vertebral artery V4 segment. CT brain perfusion:  IMPRESSION:  Perfusing mismatch within the left MCA territory measuring 45 cc. The area  of perfusion abnormality is smaller than the acute infarct evident on the  subsequent MRI of the brain. All imaging was personally reviewed    ASSESSMENT/PLAN:   51-year-old male presenting with worsening right-sided extremity weakness and sensory loss. Patient woke with symptoms yesterday morning. Has history of strokes in multiple vascular distributions and new acute stroke on imaging on admission. Worsening right-sided weakness and paresthesia secondary to acute stroke superimposed on subacute and remote strokes. Neuroimaging as above  CT brain perfusion notes mismatch within the left MCA territory  MRI brain notes acute stroke in the left parietal corona radiata and centrum semiovale as well as multiple additional punctate foci and acute infarct present in the left frontal corona radiata and centrum semiovale, right parasagittal parietal lobe, left posterior occipital lobe and bilateral cerebellum  CTA head and neck with moderate to severe stenosis in the bifurcation of the left MCA with no distal occlusion. Occlusion of the P2 segment of the left PCA which is suspected to be chronic as there is new collateral flow. Neuro intervention, Dr. Felipe Pérez following  Plan for cerebral angioplasty and stenting of the high-grade left MCA stenosis  Continue DAPT, atorvastatin 80 mg  PT/OT/ST as recommended  Patient will need reevaluation for sleep apnea, strongly recommend CPAP compliance  A1c 9.1,     2.  Concern for Vasculitis v autoimmune response  Continue prednisone 20 mg 3 times daily for total of 60 mg daily  LP ordered  PT/PTT/INR ordered  CSF profile as specified per Dr. Wallace Music and autoimmune panel

## 2023-08-29 NOTE — CARE COORDINATION
08/29/23 1239   Service Assessment   Patient Orientation Alert and Oriented   Cognition Alert   History Provided By Patient   Primary [address] Family Members   Patient's Healthcare Decision Maker is: Legal Next of [facility]   PCP Verified by CM Yes   Prior Functional Level Assistance with the following:;Mobility   Current Functional Level Assistance with the following:;Mobility   Can patient return to prior living arrangement Unknown at present   Ability to make needs known: Good   Family able to assist with home care needs: Yes   Would you like for me to discuss the discharge plan with any other family members/significant others, and if so, who? Yes   Financial Resources Other (Comment)  (commercial insurance)   Community Resources ECF/Home Care     CM in to see Pt to initiate discharge planning. Pt from home and is active with [facility]. Pt is agreeable to therapy recommendation of ARU. Referral made to El[facility]. Pt denies any other needs at this time.   CM following

## 2023-08-29 NOTE — CARE COORDINATION
Referral received for ARU. Will review patients clinical information as well as PT/OT evaluations. Case will be discussed with Dr. Vannessa Urena for possible admission. Patient will require precert with [facility] prior to admit to ARU. *OT evaluations remain pending at this time. Will follow.

## 2023-08-30 VITALS
SYSTOLIC BLOOD PRESSURE: 131 MMHG | HEART RATE: 67 BPM | WEIGHT: 225 LBS | RESPIRATION RATE: 17 BRPM | TEMPERATURE: 98 F | HEIGHT: 72 IN | DIASTOLIC BLOOD PRESSURE: 87 MMHG | BODY MASS INDEX: 30.48 KG/M2 | OXYGEN SATURATION: 95 %

## 2023-08-30 LAB
CRYOGLOB SER QL: NORMAL
GLUCOSE BLD-MCNC: 221 MG/DL (ref 70–99)

## 2023-08-30 PROCEDURE — 99232 SBSQ HOSP IP/OBS MODERATE 35: CPT | Performed by: NURSE PRACTITIONER

## 2023-08-30 PROCEDURE — 6370000000 HC RX 637 (ALT 250 FOR IP): Performed by: INTERNAL MEDICINE

## 2023-08-30 PROCEDURE — 97535 SELF CARE MNGMENT TRAINING: CPT

## 2023-08-30 PROCEDURE — 6370000000 HC RX 637 (ALT 250 FOR IP): Performed by: STUDENT IN AN ORGANIZED HEALTH CARE EDUCATION/TRAINING PROGRAM

## 2023-08-30 PROCEDURE — 97166 OT EVAL MOD COMPLEX 45 MIN: CPT

## 2023-08-30 PROCEDURE — 99233 SBSQ HOSP IP/OBS HIGH 50: CPT | Performed by: NURSE PRACTITIONER

## 2023-08-30 PROCEDURE — 94761 N-INVAS EAR/PLS OXIMETRY MLT: CPT

## 2023-08-30 PROCEDURE — 82962 GLUCOSE BLOOD TEST: CPT

## 2023-08-30 RX ADMIN — CLOPIDOGREL BISULFATE 75 MG: 75 TABLET ORAL at 11:12

## 2023-08-30 RX ADMIN — INSULIN LISPRO 2 UNITS: 100 INJECTION, SOLUTION INTRAVENOUS; SUBCUTANEOUS at 10:12

## 2023-08-30 RX ADMIN — INSULIN LISPRO 1 UNITS: 100 INJECTION, SOLUTION INTRAVENOUS; SUBCUTANEOUS at 10:11

## 2023-08-30 RX ADMIN — ESCITALOPRAM OXALATE 10 MG: 10 TABLET ORAL at 10:11

## 2023-08-30 RX ADMIN — ASPIRIN 81 MG: 81 TABLET, CHEWABLE ORAL at 11:12

## 2023-08-30 RX ADMIN — GABAPENTIN 300 MG: 300 CAPSULE ORAL at 10:10

## 2023-08-30 RX ADMIN — PANTOPRAZOLE SODIUM 40 MG: 40 TABLET, DELAYED RELEASE ORAL at 06:02

## 2023-08-30 ASSESSMENT — ENCOUNTER SYMPTOMS
VOICE CHANGE: 0
CONSTIPATION: 0
BACK PAIN: 0
VOMITING: 0
SORE THROAT: 0
ABDOMINAL PAIN: 0
COUGH: 0
COLOR CHANGE: 0
NAUSEA: 0
SINUS PAIN: 0
SINUS PRESSURE: 0
TROUBLE SWALLOWING: 0
DIARRHEA: 0
CHEST TIGHTNESS: 0
WHEEZING: 0
SHORTNESS OF BREATH: 0

## 2023-08-30 NOTE — PROGRESS NOTES
4 Eyes Skin Assessment     NAME:  Rebecca Vazquez  YOB: 1970  MEDICAL RECORD NUMBER:  [id number]    The patient is being assessed for  Admission    I agree that at least one RN has performed a thorough Head to Toe Skin Assessment on the patient. ALL assessment sites listed below have been assessed. Areas assessed by both nurses:    Head, Face, Ears, Shoulders, Back, Chest, Arms, Elbows, Hands, Sacrum. Buttock, Coccyx, Ischium, Legs. Feet and Heels, and Under Medical Devices         Does the Patient have a Wound? Yes wound(s) were present on assessment.  LDA wound assessment was Initiated and completed by RN       Chung Prevention initiated by RN: No  Wound Care Orders initiated by RN: No    Pressure Injury (Stage 3,4, Unstageable, DTI, NWPT, and Complex wounds) if present, place Wound referral order by RN under : No    New Ostomies, if present place, Ostomy referral order under : No     Nurse 1 eSignature: Electronically signed by Letitia Holley RN on 8/29/23 at 1:41 AM EDT    **SHARE this note so that the co-signing nurse can place an eSignature**    Nurse 2 eSignature: Electronically signed by Vito Noonan RN on 8/29/23 at 1:55 AM EDT
[facility] called primary [facility] and notified of ready bed at [facility]. Pt states he was not aware of transfer happening tonight. Transport arrived at bedside. This charge RN messaged hospitalist and called supervisor to inquire about transfer. Supervisor unaware, reviewed notes from Dr. Pilar Cruz. This RN messaged Dr. María Webber to check if it was set up by his team.Dr. Morin states that he had no idea of transfer to [facility] and to keep this pt here. Pt refusing transfer to [facility] at this time, states he is on board with Dr. María Webber and neurology's plan of care. Transport, supervisor [facility], and hospitalist Dr. Mary Jane Rae notified.  Electronically signed by Michelle Stringer RN on 8/29/2023 at 9:46 PM
Occupational Therapy  [facility] ACUTE CARE OCCUPATIONAL THERAPY EVALUATION  [place] José Luis, 1970, 3030/3030-A, 8/30/2023    Discharge Recommendation: Inpatient Rehabilitation    History  [place]:  The primary encounter diagnosis was Right sided weakness. A diagnosis of Cerebrovascular accident (CVA), unspecified mechanism ([address]) was also pertinent to this visit. Patient  has a past medical history of Blood circulation, collateral, Callus of foot, Callus of foot, Charcot's joint of foot, Charcot's joint of foot in type 2 diabetes mellitus ([address]), Closed nondisplaced fracture of fifth metatarsal bone of right foot with routine healing, Diabetes mellitus ([address]), Diverticulitis, Hyperlipidemia, Hypertension, Ischemic ulcer of left foot, limited to breakdown of skin ([address]), Leg edema, right, Neuropathy, Plantar wart, left foot, Ulcer of right foot with fat layer exposed ([address]), Ulcer of right foot with necrosis of muscle, Hannon Grade III, WD-Chronic ulcer of left foot with fat layer exposed ([address]), WD-Chronic ulcer of toe of right foot, limited to breakdown of skin ([address]), WD-Diabetic ulcer of left foot associated with type 2 diabetes mellitus ([address]), WD-Diabetic ulcer of right lateral foot associated with type 2 diabetes mellitus, with fat layer exposed ([address]), and WD-Type 2 diabetes mellitus with right diabetic foot ulcer ([address]). Patient  has a past surgical history that includes Foot surgery (Right, 07/2012); Abdomen surgery; fracture surgery (Right); Colonoscopy (05/06/2019); Colonoscopy (N/A, 05/06/2019); Colonoscopy (N/A, 12/20/2022); and Cerebral angiogram (2023). Subjective:  Patient states: \"It's just, I was supposed to start outpatient therapy but now this has happened and I lost all the progress I made\". Pain: Denied. Communication with other providers: RNLALI .   Restrictions: General Precautions, Fall Risk     Home Setup/Prior level of function   Social/Functional History  Lives With:
Per HELIO Almonte, hold Lovenox in AM for lumbar puncture today. No need to make patient NPO before lumbar puncture per Gustavo Fragosos.
[facility] called this RN to prepare patient for transport to [facility]. Patient scheduled for stent placement for L MCA stenosis possibly Thursday, but no evidence of requirement to transfer to [facility] and nothing reported from [facility]. Per Chart Review, transfer order placed on 8/29 at 1930. Patient unaware of plan to transfer as well as this RN until called from [facility]. Involved charge RN who contacted neurology consult Dr. Ray Jonas and supervisor, Francie Colin. Dr. Ray Jonas, supervisor Francie Colin, and Charge RN Stevieov all unaware of this patient's transfer as well as this RN prior to 2000. Dr. Ray Jonas requested we keep the patient at this facility and the patient is agreeable stating he does not want to move facilities unless Dr. Ray Jonas orders the transfer. This RN notified [facility] RN, Michael Oviedo, that this patient would no longer be transferred to that facility. Pt now resting comfortably in his bed and plan of care is ongoing.
Vascular/Interventional Neurology Progress Note  [address] [facility]  Patient Name: Junito Baker     : 1970      Subjective: The patient was seen and examined. No acute events overnight. The patient reports some improved strength in the right upper extremity. Possible transfer today to [facility] for second opinion on vasculitis treatment. Objective:   Scheduled Meds:   methylPREDNISolone  1,000 mg IntraVENous Daily    insulin glargine  6 Units SubCUTAneous Nightly    insulin lispro  2 Units SubCUTAneous TID WC    insulin lispro  0-4 Units SubCUTAneous TID WC    insulin lispro  0-4 Units SubCUTAneous Nightly    sodium chloride flush  5-40 mL IntraVENous 2 times per day    enoxaparin  40 mg SubCUTAneous Daily    atorvastatin  80 mg Oral Nightly    aspirin  81 mg Oral Daily    clopidogrel  75 mg Oral Daily    escitalopram  10 mg Oral Daily    pantoprazole  40 mg Oral QAM AC    gabapentin  300 mg Oral TID     Continuous Infusions:   sodium chloride      dextrose       PRN Meds:.sodium chloride flush, sodium chloride, ondansetron **OR** ondansetron, polyethylene glycol, glucose, dextrose bolus **OR** dextrose bolus, glucagon (rDNA), dextrose    Vital Signs:  [unfilled]     General: A&O x 4, NAD, cooperative  HEENT: NC/AT, EOMI, PERRL, mmm, neck supple  Extremities: no edema, no calf tenderness b/l    Neurological Exam:         Mental Status:  A&O to self, location, and year.  NAD, speech clear, language fluent, repetition and naming intact, follows commands appropriately     Cranial Nerves:  CN II-XII intact     Sensation: decreased to light touch/pinprick/vibration UE/LE right     Motor: 5/5 UE right 3+/5 UE left 5/5 LE left, 4/5 LE right     Reflexes: 2/4 biceps, triceps, brachioradialis, patellar, and achilles bilaterally; flexor plantar responses bilaterally     Coordination:       Tremors-- None       Rapidly alternating movements (KATHLEEN): R dysdiadonchokinesis b/l      Heel-Shin: No
PRN  glucose, 4 tablet, PRN  dextrose bolus, 125 mL, PRN   Or  dextrose bolus, 250 mL, PRN  glucagon (rDNA), 1 mg, PRN  dextrose, , Continuous PRN        Labs      Recent Results (from the past 24 hour(s))   POCT Glucose    Collection Time: 08/29/23  1:00 PM   Result Value Ref Range    POC Glucose 231 (H) 70 - 99 MG/DL   Protime-INR    Collection Time: 08/29/23  5:13 PM   Result Value Ref Range    Protime 13.6 11.7 - 14.5 SECONDS    INR 1.0 INDEX   POCT Glucose    Collection Time: 08/29/23  5:36 PM   Result Value Ref Range    POC Glucose 365 (H) 70 - 99 MG/DL   POCT Glucose    Collection Time: 08/29/23  8:59 PM   Result Value Ref Range    POC Glucose 326 (H) 70 - 99 MG/DL   POCT Glucose    Collection Time: 08/30/23  9:03 AM   Result Value Ref Range    POC Glucose 221 (H) 70 - 99 MG/DL        Imaging/Diagnostics Last 24 Hours   CT HEAD WO CONTRAST    Result Date: 8/28/2023  EXAMINATION: CT OF THE HEAD WITHOUT CONTRAST  8/28/2023 10:45 am TECHNIQUE: CT of the head was performed without the administration of intravenous contrast. Automated exposure control, iterative reconstruction, and/or weight based adjustment of the mA/kV was utilized to reduce the radiation dose to as low as reasonably achievable. COMPARISON: MRI brain 08/15/2023, CT brain 06/28/2023. HISTORY: ORDERING SYSTEM PROVIDED HISTORY: HEAD TRAUMA, CLOSED, MILD, GCS >= 13, NO RISK FACTORS, NEURO EXAM NORMAL TECHNOLOGIST PROVIDED HISTORY: Has a \"code stroke\" or \"stroke alert\" been called? ->Yes Reason for exam:->R sided weakness Decision Support Exception - unselect if not a suspected or confirmed emergency medical condition->Emergency Medical Condition (MA) Reason for Exam: R sided weakness FINDINGS: BRAIN/VENTRICLES: There is a new ovoid 1.5 cm focus of hypoattenuation within the left corona radiata extending to the left periventricular white matter suggestive of a subacute infarct. There is no acute intracranial hemorrhage.  There is no mass effect or
complication, for intervention cases there is a 10% risk of stroke or serious complication, for ischemic stroke interventions there is a 10% risk of stroke or worsening. Other risk include but are not limited to infection, dissection, radiation injury, hemorrhage, contrast reactions, nephrotoxicity and death.            Electronically signed by ADELFO Ayala CNP on 8/29/2023 at 10:50 AM   8/29/2023
to light touch/pinprick/vibration UE/LE right    Coordination/Cerebellum:       Tremors--none      Rapidly alternating movements:  dysdiadochokinesia right               Heel-to-Shin: no dysmetria b/l      Finger-to-Nose: no dysmetria b/l    Gait and stance:      Gait: deferred      LABS:     Recent Labs     08/28/23  1106 08/28/23  1107 08/29/23  0832 08/29/23  1713   WBC  --  17.3* 13.7*  --    NA  --  133* 135  --    K  --  4.5 4.5  --    CL  --  97* 97*  --    CO2  --  25 24  --    BUN  --  17 14  --    CREATININE  --  0.6* 0.5*  --    GLUCOSE 320 376* 210*  --    INR  --  1.0  --  1.0       IMAGING:    CT head w/o contrast:  IMPRESSION:  New ovoid 1.5 cm focus of hypoattenuation within the left corona radiata  extending to the left periventricular white matter suggestive of an  acute/subacute infarct, new from the previous MRI. Consider MRI of the brain  for further evaluation. No acute intracranial hemorrhage. CTA head and neck:  IMPRESSION:  Suspected moderate to severe stenosis within the bifurcation of the left MCA. No distal occlusion is evident. Occlusion of the P2 segment of the left PCA which is suspected to be chronic  as there appears to be new collateral flow. New hypodense lesion within the left corona radiata within the left parietal  lobe. MRI of the brain without contrast is suggested for further evaluation. MRI brain w/o contrast:  IMPRESSION:  1. Acute infarct present in the left parietal corona radiata and centrum  semiovale correlates with CT finding. No evidence of hemorrhagic  transformation. Finding is new compared to 08/15/2023.  2. Multiple additional punctate foci of acute infarct present as seen in the  left frontal corona radiata and centrum semiovale, right parasagittal  parietal lobe, left posterior occipital lobe, and bilateral cerebellum. No  evidence of hemorrhagic transformation.   Findings are new compared to  08/15/2023.  3. Again seen small subacute
to be communicated to a licensed caregiver. Comment: Please note this report has been produced using speech recognition software and may contain errors related to that system including errors in grammar, punctuation, and spelling, as well as words and phrases that may be inappropriate. If there are any questions or concerns please feel free to contact the dictating provider for clarification.      Electronically signed by Julisa Ferguson MD on 8/29/2023 at 2:33 PM

## 2023-08-30 NOTE — DISCHARGE SUMMARY
findings were sent to the Radiology Results [address] [employer] at 8:21 am on 8/29/2023 to be communicated to a licensed caregiver. CBC:   Recent Labs     08/28/23  1107 08/29/23  0832   WBC 17.3* 13.7*   HGB 15.5 15.9    209     BMP:    Recent Labs     08/28/23  1106 08/28/23  1107 08/29/23  0832   NA  --  133* 135   K  --  4.5 4.5   CL  --  97* 97*   CO2  --  25 24   BUN  --  17 14   CREATININE  --  0.6* 0.5*   GLUCOSE 320 376* 210*     Hepatic:   Recent Labs     08/28/23  1107   AST 18   ALT 43*   BILITOT 0.6   ALKPHOS 87     Lipids:   Lab Results   Component Value Date/Time    CHOL 232 08/29/2023 08:32 AM    CHOL 243 07/16/2020 10:36 AM    HDL 45 08/29/2023 08:32 AM    TRIG 157 08/29/2023 08:32 AM     Hemoglobin A1C:   Lab Results   Component Value Date/Time    LABA1C 9.1 08/29/2023 08:32 AM     TSH: No results found for: TSH  Troponin:   Lab Results   Component Value Date/Time    TROPONINT <0.010 08/28/2023 11:07 AM    TROPONINT <0.010 06/24/2023 03:30 PM     Lactic Acid: No results for input(s): LACTA in the last 72 hours.   BNP:   Recent Labs     08/28/23  1107   PROBNP 80.88     UA:  Lab Results   Component Value Date/Time    NITRU NEGATIVE 10/10/2017 01:05 PM    COLORU YELLOW 10/10/2017 01:05 PM    PHUR 6.5 10/10/2017 01:05 PM    LABCAST NONE SEEN 10/10/2017 01:05 PM    WBCUA NONE SEEN 10/10/2017 01:05 PM    RBCUA 1-5 10/10/2017 01:05 PM    MUCUS MODERATE 11/02/2016 05:35 PM    BACTERIA NONE SEEN 10/10/2017 01:05 PM    CLARITYU SL HAZY 12/05/2016 12:40 PM    SPECGRAV 1.015 10/10/2017 01:05 PM    LEUKOCYTESUR NEGATIVE 10/10/2017 01:05 PM    UROBILINOGEN <2 10/10/2017 01:05 PM    BILIRUBINUR NEGATIVE 10/10/2017 01:05 PM    BLOODU TRACE 10/10/2017 01:05 PM    KETUA NEGATIVE 10/10/2017 01:05 PM     Urine Cultures:   Lab Results   Component Value Date/Time    LABURIN 200 mg/dl 07/11/2023 02:22 PM     Blood Cultures: No results found for: BC  No results found for: BLOODCULT2  Organism:   Lab Results

## 2023-09-11 ENCOUNTER — HOSPITAL ENCOUNTER (INPATIENT)
Age: 53
DRG: 057 | End: 2023-09-11
Attending: PHYSICAL MEDICINE & REHABILITATION | Admitting: PHYSICAL MEDICINE & REHABILITATION
Payer: COMMERCIAL

## 2023-09-11 DIAGNOSIS — I63.9 ACUTE CEREBROVASCULAR ACCIDENT (CVA) (HCC): ICD-10-CM

## 2023-09-11 PROCEDURE — 6370000000 HC RX 637 (ALT 250 FOR IP): Performed by: PHYSICAL MEDICINE & REHABILITATION

## 2023-09-11 PROCEDURE — 1280000000 HC REHAB R&B

## 2023-09-11 PROCEDURE — 99223 1ST HOSP IP/OBS HIGH 75: CPT | Performed by: PHYSICAL MEDICINE & REHABILITATION

## 2023-09-11 RX ORDER — ESCITALOPRAM OXALATE 10 MG/1
10 TABLET ORAL DAILY
Status: DISCONTINUED | OUTPATIENT
Start: 2023-09-12 | End: 2023-09-23 | Stop reason: HOSPADM

## 2023-09-11 RX ORDER — ATORVASTATIN CALCIUM 40 MG/1
80 TABLET, FILM COATED ORAL NIGHTLY
Status: DISCONTINUED | OUTPATIENT
Start: 2023-09-11 | End: 2023-09-23 | Stop reason: HOSPADM

## 2023-09-11 RX ORDER — CYCLOBENZAPRINE HCL 10 MG
5 TABLET ORAL EVERY 8 HOURS PRN
Status: DISCONTINUED | OUTPATIENT
Start: 2023-09-11 | End: 2023-09-12

## 2023-09-11 RX ORDER — INSULIN LISPRO 100 [IU]/ML
0-4 INJECTION, SOLUTION INTRAVENOUS; SUBCUTANEOUS NIGHTLY
Status: DISCONTINUED | OUTPATIENT
Start: 2023-09-11 | End: 2023-09-23 | Stop reason: HOSPADM

## 2023-09-11 RX ORDER — ASPIRIN 81 MG/1
81 TABLET, CHEWABLE ORAL DAILY
Status: DISCONTINUED | OUTPATIENT
Start: 2023-09-12 | End: 2023-09-23 | Stop reason: HOSPADM

## 2023-09-11 RX ORDER — ACETAMINOPHEN 325 MG/1
650 TABLET ORAL EVERY 4 HOURS PRN
Status: DISCONTINUED | OUTPATIENT
Start: 2023-09-11 | End: 2023-09-23 | Stop reason: HOSPADM

## 2023-09-11 RX ORDER — GABAPENTIN 300 MG/1
300 CAPSULE ORAL 3 TIMES DAILY
Status: DISCONTINUED | OUTPATIENT
Start: 2023-09-11 | End: 2023-09-23 | Stop reason: HOSPADM

## 2023-09-11 RX ORDER — DEXTROSE MONOHYDRATE 100 MG/ML
INJECTION, SOLUTION INTRAVENOUS CONTINUOUS PRN
Status: DISCONTINUED | OUTPATIENT
Start: 2023-09-11 | End: 2023-09-23 | Stop reason: HOSPADM

## 2023-09-11 RX ORDER — INSULIN LISPRO 100 [IU]/ML
0-8 INJECTION, SOLUTION INTRAVENOUS; SUBCUTANEOUS
Status: DISCONTINUED | OUTPATIENT
Start: 2023-09-11 | End: 2023-09-23 | Stop reason: HOSPADM

## 2023-09-11 RX ORDER — AMLODIPINE BESYLATE 10 MG/1
10 TABLET ORAL DAILY
Status: DISCONTINUED | OUTPATIENT
Start: 2023-09-11 | End: 2023-09-23 | Stop reason: HOSPADM

## 2023-09-11 RX ORDER — SENNOSIDES A AND B 8.6 MG/1
1 TABLET, FILM COATED ORAL NIGHTLY PRN
Status: DISCONTINUED | OUTPATIENT
Start: 2023-09-11 | End: 2023-09-23 | Stop reason: HOSPADM

## 2023-09-11 RX ORDER — ENOXAPARIN SODIUM 100 MG/ML
40 INJECTION SUBCUTANEOUS DAILY
Status: DISCONTINUED | OUTPATIENT
Start: 2023-09-12 | End: 2023-09-23 | Stop reason: HOSPADM

## 2023-09-11 RX ORDER — GLUCAGON 1 MG/ML
1 KIT INJECTION PRN
Status: DISCONTINUED | OUTPATIENT
Start: 2023-09-11 | End: 2023-09-23 | Stop reason: HOSPADM

## 2023-09-11 RX ORDER — POLYETHYLENE GLYCOL 3350 17 G/17G
17 POWDER, FOR SOLUTION ORAL DAILY PRN
Status: DISCONTINUED | OUTPATIENT
Start: 2023-09-11 | End: 2023-09-23 | Stop reason: HOSPADM

## 2023-09-11 RX ORDER — ONDANSETRON 4 MG/1
4 TABLET, ORALLY DISINTEGRATING ORAL 4 TIMES DAILY PRN
Status: DISCONTINUED | OUTPATIENT
Start: 2023-09-11 | End: 2023-09-23 | Stop reason: HOSPADM

## 2023-09-11 RX ADMIN — INSULIN LISPRO 2 UNITS: 100 INJECTION, SOLUTION INTRAVENOUS; SUBCUTANEOUS at 18:32

## 2023-09-11 RX ADMIN — GABAPENTIN 300 MG: 300 CAPSULE ORAL at 21:37

## 2023-09-11 RX ADMIN — CYCLOBENZAPRINE 5 MG: 10 TABLET, FILM COATED ORAL at 22:33

## 2023-09-11 RX ADMIN — ATORVASTATIN CALCIUM 80 MG: 40 TABLET, FILM COATED ORAL at 21:37

## 2023-09-11 RX ADMIN — GABAPENTIN 300 MG: 300 CAPSULE ORAL at 18:32

## 2023-09-11 ASSESSMENT — PAIN SCALES - GENERAL
PAINLEVEL_OUTOF10: 5
PAINLEVEL_OUTOF10: 0
PAINLEVEL_OUTOF10: 0

## 2023-09-11 ASSESSMENT — PAIN DESCRIPTION - DESCRIPTORS: DESCRIPTORS: SPASM

## 2023-09-11 ASSESSMENT — PAIN DESCRIPTION - PAIN TYPE: TYPE: CHRONIC PAIN

## 2023-09-11 ASSESSMENT — PAIN - FUNCTIONAL ASSESSMENT: PAIN_FUNCTIONAL_ASSESSMENT: PREVENTS OR INTERFERES SOME ACTIVE ACTIVITIES AND ADLS

## 2023-09-11 ASSESSMENT — LIFESTYLE VARIABLES
HOW OFTEN DO YOU HAVE A DRINK CONTAINING ALCOHOL: NEVER
HOW MANY STANDARD DRINKS CONTAINING ALCOHOL DO YOU HAVE ON A TYPICAL DAY: PATIENT DOES NOT DRINK

## 2023-09-11 ASSESSMENT — PAIN SCALES - WONG BAKER
WONGBAKER_NUMERICALRESPONSE: 4;6
WONGBAKER_NUMERICALRESPONSE: 0

## 2023-09-11 ASSESSMENT — PAIN DESCRIPTION - LOCATION: LOCATION: LEG

## 2023-09-11 ASSESSMENT — PAIN DESCRIPTION - ONSET: ONSET: ON-GOING

## 2023-09-11 ASSESSMENT — PAIN DESCRIPTION - FREQUENCY: FREQUENCY: INTERMITTENT

## 2023-09-11 ASSESSMENT — PAIN DESCRIPTION - ORIENTATION: ORIENTATION: LEFT

## 2023-09-11 NOTE — PLAN OF CARE
AR[facility] Interdisciplinary Plan of Care [facility]  [address]  [phone]  Fax: [phone]    Petey Gamez    : 1970  Acct #: [de-identified]  MRN: [id number]   PHYSICIAN:  Maria Fernanda Owens MD  Primary Active Problems:   Active Hospital Problems    Diagnosis Date Noted    Dysphagia due to recent stroke [I69.391] 2023    Cerebral vasculitis [I67.7] 2023    History of DVT (deep vein thrombosis) [Z86.718] 2023    Dysarthria due to acute stroke ([address]) [I63.9, R47.1] 2023    Hemiparesis of right dominant side as late effect of cerebral infarction [facility]) [I69.351] 2023    Acute CVA (cerebrovascular accident) ([address]) [I63.9] 2023    Depression with anxiety [F41.8] 2023    Poorly controlled type 2 diabetes mellitus with peripheral neuropathy ([address]) [G33.99, E11.65] 2016     Rehabilitation Diagnosis:     CVA (cerebral vascular accident) [facility]) [I63.9]  Acute CVA (cerebrovascular accident) [facility]) [I63.9]      CARE PLAN     NURSING:  Petey Gamez while on this unit will:      Bowel and Bladder   [x] Be continent of bowel and bladder      [] Have an adequate number of bowel movements   [] Urinate with no urinary retention >300ml in bladder   [] Bladder Scan: (details)   [] Complete bladder protocol with pagan removal   [] Initiate Bladder Program to toilet every ___ hours   [] Initiate Bowel Program to toilet every ___hours   [] Bladder training    [] Bowel training  Pulmonary   [x] Maintain O2 SATs at 92% or greater  Pain Management   [] Have pain managed while on ARU        [] Be pain free by discharge    [] Medication Management and Education  Maintenance of Skin Integrity/Wound Management   [] Have no skin breakdown while on ARU   [] Have improved skin integrity via wound measurements   [] Have no signs/symptoms of infection via infection protection and monitoring at the          wound or touching assistance  Comment: to don pullover T shirt. Did not use hemitechnique but was still able to don  CARE Score: 4  Discharge Goal: Independent         LB Dressing: Lower Body Dressing  Assistance Needed: Partial/moderate assistance  Comment: required increased time as pt initially threaded BLEs in same sleeve however was able to self correct without cueing; CGA-min A in stance for pants management up c cue to fully perform on R side  CARE Score: 3  Discharge Goal: Independent    Donning and [place] Footwear: Putting On/Taking Off Footwear  Assistance Needed: Supervision or touching assistance  Comment: already familiar with hemitechnique; did not require cueing to doff hospital socks/don personal socks and shoes  CARE Score: 4  Discharge Goal: Independent      [address] needed: Partial/moderate assistance  Comment: required some assist to manage hospital gown to side; urinated only  CARE Score: 3  Discharge Goal: Independent      Toilet Transfers: Toilet Transfer  Assistance needed: Supervision or touching assistance  Comment: CGA c RW, grab bar  CARE Score: 4  Discharge Goal: Independent      SPEECH THERAPY: (If ordered)  Plan of Care and Goals:   LTG       Duration/Frequency of Treatment  Duration of Treatment: 2x/week x1 week 30 mins min                       Short Term Goals  Time Frame for Short Term Goals: Pt will improve overall speech precision in conversation. 2x/week x1 week 30 mins min  Goal 1: Pt will complete education and practice in lingual ROM/STR/COORD exercises to facilitate improved conversational speech. Pt self monitors well. Goal 2: Pt will monitor rate in oral reading and conversation with no cues. Time Frame for Short Term Goals: Pt will improve overall speech precision in conversation.   2x/week x1 week 30 mins min     LTG:                           Treatments may include speech/language/communication therapy, cognitive and Therapy Evaluations: Acute inpatient rehabilitation with occupational and physical therapy 180 minutes 5 out of every 7 days. Will address basic and  advancing mobility with self-care instruction and adaptive equipment training. Caregiver education will be offered. Expected length of stay  prior to a supervised level of function for discharge home with a walker and [facility] OT/PT is 2 weeks. Additional recommendation:     Acute CVA with right hemiparesis: The patient requires daily occupational and physical therapy with speech-language pathology. Due to his right-sided weakness, impaired coordination and sensory deficits, he is at risk for fall with injury during standing ADLs and mobility activities. Therefore, we must provide him adaptive equipment training with strengthening exercises, balance recovery training and endurance building. We must provide aggressive pulmonary hygiene measures, DVT prophylaxis and nutritional support. We must monitor his bowel and bladder function and provide retraining if needed. Close monitoring of his oral intake for signs of aspiration. Dysarthria evaluation and training with speech therapy. He is on antiplatelet therapy with an aspirin (81 mg) and he requires a statin. The antiplatelet therapy should be held 1 week before his brain biopsy. Outpatient follow-up with neurosurgery at [facility] and his PCP. DVT prophylaxis: Lovenox 40 mg subcu daily. MONITOR his hemoglobin and platelet count periodically while on this medication as it places him at higher risk for spontaneous hemorrhage and GI bleeding. GI prophylaxis offered (H2 blocker). Weightbearing activities will be pursued daily. Essential hypertension: His pressures have been trending down recently. His ACE inhibitor is on hold. We will continue Norvasc 10 mg daily and monitor his systolic pressure at rest and with activity. Encouraging oral hydration.   Uncontrolled diabetes type 2 with hyperglycemia and

## 2023-09-11 NOTE — PROGRESS NOTES
4 Eyes Skin Assessment     NAME:  Letitia Parra  YOB: 1970  MEDICAL RECORD NUMBER:  [id number]    The patient is being assessed for  Admission    I agree that at least one RN has performed a thorough Head to Toe Skin Assessment on the patient. ALL assessment sites listed below have been assessed. Areas assessed by both nurses:    Head, Face, Ears, Shoulders, Back, Chest, Arms, Elbows, Hands, and Sacrum. Buttock, Coccyx, Ischium        Does the Patient have a Wound? Yes wound(s) were present on assessment.  LDA wound assessment was Initiated and completed by RN       Chung Prevention initiated by RN: Yes  Wound Care Orders initiated by RN: Yes    Pressure Injury (Stage 3,4, Unstageable, DTI, NWPT, and Complex wounds) if present, place Wound referral order by RN under : No    New Ostomies, if present place, Ostomy referral order under : No   Rt and lt foot 4 th toe reddened and scabbed- -rt foot rt inner ankle redened with scab  Nurse 1 eSignature: Electronically signed by Hardeep Killian RN on 9/11/23 at 4:25 PM EDT    **SHARE this note so that the co-signing nurse can place an eSignature**    Nurse 2 eSignature: Electronically signed by Delmi Mack RN on 5/12/14 at 7:33 PM EDT

## 2023-09-11 NOTE — PROGRESS NOTES
ARU Admission Assessment - [facility]      A Complete drug regimen review was completed for this patient this date. [x]  No clinically significant medication issue was identified   []  Yes, a clinically significant medication issue was identified     []  Adverse Drug Event:    []  Allergy:    []  Side Effect:    []  Ineffective Therapy:    []  Drug interaction:     []  Duplicate Therapy:    []  Untreated Indication:    []  Non-adherence:    []  Other:  Nursing contacted the physician:       Date:                Time:    Actions recommended by physician were completed:   Date:                 Time:  Action(s) Taken:             []  New Physician Order Received    []  Issue Noted by Physician; However No Action Required    []  Other:        Ethnicity  \"Are you of , /a, or Czech origin? \"  Check all that apply:  [x] A. No, not of , /a, or [place] Origin  [] B.  Yes, [place] American, Chicano/a  [] C.  Yes, [address] [place] [address]  [] D.  Yes, [place]  [] E.  Yes, another , , or Czech origin  [] X. Patient unable to respond    Race  \"What is your race? \"  Check all that apply:  [x] A. White  [] B. Black or   [] C. American [place] or [place] Native  [] D.  Sreekanth  [] E. [place]  [] F. Scottish  [] G. [place]  [] Ligia Lat  [] I. [place]  [] J.  Other   [] K.   [] L. Scottish or Caleb  [] M. Malagasy  [] N. Other [address] [facility]  [] X. Patient unable to respond    Language  A. \"What is your preferred language? \"   white    B. \"Do you need or want an  to communicate with a doctor or health care staff? \"  Check only one:  [x] 0. No  [] 1. Yes  [] 9. Unable to determine    Transportation  \"In the past 6-12 months, has lack of transportation kept you from medical appointments, meetings, work, or from getting things needed for daily living? \"  Check all that apply:  [] A.  Yes, it has kept me from medical appointments or from getting my medications  [] B.  Yes, it has kept me from non-medical meetings, appointments, work, or from getting things that I need  [x] C.  No  [] X. Patient unable to respond  [] Y. Patient declines to respond    Hearing  Ability to hear (with hearing aid or hearing appliances if normally used)  [x]  0. Adequate - no difficulty in normal conversation, social interaction, listening to TV  []  1. Minimal difficulty - difficulty in some environments (e.g. when person speaks softly or setting is noisy)  []  2. Moderate difficulty - speaker has to increase volume and speak distinctly   []  3. Highly impaired - absence of useful hearing    Vision  Ability to see in adequate light (with glasses or other visual appliances)  [x]  0. Adequate - sees fine detail, such as regular print in newspapers/books  []  1. Impaired - sees large print, but not regular print in newspapers/books  []  2. Moderately impaired - limited vision; not able to see newspaper headlines but can identify objects  []  3. Highly impaired - object identification in question, but eyes appear to follow objects  []  4. Severely impaired - no vision or sees only light, colors, or shapes; eyes do not appear to follow objects    Health Literacy  \"How often do you need to have someone help you when you read instructions, pamphlets, or other written material from your doctor or pharmacy? \"  [x]  0. Never  []  1. Rarely  []  2. Sometimes  []  3. Often  []  4. Always  []  8. Patient unable to respond    Signs and Symptoms of Delirium  A. Acute Onset Mental Status Change - Is there evidence of an acu[]te change in mental status from the patient's baseline? 0. No  [] 1. Yes    B. Inattention - Did the patient have difficulty focusing attention, for example being easily distractible or having difficulty keeping track of what was being said? [x]  0. Behavior not present  []  1.   Behavior continuously present, does not around you? \"  [x] 0. Never  [] 1. Rarely  [] 2. Sometimes  [] 3. Often  [] 4. Always  [] 8. Patient unable to respond    Pain Effect on Sleep  \"Over the past 5 days, how much of the time has pain made it hard for you to sleep at night? \"  [x]  0. Does not apply - I have not had any pain or hurting in the past 5 days  []  1. Rarely or not at all  []  2. Occasionally  []  3. Frequently  []  4. Almost constantly  []  8. Unable to answer  **If the patient answers \"0. Does not apply\" to this question, skip the next two \"Pain\" questions**      Pain Interference with Therapy Activities  \"Over the past 5 days, how often have you limited your participation in rehabilitation therapy sessions due to pain? \"  [x]  0. Does not apply - I have not received rehabilitation therapy in the past 5 days  []  1. Rarely or not at all  []  2. Occasionally  []  3. Frequently  []  4. Almost constantly  []  8. Unable to answer    Pain Interference with Day-to-Day Activities: \"Over the past 5 days, how often have you limited your day-to-day activities (excluding rehabilitation therapy session)? \"  [x]  1. Rarely or not at all  []  2. Occasionally  []  3. Frequently  []  4. Almost constantly  []  8.   Unable to answer

## 2023-09-12 PROBLEM — I63.9 DYSARTHRIA DUE TO ACUTE STROKE (HCC): Status: ACTIVE | Noted: 2023-09-12

## 2023-09-12 PROBLEM — I69.351 HEMIPARESIS OF RIGHT DOMINANT SIDE AS LATE EFFECT OF CEREBRAL INFARCTION (HCC): Status: ACTIVE | Noted: 2023-08-29

## 2023-09-12 PROBLEM — R47.1 DYSARTHRIA DUE TO ACUTE STROKE (HCC): Status: ACTIVE | Noted: 2023-09-12

## 2023-09-12 PROBLEM — Z86.718 HISTORY OF DVT (DEEP VEIN THROMBOSIS): Status: ACTIVE | Noted: 2023-09-12

## 2023-09-12 PROBLEM — F41.8 DEPRESSION WITH ANXIETY: Status: ACTIVE | Noted: 2023-07-11

## 2023-09-12 PROBLEM — I69.391 DYSPHAGIA DUE TO RECENT STROKE: Status: ACTIVE | Noted: 2023-09-12

## 2023-09-12 PROBLEM — I67.7 CEREBRAL VASCULITIS: Status: ACTIVE | Noted: 2023-09-12

## 2023-09-12 LAB
GLUCOSE BLD-MCNC: 126 MG/DL (ref 70–99)
GLUCOSE BLD-MCNC: 142 MG/DL (ref 70–99)
GLUCOSE BLD-MCNC: 156 MG/DL (ref 70–99)
GLUCOSE BLD-MCNC: 95 MG/DL (ref 70–99)

## 2023-09-12 PROCEDURE — 94150 VITAL CAPACITY TEST: CPT

## 2023-09-12 PROCEDURE — 1280000000 HC REHAB R&B

## 2023-09-12 PROCEDURE — 97163 PT EVAL HIGH COMPLEX 45 MIN: CPT

## 2023-09-12 PROCEDURE — 97112 NEUROMUSCULAR REEDUCATION: CPT

## 2023-09-12 PROCEDURE — 92523 SPEECH SOUND LANG COMPREHEN: CPT

## 2023-09-12 PROCEDURE — 97535 SELF CARE MNGMENT TRAINING: CPT

## 2023-09-12 PROCEDURE — 94761 N-INVAS EAR/PLS OXIMETRY MLT: CPT

## 2023-09-12 PROCEDURE — 97167 OT EVAL HIGH COMPLEX 60 MIN: CPT

## 2023-09-12 PROCEDURE — 99211 OFF/OP EST MAY X REQ PHY/QHP: CPT

## 2023-09-12 PROCEDURE — 97116 GAIT TRAINING THERAPY: CPT

## 2023-09-12 PROCEDURE — 97530 THERAPEUTIC ACTIVITIES: CPT

## 2023-09-12 PROCEDURE — 6370000000 HC RX 637 (ALT 250 FOR IP): Performed by: PHYSICAL MEDICINE & REHABILITATION

## 2023-09-12 PROCEDURE — 6360000002 HC RX W HCPCS: Performed by: PHYSICAL MEDICINE & REHABILITATION

## 2023-09-12 PROCEDURE — 82962 GLUCOSE BLOOD TEST: CPT

## 2023-09-12 RX ORDER — METHOCARBAMOL 500 MG/1
500 TABLET, FILM COATED ORAL 3 TIMES DAILY PRN
Status: DISCONTINUED | OUTPATIENT
Start: 2023-09-12 | End: 2023-09-23 | Stop reason: HOSPADM

## 2023-09-12 RX ORDER — FAMOTIDINE 20 MG/1
20 TABLET, FILM COATED ORAL 2 TIMES DAILY
Status: DISCONTINUED | OUTPATIENT
Start: 2023-09-12 | End: 2023-09-23 | Stop reason: HOSPADM

## 2023-09-12 RX ADMIN — ATORVASTATIN CALCIUM 80 MG: 40 TABLET, FILM COATED ORAL at 21:17

## 2023-09-12 RX ADMIN — GABAPENTIN 300 MG: 300 CAPSULE ORAL at 08:58

## 2023-09-12 RX ADMIN — GABAPENTIN 300 MG: 300 CAPSULE ORAL at 12:51

## 2023-09-12 RX ADMIN — GABAPENTIN 300 MG: 300 CAPSULE ORAL at 21:17

## 2023-09-12 RX ADMIN — AMLODIPINE BESYLATE 10 MG: 10 TABLET ORAL at 08:58

## 2023-09-12 RX ADMIN — ENOXAPARIN SODIUM 40 MG: 100 INJECTION SUBCUTANEOUS at 08:59

## 2023-09-12 RX ADMIN — ESCITALOPRAM OXALATE 10 MG: 10 TABLET ORAL at 08:58

## 2023-09-12 RX ADMIN — ASPIRIN 81 MG CHEWABLE TABLET 81 MG: 81 TABLET CHEWABLE at 08:58

## 2023-09-12 ASSESSMENT — PAIN SCALES - GENERAL
PAINLEVEL_OUTOF10: 0
PAINLEVEL_OUTOF10: 0

## 2023-09-12 ASSESSMENT — PAIN SCALES - WONG BAKER
WONGBAKER_NUMERICALRESPONSE: 0
WONGBAKER_NUMERICALRESPONSE: 0

## 2023-09-12 NOTE — PROGRESS NOTES
Physical Therapy  [facility] ARU PHYSICAL THERAPY EVALUATION    Chart Review:  Past Medical History:   Diagnosis Date    Blood circulation, collateral     Callus of foot     Callus of foot 08/13/2018    Charcot's joint of foot     Charcot's joint of foot in type 2 diabetes mellitus ([address]) 02/06/2014    Closed nondisplaced fracture of fifth metatarsal bone of right foot with routine healing 08/28/2017    CVA (cerebral vascular accident) ([address])     Diabetes mellitus ([address])     Diverticulitis     Hyperlipidemia     Hypertension     Ischemic ulcer of left foot, limited to breakdown of skin ([address]) 07/12/2016    Leg edema, right 08/25/2017    Neuropathy     Plantar wart, left foot 08/13/2018    Ulcer of right foot with fat layer exposed ([address]) 11/28/2016    Ulcer of right foot with necrosis of muscle, Hannon Grade III 08/11/2016    WD-Chronic ulcer of left foot with fat layer exposed ([address]) 08/11/2016    WD-Chronic ulcer of toe of right foot, limited to breakdown of skin ([address]) 07/12/2016    WD-Diabetic ulcer of left foot associated with type 2 diabetes mellitus ([address]) 08/11/2016    WD-Diabetic ulcer of right lateral foot associated with type 2 diabetes mellitus, with fat layer exposed ([address])     WD-Type 2 diabetes mellitus with right diabetic foot ulcer ([address]) 07/12/2016     Past Surgical History:   Procedure Laterality Date    ABDOMEN SURGERY      CEREBRAL ANGIOGRAM  2023    COLONOSCOPY  05/06/2019    divertic, polyps, clip, repeat age 48    COLONOSCOPY N/A 05/06/2019    COLONOSCOPY CONTROL HEMORRHAGE performed by Jonathan Moody MD at [facility] N/A 12/20/2022    COLONOSCOPY DIAGNOSTIC performed by Anay Thornton MD at [address] Right 07/2012    charcot    FRACTURE SURGERY Right     foot     Fall History: Has the patient had two or more falls in the past year or any fall with injury in the past year?: No (one fall in July but no injury)  Social History:  Social/Functional History  Lives With: Family 2ww but needed assist for R hand . After talking with OT will continue to assess if walker will be best device or if pt will need to switch to some kind of cane for LUE use only. Pt does demonstrate good safety and feel with ARU-PT intervention for deficits as noted that pt will be mod I with gait and mobility, though may need some assist for stairs.         Body Structures, Functions, Activity Limitations Requiring Skilled Therapeutic Intervention: Decreased functional mobility , Decreased ADL status, Decreased ROM, Decreased strength, Decreased endurance, Decreased sensation, Decreased balance, Decreased coordination, Decreased fine motor control, Decreased high-level IADLs, Decreased vision/visual deficit     Therapy Prognosis: Good  Decision Making: High Complexity  Clinical Presentation: unpredicatable characteristics      Patient education:   ARU schedule, ARU expectations for participation, plan of care,   Treatment Initiated:  Functional mobility training, gait training, patient education  Barriers to Improvement:  unknown etiology of CVAs  Discharge Recommendations:  home with family  Equipment Recommendations:  gait device tbd     Goals:  Patient Goals   Patient Goals : home at mod I  Short Term Goals  Time Frame for Short Term Goals: 7-10 days STG=LTG  Short Term Goal 1: pt will perform bed mobility with mod I  Short Term Goal 2: Pt will perform sit to stand, w/c<>bed and car transfers with mod I  Short Term Goal 3: Pt will perform gait with appropriate device 150' on level surface and at least 10' on ramp with mod I  Short Term Goal 4: Pt will ascend/descend curb step with LRAD and 4 steps with rail with mod I, 12 steps with supervision  Short Term Goal 5: Pt will  light object with reacher with mod I     Plan:    Requires PT Follow-Up: Yes  Pt will be seen at least 60 minutes per day for a minimum of 5 days per week, plus group therapy as appropriate  Physcial Therapy Plan  Current Treatment

## 2023-09-12 NOTE — DISCHARGE INSTR - ACTIVITY
[facility] Stroke Survivor and Caregiver Support Group  First Thursday of each month  5:30 PM - 8:00 PM [email or web address]  [employer]

## 2023-09-12 NOTE — CONSULTS
[facility] Contin[facility] Nurse  Consult Note       Letitia Parra  AGE: 46 y.o.    GENDER: male  : 1970  TODAY'S DATE:  2023    Subjective:     Reason for Evaluation and Assessment: wound care evalRandolph Parra is a 46 y.o. male referred by:   [x] Physician  [] Nursing  [] Other:     Wound Identification:  Wound Type: diabetic and pressure  Contributing Factors: diabetes and chronic pressure        PAST MEDICAL HISTORY        Diagnosis Date    Blood circulation, collateral     Callus of foot     Callus of foot 2018    Charcot's joint of foot     Charcot's joint of foot in type 2 diabetes mellitus ([address]) 2014    Closed nondisplaced fracture of fifth metatarsal bone of right foot with routine healing 2017    CVA (cerebral vascular accident) ([address])     Diabetes mellitus ([address])     Diverticulitis     Hyperlipidemia     Hypertension     Ischemic ulcer of left foot, limited to breakdown of skin ([address]) 2016    Leg edema, right 2017    Neuropathy     Plantar wart, left foot 2018    Ulcer of right foot with fat layer exposed ([address]) 2016    Ulcer of right foot with necrosis of muscle, Hannon Grade III 2016    WD-Chronic ulcer of left foot with fat layer exposed ([address]) 2016    WD-Chronic ulcer of toe of right foot, limited to breakdown of skin ([address]) 2016    WD-Diabetic ulcer of left foot associated with type 2 diabetes mellitus ([address]) 2016    WD-Diabetic ulcer of right lateral foot associated with type 2 diabetes mellitus, with fat layer exposed ([address])     WD-Type 2 diabetes mellitus with right diabetic foot ulcer ([address]) 2016       PAST SURGICAL HISTORY    Past Surgical History:   Procedure Laterality Date    ABDOMEN SURGERY      CEREBRAL ANGIOGRAM      COLONOSCOPY  2019    divertic, polyps, clip, repeat age 48    COLONOSCOPY N/A 2019    COLONOSCOPY CONTROL HEMORRHAGE performed by Lazarus Eagles, MD at [facility] cm 09/12/23 0834   Wound Surface Area (cm^2) 0.09 cm^2 09/12/23 0834   Wound Volume (cm^3) 0.009 cm^3 09/12/23 0834   Distance Tunneling (cm) 0 cm 09/12/23 0834   Tunneling Position ___ O'Clock 0 09/12/23 0834   Undermining Starts ___ O'Clock 0 09/12/23 0834   Undermining Ends___ O'Clock 0 09/12/23 0834   Undermining Maxium Distance (cm) 0 09/12/23 0834   Wound Assessment Eschar dry 09/12/23 0834   Drainage Amount None (dry) 09/12/23 0834   Odor None 08/30/23 0244   Violeta-wound Assessment Intact 09/12/23 0834   Margins Attached edges 09/12/23 0834   Number of days: 14       Wound 09/12/23 Toe (Comment  which one) Right 2nd toe (Active)   Wound Image   09/12/23 0834   Wound Etiology Diabetic 09/12/23 0834   Wound Cleansed Cleansed with saline 09/12/23 0834   Dressing/Treatment Betadine swabs/povidone iodine 09/12/23 0834   Wound Length (cm) 0.7 cm 09/12/23 0834   Wound Width (cm) 0.5 cm 09/12/23 0834   Wound Depth (cm) 0.1 cm 09/12/23 0834   Wound Surface Area (cm^2) 0.35 cm^2 09/12/23 0834   Wound Volume (cm^3) 0.035 cm^3 09/12/23 0834   Distance Tunneling (cm) 0 cm 09/12/23 0834   Tunneling Position ___ O'Clock 0 09/12/23 0834   Undermining Starts ___ O'Clock 0 09/12/23 0834   Undermining Ends___ O'Clock 0 09/12/23 0834   Undermining Maxium Distance (cm) 0 09/12/23 0834   Wound Assessment Eschar dry 09/12/23 0834   Drainage Amount None (dry) 09/12/23 0834   Violeta-wound Assessment Intact 09/12/23 0834   Margins Attached edges 09/12/23 0834   Number of days: 0       Wound 09/12/23 Ankle Right;Lateral (Active)   Wound Image   09/12/23 0834   Wound Etiology Diabetic 09/12/23 0834   Wound Cleansed Cleansed with saline;Betadine/povidone iodine 09/12/23 0834   Wound Length (cm) 0.6 cm 09/12/23 0834   Wound Width (cm) 0.5 cm 09/12/23 0834   Wound Depth (cm) 0.1 cm 09/12/23 0834   Wound Surface Area (cm^2) 0.3 cm^2 09/12/23 0834   Wound Volume (cm^3) 0.03 cm^3 09/12/23 0834   Distance Tunneling (cm) 0 cm 09/12/23 0834 Tunneling Position ___ O'Clock 0 09/12/23 0834   Undermining Starts ___ O'Clock 0 09/12/23 0834   Undermining Ends___ O'Clock 0 09/12/23 0834   Undermining Maxium Distance (cm) 0 09/12/23 0834   Wound Assessment Eschar dry 09/12/23 0834   Drainage Amount None (dry) 09/12/23 0834   Violeta-wound Assessment Intact 09/12/23 0834   Margins Attached edges 09/12/23 0834   Number of days: 0       Response to treatment:  Well tolerated by patient. Pain Assessment:  Severity:  none  Quality of pain:   Wound Pain Timing/Severity:   Premedicated: no    Plan:     Plan of Care: Wound 08/28/23 Foot Left; Anterior 2nd toe-Dressing/Treatment: Open to air  Wound 09/12/23 Toe (Comment  which one) Right 2nd toe-Dressing/Treatment: Betadine swabs/povidone iodine    Patient in bed agreeable to wound care eval. Pt has dry eschar diabetic wounds to rt 2nd toe/lt 2nd toe/rt lateral ankle. Cleansed with NS measured and pictured painted with betadine TITA. Heels and buttocks intact. Pt is generally not at risk for skin breakdown AEB nuris. Specialty Bed Required : no  [] Low Air Loss   [] Pressure Redistribution  [] Fluid Immersion  [] Bariatric  [] Total Pressure Relief  [] Other:     Discharge Plan:  Placement for patient upon discharge: tbd  Hospice Care: no  Patient appropriate for [id number]: pt follows with a clinic     Patient/Caregiver Teaching:  Level of patient/caregiver understanding able to: pt voiced understanding. Electronically signed by Anusha Lopez.  KURT Stein, on 9/12/2023 at 9:42 AM

## 2023-09-12 NOTE — PROGRESS NOTES
Occupational Therapy                              [facility] ARU OCCUPATIONAL THERAPY EVALUATION    Chart Review:  Past Medical History:   Diagnosis Date    Blood circulation, collateral     Callus of foot     Callus of foot 08/13/2018    Charcot's joint of foot     Charcot's joint of foot in type 2 diabetes mellitus ([address]) 02/06/2014    Closed nondisplaced fracture of fifth metatarsal bone of right foot with routine healing 08/28/2017    CVA (cerebral vascular accident) ([address])     Diabetes mellitus ([address])     Diverticulitis     Hyperlipidemia     Hypertension     Ischemic ulcer of left foot, limited to breakdown of skin ([address]) 07/12/2016    Leg edema, right 08/25/2017    Neuropathy     Plantar wart, left foot 08/13/2018    Ulcer of right foot with fat layer exposed ([address]) 11/28/2016    Ulcer of right foot with necrosis of muscle, Hannon Grade III 08/11/2016    WD-Chronic ulcer of left foot with fat layer exposed ([address]) 08/11/2016    WD-Chronic ulcer of toe of right foot, limited to breakdown of skin ([address]) 07/12/2016    WD-Diabetic ulcer of left foot associated with type 2 diabetes mellitus ([address]) 08/11/2016    WD-Diabetic ulcer of right lateral foot associated with type 2 diabetes mellitus, with fat layer exposed ([address])     WD-Type 2 diabetes mellitus with right diabetic foot ulcer ([address]) 07/12/2016     Past Surgical History:   Procedure Laterality Date    ABDOMEN SURGERY      CEREBRAL ANGIOGRAM  2023    COLONOSCOPY  05/06/2019    divertic, polyps, clip, repeat age 48    COLONOSCOPY N/A 05/06/2019    COLONOSCOPY CONTROL HEMORRHAGE performed by Kelly Junior MD at [facility] N/A 12/20/2022    COLONOSCOPY DIAGNOSTIC performed by Lucy Cabrera MD at [address] Right 07/2012    charcot    FRACTURE SURGERY Right     foot     Social History:  Social/Functional History  Lives With: Family (mom (back issues)and step-dad(recent CVA but doing well))  Type of Home: House  Home Layout: Two level, Able to significant RUE weakness as well as some hypertonicity especially in digits. I do predict pt can achieve mod I goals for ADLs and pt will benefit from an emphasis on balance training and RUE neuro re-ed. The QI, MMT, and ROM standardized assessments were used this date to determine the above performance deficits, which compromise pt's ability to safely complete ADLs/IADLs/mobility. Pt will benefit from ARU OT services to increase functional performance and return to PLOF. Decision Making: High Complexity  Clinical Presentation:  Evolving c unstable characteristics (I.e. hemiparesis)  Patient education:   ARU barbaral, Role of O.T., O.T. plan of care  []   Patient goal was established and reviewed in Rehabtracker with patient and/or family this date.   REQUIRES OT FOLLOW-UP: Yes  Discharge Recommendations:  Home c assist, [address] OT  Equipment Recommendations:  None    Goals:     Short Term Goals  Time Frame for Short Term Goals: STGs=LTGs  Long Term Goals  Time Frame for Long Term Goals : ~10 days or until d/c  Long Term Goal 1: Pt will complete grooming tasks Ind  Long Term Goal 2: Pt will complete total body bathing mod I using AE PRN  Long Term Goal 3: Pt will complete UB dressing Ind using hemitechnique  Long Term Goal 4: Pt will complete LB dressing mod I  Long Term Goal 5: Pt will doff/don footwear mod I  Additional Goals?: Yes  Long Term Goal 6: Pt will complete toileting mod I  Long Term Goal 7: Pt will complete functional transfers (bed, chair, toilet, shower) c DME PRN and mod I  Long Term Goal 8: Pt will perform therex/therax to facilitate increased strength/endurance/ax tolerance (c emphasis on dynamic standing balance/tolerance >8 mins and RUE neuro re-ed) c SBA  Long Term Goal 9: Pt will complete simple homemaking tasks c DME PRN and mod I    Plan:    Pt will be seen at least 60 minutes per day for a minimum of 5 days per week, plus group therapy as appropriate  Current Treatment Recommendations: Strengthening, ROM, Balance training, Functional mobility training, Endurance training, Neuromuscular re-education, Safety education & training, Patient/Caregiver education & training, Equipment evaluation, education, & procurement, Self-Care / ADL, Return to work related activity, Home management training, Coordination training, Sensory integration    OT Individual Minutes  Time In: 7958  Time Out: 1200  Minutes: 75                Number of Minutes/Billable Intervention      OT Evaluation 20   Therapeutic Exercise    ADL Self-care 45   Neuro Re-Ed 10   Therapeutic Activity    Group    Other:    TOTAL 75     Electronically signed by:    David Tabares MS, OTR/L  License #[id number]  9/12/2023, 3:11 PM

## 2023-09-12 NOTE — PROGRESS NOTES
Facility/Department: [facility] ARU  Initial Speech/Language/Cognitive Assessment    NAME: Rebecca Vazquez  : 1970   MRN: [id number]  ADMISSION DATE: 2023  ADMITTING DIAGNOSIS: has Charcot's joint of foot in type 2 diabetes mellitus ([address]); Skin callus; H/O sepsis; Poorly controlled type 2 diabetes mellitus with peripheral neuropathy ([address]); Psoriasis; Essential hypertension; Leg edema, right; Right leg pain; Plantar wart, left foot; Callus of foot; Change in bowel movement; Abnormal CT of the abdomen; Smokeless tobacco use; Erectile dysfunction; History of colon polyps; Acute cerebrovascular accident (CVA) ([address]); Right hemiparesis ([address]); Difficulty transferring location; Depression with anxiety; Neuropathy; Vasculitis ([address]); Acute CVA (cerebrovascular accident) ([address]); Hemiparesis of right dominant side as late effect of cerebral infarction ([address]); Dysphagia due to recent stroke; Cerebral vasculitis; History of DVT (deep vein thrombosis); and Dysarthria due to acute stroke [facility]) on their problem list.  DATE ONSET: 2023    Date of Eval: 2023   Evaluating Therapist: KILO Crockett    RECENT RESULTS  CT OF HEAD/MRI: FINDINGS:  INTRACRANIAL STRUCTURES/VENTRICLES: Acute infarct present in the left  parietal corona radiata and centrum semiovale correlates with CT finding. Additional punctate foci of acute infarct present in the left frontal corona  radiata and centrum semiovale, right parasagittal parietal lobe, left  posterior occipital lobe, and bilateral cerebellum. No evidence of acute  hemorrhage. No evidence of significant mass effect/midline shift. Involutional parenchymal changes. No ventriculomegaly. Scattered  periventricular, deep, and subcortical white matter T2/FLAIR hyperintensities  are nonspecific and likely related to microvascular ischemic disease. Abnormal appearance of the left intracranial vertebral artery flow void  compatible with occlusion seen on recent CTA.   The

## 2023-09-12 NOTE — H&P
[place]    : 1970  Acct #: [de-identified]  MRN: [id number]              History and physical    Face-to-face exam: 2023. Time of face-to-face exam: [phone]. Admitting diagnosis: Acute CVA ([place] 1.2)    Comorbid diagnoses impacting rehabilitation: Right hemiparesis, dysarthria, dysphagia, essential hypertension, uncontrolled diabetes type 2 with hyperglycemia, poorly controlled diabetes type II with peripheral neuropathy, history of lower limb DVT, depression with anxiety, possible cerebral angiopathy. Chief complaint: Right arm and leg weakness and difficulty speaking. History of present illness: Patient is a 60-year-old right-hand-dominant male with a history of hypertension, diabetes and a lower limb DVT in his 35s with small bowel ischemia at that time. Now he presents with a series of neurologic changes manifesting as right arm and leg weakness, slurred speech and dysphagia. On 2023 he initially developed right arm and leg weakness with slurred speech and sensory alterations that was evaluated in the [facility] system. He was diagnosed with acute CVA and treated with antiplatelet therapy and blood pressure regulation. His evaluation raised the concern of vasculitis. Over the next 10 weeks he had multiple hospitalizations and outpatient evaluations of waxing and waning arm and leg weakness, alterations of speech and difficulty swallowing. He was diagnosed with cerebral vasculitis and treated with high-dose steroids. Unfortunately, his symptoms did not mohit. Most recently on 2023 he developed recurrence of right arm and leg weakness, slurred speech and facial paresthesias. After initial evaluation in our hospital, he was transferred to [address] [place] [address] for neurologic and neurosurgical evaluation. Their conclusion is that the etiology of his strokes remains uncertain. They are planning for a brain biopsy to evaluate the multiple intravascular lesions noted on [address].   The PROTIME 13.6 08/29/2023    PROTIME 12.9 08/28/2023    PROTIME 13.0 08/07/2023     Lab Results   Component Value Date    CREATININE 0.5 (L) 08/29/2023    BUN 14 08/29/2023     08/29/2023    K 4.5 08/29/2023    CL 97 (L) 08/29/2023    CO2 24 08/29/2023     Lab Results   Component Value Date    ALT 43 (H) 08/28/2023    AST 18 08/28/2023    ALKPHOS 87 08/28/2023    BILITOT 0.6 08/28/2023         Impression: 63-year-old male with a history of diabetes, hypertension and depression with past DVT and bowel ischemia who now presents with a series of cerebrovascular accidents primarily manifesting as right arm and leg weakness, dysarthria and dysphagia. He has been treated for cerebral vasculitis with equivocal results. A brain biopsy is anticipated at [facility] in the next several weeks. Strengths for the patient: His young age, the support of his family and a reasonably accessible home. Limitations/barriers for the patient: The recurring nature of his cerebral infarctions, his diabetes and his past history of DVT. Recommendation: Acute inpatient rehabilitation with occupational and physical therapy 180 minutes 5 out of every 7 days. Will address basic and  advancing mobility with self-care instruction and adaptive equipment training. Caregiver education will be offered. Expected length of stay  prior to a supervised level of function for discharge home with a walker and [facility] OT/PT is 2 weeks. Additional recommendation:    Acute CVA with right hemiparesis: The patient requires daily occupational and physical therapy with speech-language pathology. Due to his right-sided weakness, impaired coordination and sensory deficits, he is at risk for fall with injury during standing ADLs and mobility activities. Therefore, we must provide him adaptive equipment training with strengthening exercises, balance recovery training and endurance building.   We must provide aggressive pulmonary hygiene measures, DVT concur with its findings without change. A detailed plan of care will be established by hospital day 4 and I attest the patient is appropriate for inpatient rehabilitation at this time. I have compared the patient's current functional status noted during my history and physical with that of the preadmission screen and I have found no significant differences.

## 2023-09-12 NOTE — PROGRESS NOTES
Comprehensive Nutrition Assessment    Type and Reason for Visit:  Initial, Consult (oral nutrition supplement)    Nutrition Recommendations/Plan:   Continue carb controlled diet per order  May offer daily diabetic oral nutrition supplement as needed when in stock  Will continue to follow up during stay      Malnutrition Assessment:  Malnutrition Status:  No malnutrition (09/12/23 1444)    Context:  Acute Illness       Nutrition Assessment:    Admit with hx acute CVA, right hemiparesis. Currently on carb controlled diet with hx DM. Meal intake % so far, content with recent meals. Eating well at this time, some weight loss in past 4 months but not significant loss. Will follow at low nutrition risk at this time. Nutrition Related Findings:    resting in bed, hx DM, HTN   recent Hba1c 9.1% Wound Type:  (dry wound on toes)       Current Nutrition Intake & Therapies:    Average Meal Intake: %  Average Supplements Intake: None Ordered  ADULT DIET; Regular; 5 carb choices (75 gm/meal)    Anthropometric Measures:  Height: 6' (182.9 cm)  Ideal Body Weight (IBW): 178 lbs (81 kg)       Current Body Weight: 220 lb 7.4 oz (100 kg), 123.9 % IBW. Current BMI (kg/m2): 29.9  Usual Body Weight: 235 lb (106.6 kg) (hx June 2023)  % Weight Change (Calculated): -6.2  Weight Adjustment For: No Adjustment                 BMI Categories: Overweight (BMI 25.0-29. 9)    Estimated Daily Nutrient Needs:  Energy Requirements Based On: Formula  Weight Used for Energy Requirements: Current  Energy (kcal/day): 9963-7458 (mif[address] st jeor)  Weight Used for Protein Requirements: Ideal  Protein (g/day): 81-97 (1-1.2 g/kg)  Method Used for Fluid Requirements: 1 ml/kcal  Fluid (ml/day): 2200    Nutrition Diagnosis:   No nutrition diagnosis at this time related to   as evidenced by      Nutrition Interventions:   Food and/or Nutrient Delivery: Continue Current Diet  Nutrition Education/Counseling: Education initiated  Coordination of

## 2023-09-12 NOTE — PROGRESS NOTES
09/12/23 7037   Encounter Summary   Encounter Overview/Reason  Initial Encounter   Service Provided For: Patient   Referral/Consult From: Nurse   Support System Parent; Family members   Last Encounter  09/12/23  (Patient awake receptive of  visit. Patient is Mosque and appreciated time of prayer.)   Complexity of Encounter Moderate   Begin Time 0800   End Time  0830   Total Time Calculated 30 min   Spiritual/Emotional needs   Type Spiritual Support   Assessment/Intervention/Outcome   Assessment Calm;Coping; Hopeful   Intervention Active listening;Discussed relationship with God;Nurtured Hope;Prayer (assurance of)/[place];Sustaining Presence/Ministry of presence   Outcome Comfort;Coping;Encouraged;Engaged in conversation;Expressed feelings, needs, and concerns;Expressed feelings of Gisele, Peace and/or Love;Expressed Gratitude   Plan and Referrals   Plan/Referrals Continue to visit, (comment)  (Patient informed how to contact )

## 2023-09-12 NOTE — CARE COORDINATION
Case Management Admission Note    Patient:Tomas Amaya      SGZ:[id number]  RPX:[id number]  Rehab Dx/Hx: CVA (cerebral vascular accident) ([address]) [I63.9]  Acute CVA (cerebrovascular accident) [facility]) [I63.9]    Chief Complaint:   Past Medical History:   Diagnosis Date    Blood circulation, collateral     Callus of foot     Callus of foot 08/13/2018    Charcot's joint of foot     Charcot's joint of foot in type 2 diabetes mellitus ([address]) 02/06/2014    Closed nondisplaced fracture of fifth metatarsal bone of right foot with routine healing 08/28/2017    CVA (cerebral vascular accident) ([address])     Diabetes mellitus ([address])     Diverticulitis     Hyperlipidemia     Hypertension     Ischemic ulcer of left foot, limited to breakdown of skin ([address]) 07/12/2016    Leg edema, right 08/25/2017    Neuropathy     Plantar wart, left foot 08/13/2018    Ulcer of right foot with fat layer exposed ([address]) 11/28/2016    Ulcer of right foot with necrosis of muscle, [place] Grade III 08/11/2016    WD-Chronic ulcer of left foot with fat layer exposed ([address]) 08/11/2016    WD-Chronic ulcer of toe of right foot, limited to breakdown of skin ([address]) 07/12/2016    WD-Diabetic ulcer of left foot associated with type 2 diabetes mellitus ([address]) 08/11/2016    WD-Diabetic ulcer of right lateral foot associated with type 2 diabetes mellitus, with fat layer exposed ([address])     WD-Type 2 diabetes mellitus with right diabetic foot ulcer ([address]) 07/12/2016     Past Surgical History:   Procedure Laterality Date    ABDOMEN SURGERY      CEREBRAL ANGIOGRAM  2023    COLONOSCOPY  05/06/2019    divertic, polyps, clip, repeat age 48    COLONOSCOPY N/A 05/06/2019    COLONOSCOPY CONTROL HEMORRHAGE performed by Carol Ann Naik MD at [facility] N/A 12/20/2022    COLONOSCOPY DIAGNOSTIC performed by Phebe Meckel, MD at [address] Right 07/2012    charcot    FRACTURE SURGERY Right     foot     Allergies   Allergen Reactions    Bactrim therapy at [facility]. Patient reports access to food, utilities, and shelter and feels safe in their home environment. BIMS completed in initial assessment. Room whiteboard updated. Plan is to D/C home with Mother, with HHC/Outpatient and DME as recommended by therapy staff. F/U to be set with Damion Dowell PA-C and family will transport home.     MED Tyler, MADISONW, 9/12/2023, 11:02 AM

## 2023-09-12 NOTE — ACP (ADVANCE CARE PLANNING)
Advance Care Planning     Advance Care Planning Inpatient Note  [facility] Care Department    Today's Date: 9/12/2023  Unit: [facility] ARU    Received request from HealthCare Provider and patient. Upon review of chart and communication with care team, patient's decision making abilities are not in question. . Patient was/were present in the room during visit. Goals of ACP Conversation:  Facilitate a discussion related to patient's goals of care as they align with the patient's values and beliefs. Health Care Decision Makers:       Primary Decision Maker: Delgado Toney - Parent - [phone]    Secondary Decision Maker: Jason Guaman - Brother/Sister - [phone]  Summary:  [facility]  Verified that ACP/Healthcare POA documents are present, complete, witnessed & notarized as forms require. Then verified accuracy of Healthcare Decision Maker(s) contact information. Last, ensure Decision Maker(s) is designated Primary, Secondary or Supplemental. (Optional):[id number]}  Advance Care Planning Documents (Patient Wishes):  Healthcare Power of /Advance Directive Appointment of Health Care Agent     Assessment:  Complete and thorough discussion on ACP, and all questions answered to patient's satisfaction. Both DPOA and LW were completed. Interventions:  Assisted in the completion of documents according to patient's wishes at this time    Care Preferences Communicated:   No    Outcomes/Plan:  ACP Discussion: Completed  New advance directive completed. Returned original document(s) to patient, as well as copies for distribution to appointed agents  Copy of advance directive given to staff to scan into medical record. Routed ACP note to attending provider or other IDT member.   Teach Back Method used to verify the patient's and/or Healthcare Decision Maker's understanding of key information in the advance directive documents    Electronically signed by Delvin Soto

## 2023-09-12 NOTE — PROGRESS NOTES
09/12/23 0846   Encounter Summary   Encounter Overview/Reason  Advance Care Planning   Service Provided For: Patient   Referral/Consult From: Patient   Support System Parent; Family members   Last Encounter  09/12/23  (Complete and thorough ACP discussion and forms completed.)   Complexity of Encounter High   Begin Time 0845   End Time  0940   Total Time Calculated 55 min   Spiritual/Emotional needs   Type Spiritual Support   Advance Care Planning   Type ACP conversation; Completed AD/ACP document(s)   Assessment/Intervention/Outcome   Assessment Calm   Intervention Active listening;Nurtured Hope;Prayer (assurance of)/[place];Sustaining Presence/Ministry of presence   Outcome Acceptance;Comfort;Engaged in conversation;Peace   Plan and Referrals   Plan/Referrals Placed on Church Communion List;Continue Support (comment)  (Patient informed how to contact )

## 2023-09-13 LAB
GLUCOSE BLD-MCNC: 104 MG/DL (ref 70–99)
GLUCOSE BLD-MCNC: 110 MG/DL (ref 70–99)
GLUCOSE BLD-MCNC: 119 MG/DL (ref 70–99)
GLUCOSE BLD-MCNC: 230 MG/DL (ref 70–99)

## 2023-09-13 PROCEDURE — 1280000000 HC REHAB R&B

## 2023-09-13 PROCEDURE — 94150 VITAL CAPACITY TEST: CPT

## 2023-09-13 PROCEDURE — 94761 N-INVAS EAR/PLS OXIMETRY MLT: CPT

## 2023-09-13 PROCEDURE — 82962 GLUCOSE BLOOD TEST: CPT

## 2023-09-13 PROCEDURE — 6360000002 HC RX W HCPCS: Performed by: PHYSICAL MEDICINE & REHABILITATION

## 2023-09-13 PROCEDURE — 97530 THERAPEUTIC ACTIVITIES: CPT

## 2023-09-13 PROCEDURE — 6370000000 HC RX 637 (ALT 250 FOR IP): Performed by: PHYSICAL MEDICINE & REHABILITATION

## 2023-09-13 PROCEDURE — 97116 GAIT TRAINING THERAPY: CPT

## 2023-09-13 PROCEDURE — 97110 THERAPEUTIC EXERCISES: CPT

## 2023-09-13 PROCEDURE — 97535 SELF CARE MNGMENT TRAINING: CPT

## 2023-09-13 PROCEDURE — 97112 NEUROMUSCULAR REEDUCATION: CPT

## 2023-09-13 RX ADMIN — GABAPENTIN 300 MG: 300 CAPSULE ORAL at 08:56

## 2023-09-13 RX ADMIN — METHOCARBAMOL 500 MG: 500 TABLET ORAL at 22:48

## 2023-09-13 RX ADMIN — GABAPENTIN 300 MG: 300 CAPSULE ORAL at 20:47

## 2023-09-13 RX ADMIN — AMLODIPINE BESYLATE 10 MG: 10 TABLET ORAL at 08:56

## 2023-09-13 RX ADMIN — ESCITALOPRAM OXALATE 10 MG: 10 TABLET ORAL at 08:56

## 2023-09-13 RX ADMIN — ASPIRIN 81 MG CHEWABLE TABLET 81 MG: 81 TABLET CHEWABLE at 08:56

## 2023-09-13 RX ADMIN — GABAPENTIN 300 MG: 300 CAPSULE ORAL at 14:18

## 2023-09-13 RX ADMIN — ENOXAPARIN SODIUM 40 MG: 100 INJECTION SUBCUTANEOUS at 08:56

## 2023-09-13 RX ADMIN — ATORVASTATIN CALCIUM 80 MG: 40 TABLET, FILM COATED ORAL at 20:47

## 2023-09-13 NOTE — PROGRESS NOTES
Occupational Therapy  Physical Rehabilitation: OCCUPATIONAL THERAPY     [x] daily progress note       [] discharge       Patient Name:  Julia Oneal   :  1970 MRN: [id number]  Room:  [address] [place] [address] Date of Admission: 2023  Rehabilitation Diagnosis:   CVA (cerebral vascular accident) [facility]) [I63.9]  Acute CVA (cerebrovascular accident) ([address] [facility]) [I63.9]       Date 2023       Day of ARU Week:  3   Time IN/OUT 1061-0249   Individual Tx Minutes 60   Group Tx Minutes    Co-Treat Minutes    Concurrent Tx Minutes    TOTAL Tx Time Mins 60   Variance Time    Variance Time []   Refusal due to:     []   Medical hold/reason:    []   Illness   []   Off Unit for test/procedure  []   Extra time needed to complete task  []   Therapeutic need  []   Other (specify):   Restrictions Restrictions/Precautions: General Precautions, Fall Risk         Communication with other providers: [x]   OK to see per nursing:     []   Spoke with team member regarding:      Subjective observations and cognitive status: Pt resting in bed on approach; pleasant and agreeable to therapy.       Pain level/location:    /10       Location:    Discharge recommendations  Anticipated discharge date:  TBD  Destination: []home alone   []home alone w assist prn   [] home w/ family    [] Continuous supervision       []SNF    [] Assisted living     [] Other:   Continued therapy: []HHC OT  []OUTPATIENT  OT   [] No Further OT  Equipment needs: TBD      Toileting:   denied need        Toilet Transfers:   NA   Device Used:    []   Standard Toilet         []   Grab Bars           []  Bedside Commode       []   Elevated Toilet          []   Other:        Bed Mobility:           []   Pt received out of bed   Supine --> Sit:  SBA c increased time   Sit --> Supine:  SBA     Transfers:    Sit--> Stand:  CGA  Stand --> Sit:   CGA  Stand-Pivot:   CGA  Other:    Assistive device required for transfer:   RW       Functional Mobility:  120 ft c CGA   Assistance: ROM, Balance training, Functional mobility training, Endurance training, Neuromuscular re-education, Safety education & training, Patient/Caregiver education & training, Equipment evaluation, education, & procurement, Self-Care / ADL, Return to work related activity, Home management training, Coordination training, Sensory integration    Electronically signed by   TITA Kennedy,  9/13/2023, 12:51 PM

## 2023-09-13 NOTE — PLAN OF CARE
Problem: Discharge Planning  Goal: Discharge to home or other facility with appropriate resources  9/13/2023 1115 by Radhika Honeycutt LPN  Outcome: Progressing  9/13/2023 1114 by Radhika Honeycutt LPN  Outcome: Progressing     Problem: Safety - Adult  Goal: Free from fall injury  9/13/2023 1115 by Radhika Honeycutt LPN  Outcome: Progressing  9/13/2023 1114 by Radhika Honeycutt LPN  Outcome: Progressing     Problem: Pain  Goal: Verbalizes/displays adequate comfort level or baseline comfort level  9/13/2023 1115 by Radhika Honeycutt LPN  Outcome: Progressing  9/13/2023 1114 by Radhika Honeycutt LPN  Outcome: Progressing     Problem: Chronic Conditions and Co-morbidities  Goal: Patient's chronic conditions and co-morbidity symptoms are monitored and maintained or improved  9/13/2023 1115 by Radhika Honeycutt LPN  Outcome: Progressing  9/13/2023 1114 by Radhika Honeycutt LPN  Outcome: Progressing     Problem: Skin/Tissue Integrity  Goal: Absence of new skin breakdown  Description: 1. Monitor for areas of redness and/or skin breakdown  2. Assess vascular access sites hourly  3. Every 4-6 hours minimum:  Change oxygen saturation probe site  4. Every 4-6 hours:  If on nasal continuous positive airway pressure, respiratory therapy assess nares and determine need for appliance change or resting period.   9/13/2023 1115 by Radhika Honeycutt LPN  Outcome: Progressing  9/13/2023 1114 by Radhika Honeycutt LPN  Outcome: Progressing

## 2023-09-13 NOTE — PATIENT CARE CONFERENCE
ACUTE REHAB TEAM CONFERENCE SUMMARY   [facility]    NAME: Estevan Fair  : 1970 ADMIT DATE: 2023    Rehab Admitting Dx:CVA (cerebral vascular accident) [facility]) [I63.9]  Acute CVA (cerebrovascular accident) ([address]) [I63.9]  Patient Comorbid Conditions: Active Hospital Problems    Diagnosis Date Noted    Dysphagia due to recent stroke [I69.391] 2023    Cerebral vasculitis [I67.7] 2023    History of DVT (deep vein thrombosis) [Z86.718] 2023    Dysarthria due to acute stroke ([address]) [I63.9, R47.1] 2023    Hemiparesis of right dominant side as late effect of cerebral infarction [facility]) [I69.351] 2023    Acute CVA (cerebrovascular accident) ([address]) [I63.9] 2023    Depression with anxiety [F41.8] 2023    Poorly controlled type 2 diabetes mellitus with peripheral neuropathy ([address]) [E11.42, E11.65] 2016     Date: 2023    CASE MANAGEMENT  Current issues/needs regarding patient and family discharge status: 2-level, 1st floor set up, ramped entrance, Home DME: [facility], built in [place], [facility], [facility]  Patient and family goal: Home w/ mother    PHYSICAL THERAPY (Updated in QI)  Short Term Goals  Time Frame for Short Term Goals: 7-10 days STG=LTG  Short Term Goal 1: pt will perform bed mobility with mod I  Short Term Goal 2: Pt will perform sit to stand, w/c<>bed and car transfers with mod I  Short Term Goal 3: Pt will perform gait with appropriate device 150' on level surface and at least 10' on ramp with mod I  Short Term Goal 4: Pt will ascend/descend curb step with LRAD and 4 steps with rail with mod I, 12 steps with supervision  Short Term Goal 5: Pt will  light object with reacher with mod I          Impairments/deficits, barriers:     Body Structures, Functions, Activity Limitations Requiring Skilled Therapeutic Intervention: Decreased functional mobility , Decreased ADL status, Decreased ROM, Decreased strength, Decreased endurance, Decreased an intensive and coordinated interdisciplinary team approach to the delivery of rehabilitative care. Assessment/Plan   [x]  The patient is making good progression towards their long term goals and is actively participating in and has a reasonable expectation to continue to benefit from the intensive rehabilitation therapy program   []  The estimated discharge date has been changed from initial team conference due to:   []  The estimated discharge destination has been changed from initial team conference due to:         Ongoing tx following discharge: []HHC    [x]OUTPATIENT  PT/OT    [] No Further Treatment     [] Family/Caregiver Training  []  Transitional Living Arrangement   [] Home Assessment (date  )     [] Family Conference   []  Therapeutic Pass       []  Other: (specify)    Estimated Discharge Date: 9/23/23    Estimated Discharge Destination: []home alone   []home alone with assist prn  []Continuous supervision [x]Return home with s/o/spouse/family   [] Assisted living    []SNF     Team members participating in today's conference. [x] Osiel Borjas, Medical Director      [x] Dicky Goltz, DPT,         [] Wilbur Brownlee RN Nurse Manager   [] Toya Kennedy RN Nurse Manager     []  Elizabeth Morgan, PT  [x]  Stevie Espinoza, PT       [x] Mikael Montanez, OT   [] Nehal Anton OT  [] Andi Mejia OT     [x]  Zachary Reeder, SLP    []  Joleen Power, KILO   [] Yasmin Kerr, KILO      []Mei Giles,   [x]Quynh Thacker MSW, LSW,      [] Jose Whitehead RN   [x] Hakan Burris RN    [] Nelly Honeycutt RN    [] Mariella Hill        I have led this Team Conference and agree with the plan, Osiel Borjas MD, 9/14/2023, 12:59 PM  []   I, the Medical Director, was required to lead today's conference via telephone due to an unanticipated scheduling conflict. I agree with the decisions made by the inter-disciplinary team at today's meeting.       Goals have been updated to reflect

## 2023-09-13 NOTE — PROGRESS NOTES
Julia Oneal    : 1970  Acct #: [de-identified]  MRN: [id number]              PM&R Progress Note      Admitting diagnosis: ***    Comorbid diagnoses impacting rehabilitation: ***    Chief complaint: ***    Prior (baseline) level of function: Independent.     Current level of function:         Current  IRF-JYOTI and Goals:   Occupational Therapy:    Short Term Goals  Time Frame for Short Term Goals: STGs=LTGs :   Long Term Goals  Time Frame for Long Term Goals : ~10 days or until d/c  Long Term Goal 1: Pt will complete grooming tasks Ind  Long Term Goal 2: Pt will complete total body bathing mod I using AE PRN  Long Term Goal 3: Pt will complete UB dressing Ind using hemitechnique  Long Term Goal 4: Pt will complete LB dressing mod I  Long Term Goal 5: Pt will doff/don footwear mod I  Additional Goals?: Yes  Long Term Goal 6: Pt will complete toileting mod I  Long Term Goal 7: Pt will complete functional transfers (bed, chair, toilet, shower) c DME PRN and mod I  Long Term Goal 8: Pt will perform therex/therax to facilitate increased strength/endurance/ax tolerance (c emphasis on dynamic standing balance/tolerance >8 mins and RUE neuro re-ed) c SBA  Long Term Goal 9: Pt will complete simple homemaking tasks c DME PRN and mod I :                                       Eating: Eating  Assistance Needed: Setup or clean-up assistance  Comment: requires setup 2* RUE weakness  CARE Score: 5  Discharge Goal: Set-up or clean-up assistance       Oral Hygiene: Oral Hygiene  Assistance Needed: Supervision or touching assistance  Comment: CGA-close SBA in stance using hemitechnique  CARE Score: 4  Discharge Goal: Independent    UB/LB Bathing: Shower/Bathe Self  Assistance Needed: Partial/moderate assistance  Comment: required min A to bathe L axilla; completed full shower seated  CARE Score: 3  Discharge Goal: Independent    UB Dressing: Upper Body Dressing  Assistance Needed: Supervision or touching assistance  Comment: to

## 2023-09-13 NOTE — PROGRESS NOTES
Physical Therapy      [x] daily progress note       [] discharge       Patient Name:  Fabio Diaz   :  1970 MRN: [id number]  Room:  [address] [place] [address] Date of Admission: 2023  Rehabilitation Diagnosis:   CVA (cerebral vascular accident) [facility]) [I63.9]  Acute CVA (cerebrovascular accident) ([address]) [I63.9]       Date 2023       Day of ARU Week:  3   Time IN/OUT 5739-9068  1341-1198   Individual Tx Minutes 64+56   TOTAL Tx Time Mins 120   Variance Time    Variance Time []   Refusal due to:     []   Medical hold/reason:    []   Illness   []   Off Unit for test/procedure  []   Extra time needed to complete task  []   Therapeutic need  []   Other (specify):   Restrictions Restrictions/Precautions  Restrictions/Precautions: General Precautions, Fall Risk      Communication with other providers: [x]   OK to see per nursing:     []   Spoke with team member regarding:      Subjective observations and cognitive status: Pt resting in bed, RN in room for medication/assessment; pt pleasant and willing to participate; R wrist splint attachment provided for RW.       Pain level/location:    0/10       Location:    Discharge recommendations  Anticipated discharge date:  TBD  Destination: []home alone   []home alone with assist PRN     [] home w/ family      [] Continuous supervision  []SNF    [] Assisted living     [] Other:   Continued therapy: []HHC PT  []OUTPATIENT  PT   [] No Further PT  Equipment needs: TBD       Bed Mobility:         []   Pt received out of bed     Roll Left and Right  Assistance Needed: Independent  Comment: with bed features  CARE Score: 6  Discharge Goal: Independent    Sit to Lying  Assistance Needed: Independent  Comment: with bed features  CARE Score: 6  Discharge Goal: Independent    Lying to Sitting on Side of Bed  Assistance Needed: Independent  Comment: with bed features  CARE Score: 6  Discharge Goal: Independent    Transfers:    Sit to Stand  Assistance Needed: Supervision or touching Goals: 7-10 days STG=LTG  Short Term Goal 1: pt will perform bed mobility with mod I  Short Term Goal 2: Pt will perform sit to stand, w/c<>bed and car transfers with mod I  Short Term Goal 3: Pt will perform gait with appropriate device 150' on level surface and at least 10' on ramp with mod I  Short Term Goal 4: Pt will ascend/descend curb step with LRAD and 4 steps with rail with mod I, 12 steps with supervision  Short Term Goal 5: Pt will  light object with reacher with mod I:   :        Plan of Care                                                                              Times per week: 5 days per week for a minimum of 60 minutes/day plus group as appropriate for 60 minutes.   Treatment to include Current Treatment Recommendations: Strengthening, ROM, Balance training, Functional mobility training, Transfer training, IADL training, Neuromuscular re-education, Stair training, Gait training, Endurance training, Home exercise program, Safety education & training, Patient/Caregiver education & training, Equipment evaluation, education, & procurement, Modalities, Positioning, Therapeutic activities    Electronically signed by   Theo Albright, PTA #[id number]  9/13/2023, 4:59 PM

## 2023-09-13 NOTE — CARE COORDINATION
Received communication from Adela Fritz. Patient approved additional days. Next review date is 09/18/2023.

## 2023-09-14 LAB
GLUCOSE BLD-MCNC: 111 MG/DL (ref 70–99)
GLUCOSE BLD-MCNC: 144 MG/DL (ref 70–99)
GLUCOSE BLD-MCNC: 154 MG/DL (ref 70–99)
GLUCOSE BLD-MCNC: 156 MG/DL (ref 70–99)

## 2023-09-14 PROCEDURE — 92507 TX SP LANG VOICE COMM INDIV: CPT

## 2023-09-14 PROCEDURE — 97535 SELF CARE MNGMENT TRAINING: CPT

## 2023-09-14 PROCEDURE — 82962 GLUCOSE BLOOD TEST: CPT

## 2023-09-14 PROCEDURE — 6370000000 HC RX 637 (ALT 250 FOR IP): Performed by: PHYSICAL MEDICINE & REHABILITATION

## 2023-09-14 PROCEDURE — 97530 THERAPEUTIC ACTIVITIES: CPT

## 2023-09-14 PROCEDURE — 97112 NEUROMUSCULAR REEDUCATION: CPT

## 2023-09-14 PROCEDURE — 97116 GAIT TRAINING THERAPY: CPT

## 2023-09-14 PROCEDURE — 1280000000 HC REHAB R&B

## 2023-09-14 PROCEDURE — 94761 N-INVAS EAR/PLS OXIMETRY MLT: CPT

## 2023-09-14 PROCEDURE — 6360000002 HC RX W HCPCS: Performed by: PHYSICAL MEDICINE & REHABILITATION

## 2023-09-14 RX ADMIN — AMLODIPINE BESYLATE 10 MG: 10 TABLET ORAL at 08:56

## 2023-09-14 RX ADMIN — ESCITALOPRAM OXALATE 10 MG: 10 TABLET ORAL at 08:56

## 2023-09-14 RX ADMIN — GABAPENTIN 300 MG: 300 CAPSULE ORAL at 14:06

## 2023-09-14 RX ADMIN — ATORVASTATIN CALCIUM 80 MG: 40 TABLET, FILM COATED ORAL at 20:19

## 2023-09-14 RX ADMIN — ENOXAPARIN SODIUM 40 MG: 100 INJECTION SUBCUTANEOUS at 08:56

## 2023-09-14 RX ADMIN — FAMOTIDINE 20 MG: 20 TABLET ORAL at 08:56

## 2023-09-14 RX ADMIN — GABAPENTIN 300 MG: 300 CAPSULE ORAL at 20:19

## 2023-09-14 RX ADMIN — METHOCARBAMOL 500 MG: 500 TABLET ORAL at 20:19

## 2023-09-14 RX ADMIN — GABAPENTIN 300 MG: 300 CAPSULE ORAL at 08:56

## 2023-09-14 RX ADMIN — ASPIRIN 81 MG CHEWABLE TABLET 81 MG: 81 TABLET CHEWABLE at 08:56

## 2023-09-14 ASSESSMENT — PAIN SCALES - GENERAL: PAINLEVEL_OUTOF10: 0

## 2023-09-14 NOTE — PROGRESS NOTES
Physical Therapy      [x] daily progress note       [] discharge       Patient Name:  Junito Baker   :  1970 MRN: [id number]  Room:  [address] [place] [address] Date of Admission: 2023  Rehabilitation Diagnosis:   CVA (cerebral vascular accident) [facility]) [I63.9]  Acute CVA (cerebrovascular accident) ([address] ) [I63.9]       Date 2023       Day of ARU Week:  4   Time IN/OUT [id number] 7311-3601   Individual Tx Minutes 60+30   TOTAL Tx Time Mins 90   Variance Time    Variance Time []   Refusal due to:     []   Medical hold/reason:    []   Illness   []   Off Unit for test/procedure  []   Extra time needed to complete task  []   Therapeutic need  []   Other (specify):   Restrictions Restrictions/Precautions  Restrictions/Precautions: General Precautions, Fall Risk      Communication with other providers: [x]   OK to see per nursing:     []   Spoke with team member regarding:      Subjective observations and cognitive status: Pt resting in bed, motivated to tx. Pt with resting hand splint on arrival. RN in for medication  PM: Pt willing to participate, demos increased fatigue and tone this PM.      Pain level/location: 0/10       Location:    Discharge recommendations  Anticipated discharge date:    Destination: []home alone   []home alone with assist PRN     [] home w/ family      [] Continuous supervision  []SNF    [] Assisted living     [] Other:   Continued therapy: []HHC PT  []OUTPATIENT  PT   [] No Further PT  Equipment needs: TBD     Bed Mobility:           []   Pt received out of bed   Rolling R/L:  Indep  Scooting:  Indep  Supine --> Sit:  Indep  Sit --> Supine:  Indep  Bed features used:    [x] HOB elevated      [x] Bed rail                                    [] No     Transfers:    Sit--> Stand:  SB-CGA  Stand --> Sit:   CG-Min A  Stand-Pivot:   CG-Min A due to LOB with fatigue while turning to sit on bench after distance amb.    Assistive device required for transfer:   RW with rolyan splint    Gait: Mobility/Transfer technique  [x]   Gait technique/sequencing  [x]   Proper use of assistive device/adaptive equipment  [x]   Stair training/Advanced mobility safety and technique  []   Reinforced patient's precautions/mobility while maintaining precautions  []   Postural awareness  []   Family/caregiver training  [x]   Progress was updated and reviewed with patient  this date.   []   Other:       Treatment Plan for Next Session: R LE neuro re-ed/coordination, gait progression, car transfer, amb on outside surfaces, WC mobility        Treatment/Activity Tolerance:   [x] Tolerated treatment with no adverse effects    [] Patient limited by fatigue  [] Patient limited by pain   [] Patient limited by medical complications:    [] Adverse reaction to Tx:   [] Significant change in status    Safety:       [x]  bed alarm set    []  chair alarm set    []  Pt refused alarms                []  Telesitter activated      [x]  Gait belt used during tx session      [] Call light and belongings in reach       [x] Other: Pt left in c/o OT after AM session      Number of Minutes/Billable Intervention  Gait Training 50   Therapeutic Exercise    Neuro Re-Ed 25   Therapeutic Activity 15   Wheelchair Propulsion    Group    Other:    TOTAL 90         Social History  Social/Functional History  Lives With: Family (mom (back issues)and step-dad(recent CVA but doing well))  Type of Home: House  Home Layout: Two level, Able to Live on Main level with bedroom/bathroom  Home Access: Ramped entrance, Stairs to enter with rails  Entrance Stairs - Number of Steps: 3  Entrance Stairs - Rails: Right (one entrance on L, one entrace on R)  Bathroom Shower/Tub: Walk-in shower  Bathroom Toilet: Standard  Bathroom Equipment: Grab bars in shower, 3-in-1 commode, Built-in shower seat, Hand-held shower (has handles only on toilet, 3-in-1 used in shower)  Bathroom Accessibility: Walker accessible  Home Equipment: Darwin Johnson, jovani, Cane, [address], Sock aid, Leg   Has the patient had two or more falls in the past year or any fall with injury in the past year?: No (one fall in July but no injury)  Receives Help From: Family  ADL Assistance: Independent  Homemaking Responsibilities: Yes  Meal Prep Responsibility: Secondary (Amish helping out, mom cooks mostly)  Laundry Responsibility: Secondary (helps out)  Cleaning Responsibility: No  Bill Paying/Finance Responsibility: Primary  Shopping Responsibility: No (mom)  Dependent Care Responsibility: No  Health Care Management: Primary  Ambulation Assistance: Independent (indoors used cane independently, walker outdoors)  Transfer Assistance: Independent  Active : No (had been driving prior to CVA in June)  Patient's  Info: mom, brother and sister drive  Mode of Transportation: [employer] ([place])  Education: 2 years college  Occupation: Full time employment, Off due to injury  Type of Occupation: worked at "[employer]. Has not worked since [facility] in June  [address], fishing, 4 wheeling, no pets, some reading. Pt sets up meds in med box; sister assisting. Online bill pay. Outing to doctors appts.   Additional Comments: pt sleeps in regular flat bed    Objective                                                                                    Goals:  (Update in navigator)  Short Term Goals  Time Frame for Short Term Goals: 7-10 days STG=LTG  Short Term Goal 1: pt will perform bed mobility with mod I  Short Term Goal 2: Pt will perform sit to stand, w/c<>bed and car transfers with mod I  Short Term Goal 3: Pt will perform gait with appropriate device 150' on level surface and at least 10' on ramp with mod I  Short Term Goal 4: Pt will ascend/descend curb step with LRAD and 4 steps with rail with mod I, 12 steps with supervision  Short Term Goal 5: Pt will  light object with reacher with mod I:   :        Plan of Care

## 2023-09-14 NOTE — PROGRESS NOTES
[facility] UNIT  SPEECH/LANGUAGE PATHOLOGY      [x] Daily           [] Discharge    [facility]      PFZ:[id number]  V:[id number]  Rehab Dx/Hx: CVA (cerebral vascular accident) ([address]) [I63.9]  Acute CVA (cerebrovascular accident) ([address]) [I63.9]   Allergies   Allergen Reactions    Bactrim [Sulfamethoxazole-Trimethoprim] Swelling, Dermatitis and Other (See Comments)     Causes vasculitis    Sulfa Antibiotics Anaphylaxis     Precautions:  Restrictions/Precautions: General Precautions, Fall Risk          Home Situation/IADL:   Social/Functional History  Lives With: Family (mom (back issues)and step-dad(recent CVA but doing well))  Type of Home: House  Home Layout: Two level, Able to Live on Main level with bedroom/bathroom  Home Access: Ramped entrance, Stairs to enter with rails  Entrance Stairs - Number of Steps: 3  Entrance Stairs - Rails: Right (one entrance on L, one entrace on R)  Bathroom Shower/Tub: Walk-in shower  Bathroom Toilet: Standard  Bathroom Equipment: Grab bars in shower, 3-in-1 commode, Built-in shower seat, Hand-held shower (has handles only on toilet, 3-in-1 used in shower)  Bathroom Accessibility: Walker accessible  Home Equipment: [place], rolling, Cane, Reacher, Sock aid, Leg   Has the patient had two or more falls in the past year or any fall with injury in the past year?: No (one fall in July but no injury)  Receives Help From: Family  ADL Assistance: Independent  Homemaking Responsibilities: Yes  Meal Prep Responsibility: Secondary (Orthodox helping out, mom cooks mostly)  Laundry Responsibility: Secondary (helps out)  Cleaning Responsibility: No  Bill Paying/Finance Responsibility: Primary  Shopping Responsibility: No (mom)  Dependent Care Responsibility: No  Health Care Management: Primary  Ambulation Assistance: Independent (indoors used cane independently, walker outdoors)  Transfer Assistance: Independent  Active : No (had been

## 2023-09-14 NOTE — PROGRESS NOTES
Physical Rehabilitation: OCCUPATIONAL THERAPY     [x] daily progress note       [] discharge       Patient Name:  Juan Mendez   :  1970 MRN: [id number]  Room:  [address] [place] [address] Date of Admission: 2023  Rehabilitation Diagnosis:   CVA (cerebral vascular accident) [facility]) [I63.9]  Acute CVA (cerebrovascular accident) ([address] ) [I63.9]       Date 2023       Day of ARU Week:  4   Time IN/-1045   Individual Tx Minutes 60   Group Tx Minutes    Co-Treat Minutes    Concurrent Tx Minutes    TOTAL Tx Time Mins 60   Variance Time    Variance Time []   Refusal due to:     []   Medical hold/reason:    []   Illness   []   Off Unit for test/procedure  []   Extra time needed to complete task  []   Therapeutic need  []   Other (specify):   Restrictions Restrictions/Precautions: General Precautions, Fall Risk         Communication with other providers: [x]   OK to see per nursing:     []   Spoke with team member regarding:      Subjective observations and cognitive status: PT with pt upon arrival, pt asking for shower since it has been 3 days since last shower. Pain level/location:   0 /10       Location:    Discharge recommendations  Anticipated discharge date:  TBD  Destination: []home alone   []home alone w assist prn   [] home w/ family    [] Continuous supervision       []SNF    [] Assisted living     [] Other:   Continued therapy: []HHC OT  []OUTPATIENT  OT   [] No Further OT  Equipment needs: TBD      Toileting:    Mod A for thoroughness c santi hygiene after BM       Toilet Transfers:   Sup for steadying  Device Used:    [x]   Standard Toilet         [x]   Grab Bars           []  Bedside Commode       []   Elevated Toilet          []   Other:        Bed Mobility:           [x]   Pt received out of bed     Transfers:    Sit--> Stand:  CGA  Stand --> Sit:   SBA  Other:  Shower bench transfer @[facility]  Assistive device required for transfer:   FWW      Functional Mobility:    Assistance:  SBA  Device:   [] limited by pain   [] Patient limited by medical complications:    [] Adverse reaction to Tx:   [] Significant change in status    Safety:       [x]  bed alarm set    []  chair alarm set    []  Pt refused alarms                []  Telesitter activated      [x]  Gait belt used during tx session      []other:       Number of Minutes/Billable Intervention  Therapeutic Exercise    ADL Self-care 30   Neuro Re-Ed 30   Therapeutic Activity    Group    Other:    TOTAL 60       Social History  Social/Functional History  Lives With: Family (mom (back issues)and step-dad(recent CVA but doing well))  Type of Home: House  Home Layout: Two level, Able to Live on Main level with bedroom/bathroom  Home Access: Ramped entrance, Stairs to enter with rails  Entrance Stairs - Number of Steps: 3  Entrance Stairs - Rails: Right (one entrance on L, one entrace on R)  Bathroom Shower/Tub: Walk-in shower  Bathroom Toilet: Standard  Bathroom Equipment: Grab bars in shower, 3-in-1 commode, Built-in shower seat, Hand-held shower (has handles only on toilet, 3-in-1 used in shower)  Bathroom Accessibility: Walker accessible  Home Equipment: Walker, rolling, Cane, Reacher, Sock aid, Leg   Has the patient had two or more falls in the past year or any fall with injury in the past year?: No (one fall in July but no injury)  Receives Help From: Family  ADL Assistance: Independent  Homemaking Responsibilities: Yes  Meal Prep Responsibility: Secondary (Confucianist helping out, mom cooks mostly)  Laundry Responsibility: Secondary (helps out)  Cleaning Responsibility: No  Bill Paying/Finance Responsibility: Primary  Shopping Responsibility: No (mom)  Dependent Care Responsibility: No  Health Care Management: Primary  Ambulation Assistance: Independent (indoors used cane independently, walker outdoors)  Transfer Assistance: Independent  Active : No (had been driving prior to CVA in June)  Patient's  Info: mom, brother and sister drive  Mode of Transportation: [employer] ([employer])  Education: 2 years college  Occupation: Full time employment, Off due to injury  Type of Occupation: worked at [employer]. Has not worked since [place] in June [address], fishing, 4 wheeling, no pets, some reading. Pt sets up meds in med box; sister assisting. Online bill pay. Outing to doctors appts. Additional Comments: pt sleeps in regular flat bed    Objective                                                                                    Goals:  (Update in navigator)  Short Term Goals  Time Frame for Short Term Goals: STGs=LTGs:  Long Term Goals  Time Frame for Long Term Goals : ~10 days or until d/c  Long Term Goal 1: Pt will complete grooming tasks Ind  Long Term Goal 2: Pt will complete total body bathing mod I using AE PRN  Long Term Goal 3: Pt will complete UB dressing Ind using hemitechnique  Long Term Goal 4: Pt will complete LB dressing mod I  Long Term Goal 5: Pt will doff/don footwear mod I  Additional Goals?: Yes  Long Term Goal 6: Pt will complete toileting mod I  Long Term Goal 7: Pt will complete functional transfers (bed, chair, toilet, shower) c DME PRN and mod I  Long Term Goal 8: Pt will perform therex/therax to facilitate increased strength/endurance/ax tolerance (c emphasis on dynamic standing balance/tolerance >8 mins and RUE neuro re-ed) c SBA  Long Term Goal 9: Pt will complete simple homemaking tasks c DME PRN and mod I:        Plan of Care                                                                              Times per week: 5 days per week for a minimum of 60 minutes/day plus group as appropriate for 60 minutes.   Treatment to include Occupational Therapy Plan  Current Treatment Recommendations: Strengthening, ROM, Balance training, Functional mobility training, Endurance training, Neuromuscular re-education, Safety education & training, Patient/Caregiver education & training, Equipment evaluation,

## 2023-09-14 NOTE — CARE COORDINATION
LILLIAM called the office of Dr. Lyric Marx to notified that the patient will be hospitalized on 9/18 and requested that the procedure be rescheduled. Awaiting a return call. Patient notified. LSW met with patient and provided written communication following Care Conference. LSW informed patient of recommendations for WC, Outpatient PT, OT, Patient stated that he has a WC at home. Provided the patient with a list of Outpatient facilities. Patient verbalized understanding and chose [facility] and referral will be made closer to discharge. Whiteboard updated. D/C Plan:  Estimated Date: Sept 23  DME: has recommended DME  Outpatient:  PT, OT (Shira Callahan)  To: Home with family (family will transport)    Received a call from Carina at Dr. Digna Carrion office. Procedure rescheduled to 09/28/2023. AVS updated.

## 2023-09-15 ENCOUNTER — TELEPHONE (OUTPATIENT)
Dept: ONCOLOGY | Age: 53
End: 2023-09-15

## 2023-09-15 LAB
GLUCOSE BLD-MCNC: 126 MG/DL (ref 70–99)
GLUCOSE BLD-MCNC: 133 MG/DL (ref 70–99)
GLUCOSE BLD-MCNC: 145 MG/DL (ref 70–99)
GLUCOSE BLD-MCNC: 176 MG/DL (ref 70–99)

## 2023-09-15 PROCEDURE — 6370000000 HC RX 637 (ALT 250 FOR IP): Performed by: PHYSICAL MEDICINE & REHABILITATION

## 2023-09-15 PROCEDURE — 1280000000 HC REHAB R&B

## 2023-09-15 PROCEDURE — 97116 GAIT TRAINING THERAPY: CPT

## 2023-09-15 PROCEDURE — 97110 THERAPEUTIC EXERCISES: CPT

## 2023-09-15 PROCEDURE — 97530 THERAPEUTIC ACTIVITIES: CPT

## 2023-09-15 PROCEDURE — 94761 N-INVAS EAR/PLS OXIMETRY MLT: CPT

## 2023-09-15 PROCEDURE — 82962 GLUCOSE BLOOD TEST: CPT

## 2023-09-15 PROCEDURE — 97535 SELF CARE MNGMENT TRAINING: CPT

## 2023-09-15 PROCEDURE — 97112 NEUROMUSCULAR REEDUCATION: CPT

## 2023-09-15 PROCEDURE — 6360000002 HC RX W HCPCS: Performed by: PHYSICAL MEDICINE & REHABILITATION

## 2023-09-15 PROCEDURE — 92507 TX SP LANG VOICE COMM INDIV: CPT

## 2023-09-15 PROCEDURE — 94150 VITAL CAPACITY TEST: CPT

## 2023-09-15 RX ADMIN — GABAPENTIN 300 MG: 300 CAPSULE ORAL at 08:44

## 2023-09-15 RX ADMIN — FAMOTIDINE 20 MG: 20 TABLET ORAL at 08:44

## 2023-09-15 RX ADMIN — GABAPENTIN 300 MG: 300 CAPSULE ORAL at 22:01

## 2023-09-15 RX ADMIN — GABAPENTIN 300 MG: 300 CAPSULE ORAL at 14:34

## 2023-09-15 RX ADMIN — ATORVASTATIN CALCIUM 80 MG: 40 TABLET, FILM COATED ORAL at 22:01

## 2023-09-15 RX ADMIN — ENOXAPARIN SODIUM 40 MG: 100 INJECTION SUBCUTANEOUS at 08:43

## 2023-09-15 RX ADMIN — ESCITALOPRAM OXALATE 10 MG: 10 TABLET ORAL at 08:44

## 2023-09-15 RX ADMIN — METHOCARBAMOL 500 MG: 500 TABLET ORAL at 15:43

## 2023-09-15 RX ADMIN — AMLODIPINE BESYLATE 10 MG: 10 TABLET ORAL at 08:44

## 2023-09-15 RX ADMIN — ASPIRIN 81 MG CHEWABLE TABLET 81 MG: 81 TABLET CHEWABLE at 08:44

## 2023-09-15 ASSESSMENT — PAIN SCALES - GENERAL
PAINLEVEL_OUTOF10: 0
PAINLEVEL_OUTOF10: 0

## 2023-09-15 ASSESSMENT — PAIN SCALES - WONG BAKER: WONGBAKER_NUMERICALRESPONSE: 0

## 2023-09-15 NOTE — PROGRESS NOTES
Gloria Minor    : 1970  Acct #: [de-identified]  MRN: [id number]              PM&R Progress Note      Admitting diagnosis: ***    Comorbid diagnoses impacting rehabilitation: ***    Chief complaint: ***    Prior (baseline) level of function: Independent.     Current level of function:         Current  [id number] and Goals:   Occupational Therapy:    Short Term Goals  Time Frame for Short Term Goals: STGs=LTGs :   Long Term Goals  Time Frame for Long Term Goals : ~10 days or until d/c  Long Term Goal 1: Pt will complete grooming tasks Ind  Long Term Goal 2: Pt will complete total body bathing mod I using AE PRN  Long Term Goal 3: Pt will complete UB dressing Ind using hemitechnique  Long Term Goal 4: Pt will complete LB dressing mod I  Long Term Goal 5: Pt will doff/don footwear mod I  Additional Goals?: Yes  Long Term Goal 6: Pt will complete toileting mod I  Long Term Goal 7: Pt will complete functional transfers (bed, chair, toilet, shower) c DME PRN and mod I  Long Term Goal 8: Pt will perform therex/therax to facilitate increased strength/endurance/ax tolerance (c emphasis on dynamic standing balance/tolerance >8 mins and RUE neuro re-ed) c SBA  Long Term Goal 9: Pt will complete simple homemaking tasks c DME PRN and mod I :                                       Eating: Eating  Assistance Needed: Setup or clean-up assistance  Comment: requires setup 2* RUE weakness  CARE Score: 5  Discharge Goal: Set-up or clean-up assistance       Oral Hygiene: Oral Hygiene  Assistance Needed: Supervision or touching assistance  Comment: CGA-close SBA in stance using hemitechnique  CARE Score: 4  Discharge Goal: Independent    UB/LB Bathing: Shower/Bathe Self  Assistance Needed: Partial/moderate assistance  Comment: to clean LUE  CARE Score: 3  Discharge Goal: Independent    UB Dressing: Upper Body Dressing  Assistance Needed: Supervision or touching assistance  Comment: VC for hemitechnique  CARE Score: 4  Discharge Goal:

## 2023-09-15 NOTE — PROGRESS NOTES
Responsibility: Primary  Shopping Responsibility: No (mom)  Dependent Care Responsibility: No  Health Care Management: Primary  Ambulation Assistance: Independent (indoors used cane independently, walker outdoors)  Transfer Assistance: Independent  Active : No (had been driving prior to CVA in June)  Patient's  Info: mom, brother and sister drive  Mode of Transportation: SUV (smaller)  Education: 2 years college  Occupation: Full time employment, Off due to injury  Type of Occupation: worked at [employer]. Has not worked since [facility] in June  [address], fishing, 4 wheeling, no pets, some reading. Pt sets up meds in med box; sister assisting. Online bill pay. Outing to doctors appts.   Additional Comments: pt sleeps in regular flat bed    Objective                                                                                    Goals:  (Update in navigator)  Short Term Goals  Time Frame for Short Term Goals: STGs=LTGs:  Long Term Goals  Time Frame for Long Term Goals : ~10 days or until d/c  Long Term Goal 1: Pt will complete grooming tasks Ind  Long Term Goal 2: Pt will complete total body bathing mod I using AE PRN  Long Term Goal 3: Pt will complete UB dressing Ind using hemitechnique  Long Term Goal 4: Pt will complete LB dressing mod I  Long Term Goal 5: Pt will doff/don footwear mod I  Additional Goals?: Yes  Long Term Goal 6: Pt will complete toileting mod I  Long Term Goal 7: Pt will complete functional transfers (bed, chair, toilet, shower) c DME PRN and mod I  Long Term Goal 8: Pt will perform therex/therax to facilitate increased strength/endurance/ax tolerance (c emphasis on dynamic standing balance/tolerance >8 mins and RUE neuro re-ed) c SBA  Long Term Goal 9: Pt will complete simple homemaking tasks c DME PRN and mod I:        Plan of Care                                                                              Times per week: 5 days per week for a minimum of 60 minutes/day plus group as appropriate for 60 minutes.   Treatment to include Occupational Therapy Plan  Current Treatment Recommendations: Strengthening, ROM, Balance training, Functional mobility training, Endurance training, Neuromuscular re-education, Safety education & training, Patient/Caregiver education & training, Equipment evaluation, education, & procurement, Self-Care / ADL, Return to work related activity, Home management training, Coordination training, Sensory integration    Electronically signed by   Mavis Favre, OTA,  9/15/2023, 12:40 PM

## 2023-09-15 NOTE — TELEPHONE ENCOUNTER
Called patient's sister, Crystal Bingham to review lab results from 08/23/2023. Flow cytometry leukemia/lymphoma still active - in process. All questions answered. Patient is currently IP. Will update physician. No further needs identified.

## 2023-09-15 NOTE — PROGRESS NOTES
Indep  Bed features used:    [x] HOB elevated      [x] Bed rail                                    [] No     Transfers:    Sit--> Stand:  SBA, cues for increased LEX before standing for improved balance  Stand --> Sit:   SBA, occasional cues for removing R hand from RW  Stand-Pivot:   CGA  Assistive device required for transfer:   RW    Gait:    Distance:  126'+110' +238'  Assistance:  SBA-CGA with turning  Device:  RW with built up  and dycem  Gait Quality:   Trialed amb without rolyan splint today. Pt able to maintain grasp 90% of time amb shorter distance, needing to stop one occasion to re-position hand due to increased flexor tone rotating wrist with decreased /WB through palm. Additional Therapeutic activities/exercises completed this date:     []   Nu-step:  Time:        Level:         #Steps:       []   Rebounder:    []  Seated     []  Standing        [x]   Balance / Neuro re-ed/ coordination SLS ax in // bars: B UE support with A at R wrist  to maintain neutral for grasp; Cone tapping with R / L LE SBA for balance, min cues for increased hip and knee flexion.   Rolling ball multi directions SB-CGA for balance, pt with difficulty controlling ball with CW/CCW        []   Postural training    []   Supine ther ex (reps/sets):     []   Seated ther ex (reps/sets):     []   Standing ther ex (reps/sets):     []   Picked up object from floor                       []   Reacher used   []   Other:   []   Other:      Patient/Caregiver Education and Training:   []    Role of PT  [x]   Education about Dx  [x]   Use of call light for assist   []   HEP provided and explained   [x]   Treatment plan reviewed  []   Home safety  []   Wheelchair mobility/management   []   Body mechanics  [x]   Bed Mobility/Transfer technique  [x]   Gait technique/sequencing  []   Proper use of assistive device/adaptive equipment  []   Stair training/Advanced mobility safety and technique  []   Reinforced patient's precautions/mobility while maintaining precautions  []   Postural awareness  []   Family/caregiver training  [x]   Progress was updated and reviewed with patient  this date.   []   Other:       Treatment Plan for Next Session: amb on outside surfaces, bed mobility in flat bed, car transfer, safety with object retrieval using reacher, stair training, gait progression        Treatment/Activity Tolerance:   [x] Tolerated treatment with no adverse effects    [] Patient limited by fatigue  [] Patient limited by pain   [] Patient limited by medical complications:    [] Adverse reaction to Tx:   [] Significant change in status    Safety:       [x]  bed alarm set    []  chair alarm set    []  Pt refused alarms                []  Telesitter activated      [x]  Gait belt used during tx session      [] Call light and belongings in reach       [] Other      Number of Minutes/Billable Intervention  Gait Training 23   Therapeutic Exercise 15   Neuro Re-Ed 22   Therapeutic Activity    Wheelchair Propulsion    Group    Other:    TOTAL 60         Social History  Social/Functional History  Lives With: Family (mom (back issues)and step-dad(recent CVA but doing well))  Type of Home: House  Home Layout: Two level, Able to Live on Main level with bedroom/bathroom  Home Access: Ramped entrance, Stairs to enter with rails  Entrance Stairs - Number of Steps: 3  Entrance Stairs - Rails: Right (one entrance on L, one entrace on R)  Bathroom Shower/Tub: Walk-in shower  Bathroom Toilet: Standard  Bathroom Equipment: Grab bars in shower, 3-in-1 commode, Built-in shower seat, Hand-held shower (has handles only on toilet, 3-in-1 used in shower)  Bathroom Accessibility: Walker accessible  Home Equipment: Walker, rolling, Cane, [address], Sock aid, Leg   Has the patient had two or more falls in the past year or any fall with injury in the past year?: No (one fall in July but no injury)  Receives Help From: Family  ADL Assistance: Strengthening, ROM, Balance training, Functional mobility training, Transfer training, IADL training, Neuromuscular re-education, Stair training, Gait training, Endurance training, Home exercise program, Safety education & training, Patient/Caregiver education & training, Equipment evaluation, education, & procurement, Modalities, Positioning, Therapeutic activities    Electronically signed by   Eleanor Navarro PTA #[id number]  9/15/2023, 8:51 AM

## 2023-09-15 NOTE — PROGRESS NOTES
driving prior to CVA in June)  Patient's  Info: mom, brother and sister drive  Mode of Transportation: SUV (smaller)  Education: 2 years college  Occupation: Full time employment, Off due to injury  Type of Occupation: worked at [employer]. Has not worked since [facility] in June  [address], fishing, 4 wheeling, no pets, some reading. Pt sets up meds in med box; sister assisting. Online bill pay. Outing to doctors appts. Additional Comments: pt sleeps in regular flat bed      Date of Admit: 9/11/2023  Room #: 1025/1025-A     ST Number of Minutes/Billable Intervention  Cog/Memory Deficits     Aphasia/Language     Dysarthria/Speech 30    Apraxia/Speech     Dysphagia/Swallowing     Group     Other    TOTAL Minutes Billed  30    Variance          Date: 9/15/2023  Day of ARU Week:  5       SLP Individual Minutes  Time In: 1130  Time Out: 1200  Minutes: 30         Variance/Reason:  [] Refusal due to   [] Medical hold/reason  [] Illness   [] Off Unit for test/procedure  [] Extra time needed to complete task  [] Other (specify)    Activity completed: Pt seen for speech STR and communication strategies    Pain: denies  Current Diet: ADULT DIET; Regular; 5 carb choices (75 gm/meal)  Subjective: alert and motivated      Goals and POC: Co-treats where appropriate with PT or OT to facilitate patient goals in functional tasks. LTG                           Short Term Goals  Time Frame for Short Term Goals: Pt will improve overall speech precision in conversation. 2x/week x1 week 30 mins min Goal met. Goal 1: Pt will complete education and practice in lingual ROM/STR/COORD exercises to facilitate improved conversational speech. Pt self monitors well. [place] Pressure Instrument was used to target lingual STR at 42 kPa goal pressure. 10 anterior Lingual Pulses were completed x 10sets. 10 sec Sustained holds were completed x 10.   Improvement was evident by increased achievement of target goal during session. Completed education with pt on exercises with handout. Pt able to complete all tasks for lingual buccal resistance, lingual protrusion with medial target, lingual presses and denise. Rate remains good. Slight distortions in conversation but continues to improve. Goal 2: Pt will monitor rate in oral reading and conversation with no cues. No cues needed. Pt also talking on the phone on arrival with no difficulty,. Pt meeting goals and is conversational with a few speech distortions. Pt able to complete exercises independently and continues to work at bedside. No further ST recommended. HEP provided     Alarm placed: [x]bed []chair   []other:   []BRAD activated [] Sitter present       Barriers to progress:   [] Fatigue        [] Cognitive Deficits   [] Memory Deficits   [] Reduced Attn   [] Self Limiting Behaviors    [] Reduced insight/awareness     [] Visual Deficits   [] Premorbid Conditions  [] Impulsivity     [] Other      Education/Interventions used this date: see above    []   Progress was updated and reviewed in [facility] with patient and/or family this         date. [] Attending Care Conference for pt this date. See Team Patient Care Conference Note for updates.     Interventions used this date:  [x] Speech/Language Treatment       [] Dysphagia Treatment     [] Cognitive Skill Development  [] Instruction in HEP     [] Group Therapy  Other:         Assessment / Impression                                                          Treatment/Activity Tolerance:   [x] Tolerated Treatment well:     [] Patient limited by fatigue/pain:       [] Patient limited by medical complications:    [] Adverse Reaction to Tx:   [] Significant change in status:      Electronically Signed by  Kt Peña, SLP, MA, CCC-SLP    9/15/2023  6:11 PM

## 2023-09-15 NOTE — PLAN OF CARE
Problem: Discharge Planning  Goal: Discharge to home or other facility with appropriate resources  9/15/2023 1017 by Marlena Wahl LPN  Outcome: Progressing  9/14/2023 2252 by Nathalie Elliott LPN  Outcome: Progressing     Problem: Safety - Adult  Goal: Free from fall injury  9/15/2023 1017 by Marlena Wahl LPN  Outcome: Progressing  9/14/2023 2252 by Nathalie Elliott LPN  Outcome: Progressing     Problem: Pain  Goal: Verbalizes/displays adequate comfort level or baseline comfort level  9/15/2023 1017 by Marlena Wahl LPN  Outcome: Progressing  9/14/2023 2252 by Nathalie Elliott LPN  Outcome: Progressing     Problem: Chronic Conditions and Co-morbidities  Goal: Patient's chronic conditions and co-morbidity symptoms are monitored and maintained or improved  9/15/2023 1017 by Marlena Wahl LPN  Outcome: Progressing  9/14/2023 2252 by Nathalie Elliott LPN  Outcome: Progressing     Problem: Skin/Tissue Integrity  Goal: Absence of new skin breakdown  Description: 1. Monitor for areas of redness and/or skin breakdown  2. Assess vascular access sites hourly  3. Every 4-6 hours minimum:  Change oxygen saturation probe site  4. Every 4-6 hours:  If on nasal continuous positive airway pressure, respiratory therapy assess nares and determine need for appliance change or resting period.   9/15/2023 1017 by Marlena Wahl LPN  Outcome: Progressing  9/14/2023 2252 by Nathalie lEliott LPN  Outcome: Progressing

## 2023-09-15 NOTE — TELEPHONE ENCOUNTER
Pt is currently admitted and so was unable to keep appt. Pt's sister would like a call regarding the labs that were going to be discussed at today's appt and how those results may affect upcoming procedures.

## 2023-09-16 LAB
GLUCOSE BLD-MCNC: 103 MG/DL (ref 70–99)
GLUCOSE BLD-MCNC: 107 MG/DL (ref 70–99)
GLUCOSE BLD-MCNC: 149 MG/DL (ref 70–99)
GLUCOSE BLD-MCNC: 167 MG/DL (ref 70–99)

## 2023-09-16 PROCEDURE — 97110 THERAPEUTIC EXERCISES: CPT

## 2023-09-16 PROCEDURE — 1280000000 HC REHAB R&B

## 2023-09-16 PROCEDURE — 82962 GLUCOSE BLOOD TEST: CPT

## 2023-09-16 PROCEDURE — 97116 GAIT TRAINING THERAPY: CPT

## 2023-09-16 PROCEDURE — 94761 N-INVAS EAR/PLS OXIMETRY MLT: CPT

## 2023-09-16 PROCEDURE — 97530 THERAPEUTIC ACTIVITIES: CPT

## 2023-09-16 PROCEDURE — 97535 SELF CARE MNGMENT TRAINING: CPT

## 2023-09-16 PROCEDURE — 6370000000 HC RX 637 (ALT 250 FOR IP): Performed by: PHYSICAL MEDICINE & REHABILITATION

## 2023-09-16 PROCEDURE — 94150 VITAL CAPACITY TEST: CPT

## 2023-09-16 PROCEDURE — 6360000002 HC RX W HCPCS: Performed by: PHYSICAL MEDICINE & REHABILITATION

## 2023-09-16 PROCEDURE — 89220 SPUTUM SPECIMEN COLLECTION: CPT

## 2023-09-16 RX ADMIN — ASPIRIN 81 MG CHEWABLE TABLET 81 MG: 81 TABLET CHEWABLE at 09:17

## 2023-09-16 RX ADMIN — GABAPENTIN 300 MG: 300 CAPSULE ORAL at 21:29

## 2023-09-16 RX ADMIN — ESCITALOPRAM OXALATE 10 MG: 10 TABLET ORAL at 09:17

## 2023-09-16 RX ADMIN — GABAPENTIN 300 MG: 300 CAPSULE ORAL at 13:19

## 2023-09-16 RX ADMIN — AMLODIPINE BESYLATE 10 MG: 10 TABLET ORAL at 09:17

## 2023-09-16 RX ADMIN — ATORVASTATIN CALCIUM 80 MG: 40 TABLET, FILM COATED ORAL at 21:29

## 2023-09-16 RX ADMIN — ENOXAPARIN SODIUM 40 MG: 100 INJECTION SUBCUTANEOUS at 09:17

## 2023-09-16 RX ADMIN — GABAPENTIN 300 MG: 300 CAPSULE ORAL at 09:17

## 2023-09-16 RX ADMIN — FAMOTIDINE 20 MG: 20 TABLET ORAL at 09:17

## 2023-09-16 NOTE — PROGRESS NOTES
Occupational Therapy  Physical Rehabilitation: OCCUPATIONAL THERAPY     [x] daily progress note       [] discharge       Patient Name:  Albert Gil   :  1970 MRN: [id number]  Room:  [address] [place] [address] Date of Admission: 2023  Rehabilitation Diagnosis:   CVA (cerebral vascular accident) [facility]) [I63.9]  Acute CVA (cerebrovascular accident) ([address] [facility]) [I63.9]       Date 2023       Day of ARU Week:  6   Time IN/OUT 5046-1335   Individual Tx Minutes 60   Group Tx Minutes 0   Co-Treat Minutes 0   Concurrent Tx Minutes 0   TOTAL Tx Time Mins 60   Variance Time 0   Variance Time []   Refusal due to:     []   Medical hold/reason:    []   Illness   []   Off Unit for test/procedure  []   Extra time needed to complete task  []   Therapeutic need  []   Other (specify):   Restrictions Restrictions/Precautions  Restrictions/Precautions: General Precautions, Fall Risk      Communication with other providers: [x]   OK to see per nursing:     []   Spoke with team member regarding:      Subjective observations and cognitive status: \"I already got my clothes on\"     Pain level/location:    0/10       Location: Denies pain   Discharge recommendations  Anticipated discharge date:    Destination: []home alone   []home alone w assist prn   [x] home w/ family    [] Continuous supervision       []SNF    [] Assisted living     [] Other:   Continued therapy: []HHC OT  [x]OUTPATIENT  OT   [] No Further OT  Equipment needs: none    (HIT F2 to transition between stars)    Grooming:    Mod I to stand at sink to wash, rinse and dry face      Bathing:   declined any further bathing           Bed Mobility:           []   Pt received out of bed   Scooting:  Sup  Supine --> Sit:  SUp  Sit --> Supine:  SUP    Transfers:    Sit--> Stand:  CGA  Stand --> Sit:   CGA  Stand-Pivot:   CGA  Other:    Assistive device required for transfer:   RW      Functional Mobility:    Assistance:  CGA x 130'  Device:   [x]   Leonides Sea     []   Standard

## 2023-09-16 NOTE — PROGRESS NOTES
Physical Therapy  [x] daily progress note       [] discharge       Patient Name:  Petey Gamez   :  1970 MRN: [id number]  Room:  [address] [place], [place] [address] Date of Admission: 2023  Rehabilitation Diagnosis:   CVA (cerebral vascular accident) [facility]) [I63.9]  Acute CVA (cerebrovascular accident) ([address] [facility]) [I63.9]       Date 2023       Day of ARU Week:  6   Time IN/-1030   Individual Tx Minutes 60   Group Tx Minutes    Co-Treat Minutes    Concurrent Tx Minutes    TOTAL Tx Time Mins 60   Variance Time    Variance Time []   Refusal due to:     []   Medical hold/reason:    []   Illness   []   Off Unit for test/procedure  []   Extra time needed to complete task  []   Therapeutic need  []   Other (specify):   Restrictions Restrictions/Precautions  Restrictions/Precautions: General Precautions, Fall Risk      Communication with other providers: []   OK to see per nursing:     []   Spoke with team member regarding:      Subjective observations and cognitive status: Patient in bed upon arrival for therapy. Patient ready and willing to do therapy today. Patient states he has no pain today. Pain level/location: 0/10       Location: N/A   Discharge recommendations  Anticipated discharge date:    Destination: []home alone   []home alone with assist PRN     [] home w/ family      [] Continuous supervision  []SNF    [] Assisted living     [] Other:   Continued therapy: []HHC PT  []OUTPATIENT  PT   [] No Further PT  Equipment needs: ARTUR Gamez requires the assistance of a wheeled walker to successfully ambulate from room to room at home to allow completion of daily living tasks such as: bathing, toileting, dressing and grooming. A wheeled walker is necessary due to the patient's unsteady gait, upper body weakness, inability to  a standard walker. This patient can ambulate only by pushing a walker instead of using a lesser assistive device such as a cane or crutch.      Bed Mobility:           [] Pt received out of bed   Scooting:  SBA  Supine --> Sit:  SBA  Sit --> Supine:  SBA  Bed features used:    [x] HOB elevated      [x] Bed rail                                    [] No     Transfers:    Sit--> Stand:  CGA  Stand --> Sit:   CGA  Stand-Pivot:   CGA  Car Transfers:  CGA  Assistive device required for transfer:   RW    Gait:    Distance:  113'+228'+137'   Assistance:  CGA  Device:  RW  Gait Quality:   decreased clearance on R LE      Stairs   # Completed:   5 stairs x 2 trials  Assistance:    CGA  Supportive Device:  Handrails  Height:   6\"  Vc's for proper sequencing. Patent states he is more comfortable using LLE (stronger leg) to lead with descending stairs. Patient demos safe technique leading with LLE. Curb       Assistance:    CGA-vc's for proper sequencing  Supportive Device:  RW  Height:   4\"    Uneven Surfaces:       Assistance:    [facility]  Device:    10' x 2  Surfaces Completed:   [x] Carpet with bean bags beneath       [] Throw rugs          [] Outdoor pavements      [] Grass       [] Loose gravel   [] Carpet      []  Multi grades     [] Threshold         [] Ramp           Additional Therapeutic activities/exercises completed this date:     []   Nu-step:  Time:        Level:         #Steps:       []   Rebounder:    []  Seated     []  Standing        []   Balance training         []   Postural training    []   Supine ther ex (reps/sets):     [x]   Seated ther ex (reps/sets): B LE ex x 15 reps seated with 2.5#wt on LLE only-LAQ's, heel/toe raises, hip abd/add and marching to increase strength and ROM.     []   Standing ther ex (reps/sets):     []   Picked up object from floor                       []   Reacher used   []   Other:   []   Other:   []   Other:      Patient/Caregiver Education and Training:   []    Role of PT  []   Education about Dx  []   Use of call light for assist   []   HEP provided and explained   []   Treatment plan reviewed  []   Home safety  []   Wheelchair mobility/management   [x]   Body mechanics  [x]   Bed Mobility/Transfer technique  [x]   Gait technique/sequencing  [x]   Proper use of assistive device/adaptive equipment  []   Stair training/Advanced mobility safety and technique  []   Reinforced patient's precautions/mobility while maintaining precautions  []   Postural awareness  []   Family/caregiver training  [x]   Progress was updated and reviewed with patient  this date.   []   Other:       Treatment Plan for Next Session: Continue gait training and item retrieval using a reacher    Assessment:      Treatment/Activity Tolerance:   [x] Tolerated treatment with no adverse effects    [] Patient limited by fatigue  [] Patient limited by pain   [] Patient limited by medical complications:    [] Adverse reaction to Tx:   [] Significant change in status    Safety:       [x]  bed alarm set    []  chair alarm set    []  Pt refused alarms                []  Telesitter activated      [x]  Gait belt used during tx session      [x] Call light and belongings in reach       [] Other      Number of Minutes/Billable Intervention  Gait Training 30   Therapeutic Exercise 15   Neuro Re-Ed    Therapeutic Activity 15   Wheelchair Propulsion    Group    Other:    TOTAL 60         Social History  Social/Functional History  Lives With: Family (mom (back issues)and step-dad(recent CVA but doing well))  Type of Home: House  Home Layout: Two level, Able to Live on Main level with bedroom/bathroom  Home Access: Ramped entrance, Stairs to enter with rails  Entrance Stairs - Number of Steps: 3  Entrance Stairs - Rails: Right (one entrance on L, one entrace on R)  Bathroom Shower/Tub: Walk-in shower  Bathroom Toilet: Standard  Bathroom Equipment: Grab bars in shower, 3-in-1 commode, Built-in shower seat, Hand-held shower (has handles only on toilet, 3-in-1 used in shower)  Bathroom Accessibility: Walker accessible  Home Equipment: Belen Jaclyn, rolling, Cane, [address], Sock aid, Leg   Has

## 2023-09-16 NOTE — PROGRESS NOTES
Occupational Therapy  Physical Rehabilitation: OCCUPATIONAL THERAPY     [x] daily progress note       [] discharge       Patient Name:  Lan Moulton   :  1970 MRN: [id number]  Room:  [address] [place] [address] Date of Admission: 2023  Rehabilitation Diagnosis:   CVA (cerebral vascular accident) [facility]) [I63.9]  Acute CVA (cerebrovascular accident) ([address] [facility]) [I63.9]       Date 2023       Day of ARU Week:  6   Time IN/OUT 1306/1406   Individual Tx Minutes 60   Group Tx Minutes    Co-Treat Minutes    Concurrent Tx Minutes    TOTAL Tx Time Mins 60   Variance Time    Variance Time []   Refusal due to:     []   Medical hold/reason:    []   Illness   []   Off Unit for test/procedure  []   Extra time needed to complete task  []   Therapeutic need  []   Other (specify):   Restrictions Restrictions/Precautions: General Precautions, Fall Risk         Communication with other providers: [x]   OK to see per nursing:     []   Spoke with team member regarding:      Subjective observations and cognitive status: Pt in bed on approach, pleasant and agreeable to tx session. Pain level/location:    /10       Location: Denied   Discharge recommendations  Anticipated discharge date:    Destination: []home alone   []home alone w assist prn   [x] home w/ family    [] Continuous supervision       []SNF    [] Assisted living     [] Other:   Continued therapy: []HHC OT  [x]OUTPATIENT  OT   [] No Further OT  Equipment needs: none      Toileting:   Denied need       Bed Mobility:           []   Pt received out of bed   Supine --> Sit:  Mod I   Sit --> Supine: Mod I     Transfers:    Sit--> Stand:  SBA  Stand --> Sit:   SBA  Stand-Pivot:   SBA  Other:    Assistive device required for transfer:   RW      Functional Mobility:    Assistance:  SBA ~75 ft x2 + ~30 ft x3.   D/t weak RUE extensors combined with flexor tone causing R to roll off walker handle, trialed multiple different modifications including adjusting height to promote more patient is making progress toward established goals as evidenced by QI scores. Ongoing deficits are observed in the areas of balance, RUE tone/coordination and continued focus on this is recommended.        Treatment/Activity Tolerance:   [x] Tolerated treatment with no adverse effects    [] Patient limited by fatigue  [] Patient limited by pain   [] Patient limited by medical complications:    [] Adverse reaction to Tx:   [] Significant change in status    Safety:       [x]  bed alarm set    []  chair alarm set    []  Pt refused alarms                []  Telesitter activated      [x]  Gait belt used during tx session      []other:       Number of Minutes/Billable Intervention  Therapeutic Exercise 15   ADL Self-care    Neuro Re-Ed    Therapeutic Activity 45   Group    Other:    TOTAL 60       Social History  Social/Functional History  Lives With: Family (mom (back issues)and step-dad(recent CVA but doing well))  Type of Home: House  Home Layout: Two level, Able to Live on Main level with bedroom/bathroom  Home Access: Ramped entrance, Stairs to enter with rails  Entrance Stairs - Number of Steps: 3  Entrance Stairs - Rails: Right (one entrance on L, one entrace on R)  Bathroom Shower/Tub: Walk-in shower  Bathroom Toilet: Standard  Bathroom Equipment: Grab bars in shower, 3-in-1 commode, Built-in shower seat, Hand-held shower (has handles only on toilet, 3-in-1 used in shower)  Bathroom Accessibility: Walker accessible  Home Equipment: Walker, rolling, Cane, Reacher, Sock aid, Leg   Has the patient had two or more falls in the past year or any fall with injury in the past year?: No (one fall in July but no injury)  Receives Help From: Family  ADL Assistance: Independent  Homemaking Responsibilities: Yes  Meal Prep Responsibility: Secondary ([employer] helping out, mom cooks mostly)  Laundry Responsibility: Secondary (helps out)  Cleaning Responsibility: No  Bill Paying/Finance Responsibility: Primary  Shopping

## 2023-09-17 VITALS
BODY MASS INDEX: 30.4 KG/M2 | WEIGHT: 224.43 LBS | RESPIRATION RATE: 14 BRPM | TEMPERATURE: 98.2 F | DIASTOLIC BLOOD PRESSURE: 82 MMHG | SYSTOLIC BLOOD PRESSURE: 126 MMHG | HEIGHT: 72 IN | HEART RATE: 64 BPM | OXYGEN SATURATION: 96 %

## 2023-09-17 LAB
GLUCOSE BLD-MCNC: 120 MG/DL (ref 70–99)
GLUCOSE BLD-MCNC: 130 MG/DL (ref 70–99)
GLUCOSE BLD-MCNC: 131 MG/DL (ref 70–99)
GLUCOSE BLD-MCNC: 157 MG/DL (ref 70–99)

## 2023-09-17 PROCEDURE — 94761 N-INVAS EAR/PLS OXIMETRY MLT: CPT

## 2023-09-17 PROCEDURE — 94150 VITAL CAPACITY TEST: CPT

## 2023-09-17 PROCEDURE — 1280000000 HC REHAB R&B

## 2023-09-17 PROCEDURE — 6370000000 HC RX 637 (ALT 250 FOR IP): Performed by: PHYSICAL MEDICINE & REHABILITATION

## 2023-09-17 PROCEDURE — 6360000002 HC RX W HCPCS: Performed by: PHYSICAL MEDICINE & REHABILITATION

## 2023-09-17 PROCEDURE — 82962 GLUCOSE BLOOD TEST: CPT

## 2023-09-17 RX ADMIN — GABAPENTIN 300 MG: 300 CAPSULE ORAL at 21:39

## 2023-09-17 RX ADMIN — AMLODIPINE BESYLATE 10 MG: 10 TABLET ORAL at 09:04

## 2023-09-17 RX ADMIN — GABAPENTIN 300 MG: 300 CAPSULE ORAL at 15:01

## 2023-09-17 RX ADMIN — ASPIRIN 81 MG CHEWABLE TABLET 81 MG: 81 TABLET CHEWABLE at 09:04

## 2023-09-17 RX ADMIN — ATORVASTATIN CALCIUM 80 MG: 40 TABLET, FILM COATED ORAL at 21:39

## 2023-09-17 RX ADMIN — FAMOTIDINE 20 MG: 20 TABLET ORAL at 09:04

## 2023-09-17 RX ADMIN — GABAPENTIN 300 MG: 300 CAPSULE ORAL at 09:04

## 2023-09-17 RX ADMIN — ESCITALOPRAM OXALATE 10 MG: 10 TABLET ORAL at 09:04

## 2023-09-17 RX ADMIN — ENOXAPARIN SODIUM 40 MG: 100 INJECTION SUBCUTANEOUS at 09:04

## 2023-09-18 LAB
GLUCOSE BLD-MCNC: 101 MG/DL (ref 70–99)
GLUCOSE BLD-MCNC: 115 MG/DL (ref 70–99)
GLUCOSE BLD-MCNC: 154 MG/DL (ref 70–99)
GLUCOSE BLD-MCNC: 192 MG/DL (ref 70–99)

## 2023-09-18 PROCEDURE — 94761 N-INVAS EAR/PLS OXIMETRY MLT: CPT

## 2023-09-18 PROCEDURE — 97110 THERAPEUTIC EXERCISES: CPT

## 2023-09-18 PROCEDURE — 1280000000 HC REHAB R&B

## 2023-09-18 PROCEDURE — 97530 THERAPEUTIC ACTIVITIES: CPT

## 2023-09-18 PROCEDURE — 97116 GAIT TRAINING THERAPY: CPT

## 2023-09-18 PROCEDURE — 6370000000 HC RX 637 (ALT 250 FOR IP): Performed by: PHYSICAL MEDICINE & REHABILITATION

## 2023-09-18 PROCEDURE — 6360000002 HC RX W HCPCS: Performed by: PHYSICAL MEDICINE & REHABILITATION

## 2023-09-18 PROCEDURE — 94150 VITAL CAPACITY TEST: CPT

## 2023-09-18 PROCEDURE — 97112 NEUROMUSCULAR REEDUCATION: CPT

## 2023-09-18 PROCEDURE — 97535 SELF CARE MNGMENT TRAINING: CPT

## 2023-09-18 PROCEDURE — 82962 GLUCOSE BLOOD TEST: CPT

## 2023-09-18 RX ADMIN — FAMOTIDINE 20 MG: 20 TABLET ORAL at 08:48

## 2023-09-18 RX ADMIN — GABAPENTIN 300 MG: 300 CAPSULE ORAL at 14:08

## 2023-09-18 RX ADMIN — GABAPENTIN 300 MG: 300 CAPSULE ORAL at 21:52

## 2023-09-18 RX ADMIN — ATORVASTATIN CALCIUM 80 MG: 40 TABLET, FILM COATED ORAL at 21:52

## 2023-09-18 RX ADMIN — ACETAMINOPHEN 650 MG: 325 TABLET ORAL at 06:02

## 2023-09-18 RX ADMIN — GABAPENTIN 300 MG: 300 CAPSULE ORAL at 08:48

## 2023-09-18 RX ADMIN — ASPIRIN 81 MG CHEWABLE TABLET 81 MG: 81 TABLET CHEWABLE at 08:48

## 2023-09-18 RX ADMIN — ESCITALOPRAM OXALATE 10 MG: 10 TABLET ORAL at 08:48

## 2023-09-18 RX ADMIN — ENOXAPARIN SODIUM 40 MG: 100 INJECTION SUBCUTANEOUS at 08:48

## 2023-09-18 RX ADMIN — AMLODIPINE BESYLATE 10 MG: 10 TABLET ORAL at 08:48

## 2023-09-18 RX ADMIN — FAMOTIDINE 20 MG: 20 TABLET ORAL at 21:52

## 2023-09-18 ASSESSMENT — PAIN SCALES - GENERAL: PAINLEVEL_OUTOF10: 0

## 2023-09-18 NOTE — PROGRESS NOTES
Occupational Therapy    Physical Rehabilitation: OCCUPATIONAL THERAPY     [x] daily progress note       [] discharge       Patient Name:  Aditi Webster   :  1970 MRN: [id number]  Room:  [address] [place] [address] Date of Admission: 2023  Rehabilitation Diagnosis:   CVA (cerebral vascular accident) [facility]) [I63.9]  Acute CVA (cerebrovascular accident) ([address] [facility]) [I63.9]       Date 2023       Day of ARU Week:  1   Time IN/OUT 9325-1362  6923-6908   Individual Tx Minutes 85+35   Group Tx Minutes    Co-Treat Minutes    Concurrent Tx Minutes    TOTAL Tx Time Mins 120   Variance Time    Variance Time []   Refusal due to:     []   Medical hold/reason:    []   Illness   []   Off Unit for test/procedure  []   Extra time needed to complete task  []   Therapeutic need  []   Other (specify):   Restrictions Restrictions/Precautions: General Precautions, Fall Risk         Communication with other providers: [x]   OK to see per nursing:     []   Spoke with team member regarding:      Subjective observations and cognitive status: Pt resting in bed on approach; pleasant and agreeable to therapy.       Pain level/location:    /10       Location:    Discharge recommendations  Anticipated discharge date:    Destination: []home alone   []home alone w assist prn   [x] home w/ family    [] Continuous supervision       []SNF    [] Assisted living     [] Other:   Continued therapy: []HHC OT  [x]OUTPATIENT  OT   [] No Further OT  Equipment needs: none        ADLs:    Eating: Eating  Assistance Needed: Setup or clean-up assistance  Comment: requires setup 2* RUE weakness  CARE Score: 5  Discharge Goal: Set-up or clean-up assistance       Oral Hygiene: Oral Hygiene  Assistance Needed: Independent  Comment: Seated in [facility] at sink  CARE Score: 6  Discharge Goal: Independent    UB/LB Bathing: Shower/Bathe Self  Assistance Needed: Supervision or touching assistance  Comment: Entirety of shower seated; able to wash distal LEs in figure four

## 2023-09-18 NOTE — PROGRESS NOTES
Physical Therapy      [x] daily progress note       [] discharge       Patient Name:  Alfonso Madrid   :  1970 MRN: [id number]  Room:  [address] [place] [address] Date of Admission: 2023  Rehabilitation Diagnosis:   CVA (cerebral vascular accident) [facility]) [I63.9]  Acute CVA (cerebrovascular accident) ([address]) [I63.9]       Date 2023       Day of ARU Week:  1   Time IN/OUT 2031-8192   Individual Tx Minutes 60   TOTAL Tx Time Mins 60   Variance Time    Variance Time []   Refusal due to:     []   Medical hold/reason:    []   Illness   []   Off Unit for test/procedure  []   Extra time needed to complete task  []   Therapeutic need  []   Other (specify):   Restrictions Restrictions/Precautions  Restrictions/Precautions: General Precautions, Fall Risk      Communication with other providers: [x]   OK to see per nursing:     []   Spoke with team member regarding:      Subjective observations and cognitive status: Pt on phone upon arrival, willing to participate     Pain level/location: 0/10       Location:    Discharge recommendations  Anticipated discharge date:    Destination: []home alone   []home alone with assist PRN     [] home w/ family      [] Continuous supervision  []SNF    [] Assisted living     [] Other:   Continued therapy: []HHC PT  []OUTPATIENT  PT   [] No Further PT  Equipment needs: RW  Alfonso Madrid requires the assistance of a wheeled walker to successfully ambulate from room to room at home to allow completion of daily living tasks such as: bathing, toileting, dressing and grooming. A wheeled walker is necessary due to the patient's unsteady gait, upper body weakness, inability to  a standard walker. This patient can ambulate only by pushing a walker instead of using a lesser assistive device such as a cane or crutch.        Bed Mobility:           []   Pt received out of bed   Rolling R/L: Indep  Scooting:  indep  Supine --> Sit:  Indep  Sit --> Supine:  Indep  Performed on standard bed

## 2023-09-19 LAB
GLUCOSE BLD-MCNC: 111 MG/DL (ref 70–99)
GLUCOSE BLD-MCNC: 117 MG/DL (ref 70–99)
GLUCOSE BLD-MCNC: 134 MG/DL (ref 70–99)
GLUCOSE BLD-MCNC: 163 MG/DL (ref 70–99)

## 2023-09-19 PROCEDURE — 6370000000 HC RX 637 (ALT 250 FOR IP): Performed by: PHYSICAL MEDICINE & REHABILITATION

## 2023-09-19 PROCEDURE — 6360000002 HC RX W HCPCS: Performed by: PHYSICAL MEDICINE & REHABILITATION

## 2023-09-19 PROCEDURE — 97112 NEUROMUSCULAR REEDUCATION: CPT

## 2023-09-19 PROCEDURE — 99211 OFF/OP EST MAY X REQ PHY/QHP: CPT

## 2023-09-19 PROCEDURE — 82962 GLUCOSE BLOOD TEST: CPT

## 2023-09-19 PROCEDURE — 94150 VITAL CAPACITY TEST: CPT

## 2023-09-19 PROCEDURE — 1280000000 HC REHAB R&B

## 2023-09-19 PROCEDURE — 97116 GAIT TRAINING THERAPY: CPT

## 2023-09-19 PROCEDURE — 97530 THERAPEUTIC ACTIVITIES: CPT

## 2023-09-19 PROCEDURE — 94761 N-INVAS EAR/PLS OXIMETRY MLT: CPT

## 2023-09-19 PROCEDURE — 97535 SELF CARE MNGMENT TRAINING: CPT

## 2023-09-19 RX ADMIN — ASPIRIN 81 MG CHEWABLE TABLET 81 MG: 81 TABLET CHEWABLE at 08:35

## 2023-09-19 RX ADMIN — ATORVASTATIN CALCIUM 80 MG: 40 TABLET, FILM COATED ORAL at 20:25

## 2023-09-19 RX ADMIN — GABAPENTIN 300 MG: 300 CAPSULE ORAL at 08:35

## 2023-09-19 RX ADMIN — AMLODIPINE BESYLATE 10 MG: 10 TABLET ORAL at 08:35

## 2023-09-19 RX ADMIN — ESCITALOPRAM OXALATE 10 MG: 10 TABLET ORAL at 08:35

## 2023-09-19 RX ADMIN — GABAPENTIN 300 MG: 300 CAPSULE ORAL at 13:21

## 2023-09-19 RX ADMIN — GABAPENTIN 300 MG: 300 CAPSULE ORAL at 20:25

## 2023-09-19 RX ADMIN — ENOXAPARIN SODIUM 40 MG: 100 INJECTION SUBCUTANEOUS at 08:36

## 2023-09-19 ASSESSMENT — PAIN SCALES - GENERAL: PAINLEVEL_OUTOF10: 0

## 2023-09-19 NOTE — DISCHARGE INSTRUCTIONS
Your information:  Name: Petey Gamez  : 1970    Your instructions:    Pt is discharging to home with referral to [facility]  [address], [facility]  [phone]  A representative from [facility] will contact you at home to schedule your appointment    What to do after you leave the hospital:    Recommended diet: {diet:50238}    Recommended activity: {discharge activity:71843}        The following personal items were collected during your admission and were returned to you:    Belongings  Dental Appliances: None  Vision - Corrective Lenses: Eyeglasses (reading glasses)  Hearing Aid: None  Clothing: Shirt, Socks, Footwear, Undergarments, Pants  Jewelry: None  Body Piercings Removed: No  Electronic Devices: Cell Phone,   Weapons (Notify Protective Services/Security): None  Other Valuables: Other (Comment)  Home Medications: None  Valuables Given To: Other (Comment)  Provide Name(s) of Who Valuable(s) Were Given To: n/a  Responsible person(s) in the waiting room: n/a  Patient approves for provider to speak to responsible person post operatively: No    Information obtained by:  By signing below, I understand that if any problems occur once I leave the hospital I am to contact ***. I understand and acknowledge receipt of the instructions indicated above.

## 2023-09-19 NOTE — PROGRESS NOTES
Comprehensive Nutrition Assessment    Type and Reason for Visit:  Reassess    Nutrition Recommendations/Plan:   Continue carb controlled diet per order   Will continue to follow up during stay      Malnutrition Assessment:  Malnutrition Status:  No malnutrition (09/12/23 1444)    Context:  Acute Illness       Nutrition Assessment:    Remains on carb controlled diet with meal intake % during stay. Remains with good appetite, content with meals. Patient with no questions or concerns regarding carb controlled diet on visit today. Will continue to follow at low nutrition risk at this time. Nutrition Related Findings:    resting in bed,  glucose POCT most under 180 mg/fL Wound Type:  (dry wound on toes)       Current Nutrition Intake & Therapies:    Average Meal Intake: %  Average Supplements Intake: None Ordered  ADULT DIET; Regular; 5 carb choices (75 gm/meal)    Anthropometric Measures:  Height: 6' (182.9 cm)  Ideal Body Weight (IBW): 178 lbs (81 kg)    Admission Body Weight: 220 lb 7.4 oz (100 kg)  Current Body Weight: 222 lb 10.6 oz (101 kg), 123.9 % IBW. Weight Source: Bed Scale  Current BMI (kg/m2): 30.2  Usual Body Weight: 235 lb (106.6 kg) (hx June 2023)  % Weight Change (Calculated): -6.2  Weight Adjustment For: No Adjustment                 BMI Categories: Overweight (BMI 25.0-29. 9)    Estimated Daily Nutrient Needs:  Energy Requirements Based On: Formula  Weight Used for Energy Requirements: Current  Energy (kcal/day): 8991-0453 (mifflin st jeor)  Weight Used for Protein Requirements: Ideal  Protein (g/day): 81-97 (1-1.2 g/kg)  Method Used for Fluid Requirements: 1 ml/kcal  Fluid (ml/day): 2200    Nutrition Diagnosis:   No nutrition diagnosis at this time related to   as evidenced by      Nutrition Interventions:   Food and/or Nutrient Delivery: Continue Current Diet  Nutrition Education/Counseling: Education initiated  Coordination of Nutrition Care: Continue to monitor while inpatient

## 2023-09-19 NOTE — CONSULTS
[facility] Continence Nurse  Consult Note       Fabio Diaz  AGE: 46 y.o.    GENDER: male  : 1970  TODAY'S DATE:  2023    Subjective:     Reason for  Evaluation and Assessment: wound care barry Diaz is a 46 y.o. male referred by:   [x] Physician  [] Nursing  [] Other:     Wound Identification:  Wound Type: diabetic  Contributing Factors: diabetes and decreased mobility        PAST MEDICAL HISTORY        Diagnosis Date    Blood circulation, collateral     Callus of foot     Callus of foot 2018    Charcot's joint of foot     Charcot's joint of foot in type 2 diabetes mellitus ([address]) 2014    Closed nondisplaced fracture of fifth metatarsal bone of right foot with routine healing 2017    CVA (cerebral vascular accident) ([address])     Diabetes mellitus ([address])     Diverticulitis     Hyperlipidemia     Hypertension     Ischemic ulcer of left foot, limited to breakdown of skin ([address]) 2016    Leg edema, right 2017    Neuropathy     Plantar wart, left foot 2018    Ulcer of right foot with fat layer exposed ([address]) 2016    Ulcer of right foot with necrosis of muscle, Hannon Grade III 2016    WD-Chronic ulcer of left foot with fat layer exposed ([address]) 2016    WD-Chronic ulcer of toe of right foot, limited to breakdown of skin ([address]) 2016    WD-Diabetic ulcer of left foot associated with type 2 diabetes mellitus ([address]) 2016    WD-Diabetic ulcer of right lateral foot associated with type 2 diabetes mellitus, with fat layer exposed ([address])     WD-Type 2 diabetes mellitus with right diabetic foot ulcer ([address]) 2016       PAST SURGICAL HISTORY    Past Surgical History:   Procedure Laterality Date    ABDOMEN SURGERY      CEREBRAL ANGIOGRAM      COLONOSCOPY  2019    divertic, polyps, clip, repeat age 48    COLONOSCOPY N/A 2019    COLONOSCOPY CONTROL HEMORRHAGE performed by Kanika Wong MD at [address] [place] [address] 33.33 09/19/23 0823   Wound Volume (cm^3) 0.006 cm^3 09/19/23 0823   Wound Healing % 33 09/19/23 0823   Distance Tunneling (cm) 0 cm 09/19/23 0823   Tunneling Position ___ O'Clock 0 09/19/23 0823   Undermining Starts ___ O'Clock 0 09/19/23 0823   Undermining Ends___ O'Clock 0 09/19/23 0823   Undermining Maxium Distance (cm) 0 09/19/23 0823   Wound Assessment Eschar dry 09/19/23 0823   Drainage Amount None (dry) 09/19/23 0823   Odor None 09/18/23 1941   Violeta-wound Assessment Intact 09/19/23 0823   Margins Attached edges 09/19/23 0823   Number of days: 21       Wound 09/12/23 Toe (Comment  which one) Right 2nd toe (Active)   Wound Image   09/19/23 0823   Wound Etiology Diabetic 09/19/23 0823   Wound Cleansed Cleansed with saline 09/19/23 0823   Dressing/Treatment Betadine swabs/povidone iodine 09/19/23 0823   Wound Length (cm) 0.7 cm 09/19/23 0823   Wound Width (cm) 0.5 cm 09/19/23 0823   Wound Depth (cm) 0.1 cm 09/19/23 0823   Wound Surface Area (cm^2) 0.35 cm^2 09/19/23 0823   Change in Wound Size % (l*w) 0 09/19/23 0823   Wound Volume (cm^3) 0.035 cm^3 09/19/23 0823   Wound Healing % 0 09/19/23 0823   Distance Tunneling (cm) 0 cm 09/19/23 0823   Tunneling Position ___ O'Clock 0 09/19/23 0823   Undermining Starts ___ O'Clock 0 09/19/23 0823   Undermining Ends___ O'Clock 0 09/19/23 0823   Undermining Maxium Distance (cm) 0 09/19/23 0823   Wound Assessment Eschar dry 09/19/23 0823   Drainage Amount None (dry) 09/19/23 0823   Odor None 09/19/23 0823   Violeta-wound Assessment Intact 09/19/23 0823   Margins Attached edges 09/19/23 0823   Number of days: 7       Wound 09/12/23 Ankle Right;Lateral (Active)   Wound Image   09/19/23 0823   Wound Etiology Diabetic 09/19/23 0823   Wound Cleansed Cleansed with saline 09/19/23 0823   Dressing/Treatment Betadine swabs/povidone iodine 09/19/23 0823   Wound Length (cm) 0.4 cm 09/19/23 0823   Wound Width (cm) 0.3 cm 09/19/23 0823   Wound Depth (cm) 0.1 cm 09/19/23 0823   Wound Surface

## 2023-09-19 NOTE — PROGRESS NOTES
Occupational Therapy    Physical Rehabilitation: OCCUPATIONAL THERAPY     [x] daily progress note       [] discharge       Patient Name:  Khushboo Ruffin   :  1970 MRN: [id number]  Room:  [address] [place] [address] Date of Admission: 2023  Rehabilitation Diagnosis:   CVA (cerebral vascular accident) [facility]) [I63.9]  Acute CVA (cerebrovascular accident) ([facility] [address] [facility]) [I63.9]       Date 2023       Day of ARU Week:  2   Time IN/OUT 6752-8594  2166-9733   Individual Tx Minutes 90+30   Group Tx Minutes    Co-Treat Minutes    Concurrent Tx Minutes    TOTAL Tx Time Mins 120   Variance Time    Variance Time []   Refusal due to:     []   Medical hold/reason:    []   Illness   []   Off Unit for test/procedure  []   Extra time needed to complete task  []   Therapeutic need  []   Other (specify):   Restrictions Restrictions/Precautions: General Precautions, Fall Risk         Communication with other providers: [x]   OK to see per nursing:     []   Spoke with team member regarding:      Subjective observations and cognitive status: AM and PM: Pt sitting EOB on approach; pleasant and agreeable to therapy session.       Pain level/location:    /10       Location: none    Discharge recommendations  Anticipated discharge date:    Destination: []home alone   []home alone w assist prn   [x] home w/ family    [] Continuous supervision       []SNF    [] Assisted living     [] Other:   Continued therapy: []HHC OT  [x]OUTPATIENT  OT   [] No Further OT  Equipment needs: none        ADLs:    Eating: Eating  Assistance Needed: Independent  Comment: Pt able to open packages/containers with compensatory strategies  CARE Score: 6  Discharge Goal: Set-up or clean-up assistance       Oral Hygiene: Oral Hygiene  Assistance Needed: Independent  Comment: seated in [place] at sink  CARE Score: 6  Discharge Goal: Independent    Toileting: denied need     Toilet Transfers:   NA   Device Used:    []   Standard Toilet         []   [facility]           []  Bedside grooming tasks Ind  Long Term Goal 2: Pt will complete total body bathing mod I using AE PRN  Long Term Goal 3: Pt will complete UB dressing Ind using hemitechnique  Long Term Goal 4: Pt will complete LB dressing mod I  Long Term Goal 5: Pt will doff/don footwear mod I  Additional Goals?: Yes  Long Term Goal 6: Pt will complete toileting mod I  Long Term Goal 7: Pt will complete functional transfers (bed, chair, toilet, shower) c DME PRN and mod I  Long Term Goal 8: Pt will perform therex/therax to facilitate increased strength/endurance/ax tolerance (c emphasis on dynamic standing balance/tolerance >8 mins and RUE neuro re-ed) c SBA  Long Term Goal 9: Pt will complete simple homemaking tasks c DME PRN and mod I:        Plan of Care                                                                              Times per week: 5 days per week for a minimum of 60 minutes/day plus group as appropriate for 60 minutes.   Treatment to include Occupational Therapy Plan  Current Treatment Recommendations: Strengthening, ROM, Balance training, Functional mobility training, Endurance training, Neuromuscular re-education, Safety education & training, Patient/Caregiver education & training, Equipment evaluation, education, & procurement, Self-Care / ADL, Return to work related activity, Home management training, Coordination training, Sensory integration    Electronically signed by   TITA Leach,  9/19/2023, 3:30 PM

## 2023-09-19 NOTE — CARE COORDINATION
Received a call form [employer] and patient approved additional days. Next Review date is 09/22/2023. Referral sent to [facility] via routed fax.

## 2023-09-19 NOTE — PROGRESS NOTES
09/19/23 0902   Encounter Summary   Encounter Overview/Reason  Attempted Encounter   Service Provided For: Patient not available   Referral/Consult From: [address] Parent; Family members   Last Encounter  09/19/23  (Room full of staff and students talking with patient.  Follow up for 18 day admission; silent Prayer given for patient, who is known to me.)   Complexity of Encounter Low   Begin Time 0901   End Time  0905   Total Time Calculated 4 min   Spiritual/Emotional needs   Type Spiritual Support   Assessment/Intervention/Outcome   Assessment Calm   Intervention Prayer (assurance of)/[place];Sustaining Presence/Ministry of presence   Outcome Peace   Plan and Referrals   Plan/Referrals Continue Support (comment)  (Support as requested by patient.)

## 2023-09-19 NOTE — PROGRESS NOTES
Physical Therapy      [x] daily progress note       [] discharge       Patient Name:  Rebecca Vazquez   :  1970 MRN: [id number]  Room:  [address] [place], [place] [address] Date of Admission: 2023  Rehabilitation Diagnosis:   CVA (cerebral vascular accident) [facility]) [I63.9]  Acute CVA (cerebrovascular accident) ([address] [facility]) [I63.9]       Date 2023       Day of ARU Week:  2   Time IN/OUT 6304-1653   Individual Tx Minutes 60   TOTAL Tx Time Mins 60   Variance Time    Variance Time []   Refusal due to:     []   Medical hold/reason:    []   Illness   []   Off Unit for test/procedure  []   Extra time needed to complete task  []   Therapeutic need  []   Other (specify):   Restrictions Restrictions/Precautions  Restrictions/Precautions: General Precautions, Fall Risk      Communication with other providers: [x]   OK to see per nursing:     []   Spoke with team member regarding:      Subjective observations and cognitive status: Pt resting in bed with family in room, pt motivated to participate; Pt's mom and brother JELANI (who lives across the street) informed of pt's current mobility status. Mother amd brother states awareness of pt not being at his baseline. Mother reports is unable to provide physical A for pt. Brother reports willing to come over to amb with pt but unable to provide continuous A. Discussed with pt about WC level at home unless amb with therapy or brother, Pt reports has scooter he can use on main level. Pt ed to perform SPT to scooter (with swivel seat), toilet, and bed on main level. Pt in agreement, mother reports feels more comfortable with that solution. Pt has ramp entrance into home with B rails. Family present during therapy session with brother A pt with ambulation. Demos safety with A after initial cueing.       Pain level/location: 0/10       Location:    Discharge recommendations  Anticipated discharge date:    Destination: []home alone   []home alone with assist PRN     [] home w/ family      []

## 2023-09-19 NOTE — PROGRESS NOTES
Albert Gil    : 1970  Acct #: [de-identified]  MRN: [id number]              PM&R Progress Note      Admitting diagnosis: ***    Comorbid diagnoses impacting rehabilitation: ***    Chief complaint: ***    Prior (baseline) level of function: Independent.     Current level of function:         Current  [id number] and Goals:   Occupational Therapy:    Short Term Goals  Time Frame for Short Term Goals: STGs=LTGs :   Long Term Goals  Time Frame for Long Term Goals : ~10 days or until d/c  Long Term Goal 1: Pt will complete grooming tasks Ind  Long Term Goal 2: Pt will complete total body bathing mod I using AE PRN  Long Term Goal 3: Pt will complete UB dressing Ind using hemitechnique  Long Term Goal 4: Pt will complete LB dressing mod I  Long Term Goal 5: Pt will doff/don footwear mod I  Additional Goals?: Yes  Long Term Goal 6: Pt will complete toileting mod I  Long Term Goal 7: Pt will complete functional transfers (bed, chair, toilet, shower) c DME PRN and mod I  Long Term Goal 8: Pt will perform therex/therax to facilitate increased strength/endurance/ax tolerance (c emphasis on dynamic standing balance/tolerance >8 mins and RUE neuro re-ed) c SBA  Long Term Goal 9: Pt will complete simple homemaking tasks c DME PRN and mod I :                                       Eating: Eating  Assistance Needed: Setup or clean-up assistance  Comment: requires setup 2* RUE weakness  CARE Score: 5  Discharge Goal: Set-up or clean-up assistance       Oral Hygiene: Oral Hygiene  Assistance Needed: Independent  Comment: Seated in WC at sink  CARE Score: 6  Discharge Goal: Independent    UB/LB Bathing: Shower/Bathe Self  Assistance Needed: Supervision or touching assistance  Comment: Entirety of shower seated; able to wash distal LEs in figure four position and lateral lean to wash bottom  CARE Score: 4  Discharge Goal: Independent    UB Dressing: Upper Body Dressing  Assistance Needed: Setup or clean-up assistance  Comment: S/U

## 2023-09-20 LAB
GLUCOSE BLD-MCNC: 122 MG/DL (ref 70–99)
GLUCOSE BLD-MCNC: 156 MG/DL (ref 70–99)
GLUCOSE BLD-MCNC: 174 MG/DL (ref 70–99)

## 2023-09-20 PROCEDURE — 97530 THERAPEUTIC ACTIVITIES: CPT

## 2023-09-20 PROCEDURE — 94761 N-INVAS EAR/PLS OXIMETRY MLT: CPT

## 2023-09-20 PROCEDURE — 94150 VITAL CAPACITY TEST: CPT

## 2023-09-20 PROCEDURE — 97116 GAIT TRAINING THERAPY: CPT

## 2023-09-20 PROCEDURE — 6370000000 HC RX 637 (ALT 250 FOR IP): Performed by: PHYSICAL MEDICINE & REHABILITATION

## 2023-09-20 PROCEDURE — 97535 SELF CARE MNGMENT TRAINING: CPT

## 2023-09-20 PROCEDURE — 1280000000 HC REHAB R&B

## 2023-09-20 PROCEDURE — 97110 THERAPEUTIC EXERCISES: CPT

## 2023-09-20 PROCEDURE — 97150 GROUP THERAPEUTIC PROCEDURES: CPT

## 2023-09-20 PROCEDURE — 82962 GLUCOSE BLOOD TEST: CPT

## 2023-09-20 PROCEDURE — 97112 NEUROMUSCULAR REEDUCATION: CPT

## 2023-09-20 RX ADMIN — ATORVASTATIN CALCIUM 80 MG: 40 TABLET, FILM COATED ORAL at 22:28

## 2023-09-20 RX ADMIN — GABAPENTIN 300 MG: 300 CAPSULE ORAL at 22:28

## 2023-09-20 RX ADMIN — GABAPENTIN 300 MG: 300 CAPSULE ORAL at 14:07

## 2023-09-20 RX ADMIN — FAMOTIDINE 20 MG: 20 TABLET ORAL at 09:00

## 2023-09-20 RX ADMIN — ESCITALOPRAM OXALATE 10 MG: 10 TABLET ORAL at 09:00

## 2023-09-20 RX ADMIN — GABAPENTIN 300 MG: 300 CAPSULE ORAL at 09:00

## 2023-09-20 RX ADMIN — AMLODIPINE BESYLATE 10 MG: 10 TABLET ORAL at 09:00

## 2023-09-20 RX ADMIN — FAMOTIDINE 20 MG: 20 TABLET ORAL at 22:28

## 2023-09-20 ASSESSMENT — PAIN SCALES - WONG BAKER: WONGBAKER_NUMERICALRESPONSE: 0

## 2023-09-20 ASSESSMENT — PAIN SCALES - GENERAL: PAINLEVEL_OUTOF10: 0

## 2023-09-20 NOTE — PROGRESS NOTES
Patient states he has a biopsy at [facility] on 9/28, and would like all of his blood thinners to be stopped today 9/20 for the procedure. Will notify on coming shift to notify Dr. Tasneem Olivia during his office hours.

## 2023-09-20 NOTE — PROGRESS NOTES
Patient states he is having a brain biopsy in 7 days and states he has to be off of blood thinners for 7 days prior to the procedure. Therefore he is refusing his blood thinners. Dr. Melodie Rojo aware and education provided to patient regarding risks of being off blood thinners.

## 2023-09-20 NOTE — FLOWSHEET NOTE
[x] daily progress note       [] discharge       Patient Name:  Adiit Webster   :  1970 MRN: [id number]  Room:  [address] [place] [address] Date of Admission: 2023  Rehabilitation Diagnosis:   CVA (cerebral vascular accident) [facility]) [I63.9]  Acute CVA (cerebrovascular accident) [facility]) [I63.9]       Date 2023       Day of ARU Week:  3   Time IN/OUT    Individual Tx Minutes 60   TOTAL Tx Time Mins 60   Restrictions Restrictions/Precautions  Restrictions/Precautions: General Precautions, Fall Risk      Communication with other providers: [x]   OK to see per nursing:     []   Spoke with team member regarding:      Subjective observations and cognitive status: Pt seen in semi-arreaga's position in bed at beginning of treatment. Agreeable to therapy. Pain level/location: 0/10          Discharge recommendations  Anticipated discharge date:  2023  Destination: []home alone   []home alone with assist PRN     [] home w/ family      [] Continuous supervision  []SNF    [] Assisted living     [] Other:   Continued therapy: []C PT  []OUTPATIENT  PT   [] No Further PT  Equipment needs: RW  Aditi Webster requires the assistance of a wheeled walker to successfully ambulate from room to room at home to allow completion of daily living tasks such as: bathing, toileting, dressing and grooming. A wheeled walker is necessary due to the patient's unsteady gait, upper body weakness, inability to  a standard walker. This patient can ambulate only by pushing a walker instead of using a lesser assistive device such as a cane or crutch.        Bed Mobility:           Rolling R/L:  Independent   Scooting:  Independent   Lying --> Sit:  Independent   Sit --> lying:  Independent  Pt practiced in regular, flat bed without rails     Transfers:    Sit--> Stand:  SBA  Stand --> Sit:   SBA  Chair-->Bed/Bed --> Chair:   SBA  Car Transfers:  SBA  Assistive device required for transfer:   RW    Gait:    Distance:  12' with procurement, Modalities, Positioning, Therapeutic activities    Electronically signed by   Polly Paredes [id number]  9/20/2023, 4:07 PM

## 2023-09-20 NOTE — PROGRESS NOTES
[place]    : 1970  Acct #: [de-identified]  MRN: [id number]              PM&R Progress Note      Admitting diagnosis: ***    Comorbid diagnoses impacting rehabilitation: ***    Chief complaint: ***    Prior (baseline) level of function: Independent.     Current level of function:         Current  IRF-JYOTI and Goals:   Occupational Therapy:    Short Term Goals  Time Frame for Short Term Goals: STGs=LTGs :   Long Term Goals  Time Frame for Long Term Goals : ~10 days or until d/c  Long Term Goal 1: Pt will complete grooming tasks Ind  Long Term Goal 2: Pt will complete total body bathing mod I using AE PRN  Long Term Goal 3: Pt will complete UB dressing Ind using hemitechnique  Long Term Goal 4: Pt will complete LB dressing mod I  Long Term Goal 5: Pt will doff/don footwear mod I  Additional Goals?: Yes  Long Term Goal 6: Pt will complete toileting mod I  Long Term Goal 7: Pt will complete functional transfers (bed, chair, toilet, shower) c DME PRN and mod I  Long Term Goal 8: Pt will perform therex/therax to facilitate increased strength/endurance/ax tolerance (c emphasis on dynamic standing balance/tolerance >8 mins and RUE neuro re-ed) c SBA  Long Term Goal 9: Pt will complete simple homemaking tasks c DME PRN and mod I :                                       Eating: Eating  Assistance Needed: Independent  Comment: Pt able to open packages/containers with compensatory strategies  CARE Score: 6  Discharge Goal: Set-up or clean-up assistance       Oral Hygiene: Oral Hygiene  Assistance Needed: Independent  Comment: seated in WC at sink  CARE Score: 6  Discharge Goal: Independent    UB/LB Bathing: Shower/Bathe Self  Assistance Needed: Supervision or touching assistance  Comment: Entirety of shower seated; able to wash distal LEs in figure four position and lateral lean to wash bottom  CARE Score: 4  Discharge Goal: Independent    UB Dressing: Upper Body Dressing  Assistance Needed: Setup or clean-up assistance  Comment: S/U assist to doff/don shirt  CARE Score: 5  Discharge Goal: Independent         LB Dressing: Lower Body Dressing  Assistance Needed: Supervision or touching assistance  Comment: Pt able to thread BLEs into short in figure 4 position; close SBA in stance to manage over hips  CARE Score: 4  Discharge Goal: Independent    Donning and [place] Footwear: Putting On/Taking Off Footwear  Assistance Needed: Supervision or touching assistance  Comment: to doff/don socks  CARE Score: 4  Discharge Goal: Independent      Toiletin [address] needed: Partial/moderate assistance  Comment: Pt able to complete clothing management up/down; required assist for BM hygiene  CARE Score: 3  Discharge Goal: Independent      Toilet Transfers:   Toilet Transfer  Assistance needed: Supervision or touching assistance  Comment: SBA using RW and grab bars  CARE Score: 4  Discharge Goal: Independent    Physical Therapy:   Short Term Goals  Time Frame for Short Term Goals: 7-10 days STG=LTG  Short Term Goal 1: pt will perform bed mobility with mod I  Short Term Goal 2: Pt will perform sit to stand, w/c<>bed and car transfers with mod I  Short Term Goal 3: Pt will perform gait with appropriate device 150' on level surface and at least 10' on ramp with mod I  Short Term Goal 4: Pt will ascend/descend curb step with LRAD and 4 steps with rail with mod I, 12 steps with supervision  Short Term Goal 5: Pt will  light object with reacher with mod I            Bed Mobility:   Sit to Lying  Assistance Needed: Independent  Comment: with bed features  CARE Score: 6  Discharge Goal: Independent  Roll Left and Right  Assistance Needed: Independent  Comment: with bed features  CARE Score: 6  Discharge Goal: Independent  Lying to Sitting on Side of Bed  Assistance Needed: Independent  Comment: with bed features  CARE Score: 6  Discharge Goal: Independent    Transfers:    Sit to Stand  Assistance Needed: Supervision

## 2023-09-20 NOTE — PROGRESS NOTES
Physical Therapy      [x] daily progress note       [] discharge       Patient Name:  Queen José Luis   :  1970 MRN: [id number]  Room:  [address] [place], [place] [address] Date of Admission: 2023    Rehabilitation Diagnosis:     CVA (cerebral vascular accident) [facility]) [I63.9]  Acute CVA (cerebrovascular accident) [facility]) [I63.9]    Date  2023   TIMES IN/OUT   [id number]   Group Tx Minutes 60   TOTAL Tx time seen 60   Variance Time    Variance Reason    [] Refusal due to   [] Medical hold/reason  [] Illness   [] Off Unit for test/procedure  [] Extra time needed to complete task  [] Other (specify)   Restrictions/Precautions Restrictions/Precautions  Restrictions/Precautions: General Precautions, Fall Risk      Current Diet/Swallowing Issues ADULT DIET; Regular; 5 carb choices (75 gm/meal)   Communication with other providers: [x] Ok to see per nursing at morning huddle  [] Medical hold and reason  [] Spoke with (team    member) regarding   Subjective observations: Pt agreeable to group session. Pain level/location: No reports of pain   Alarm placed/where? Fall Risk  [] Pt taken to DR for lunch with nsg present   [] Pt taken back to room with chair/bed alarm in place   [x] Pt taken back to room by other staff member       Group Activity:  Stroke Group: Patient participated in group activity including coordination exercises and discussion of benefits of activity while maintaining safety s/p CVA. Education provided to improve awareness of symptoms of stroke, stroke prevention, self awareness of deficits/limitations, limb protection and positioning, modification of ax's, transfer safety techniques, balance strategies, and coordination exercises. Patient performed a variety of seated and standing activities as able, with focus on technique and safety while benefiting from social & communication opportunities with other group members.       Functional areas targeted:   [x] Communication [x] Memory  [x] Coordination    [] Jennifer Safety [x] Problem Solving  [] Strengthening     [x] Attention  [] Endurance   [] Mobility    [x] Awareness/Insight [x] Balance  [x] Transfers  [x] Safety   [] Other (specify)  Participation:  [x] Actively participated    [] Required  cues for   [] Other (specify)    Education completed: modifications of ax's, energy conservation  Cognitive fx during this group: Problem solve benefits and precaution of activities s/p CVA  Family present: NO          Assessment / Impression                                                          Treatment/Activity Tolerance:   [x] Tolerated Treatment well:     [] Patient limited by fatigue/pain:       [] Patient limited by medical complications:    [] Adverse Reaction to Tx:   [] Significant change in status      Number of Minutes/Billable Intervention  Gait Training    Therapeutic Exercise    Neuro Re-Ed    Therapeutic Activity    Wheelchair Propulsion    Group 60   Other:    TOTAL 60       Social History  Social/Functional History  Lives With: Family (mom (back issues)and step-dad(recent CVA but doing well))  Type of Home: House  Home Layout: Two level, Able to Live on Main level with bedroom/bathroom  Home Access: Ramped entrance, Stairs to enter with rails  Entrance Stairs - Number of Steps: 3  Entrance Stairs - Rails: Right (one entrance on L, one entrace on R)  Bathroom Shower/Tub: Walk-in shower  Bathroom Toilet: Standard  Bathroom Equipment: Grab bars in shower, 3-in-1 commode, Built-in shower seat, Hand-held shower (has handles only on toilet, 3-in-1 used in shower)  Bathroom Accessibility: Walker accessible  Home Equipment: Walker, rolling, Cane, Reacher, Sock aid, Leg   Has the patient had two or more falls in the past year or any fall with injury in the past year?: No (one fall in July but no injury)  Receives Help From: Family  ADL Assistance: Independent  Homemaking Responsibilities: Yes  Meal Prep Responsibility: Secondary ([employer] helping out, mom cooks

## 2023-09-20 NOTE — PROGRESS NOTES
Occupational Therapy    Physical Rehabilitation: OCCUPATIONAL THERAPY     [x] daily progress note       [] discharge       Patient Name:  Alfonso Madrid   :  1970 MRN: [id number]  Room:  [address] [place] [address] Date of Admission: 2023  Rehabilitation Diagnosis:   CVA (cerebral vascular accident) [facility]) [I63.9]  Acute CVA (cerebrovascular accident) ([facility]) [I63.9]       Date 2023       Day of ARU Week:  3   Time IN/OUT 7762-7280   Individual Tx Minutes 60   Group Tx Minutes    Co-Treat Minutes    Concurrent Tx Minutes    TOTAL Tx Time Mins 60   Variance Time    Variance Time []   Refusal due to:     []   Medical hold/reason:    []   Illness   []   Off Unit for test/procedure  []   Extra time needed to complete task  []   Therapeutic need  []   Other (specify):   Restrictions Restrictions/Precautions: General Precautions, Fall Risk         Communication with other providers: [x]   OK to see per nursing:     []   Spoke with team member regarding:      Subjective observations and cognitive status: Pt sleeping on approach; easily awakened and agreeable to therapy.       Pain level/location:    /10       Location:    Discharge recommendations  Anticipated discharge date:    Destination: []home alone   []home alone w assist prn   [x] home w/ family    [] Continuous supervision       []SNF    [] Assisted living     [] Other:   Continued therapy: []HHC OT  [x]OUTPATIENT  OT   [] No Further OT  Equipment needs: none        ADLs:    Eating: Eating  Assistance Needed: Independent  Comment: Pt able to open packages/containers with compensatory strategies  CARE Score: 6  Discharge Goal: Set-up or clean-up assistance       Oral Hygiene: Oral Hygiene  Assistance Needed: Supervision or touching assistance  Comment: In stance at sink  CARE Score: 4  Discharge Goal: Independent    UB/LB Bathing: Shower/Bathe Self  Assistance Needed: Independent  Comment: Entirety of shower seated; able to wash distal LEs in figure four

## 2023-09-20 NOTE — PATIENT CARE CONFERENCE
assistance  Comment: SBA using RW and grab bars  CARE Score: 4  Discharge Goal: Independent      Impairments/deficits, barriers:  Decreased balance, endurance, RUE strength and tone  Assessment  Performance deficits / Impairments: Decreased functional mobility , Decreased ADL status, Decreased ROM, Decreased strength, Decreased safe awareness, Decreased endurance, Decreased sensation, Decreased balance, Decreased high-level IADLs, Decreased fine motor control, Decreased posture, Decreased coordination  Decision Making: High Complexity  Equipment needed at discharge: none      COGNITIVE FUNCTION/SPEECH THERAPY (AS INDICATED)  LTG         Duration/Frequency of Treatment  Duration of Treatment: 2x/week x1 week 30 mins min                     Short Term Goals  Time Frame for Short Term Goals: Pt will improve overall speech precision in conversation. 2x/week x1 week 30 mins min  Goal 1: Pt will complete education and practice in lingual ROM/STR/COORD exercises to facilitate improved conversational speech. Pt self monitors well. Goal 2: Pt will monitor rate in oral reading and conversation with no cues.                         Ongoing impairments or deficits or barriers:   Areas where progress has been made:  Strategies that improve performance:    Nursing Current Medical Status:   [x] Is continent of bowel and bladder     [] Is incontinent of bowel and bladder    [x] Has had an adequate number of bowel movements   [] Urinates with no urinary retention >300ml in bladder   [] Targeting bladder protocol with pagan removal   [x] Maintaining O2 SATs at 92% or greater   [x] Has pain managed while on ARU         [x] Has had no skin breakdown while on ARU   [] Has improved skin integrity via wound measurements   [] Has no signs/symptoms of infection at the wound site   [] Pressure wounds Stage/Location:    [] Arrived on unit with pressure wound  [x] Has been free from injury during hospitalization   [] Has experienced a fall term goals and is actively participating in and has a reasonable expectation to continue to benefit from the intensive rehabilitation therapy program   []  The estimated discharge date has been changed from initial team conference due to:   []  The estimated discharge destination has been changed from initial team conference due to:         Ongoing tx following discharge: []HHC     [x]OUTPATIENT PT OT  [] No Further Treatment     [] Family/Caregiver Training  []  Transitional Living Arrangement   [] Home Assessment (date  )     [] Family Conference   []  Therapeutic Pass       []  Other: (specify)    Estimated Discharge Date: 9/23    Estimated Discharge Destination: []home alone   []home alone with assist prn  []Continuous supervision [x]Return home with s/o/spouse/family   [] Assisted living    []SNF     Team members participating in today's conference. [x] Raciel Arriola, Medical Director      [x] CEDRIC ElliottT,         [] Glenna Zaidi, KURT Nurse Manager   [] Magalie Viera RN Nurse Manager     []  Shashank Ruiz, PT  [x]  Serafin Montgomery, PT       [x] Ana Go, OT   [] Jorge Hernandez OT  [] Kaylin Harvey, OT     []  Frederick Worrell, SLP    []  Husam Ricci, SLP   [] Uri Smallwood, SLP      []Mei Menard,   [x]Quynh Thacker MSW, LSW,      [] Cassi Sandy RN   [x] Santiago Corley RN    [] Alfonzo Howell RN    [] Katia Huff RN []Kandi Morocho RN       I have led this Team Conference and agree with the plan, Raciel Arriola MD, 9/21/2023, 1:54 PM  []   I, the Medical Director, was required to lead today's conference via telephone due to an unanticipated scheduling conflict. I agree with the decisions made by the inter-disciplinary team at today's meeting. Goals have been updated to reflect recent status.     Team conference note transcribed this date by: Jak Henry, Rehab Therapy Jordyn

## 2023-09-21 LAB
GLUCOSE BLD-MCNC: 108 MG/DL (ref 70–99)
GLUCOSE BLD-MCNC: 124 MG/DL (ref 70–99)
GLUCOSE BLD-MCNC: 160 MG/DL (ref 70–99)
GLUCOSE BLD-MCNC: 191 MG/DL (ref 70–99)

## 2023-09-21 PROCEDURE — 82962 GLUCOSE BLOOD TEST: CPT

## 2023-09-21 PROCEDURE — 6370000000 HC RX 637 (ALT 250 FOR IP): Performed by: PHYSICAL MEDICINE & REHABILITATION

## 2023-09-21 PROCEDURE — 94761 N-INVAS EAR/PLS OXIMETRY MLT: CPT

## 2023-09-21 PROCEDURE — 1280000000 HC REHAB R&B

## 2023-09-21 PROCEDURE — 94150 VITAL CAPACITY TEST: CPT

## 2023-09-21 PROCEDURE — 97112 NEUROMUSCULAR REEDUCATION: CPT

## 2023-09-21 PROCEDURE — 97110 THERAPEUTIC EXERCISES: CPT

## 2023-09-21 PROCEDURE — 97530 THERAPEUTIC ACTIVITIES: CPT

## 2023-09-21 PROCEDURE — 97116 GAIT TRAINING THERAPY: CPT

## 2023-09-21 PROCEDURE — 97535 SELF CARE MNGMENT TRAINING: CPT

## 2023-09-21 RX ADMIN — ATORVASTATIN CALCIUM 80 MG: 40 TABLET, FILM COATED ORAL at 20:54

## 2023-09-21 RX ADMIN — GABAPENTIN 300 MG: 300 CAPSULE ORAL at 09:35

## 2023-09-21 RX ADMIN — GABAPENTIN 300 MG: 300 CAPSULE ORAL at 14:05

## 2023-09-21 RX ADMIN — ESCITALOPRAM OXALATE 10 MG: 10 TABLET ORAL at 09:35

## 2023-09-21 RX ADMIN — AMLODIPINE BESYLATE 10 MG: 10 TABLET ORAL at 09:35

## 2023-09-21 RX ADMIN — GABAPENTIN 300 MG: 300 CAPSULE ORAL at 20:54

## 2023-09-21 ASSESSMENT — PAIN SCALES - WONG BAKER
WONGBAKER_NUMERICALRESPONSE: 0

## 2023-09-21 ASSESSMENT — PAIN SCALES - GENERAL
PAINLEVEL_OUTOF10: 0

## 2023-09-21 NOTE — PROGRESS NOTES
Physical Therapy    [x] daily progress note       [] discharge       Patient Name:  Petey Gamez   :  1970 MRN: [id number]  Room:  [address] [place] [address] Date of Admission: 2023  Rehabilitation Diagnosis:   CVA (cerebral vascular accident) [facility]) [I63.9]  Acute CVA (cerebrovascular accident) ([address]) [I63.9]       Date 2023       Day of ARU Week:  4   Time IN/OUT 0852-2006   Individual Tx Minutes 60   Group Tx Minutes    Co-Treat Minutes    Concurrent Tx Minutes    TOTAL Tx Time Mins 60   Variance Time    Variance Time []   Refusal due to:     []   Medical hold/reason:    []   Illness   []   Off Unit for test/procedure  []   Extra time needed to complete task  []   Therapeutic need  []   Other (specify):   Restrictions Restrictions/Precautions  Restrictions/Precautions: General Precautions, Fall Risk      Communication with other providers: [x]   OK to see per nursing:     []   Spoke with team member regarding:      Subjective observations and cognitive status: Pt laying in bed awake. He reports feeling good today     Pain level/location:  0  /10       Location:    Discharge recommendations  Anticipated discharge date:  23  Destination: []home alone   []home alone with assist PRN     [] home w/ family      [] Continuous supervision  []SNF    [] Assisted living     [] Other:   Continued therapy: []HHC PT  []OUTPATIENT  PT   [] No Further PT  Equipment needs: Petey Gamez requires the assistance of a wheeled walker to successfully ambulate from room to room at home to allow completion of daily living tasks such as: bathing, toileting, dressing and grooming. A wheeled walker is necessary due to the patient's unsteady gait, upper body weakness, inability to  a standard walker. This patient can ambulate only by pushing a walker instead of using a lesser assistive device such as a cane or crutch.            Bed Mobility:           []   Pt received out of bed   Rolling R/L:  ind   Scooting: med box; sister assisting. Online bill pay. Outing to doctors appts. Additional Comments: pt sleeps in regular flat bed    Objective                                                                                    Goals:  (Update in navigator)  Short Term Goals  Time Frame for Short Term Goals: 7-10 days STG=LTG  Short Term Goal 1: pt will perform bed mobility with mod I  Short Term Goal 2: Pt will perform sit to stand, w/c<>bed and car transfers with mod I  Short Term Goal 3: Pt will perform gait with appropriate device 150' on level surface and at least 10' on ramp with mod I  Short Term Goal 4: Pt will ascend/descend curb step with LRAD and 4 steps with rail with mod I, 12 steps with supervision  Short Term Goal 5: Pt will  light object with reacher with mod I:   :        Plan of Care                                                                              Times per week: 5 days per week for a minimum of 60 minutes/day plus group as appropriate for 60 minutes.   Treatment to include Current Treatment Recommendations: Strengthening, ROM, Balance training, Functional mobility training, Transfer training, IADL training, Neuromuscular re-education, Stair training, Gait training, Endurance training, Home exercise program, Safety education & training, Patient/Caregiver education & training, Equipment evaluation, education, & procurement, Modalities, Positioning, Therapeutic activities    Electronically signed by   HOWIE Mckeon,[id number]  9/21/2023, 8:26 AM

## 2023-09-21 NOTE — CARE COORDINATION
LSW met with patient and provided written communication following Care Conference. LSW informed patient of recommendations for 2WW, Outpatient PT, OT, Patient stated that he has a 2WW at home. Patient verbalized understanding. Whiteboard updated. D/C Plan:  Estimated Date: Sept 23  DME: has recommended DME  Outpatient:  PT, OT (Simba Callahan)  To:   Home with family (family will transport)

## 2023-09-21 NOTE — PROGRESS NOTES
assistance  Comment: Therapist handed patient suitcase; pt able to retrieve clothing seated on bed and doff/don shirt IND  CARE Score: 5  Discharge Goal: Independent         LB Dressing: Lower Body Dressing  Assistance Needed: Supervision or touching assistance  Comment: Pt able to thread BLEs into shorts in figure 4 position; CGA in stance to manage over hips  CARE Score: 4  Discharge Goal: Independent    Donning and [place] Footwear: Putting On/Taking Off Footwear  Assistance Needed: Setup or clean-up assistance  Comment: to doff/don socks in figure 4 position with increased time  CARE Score: 5  Discharge Goal: Independent      Toiletin [address] needed: Supervision or touching assistance  Comment: SBA in stance to manage pants up/down; pt urinated only in stance  CARE Score: 4  Discharge Goal: Independent      Toilet Transfers:   Toilet Transfer  Assistance needed: Supervision or touching assistance  Comment: SBA using RW and grab bars  CARE Score: 4  Discharge Goal: Independent    Physical Therapy:   Short Term Goals  Time Frame for Short Term Goals: 7-10 days STG=LTG  Short Term Goal 1: pt will perform bed mobility with mod I  Short Term Goal 2: Pt will perform sit to stand, w/c<>bed and car transfers with mod I  Short Term Goal 3: Pt will perform gait with appropriate device 150' on level surface and at least 10' on ramp with mod I  Short Term Goal 4: Pt will ascend/descend curb step with LRAD and 4 steps with rail with mod I, 12 steps with supervision  Short Term Goal 5: Pt will  light object with reacher with mod I            Bed Mobility:   Sit to Lying  Assistance Needed: Independent  Comment: Practiced in regular, flat bed without rails  CARE Score: 6  Discharge Goal: Independent  Roll Left and Right  Assistance Needed: Independent  Comment: Practiced in regular, flat bed without rails  CARE Score: 6  Discharge Goal: Independent  Lying to Sitting on Side of Bed  Assistance Needed: Independent  Comment: Practiced in regular, flat bed without rails  CARE Score: 6  Discharge Goal: Independent    Transfers:    Sit to Stand  Assistance Needed: Supervision or touching assistance  Comment: SBA with RW  CARE Score: 4  Discharge Goal: Independent  Chair/Bed-to-Chair Transfer  Assistance Needed: Supervision or touching assistance  Comment: SBA with RW  CARE Score: 4  Discharge Goal: Independent     Car Transfer  Assistance Needed: Supervision or touching assistance  Comment: SBA with RW  CARE Score: 4  Discharge Goal: Independent    Ambulation:    Walking Ability  Does the Patient Walk?: Yes     Walk 10 Feet  Assistance Needed: Supervision or touching assistance  Comment: CGA-SBA with RW  CARE Score: 4  Discharge Goal: Independent     Walk 50 Feet with Two Turns  Assistance Needed: Supervision or touching assistance  Comment: CGA-SBA with RW  CARE Score: 4  Discharge Goal: Independent     Walk 150 Feet  Assistance Needed: Supervision or touching assistance  Comment: CGA-SBA with RW  CARE Score: 4  Discharge Goal: Independent     Walking 10 Feet on Uneven Surfaces  Assistance Needed: Supervision or touching assistance  Comment: CGA with RW  CARE Score: 4  Discharge Goal: Independent     1 Step (Curb)  Assistance Needed: Supervision or touching assistance  Comment: CGA with RW (4\" step height)  CARE Score: 4     4 Steps  Assistance Needed: Supervision or touching assistance  Comment: CGA with left rail  CARE Score: 4  Discharge Goal: Independent     12 Steps  Assistance Needed: Supervision or touching assistance  Comment: CGA with left rail  CARE Score: 4  Discharge Goal: Supervision or touching assistance       Wheelchair:  w/c Ability: Wheelchair Ability  Uses a Wheelchair and/or Scooter?: No                Balance:        Object: Picking Up Object  Assistance Needed: Supervision or touching assistance  Comment: SBA with RW and reacher  CARE Score: 4  Discharge Goal:

## 2023-09-21 NOTE — PROGRESS NOTES
Occupational Therapy    Physical Rehabilitation: OCCUPATIONAL THERAPY     [x] daily progress note       [] discharge       Patient Name:  Juan Mendez   :  1970 MRN: [id number]  Room:  [address] [place] [address] Date of Admission: 2023  Rehabilitation Diagnosis:   CVA (cerebral vascular accident) [facility]) [I63.9]  Acute CVA (cerebrovascular accident) [facility]) [I63.9]       Date 2023       Day of ARU Week:  4   Time IN/OUT 9022-3072  8890-8924   Individual Tx Minutes 60+60   Group Tx Minutes    Co-Treat Minutes    Concurrent Tx Minutes    TOTAL Tx Time Mins 120   Variance Time    Variance Time []   Refusal due to:     []   Medical hold/reason:    []   Illness   []   Off Unit for test/procedure  []   Extra time needed to complete task  []   Therapeutic need  []   Other (specify):   Restrictions Restrictions/Precautions: General Precautions, Fall Risk         Communication with other providers: [x]   OK to see per nursing:     []   Spoke with team member regarding:      Subjective observations and cognitive status: Pt resting in bed on approach; pleasant and agreeable to therapy.       Pain level/location:    /10       Location:    Discharge recommendations  Anticipated discharge date:    Destination: []home alone   []home alone w assist prn   [x] home w/ family    [] Continuous supervision       []SNF    [] Assisted living     [] Other:   Continued therapy: []HHC OT  [x]OUTPATIENT  OT   [] No Further OT  Equipment needs: none        ADLs:      UB Dressing: Upper Body Dressing  Assistance Needed: Setup or clean-up assistance  Comment: Pt had clothes sitting on counter, therapist handed pt shirt and pt able to doff/don shirt  CARE Score: 5  Discharge Goal: Independent         LB Dressing: Lower Body Dressing  Assistance Needed: Supervision or touching assistance  Comment: Pt able to thread BLEs into shirt in figure four position with increased time; SBA in stance to manage over hips  CARE Score: 4  Discharge Goal: complete grooming tasks Ind  Long Term Goal 2: Pt will complete total body bathing mod I using AE PRN  Long Term Goal 3: Pt will complete UB dressing Ind using hemitechnique  Long Term Goal 4: Pt will complete LB dressing mod I  Long Term Goal 5: Pt will doff/don footwear mod I  Additional Goals?: Yes  Long Term Goal 6: Pt will complete toileting mod I  Long Term Goal 7: Pt will complete functional transfers (bed, chair, toilet, shower) c DME PRN and mod I  Long Term Goal 8: Pt will perform therex/therax to facilitate increased strength/endurance/ax tolerance (c emphasis on dynamic standing balance/tolerance >8 mins and RUE neuro re-ed) c SBA  Long Term Goal 9: Pt will complete simple homemaking tasks c DME PRN and mod I:        Plan of Care                                                                              Times per week: 5 days per week for a minimum of 60 minutes/day plus group as appropriate for 60 minutes.   Treatment to include Occupational Therapy Plan  Current Treatment Recommendations: Strengthening, ROM, Balance training, Functional mobility training, Endurance training, Neuromuscular re-education, Safety education & training, Patient/Caregiver education & training, Equipment evaluation, education, & procurement, Self-Care / ADL, Return to work related activity, Home management training, Coordination training, Sensory integration    Electronically signed by   TITA Lam,  9/21/2023, 8:17 AM

## 2023-09-21 NOTE — PLAN OF CARE
Problem: Discharge Planning  Goal: Discharge to home or other facility with appropriate resources  Outcome: Progressing     Problem: Safety - Adult  Goal: Free from fall injury  Outcome: Progressing  Flowsheets (Taken 9/21/2023 1020)  Free From Fall Injury: Instruct family/caregiver on patient safety     Problem: Pain  Goal: Verbalizes/displays adequate comfort level or baseline comfort level  Outcome: Progressing     Problem: Chronic Conditions and Co-morbidities  Goal: Patient's chronic conditions and co-morbidity symptoms are monitored and maintained or improved  Outcome: Progressing     Problem: Skin/Tissue Integrity  Goal: Absence of new skin breakdown  Description: 1. Monitor for areas of redness and/or skin breakdown  2. Assess vascular access sites hourly  3. Every 4-6 hours minimum:  Change oxygen saturation probe site  4. Every 4-6 hours:  If on nasal continuous positive airway pressure, respiratory therapy assess nares and determine need for appliance change or resting period.   Outcome: Progressing

## 2023-09-22 LAB
GLUCOSE BLD-MCNC: 111 MG/DL (ref 70–99)
GLUCOSE BLD-MCNC: 121 MG/DL (ref 70–99)
GLUCOSE BLD-MCNC: 161 MG/DL (ref 70–99)
GLUCOSE BLD-MCNC: 196 MG/DL (ref 70–99)

## 2023-09-22 PROCEDURE — 97530 THERAPEUTIC ACTIVITIES: CPT

## 2023-09-22 PROCEDURE — 97542 WHEELCHAIR MNGMENT TRAINING: CPT

## 2023-09-22 PROCEDURE — 97535 SELF CARE MNGMENT TRAINING: CPT

## 2023-09-22 PROCEDURE — 82962 GLUCOSE BLOOD TEST: CPT

## 2023-09-22 PROCEDURE — 97116 GAIT TRAINING THERAPY: CPT

## 2023-09-22 PROCEDURE — 97112 NEUROMUSCULAR REEDUCATION: CPT

## 2023-09-22 PROCEDURE — 6370000000 HC RX 637 (ALT 250 FOR IP): Performed by: PHYSICAL MEDICINE & REHABILITATION

## 2023-09-22 PROCEDURE — 94150 VITAL CAPACITY TEST: CPT

## 2023-09-22 PROCEDURE — 1280000000 HC REHAB R&B

## 2023-09-22 PROCEDURE — 94761 N-INVAS EAR/PLS OXIMETRY MLT: CPT

## 2023-09-22 PROCEDURE — 97110 THERAPEUTIC EXERCISES: CPT

## 2023-09-22 RX ORDER — ASPIRIN 81 MG/1
81 TABLET, CHEWABLE ORAL DAILY
Qty: 90 TABLET | Refills: 1 | Status: SHIPPED | OUTPATIENT
Start: 2023-09-22

## 2023-09-22 RX ADMIN — AMLODIPINE BESYLATE 10 MG: 10 TABLET ORAL at 07:49

## 2023-09-22 RX ADMIN — ESCITALOPRAM OXALATE 10 MG: 10 TABLET ORAL at 07:49

## 2023-09-22 RX ADMIN — GABAPENTIN 300 MG: 300 CAPSULE ORAL at 14:01

## 2023-09-22 RX ADMIN — ATORVASTATIN CALCIUM 80 MG: 40 TABLET, FILM COATED ORAL at 21:57

## 2023-09-22 RX ADMIN — GABAPENTIN 300 MG: 300 CAPSULE ORAL at 07:49

## 2023-09-22 RX ADMIN — GABAPENTIN 300 MG: 300 CAPSULE ORAL at 21:58

## 2023-09-22 ASSESSMENT — PAIN SCALES - GENERAL
PAINLEVEL_OUTOF10: 0

## 2023-09-22 ASSESSMENT — PAIN SCALES - WONG BAKER
WONGBAKER_NUMERICALRESPONSE: 0
WONGBAKER_NUMERICALRESPONSE: 0

## 2023-09-22 NOTE — PROGRESS NOTES
Physical Therapy  [x] daily progress note       [x] discharge       Patient Name:  Julia Oneal   :  1970 MRN: [id number]  Room:  [address] [place], [place] [address] Date of Admission: 2023  Rehabilitation Diagnosis:   CVA (cerebral vascular accident) [facility]) [I63.9]  Acute CVA (cerebrovascular accident) ([facility]) [I63.9]       Date 2023       Day of ARU Week:  5   Time IN/OUT 1335/1435   Individual Tx Minutes 60   Group Tx Minutes    Co-Treat Minutes    Concurrent Tx Minutes    TOTAL Tx Time Mins 60   Variance Time    Variance Time []   Refusal due to:     []   Medical hold/reason:    []   Illness   []   Off Unit for test/procedure  []   Extra time needed to complete task  []   Therapeutic need  []   Other (specify):   Restrictions Restrictions/Precautions  Restrictions/Precautions: General Precautions, Fall Risk      Interdisciplinary communication [x]   Cleared for therapy per nursing     []   RN notified about issues during session  []   RN updated on pt performance  []   Spoke with   []   Spoke with OT  []   Spoke with MD  []   Other:    Subjective observations and cognitive status: Pt in bed, agreeable to therapy. Pt did have one instance when fatigued and had significantly increased word finding difficulty, but once sat and rested, was able to resume where he had stopped trying to express himself.       Pain level/location:  0  /10       Location:    Discharge recommendations  Anticipated discharge date:    Destination: []home alone   []home alone with assist PRN     [x] home w/ family      [] Continuous supervision  []SNF    [] Assisted living     [] Other:  Continued therapy: []C PT  [x]OUTPATIENT PT   [] No Further PT  []SNF PT  Caregiver training recommended: []Yes  [] No   Equipment needs: 2ww, w/c     PT IRF-JYOTI scores and goals for discharge assessment:   Roll Left and Right  Assistance Needed: Independent  Comment: standard bed  CARE Score: 6  Discharge Goal: Independent    Sit to Lying  Assistance Needed: Independent  Comment: standard bed  CARE Score: 6  Discharge Goal: Independent    Lying to Sitting on Side of Bed  Assistance Needed: Independent  Comment: standard bed  CARE Score: 6  Discharge Goal: Independent    Sit to Stand  Assistance Needed: Supervision or touching assistance  Comment: Supervision for balance at RW  CARE Score: 4  Discharge Goal: Independent    Chair/Bed-to-Chair Transfer  Assistance Needed: Supervision or touching assistance  Comment: SB-Supervision for balance with RW  CARE Score: 4  Discharge Goal: Independent        Car Transfer  Assistance Needed: Supervision or touching assistance  Comment: SB-Supervision with RW for SPT, and transfer from car seat, INdep for LEs in/out of car  CARE Score: 4  Discharge Goal: Independent    Walk 10 Feet?   Walk 10 Feet?: Yes    1 Step  1 Step?: Yes    Picking Up Object  Assistance Needed: Supervision or touching assistance  Comment: SBA for balance at  using reacher, req safety cues for positioning of body in RW due to mild unsteadiness  CARE Score: 4  Discharge Goal: Independent          Walk 10 Feet  Assistance Needed: Supervision or touching assistance  Comment: SBA for balance at RW  CARE Score: 4  Discharge Goal: Independent    Walk 50 Feet with Two Turns  Assistance Needed: Supervision or touching assistance  Comment: SBA for balance at rW  CARE Score: 4  Discharge Goal: Independent    Walk 150 Feet  Assistance Needed: Supervision or touching assistance  Comment: SBA for balance at RW  CARE Score: 4  Discharge Goal: Independent    Walking 10 Feet on Uneven Surfaces  Assistance Needed: Supervision or touching assistance  Comment: SBA with RW for balance  CARE Score: 4  Discharge Goal: Independent  In pm pt performed gait up and down outdoor ramp with rail with cues for deviation correction, primarily training in heel strick to toe off to limit circumduction with pt demonstrating improved mechanics with increased

## 2023-09-22 NOTE — CARE COORDINATION
ARU  Discharge Summary    D/C Date: 09/23/2023    Patient discharged to: Home with family    Transported by: Family (around 10:00 AM)    Referrals made to: [facility]    Additional information: Follow up appointments scheduled with Maria Del Rosario Mcclain MD Tuesday Sep 26, 2023 1:30 PM, Janalyn Duverney, PA-C Wednesday Sep 27, 2023 11:20 AM, Richard Peabody, MD Thursday Sep 28, 2023 9:45 AM, SILVER Coates Friday Oct 13, 2023 8:00 AM, ADELFO Blunt CNP Monday Oct 16, 2023 2:45 PM. Patient notified, AVS updated.      Caregiver training: none requested    Discharge BIMS completed:  [x]

## 2023-09-22 NOTE — PROGRESS NOTES
Occupational Therapy    Physical Rehabilitation: OCCUPATIONAL THERAPY     [x] daily progress note       [] discharge       Patient Name:  Maximiano Halsted   :  1970 MRN: [id number]  Room:  [address] [place] [address] Date of Admission: 2023  Rehabilitation Diagnosis:   CVA (cerebral vascular accident) [facility]) [I63.9]  Acute CVA (cerebrovascular accident) ([facility]) [I63.9]       Date 2023       Day of ARU Week:  5   Time IN/OUT 6637-7519   Individual Tx Minutes 60   Group Tx Minutes    Co-Treat Minutes    Concurrent Tx Minutes    TOTAL Tx Time Mins 60   Variance Time    Variance Time []   Refusal due to:     []   Medical hold/reason:    []   Illness   []   Off Unit for test/procedure  []   Extra time needed to complete task  []   Therapeutic need  []   Other (specify):   Restrictions Restrictions/Precautions: General Precautions, Fall Risk         Communication with other providers: [x]   OK to see per nursing:     []   Spoke with team member regarding:      Subjective observations and cognitive status: Pt resting in bed on approach; pleasant and agreeable to therapy.       Pain level/location:    /10       Location:    Discharge recommendations  Anticipated discharge date:    Destination: []home alone   []home alone w assist prn   [x] home w/ family    [] Continuous supervision       []SNF    [] Assisted living     [] Other:   Continued therapy: []HHC OT  [x]OUTPATIENT  OT   [] No Further OT  Equipment needs: none        ADLs:    Eating: Eating  Assistance Needed: Independent  Comment: Pt able to open packages/containers with compensatory strategies  CARE Score: 6  Discharge Goal: Set-up or clean-up assistance       Oral Hygiene: Oral Hygiene  Assistance Needed: Independent  Comment: seated in WC at sink  CARE Score: 6  Discharge Goal: Independent    UB/LB Bathing: Shower/Bathe Self  Assistance Needed: Independent  Comment: Entirety of shower seated; able to wash distal LEs in figure four position and lateral lean to

## 2023-09-22 NOTE — PROGRESS NOTES
Physical Therapy        [x] daily progress note       [] discharge       Patient Name:  Max Castillo   :  1970 MRN: [id number]  Room:  [address] [place], [place] [address] Date of Admission: 2023  Rehabilitation Diagnosis:   CVA (cerebral vascular accident) [facility]) [I63.9]  Acute CVA (cerebrovascular accident) ([address]) [I63.9]       Date 2023       Day of ARU Week:  5   Time IN/OUT 3277-3354   Individual Tx Minutes 60   TOTAL Tx Time Mins 60   Variance Time    Variance Time []   Refusal due to:     []   Medical hold/reason:    []   Illness   []   Off Unit for test/procedure  []   Extra time needed to complete task  []   Therapeutic need  []   Other (specify):   Restrictions Restrictions/Precautions  Restrictions/Precautions: General Precautions, Fall Risk      Communication with other providers: [x]   OK to see per nursing:     []   Spoke with team member regarding:      Subjective observations and cognitive status: Pt resting in bed, motivated to participate. Pain level/location:   0 /10       Location:    Discharge recommendations  Anticipated discharge date:    Destination: []home alone   []home alone with assist PRN     [] home w/ family      [] Continuous supervision  []SNF    [] Assisted living     [] Other:   Continued therapy: []HHC PT  []OUTPATIENT  PT   [] No Further PT  Equipment needs: ARTUR Castillo requires the assistance of a wheeled walker to successfully ambulate from room to room at home to allow completion of daily living tasks such as: bathing, toileting, dressing and grooming. A wheeled walker is necessary due to the patient's unsteady gait, upper body weakness, inability to  a standard walker. This patient can ambulate only by pushing a walker instead of using a lesser assistive device such as a cane or crutch.            Bed Mobility:         []   Pt received out of bed     Roll Left and Right  Assistance Needed: Independent  Comment: standard bed  CARE Score: 6  Discharge Goal:

## 2023-09-23 VITALS
HEART RATE: 67 BPM | HEIGHT: 72 IN | TEMPERATURE: 98 F | BODY MASS INDEX: 29.86 KG/M2 | DIASTOLIC BLOOD PRESSURE: 87 MMHG | RESPIRATION RATE: 16 BRPM | SYSTOLIC BLOOD PRESSURE: 134 MMHG | OXYGEN SATURATION: 97 % | WEIGHT: 220.46 LBS

## 2023-09-23 LAB — GLUCOSE BLD-MCNC: 122 MG/DL (ref 70–99)

## 2023-09-23 PROCEDURE — 94761 N-INVAS EAR/PLS OXIMETRY MLT: CPT

## 2023-09-23 PROCEDURE — 82962 GLUCOSE BLOOD TEST: CPT

## 2023-09-23 PROCEDURE — 6370000000 HC RX 637 (ALT 250 FOR IP): Performed by: PHYSICAL MEDICINE & REHABILITATION

## 2023-09-23 RX ADMIN — AMLODIPINE BESYLATE 10 MG: 10 TABLET ORAL at 08:07

## 2023-09-23 RX ADMIN — FAMOTIDINE 20 MG: 20 TABLET ORAL at 08:07

## 2023-09-23 RX ADMIN — ESCITALOPRAM OXALATE 10 MG: 10 TABLET ORAL at 08:07

## 2023-09-23 RX ADMIN — GABAPENTIN 300 MG: 300 CAPSULE ORAL at 08:07

## 2023-09-23 NOTE — PROGRESS NOTES
ARU Discharge Assessment - [facility]    Transportation  \"In the past 6-12 months, has lack of transportation kept you from medical appointments, meetings, work, or from getting things needed for daily living? \"Check all that apply:  [] A.  Yes, it has kept me from medical appointments or from getting my medications  [] B.  Yes, it has kept me from non-medical meetings, appointments, work, or from getting things that I need  [x] C.  No  [] X. Patient unable to respond    Provision of Current Reconciled Medication List to Subsequent Provider at Discharge     [x] No, current reconciled medication list not provided to the subsequent provider. NO if D/C home with Outpatient or no services   [] Yes, current reconciled medication list provided to the subsequent provider. YES if D/C to Acute hospital, SNF, home with home health  (**We MUST Select route of transmission below**)      [] Via Electronic Health Record   [] [facility] 123ContactForm  [] Verbal (e.g. in person, telephone, video conferencing)  [] Paper-based (e.g. fax, copies, printouts)   [] Other Methods (e.g. texting, email, CDs)    Provision of Current Reconciled Medication List to Patient at Discharge  [] No, current reconciled medication list not provided to the patient, family and/or caregiver. NO If D/C to Acute hospital, SNF, home with home health   [x] Yes, current reconciled medication list provided to the patient, family and/or caregiver.   YES if D/C home with Outpatient or no services   (**WE MUST Select route of transmission below**)     [] Via Electronic Health Record (e.g., electronic access to patient portal)   [] Via [employer]  [] Verbal (e.g. in person, telephone, video conferencing)  [x] Paper-based (e.g. fax, copies, printouts)   [] Other Methods (e.g. texting, email, CDs)    Health Literacy  \"How often do you need to have someone help you when you read instructions,

## 2023-09-23 NOTE — PROGRESS NOTES
Arvin Whittington    : 1970  Acct #: [de-identified]  MRN: [id number]              PM&R Progress Note      Admitting diagnosis: ***    Comorbid diagnoses impacting rehabilitation: ***    Chief complaint: ***    Prior (baseline) level of function: Independent.     Current level of function:         Current  IRF-JYOTI and Goals:   Occupational Therapy:    Short Term Goals  Time Frame for Short Term Goals: STGs=LTGs :   Long Term Goals  Time Frame for Long Term Goals : ~10 days or until d/c  Long Term Goal 1: Pt will complete grooming tasks Ind  Long Term Goal 2: Pt will complete total body bathing mod I using AE PRN  Long Term Goal 3: Pt will complete UB dressing Ind using hemitechnique  Long Term Goal 4: Pt will complete LB dressing mod I  Long Term Goal 5: Pt will doff/don footwear mod I  Additional Goals?: Yes  Long Term Goal 6: Pt will complete toileting mod I  Long Term Goal 7: Pt will complete functional transfers (bed, chair, toilet, shower) c DME PRN and mod I  Long Term Goal 8: Pt will perform therex/therax to facilitate increased strength/endurance/ax tolerance (c emphasis on dynamic standing balance/tolerance >8 mins and RUE neuro re-ed) c SBA  Long Term Goal 9: Pt will complete simple homemaking tasks c DME PRN and mod I :                                       Eating: Eating  Assistance Needed: Independent  Comment: Pt able to open packages/containers with compensatory strategies  CARE Score: 6  Discharge Goal: Set-up or clean-up assistance       Oral Hygiene: Oral Hygiene  Assistance Needed: Independent  Comment: seated in WC at sink  CARE Score: 6  Discharge Goal: Independent    UB/LB Bathing: Shower/Bathe Self  Assistance Needed: Independent  Comment: Entirety of shower seated; able to wash distal LEs in figure four position and lateral lean to wash bottom  CARE Score: 6  Discharge Goal: Independent    UB Dressing: Upper Body Dressing  Assistance Needed: Independent  Comment: able to retrieve clothing

## 2023-09-23 NOTE — PROGRESS NOTES
Discharge instructions given and signed. Patient  propelled by wheel chair with belongings to family auto. Discharge completed as ordered.

## 2023-09-25 NOTE — PROGRESS NOTES
Patient Name:  Albert Gil  Patient :  1970  Patient MRN:  [id number]     Primary Oncologist: Ben Zee MD  Referring Provider: Jameel Hwang PA-C     Date of Service: 2023      Reason for Consult: To evaluate the patient with cryptogenic stroke, h/o DVT, to r/o hypercoagulable state. Chief Complaint:    Chief Complaint   Patient presents with    Follow-up     Patient Active Problem List:     Charcot's joint of foot in type 2 diabetes mellitus ([address])     Skin callus     H/O sepsis     DM (diabetes mellitus) ([address])     Psoriasis     Essential hypertension     Leg edema, right     Right leg pain     Plantar wart, left foot     Callus of foot     Change in bowel movement     Abnormal CT of the abdomen     Smokeless tobacco use     Erectile dysfunction     History of colon polyps     Acute cerebrovascular accident (CVA) ([address])     Right hemiparesis ([address])     Difficulty transferring location     Anxiety and depression     Neuropathy     Vasculitis (HCC)    HPI:   Albert Gil is a 46 y.o. male with medical history significant for HTN, DM, psoriasis, anxiety, depression and arterial ischemic stroke, referred to me on 23 for evaluation of his cryptogenic stroke. He had right frontal lobe stroke as well as anterior left medulla ischemic stroke on 23. On 2023, he underwent diagnostic cerebral angiogram with Dr. Jayson Wakefield which demonstrated severe stenosis of the left MCA distal M1 segment at the origin of the superior and inferior divisions. Additionally there is diffuse luminal irregularities throughout the intracranial circulation. The etiology to these luminal irregularities was unknown at that time. High-grade stenosis of the left MCA was not improved after 10 mg infusion of verapamil. He then received high-dose methylprednisone 1 g/day for 5 days. Repeat cerebral angiogram on 2023 following course of high-dose steroids showed no improvement.        Cerebral vasculitis work-up

## 2023-09-26 ENCOUNTER — OFFICE VISIT (OUTPATIENT)
Dept: ONCOLOGY | Age: 53
End: 2023-09-26
Payer: COMMERCIAL

## 2023-09-26 ENCOUNTER — HOSPITAL ENCOUNTER (OUTPATIENT)
Dept: INFUSION THERAPY | Age: 53
Discharge: HOME | End: 2023-09-26
Payer: COMMERCIAL

## 2023-09-26 VITALS
HEART RATE: 75 BPM | SYSTOLIC BLOOD PRESSURE: 120 MMHG | OXYGEN SATURATION: 98 % | BODY MASS INDEX: 29.8 KG/M2 | WEIGHT: 220 LBS | DIASTOLIC BLOOD PRESSURE: 70 MMHG | RESPIRATION RATE: 18 BRPM | HEIGHT: 72 IN | TEMPERATURE: 97.7 F

## 2023-09-26 DIAGNOSIS — I63.9 ACUTE CEREBROVASCULAR ACCIDENT (CVA) (HCC): Primary | ICD-10-CM

## 2023-09-26 PROCEDURE — 3078F DIAST BP <80 MM HG: CPT | Performed by: INTERNAL MEDICINE

## 2023-09-26 PROCEDURE — 99213 OFFICE O/P EST LOW 20 MIN: CPT | Performed by: INTERNAL MEDICINE

## 2023-09-26 PROCEDURE — 3074F SYST BP LT 130 MM HG: CPT | Performed by: INTERNAL MEDICINE

## 2023-09-26 PROCEDURE — 99211 OFF/OP EST MAY X REQ PHY/QHP: CPT

## 2023-09-26 RX ORDER — METHOCARBAMOL 500 MG/1
TABLET, FILM COATED ORAL
COMMUNITY
Start: 2023-09-11

## 2023-09-26 NOTE — PROGRESS NOTES
MA Rooming Questions  Patient: Maximiano Halsted  MRN: [id number]    Date: 9/26/2023        1. Do you have any new issues? yes - had a stroke in August, has brain BX sched Thurs @ [facility]         2. Do you need any refills on medications?    no    3. Have you had any imaging done since your last visit? yes -     4. Have you been hospitalized or seen in the emergency room since your last visit here?   yes -     5. Did the patient have a depression screening completed today?  No    No data recorded     PHQ-9 Given to (if applicable):               PHQ-9 Score (if applicable):                     [] Positive     []  Negative              Does question #9 need addressed (if applicable)                     [] Yes    []  No               Crow Deleon CMA

## 2023-10-05 DIAGNOSIS — R11.0 NAUSEA: ICD-10-CM

## 2023-10-05 RX ORDER — ONDANSETRON 4 MG/1
4 TABLET, FILM COATED ORAL 3 TIMES DAILY PRN
Qty: 30 TABLET | Refills: 0 | OUTPATIENT
Start: 2023-10-05

## 2023-10-10 DIAGNOSIS — R11.0 NAUSEA: ICD-10-CM

## 2023-10-10 RX ORDER — ONDANSETRON 4 MG/1
4 TABLET, FILM COATED ORAL 3 TIMES DAILY PRN
Qty: 30 TABLET | Refills: 0 | OUTPATIENT
Start: 2023-10-10

## 2023-10-11 ENCOUNTER — TELEPHONE (OUTPATIENT)
Dept: FAMILY MEDICINE CLINIC | Age: 53
End: 2023-10-11

## 2023-10-11 NOTE — TELEPHONE ENCOUNTER
----- Message from Enrrique Grant sent at 10/11/2023 12:33 PM EDT -----  Subject: Message to Provider    QUESTIONS  Information for Provider? Pt needs to schedule an appt with PCP or come in   to have Staples removed from his head. They were put in on Aug. 28th. Please contact to schedule.   ---------------------------------------------------------------------------  --------------  Yuliya ATKINSON  [phone]; OK to leave message on voicemail,Do not leave any message,   patient will call back for answer  ---------------------------------------------------------------------------  --------------  SCRIPT ANSWERS  Relationship to Patient? Self  (Is the patient requesting to see the provider for a procedure?)?  Yes

## 2023-10-11 NOTE — TELEPHONE ENCOUNTER
Please contact client to discuss recommended removal date by PCP that placed staples. Schedule a nurse visit for staple removal after obtaining this information. Thank you. Misty Mera

## 2023-10-12 ENCOUNTER — NURSE ONLY (OUTPATIENT)
Dept: FAMILY MEDICINE CLINIC | Age: 53
End: 2023-10-12

## 2023-10-12 DIAGNOSIS — Z48.02 VISIT FOR SUTURE REMOVAL: Primary | ICD-10-CM

## 2023-10-12 NOTE — PATIENT INSTRUCTIONS
We are committed to providing you the best care possible. If you receive a survey after visiting one of our offices, please take time to share your experience concerning your physician office visit. These surveys are confidential and no health information about you is shared. We are eager to improve for you and continue to give you satisfactory care, we are counting on your feedback to help make that happen. Welcome to [address] [place] [address] and Pediatrics:    Did you know we now have a faster way for you to move through your appointment? For your convenience, we now have digital registration available. When you schedule your next appointment, you will receive a link via your email as well as a text message that will allow you to complete any paperwork digitally before your appointment.

## 2023-10-12 NOTE — PROGRESS NOTES
PT GIVEN DISCHARGE INSTRUCTIONS, VERBALIZES UNDERSTANDING. PT ASSESSMENT UNCHANGED, DISCHARGED IN STABLE CONDITION.         Mathew Ratliff RN  08/04/20 1075 Patient had 14 intact staples on right side of head from biopsy site. Site examined per PCP and was ok to remove staples . Removed every other staple then removed the rest.  Sight with out signs / symptoms of infection . Well approximated .   Steri strips applied

## 2023-10-16 ENCOUNTER — TELEPHONE (OUTPATIENT)
Dept: NEUROLOGY | Age: 53
End: 2023-10-16

## 2023-10-16 NOTE — TELEPHONE ENCOUNTER
Pt's mother called to see if pt should keep appt today with Dalilacabrera Ron as he was at [facility] since seeing her last and they have suggested [facility] follow up with them for now. After having Lazaro Bonner CNP and Dr. Garth Mcgee review his chart they agree that due to having testing still pending from [facility] pt should follow up there until they have a defined diagnosis and treatment plan since he is seeing multiple specialist there working on his case. Let Jerrod Young know their suggestion and she states she will let Snow Aldana know for now to continue at [facility] until stable on a treatment plan.

## 2023-11-02 ENCOUNTER — OFFICE VISIT (OUTPATIENT)
Dept: FAMILY MEDICINE CLINIC | Age: 53
End: 2023-11-02
Payer: COMMERCIAL

## 2023-11-02 VITALS
HEART RATE: 82 BPM | SYSTOLIC BLOOD PRESSURE: 118 MMHG | BODY MASS INDEX: 30.57 KG/M2 | RESPIRATION RATE: 20 BRPM | WEIGHT: 225.4 LBS | DIASTOLIC BLOOD PRESSURE: 66 MMHG | OXYGEN SATURATION: 98 %

## 2023-11-02 DIAGNOSIS — I77.6 VASCULITIS (HCC): ICD-10-CM

## 2023-11-02 DIAGNOSIS — E11.42 TYPE 2 DIABETES MELLITUS WITH DIABETIC POLYNEUROPATHY, WITHOUT LONG-TERM CURRENT USE OF INSULIN (HCC): ICD-10-CM

## 2023-11-02 DIAGNOSIS — Z09 HOSPITAL DISCHARGE FOLLOW-UP: Primary | ICD-10-CM

## 2023-11-02 PROBLEM — E11.65 UNCONTROLLED TYPE 2 DIABETES MELLITUS WITH HYPERGLYCEMIA (HCC): Status: ACTIVE | Noted: 2023-11-02

## 2023-11-02 LAB — HBA1C MFR BLD: 6.5 %

## 2023-11-02 PROCEDURE — 99214 OFFICE O/P EST MOD 30 MIN: CPT | Performed by: PHYSICIAN ASSISTANT

## 2023-11-02 PROCEDURE — 1111F DSCHRG MED/CURRENT MED MERGE: CPT | Performed by: PHYSICIAN ASSISTANT

## 2023-11-02 PROCEDURE — 83036 HEMOGLOBIN GLYCOSYLATED A1C: CPT | Performed by: PHYSICIAN ASSISTANT

## 2023-11-02 PROCEDURE — 3074F SYST BP LT 130 MM HG: CPT | Performed by: PHYSICIAN ASSISTANT

## 2023-11-02 PROCEDURE — 3044F HG A1C LEVEL LT 7.0%: CPT | Performed by: PHYSICIAN ASSISTANT

## 2023-11-02 PROCEDURE — 3078F DIAST BP <80 MM HG: CPT | Performed by: PHYSICIAN ASSISTANT

## 2023-11-02 RX ORDER — METFORMIN HYDROCHLORIDE 500 MG/1
500 TABLET, EXTENDED RELEASE ORAL
COMMUNITY

## 2023-11-02 RX ORDER — METHOCARBAMOL 500 MG/1
500 TABLET, FILM COATED ORAL 3 TIMES DAILY
Qty: 90 TABLET | Refills: 2 | Status: SHIPPED | OUTPATIENT
Start: 2023-11-02

## 2023-11-02 RX ORDER — PROCHLORPERAZINE 25 MG/1
SUPPOSITORY RECTAL
Qty: 3 EACH | Refills: 11 | Status: SHIPPED | OUTPATIENT
Start: 2023-11-02

## 2023-11-02 RX ORDER — GABAPENTIN 300 MG/1
300 CAPSULE ORAL 3 TIMES DAILY
Qty: 90 CAPSULE | Refills: 2 | Status: SHIPPED | OUTPATIENT
Start: 2023-11-02 | End: 2024-01-31

## 2023-11-02 RX ORDER — LISINOPRIL 20 MG/1
20 TABLET ORAL 2 TIMES DAILY
COMMUNITY

## 2023-11-02 ASSESSMENT — PATIENT HEALTH QUESTIONNAIRE - PHQ9
9. THOUGHTS THAT YOU WOULD BE BETTER OFF DEAD, OR OF HURTING YOURSELF: 0
SUM OF ALL RESPONSES TO PHQ QUESTIONS 1-9: 1
1. LITTLE INTEREST OR PLEASURE IN DOING THINGS: 0
6. FEELING BAD ABOUT YOURSELF - OR THAT YOU ARE A FAILURE OR HAVE LET YOURSELF OR YOUR FAMILY DOWN: 0
SUM OF ALL RESPONSES TO PHQ QUESTIONS 1-9: 1
10. IF YOU CHECKED OFF ANY PROBLEMS, HOW DIFFICULT HAVE THESE PROBLEMS MADE IT FOR YOU TO DO YOUR WORK, TAKE CARE OF THINGS AT HOME, OR GET ALONG WITH OTHER PEOPLE: 0
3. TROUBLE FALLING OR STAYING ASLEEP: 1
7. TROUBLE CONCENTRATING ON THINGS, SUCH AS READING THE NEWSPAPER OR WATCHING TELEVISION: 0
5. POOR APPETITE OR OVEREATING: 0
8. MOVING OR SPEAKING SO SLOWLY THAT OTHER PEOPLE COULD HAVE NOTICED. OR THE OPPOSITE, BEING SO FIGETY OR RESTLESS THAT YOU HAVE BEEN MOVING AROUND A LOT MORE THAN USUAL: 0
SUM OF ALL RESPONSES TO PHQ QUESTIONS 1-9: 1
SUM OF ALL RESPONSES TO PHQ9 QUESTIONS 1 & 2: 0
4. FEELING TIRED OR HAVING LITTLE ENERGY: 0
SUM OF ALL RESPONSES TO PHQ QUESTIONS 1-9: 1
2. FEELING DOWN, DEPRESSED OR HOPELESS: 0

## 2023-11-02 NOTE — PATIENT INSTRUCTIONS
We are committed to providing you the best care possible. If you receive a survey after visiting one of our offices, please take time to share your experience concerning your physician office visit. These surveys are confidential and no health information about you is shared. We are eager to improve for you and continue to give you satisfactory care, we are counting on your feedback to help make that happen. Welcome to [address] [place] [address] and Pediatrics:    Did you know we now have a faster way for you to move through your appointment? For your convenience, we now have digital registration available. When you schedule your next appointment, you will receive a link via your email as well as a text message that will allow you to complete any paperwork digitally before your appointment. We are committed to providing you the best care possible. If you receive a survey after visiting one of our offices, please take time to share your experience concerning your physician office visit. These surveys are confidential and no health information about you is shared. We are eager to improve for you and continue to give you satisfactory care, we are counting on your feedback to help make that happen.

## 2023-11-07 PROBLEM — G81.91 RIGHT HEMIPARESIS (HCC): Status: RESOLVED | Noted: 2023-07-11 | Resolved: 2023-11-07

## 2023-11-07 PROBLEM — E08.29: Status: ACTIVE | Noted: 2023-11-07

## 2023-11-07 PROBLEM — E08.42 DIABETES MELLITUS DUE TO UNDERLYING CONDITION WITH DIABETIC POLYNEUROPATHY (HCC): Status: ACTIVE | Noted: 2023-11-07

## 2023-11-07 PROBLEM — G62.9 NEUROPATHY: Status: RESOLVED | Noted: 2023-07-11 | Resolved: 2023-11-07

## 2023-11-07 PROBLEM — E08.42 DIABETES MELLITUS DUE TO UNDERLYING CONDITION WITH DIABETIC POLYNEUROPATHY (HCC): Status: RESOLVED | Noted: 2023-11-07 | Resolved: 2023-11-07

## 2023-11-07 PROBLEM — Z09 HOSPITAL DISCHARGE FOLLOW-UP: Status: ACTIVE | Noted: 2023-11-07

## 2023-11-07 PROBLEM — E11.65 UNCONTROLLED TYPE 2 DIABETES MELLITUS WITH HYPERGLYCEMIA (HCC): Status: RESOLVED | Noted: 2023-11-02 | Resolved: 2023-11-07

## 2023-11-07 ASSESSMENT — ENCOUNTER SYMPTOMS
COUGH: 0
GASTROINTESTINAL NEGATIVE: 1
SHORTNESS OF BREATH: 0

## 2023-11-07 NOTE — ASSESSMENT & PLAN NOTE
A1C excellent today at 6. 5. will continue current meds/diet/exercise as able, recheck in three months  Refill gabapentin for neuropathy, recheck in three months

## 2023-11-07 NOTE — PROGRESS NOTES
daily  What changed: when to take this     methocarbamol 500 MG tablet  Commonly known as: ROBAXIN  Take 1 tablet by mouth 3 times daily As needed for muscle cramps/spasms  What changed:   how much to take  how to take this  when to take this  additional instructions  Changed by: Garrett Jade PA-C            CONTINUE taking these medications      amLODIPine 10 MG tablet  Commonly known as: NORVASC  Take 1 tablet by mouth daily     aspirin 81 MG chewable tablet  Take 1 tablet by mouth daily Hold the dose for 7 days before your brain biopsy. * blood glucose test strips strip  Commonly known as: ASCENSIA AUTODISC VI;ONE TOUCH ULTRA TEST VI  1 each by In Vitro route daily As needed. * blood glucose test strips  Test 4 times a day & as needed for symptoms of irregular blood glucose. Dispense sufficient amount for indicated testing frequency plus additional to accommodate PRN testing needs. escitalopram 10 MG tablet  Commonly known as: LEXAPRO  Take 1 tablet by mouth daily     gabapentin 300 MG capsule  Commonly known as: NEURONTIN  Take 1 capsule by mouth 3 times daily for 90 days. lisinopril 20 MG tablet  Commonly known as: PRINIVIL;ZESTRIL     metFORMIN 500 MG extended release tablet  Commonly known as: GLUCOPHAGE-XR           * This list has 2 medication(s) that are the same as other medications prescribed for you. Read the directions carefully, and ask your doctor or other care provider to review them with you.                    Where to Get Your Medications        These medications were sent to The [address] [place] [address] [address] [phone]  [address] [place] [address]      Phone: [phone]   Dexcom G6 Sensor Misc  gabapentin 300 MG capsule  methocarbamol 500 MG tablet          Medications marked \"taking\" at this time  Outpatient Medications Marked as Taking for the 11/2/23 encounter (Office Visit) with Garrett Jade PA-C   Medication Sig Dispense Refill

## 2023-11-07 NOTE — ASSESSMENT & PLAN NOTE
Pt has extensive hx over past six months with CVA/subsequent vasculitis with biopsy. He continues close f/u with neurology at this time for continued workup for underlying cause of CVAs.

## 2023-11-09 ENCOUNTER — TELEPHONE (OUTPATIENT)
Dept: FAMILY MEDICINE CLINIC | Age: 53
End: 2023-11-09

## 2023-11-09 RX ORDER — ONDANSETRON 4 MG/1
4 TABLET, FILM COATED ORAL 3 TIMES DAILY PRN
Qty: 30 TABLET | Refills: 0 | Status: SHIPPED | OUTPATIENT
Start: 2023-11-09 | End: 2023-11-09 | Stop reason: SDUPTHER

## 2023-11-09 RX ORDER — ONDANSETRON 4 MG/1
4 TABLET, FILM COATED ORAL EVERY 12 HOURS PRN
Qty: 30 TABLET | Refills: 2 | Status: SHIPPED | OUTPATIENT
Start: 2023-11-09

## 2023-11-09 NOTE — TELEPHONE ENCOUNTER
Mom called states pt. Has been having issues with nausea all day today. Pt. Did have nausea 3 days ago but it did subside.  Mom requesting medication for nausea called into the pharmacy

## 2023-11-14 ENCOUNTER — TELEPHONE (OUTPATIENT)
Dept: FAMILY MEDICINE CLINIC | Age: 53
End: 2023-11-14

## 2023-11-14 DIAGNOSIS — G81.91 RIGHT HEMIPARESIS (HCC): Primary | ICD-10-CM

## 2023-11-14 NOTE — TELEPHONE ENCOUNTER
Received call from Fifi Romero at [address] [facility] . Patient needs new script for therapy to continue.    Please advise

## 2023-12-07 PROBLEM — Z09 HOSPITAL DISCHARGE FOLLOW-UP: Status: RESOLVED | Noted: 2023-11-07 | Resolved: 2023-12-07

## 2023-12-18 PROBLEM — G89.29 CHRONIC RIGHT-SIDED LOW BACK PAIN WITH RIGHT-SIDED SCIATICA: Status: ACTIVE | Noted: 2023-12-18

## 2023-12-18 PROBLEM — M62.838 MUSCLE SPASM OF RIGHT LOWER EXTREMITY: Status: ACTIVE | Noted: 2023-12-18

## 2023-12-18 PROBLEM — M54.41 CHRONIC RIGHT-SIDED LOW BACK PAIN WITH RIGHT-SIDED SCIATICA: Status: ACTIVE | Noted: 2023-12-18

## 2023-12-18 PROBLEM — L30.9 DERMATITIS: Status: ACTIVE | Noted: 2023-12-18

## 2024-01-02 RX ORDER — LISINOPRIL 20 MG/1
20 TABLET ORAL 2 TIMES DAILY
Qty: 60 TABLET | Refills: 11 | Status: SHIPPED | OUTPATIENT
Start: 2024-01-02

## 2024-01-09 ENCOUNTER — HOSPITAL ENCOUNTER (OUTPATIENT)
Dept: GENERAL RADIOLOGY | Age: 54
Discharge: HOME | End: 2024-01-09
Payer: COMMERCIAL

## 2024-01-09 ENCOUNTER — HOSPITAL ENCOUNTER (OUTPATIENT)
Age: 54
Discharge: HOME | End: 2024-01-09
Payer: COMMERCIAL

## 2024-01-09 DIAGNOSIS — N20.0 URIC ACID NEPHROLITHIASIS: ICD-10-CM

## 2024-01-09 PROCEDURE — 74018 RADEX ABDOMEN 1 VIEW: CPT

## 2024-01-12 NOTE — PROGRESS NOTES
Outpatient Vascular/Neurointerventional Neurology Progress Note    Patient Name: Tomas aL     : 1970      Subjective:     The patient was seen and examined.  Patient is here for follow-up cerebral vasculitis.  PMH charcot right foot, DM2, diverticulitis, HLD, HTN, leg edema, neuropathy, diabetic ulcers on the right foot, 2016 right subclavian and IJ DVT.    2023 presented to [facility] acute right-sided weakness.  MRI multiple small ischemic strokes in the parasagittal posterior right frontal lobe and right ARTIE territory small subacute infarct in the anterior left medulla.  Hypercoagulable workup,  elevated cardiolipin AB IgM, CRP 8.2, ESR 66.    2023 the patient underwent diagnostic cerebral angiogram with Dr. Morin which demonstrated severe stenosis of the left MCA distal M1 segment at the origin of the superior and inferior divisions. Additionally there is diffuse luminal irregularities throughout the intracranial circulation. The etiology to these luminal irregularities was unknown at that time. High-grade stenosis of the left MCA was not improved after 10 mg infusion of verapamil.     2023 lumbar puncture showed elevated CSF IgG(11.1), albumin(for), glucose(117), and WBC(19)    2023 he was admitted to [facility] for high-dose steroids x 5 days.  He reported worsening right-sided weakness and dysarthria during hospitalization.  MRI with small acute to subacute infarct in the right corpus callosum.  He was discharged on prednisone 60 mg daily.  He has followed up with hematology for additional hypercoagulable workup.  Completed event monitor that did not demonstrate any arrhythmias.    2023: Repeat cerebral angiogram following steroid showed diffuse intracranial luminal irregularities consistent with vasculopathy.  Angiogram did not show any major significant improvement after high-dose steroids.  He followed up with heme-onc.      2023 admitted to [facility]

## 2024-01-17 ENCOUNTER — OFFICE VISIT (OUTPATIENT)
Age: 54
End: 2024-01-17
Payer: COMMERCIAL

## 2024-01-17 VITALS
DIASTOLIC BLOOD PRESSURE: 70 MMHG | WEIGHT: 239 LBS | BODY MASS INDEX: 32.41 KG/M2 | SYSTOLIC BLOOD PRESSURE: 110 MMHG | OXYGEN SATURATION: 96 % | HEART RATE: 80 BPM

## 2024-01-17 DIAGNOSIS — I67.7 CEREBRAL VASCULITIS: Primary | ICD-10-CM

## 2024-01-17 DIAGNOSIS — I66.02 STENOSIS OF LEFT MIDDLE CEREBRAL ARTERY: ICD-10-CM

## 2024-01-17 DIAGNOSIS — R53.1 RIGHT SIDED WEAKNESS: ICD-10-CM

## 2024-01-17 DIAGNOSIS — I63.9 ARTERIAL ISCHEMIC STROKE (HCC): ICD-10-CM

## 2024-01-17 PROCEDURE — 99214 OFFICE O/P EST MOD 30 MIN: CPT | Performed by: NURSE PRACTITIONER

## 2024-01-17 PROCEDURE — 3078F DIAST BP <80 MM HG: CPT | Performed by: NURSE PRACTITIONER

## 2024-01-17 PROCEDURE — 3074F SYST BP LT 130 MM HG: CPT | Performed by: NURSE PRACTITIONER

## 2024-01-17 RX ORDER — PREDNISONE 20 MG/1
60 TABLET ORAL DAILY
COMMUNITY
Start: 2023-12-22

## 2024-01-17 RX ORDER — ESOMEPRAZOLE MAGNESIUM 40 MG/1
CAPSULE, DELAYED RELEASE ORAL
COMMUNITY
Start: 2023-12-20

## 2024-01-18 ENCOUNTER — HOSPITAL ENCOUNTER (OUTPATIENT)
Dept: CT IMAGING | Age: 54
Discharge: HOME | End: 2024-01-18
Payer: COMMERCIAL

## 2024-01-18 DIAGNOSIS — I77.6 VASCULITIS (HCC): ICD-10-CM

## 2024-01-18 PROCEDURE — 71250 CT THORAX DX C-: CPT

## 2024-01-23 ENCOUNTER — HOSPITAL ENCOUNTER (OUTPATIENT)
Age: 54
Discharge: HOME | End: 2024-01-23
Payer: COMMERCIAL

## 2024-01-23 ENCOUNTER — HOSPITAL ENCOUNTER (OUTPATIENT)
Dept: GENERAL RADIOLOGY | Age: 54
Discharge: HOME | End: 2024-01-23
Payer: COMMERCIAL

## 2024-01-23 DIAGNOSIS — M25.511 ACUTE SHOULDER PAIN DUE TO TRAUMA, RIGHT: Primary | ICD-10-CM

## 2024-01-23 DIAGNOSIS — M25.511 ACUTE SHOULDER PAIN DUE TO TRAUMA, RIGHT: ICD-10-CM

## 2024-01-23 DIAGNOSIS — G89.11 ACUTE SHOULDER PAIN DUE TO TRAUMA, RIGHT: ICD-10-CM

## 2024-01-23 DIAGNOSIS — G89.11 ACUTE SHOULDER PAIN DUE TO TRAUMA, RIGHT: Primary | ICD-10-CM

## 2024-01-23 PROCEDURE — 73030 X-RAY EXAM OF SHOULDER: CPT

## 2024-01-24 ENCOUNTER — TELEPHONE (OUTPATIENT)
Dept: FAMILY MEDICINE CLINIC | Age: 54
End: 2024-01-24

## 2024-01-24 DIAGNOSIS — S42.291A HUMERUS HEAD FRACTURE, RIGHT, CLOSED, INITIAL ENCOUNTER: Primary | ICD-10-CM

## 2024-01-24 DIAGNOSIS — M25.511 ACUTE PAIN OF RIGHT SHOULDER: Primary | ICD-10-CM

## 2024-01-24 RX ORDER — HYDROCODONE BITARTRATE AND ACETAMINOPHEN 5; 325 MG/1; MG/1
1 TABLET ORAL EVERY 8 HOURS PRN
Qty: 15 TABLET | Refills: 0 | Status: SHIPPED | OUTPATIENT
Start: 2024-01-24 | End: 2024-01-29

## 2024-01-24 NOTE — TELEPHONE ENCOUNTER
Called Dr. Danielson office and patient has appointment 1/25/2024 at 2:40 pm at [address] phone [phone]  Called patient and updated with appointment.   Patient voices understanding.  No questions or concerns at this time

## 2024-01-24 NOTE — TELEPHONE ENCOUNTER
Called patient and sister Mei(on communication release) to update that xray of right arm shows fracture of humerus per PCP.  Explained provider wants patient to continue to wear sling and to see ortho ASAP   Patient states he doesn't have a orthopedic and has no preference to orthopedic .

## 2024-01-25 ENCOUNTER — OFFICE VISIT (OUTPATIENT)
Dept: ORTHOPEDIC SURGERY | Age: 54
End: 2024-01-25
Payer: COMMERCIAL

## 2024-01-25 VITALS — RESPIRATION RATE: 17 BRPM | TEMPERATURE: 98 F | HEART RATE: 77 BPM | OXYGEN SATURATION: 98 %

## 2024-01-25 DIAGNOSIS — S42.291A OTHER CLOSED DISPLACED FRACTURE OF PROXIMAL END OF RIGHT HUMERUS, INITIAL ENCOUNTER: Primary | ICD-10-CM

## 2024-01-25 PROCEDURE — 23600 CLTX PROX HUMRL FX W/O MNPJ: CPT | Performed by: ORTHOPAEDIC SURGERY

## 2024-01-25 PROCEDURE — 99203 OFFICE O/P NEW LOW 30 MIN: CPT | Performed by: ORTHOPAEDIC SURGERY

## 2024-01-25 NOTE — PATIENT INSTRUCTIONS
May continue doing physical therapy, but nothing with the arm  Continue wearing a sling  Ice and elevate as needed.  Tylenol or Motrin for pain.  Follow up in 2 weeks

## 2024-01-27 DIAGNOSIS — S42.291A OTHER CLOSED DISPLACED FRACTURE OF PROXIMAL END OF RIGHT HUMERUS, INITIAL ENCOUNTER: Primary | ICD-10-CM

## 2024-01-27 RX ORDER — HYDROCODONE BITARTRATE AND ACETAMINOPHEN 5; 325 MG/1; MG/1
1 TABLET ORAL EVERY 6 HOURS PRN
Qty: 28 TABLET | Refills: 0 | Status: SHIPPED | OUTPATIENT
Start: 2024-01-27 | End: 2024-02-03

## 2024-01-29 ASSESSMENT — ENCOUNTER SYMPTOMS
WHEEZING: 0
EYE REDNESS: 0
SHORTNESS OF BREATH: 0
EYE PAIN: 0
VOMITING: 0
CHEST TIGHTNESS: 0
COLOR CHANGE: 0

## 2024-01-29 NOTE — PROGRESS NOTES
Patient seen in office today for: Fracture of right interior humerus, fell with help from friend getting into truck. Patient had a stroke in June and August 2023, he has rehabbed back to being able to walk.     DOI: 1/21/2024    Patient reports 0/10 pain at rest, 10/10 when in use   RICE and medication are somewhat effective to alleviate pain and reduce swelling.     Pain worsened by: Patient reports painful ROM and weight bearing    Right handed   
daily Apply topically 2 times daily. 60 g 2    Continuous Blood Gluc Sensor (DEXCOM G6 SENSOR) MISC Change q 10 days 3 each 11    Continuous Blood Gluc Transmit (DEXCOM G6 TRANSMITTER) MISC Use as directed 1 each 1    Continuous Blood Gluc  (DEXCOM G6 ) PATRICIA Use as directed 1 each 1    UNABLE TO FIND Pt to evaluate and tx for right sciatica (Patient not taking: Reported on 1/17/2024) 1 Units 0    ondansetron (ZOFRAN) 4 MG tablet Take 1 tablet by mouth every 12 hours as needed for Nausea or Vomiting 30 tablet 2    metFORMIN (GLUCOPHAGE-XR) 500 MG extended release tablet Take 1 tablet by mouth daily (with breakfast)      gabapentin (NEURONTIN) 300 MG capsule Take 1 capsule by mouth 3 times daily for 90 days. 90 capsule 2    aspirin 81 MG chewable tablet Take 1 tablet by mouth daily Hold the dose for 7 days before your brain biopsy. 90 tablet 1    blood glucose monitor strips Test 4 times a day & as needed for symptoms of irregular blood glucose. Dispense sufficient amount for indicated testing frequency plus additional to accommodate PRN testing needs. 120 strip 0    amLODIPine (NORVASC) 10 MG tablet Take 1 tablet by mouth daily 90 tablet 1    escitalopram (LEXAPRO) 10 MG tablet Take 1 tablet by mouth daily 90 tablet 1    atorvastatin (LIPITOR) 80 MG tablet Take 1 tablet by mouth daily (Patient taking differently: Take 1 tablet by mouth nightly) 90 tablet 1    blood glucose test strips (ASCENSIA AUTODISC VI;ONE TOUCH ULTRA TEST VI) strip 1 each by In Vitro route daily As needed. 100 each 3     No current facility-administered medications for this visit.     Allergies   Allergen Reactions    Bactrim [Sulfamethoxazole-Trimethoprim] Swelling, Dermatitis and Other (See Comments)     Causes vasculitis    Sulfa Antibiotics Anaphylaxis         Review of Systems   Constitutional:  Negative for chills and fever.   HENT:  Negative for congestion and sneezing.    Eyes:  Negative for pain and redness.

## 2024-01-31 ENCOUNTER — OFFICE VISIT (OUTPATIENT)
Dept: FAMILY MEDICINE CLINIC | Age: 54
End: 2024-01-31
Payer: COMMERCIAL

## 2024-01-31 VITALS
OXYGEN SATURATION: 95 % | RESPIRATION RATE: 24 BRPM | WEIGHT: 240.2 LBS | HEART RATE: 81 BPM | DIASTOLIC BLOOD PRESSURE: 70 MMHG | SYSTOLIC BLOOD PRESSURE: 120 MMHG | BODY MASS INDEX: 32.58 KG/M2

## 2024-01-31 DIAGNOSIS — I77.6 VASCULITIS (HCC): ICD-10-CM

## 2024-01-31 DIAGNOSIS — L40.9 PSORIASIS: ICD-10-CM

## 2024-01-31 DIAGNOSIS — S42.291D OTHER CLOSED DISPLACED FRACTURE OF PROXIMAL END OF RIGHT HUMERUS WITH ROUTINE HEALING, SUBSEQUENT ENCOUNTER: ICD-10-CM

## 2024-01-31 DIAGNOSIS — I10 ESSENTIAL HYPERTENSION: ICD-10-CM

## 2024-01-31 DIAGNOSIS — E11.42 TYPE 2 DIABETES MELLITUS WITH DIABETIC POLYNEUROPATHY, WITHOUT LONG-TERM CURRENT USE OF INSULIN (HCC): Primary | ICD-10-CM

## 2024-01-31 DIAGNOSIS — I67.7 CEREBRAL VASCULITIS: ICD-10-CM

## 2024-01-31 DIAGNOSIS — F32.A ANXIETY AND DEPRESSION: ICD-10-CM

## 2024-01-31 DIAGNOSIS — F41.9 ANXIETY AND DEPRESSION: ICD-10-CM

## 2024-01-31 PROCEDURE — 99214 OFFICE O/P EST MOD 30 MIN: CPT | Performed by: PHYSICIAN ASSISTANT

## 2024-01-31 PROCEDURE — 3078F DIAST BP <80 MM HG: CPT | Performed by: PHYSICIAN ASSISTANT

## 2024-01-31 PROCEDURE — 3074F SYST BP LT 130 MM HG: CPT | Performed by: PHYSICIAN ASSISTANT

## 2024-01-31 RX ORDER — PEN NEEDLE, DIABETIC 30 GX5/16"
1 NEEDLE, DISPOSABLE MISCELLANEOUS 4 TIMES DAILY
Qty: 200 EACH | Refills: 5 | Status: SHIPPED | OUTPATIENT
Start: 2024-01-31

## 2024-01-31 ASSESSMENT — PATIENT HEALTH QUESTIONNAIRE - PHQ9
2. FEELING DOWN, DEPRESSED OR HOPELESS: 0
7. TROUBLE CONCENTRATING ON THINGS, SUCH AS READING THE NEWSPAPER OR WATCHING TELEVISION: 0
8. MOVING OR SPEAKING SO SLOWLY THAT OTHER PEOPLE COULD HAVE NOTICED. OR THE OPPOSITE, BEING SO FIGETY OR RESTLESS THAT YOU HAVE BEEN MOVING AROUND A LOT MORE THAN USUAL: 0
4. FEELING TIRED OR HAVING LITTLE ENERGY: 0
5. POOR APPETITE OR OVEREATING: 0
SUM OF ALL RESPONSES TO PHQ QUESTIONS 1-9: 0
3. TROUBLE FALLING OR STAYING ASLEEP: 0
10. IF YOU CHECKED OFF ANY PROBLEMS, HOW DIFFICULT HAVE THESE PROBLEMS MADE IT FOR YOU TO DO YOUR WORK, TAKE CARE OF THINGS AT HOME, OR GET ALONG WITH OTHER PEOPLE: 0
SUM OF ALL RESPONSES TO PHQ QUESTIONS 1-9: 0
9. THOUGHTS THAT YOU WOULD BE BETTER OFF DEAD, OR OF HURTING YOURSELF: 0
1. LITTLE INTEREST OR PLEASURE IN DOING THINGS: 0
6. FEELING BAD ABOUT YOURSELF - OR THAT YOU ARE A FAILURE OR HAVE LET YOURSELF OR YOUR FAMILY DOWN: 0
SUM OF ALL RESPONSES TO PHQ QUESTIONS 1-9: 0
SUM OF ALL RESPONSES TO PHQ9 QUESTIONS 1 & 2: 0
SUM OF ALL RESPONSES TO PHQ QUESTIONS 1-9: 0

## 2024-01-31 NOTE — PROGRESS NOTES
Tomas La  1970  53 y.o.  male    SUBJECTIVE:    Chief Complaint   Patient presents with    Other     3mth follow up broken shoulder last Sunday. Pt. Has seen ortho. Pt. Needs refills on pen needles       HPI  Pt here today for routine recheck.     Cerebral vasculitis-he is currently following with neurology at [facility] for tx, Mr. La initially experienced CVA 6/23/23 right frontal lobe, right ARTIE territory and anterior left medulla. In July, 23 he was admiited to OSH for small acute/subacute infarcts. Subsequent cerebral angiogram noted multiple luminal irregularities and a cerebral vasculitis work-up completed with elevated inflammatory markers and positive cardiolipin antibodies. He was started on high dose prednisone at that time. In August, 23 he had right sided weakness and CTA positive for acute/subacute infarct right corpus callosum. In Sept,23 he underwent right frontal stereotactic biopsy by Dr. 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 He is currently on steroids and cyclophosphamide for management. He has been referred to rheumatology-was on Tremfya and had two doses prior to CVAs in June and then July for management of his psoriasis.     DM-stable at this time despite steroid therapy. Last A1C 6.5 in November, 23. He is compliant with all meds,  Denies polyuria/polydipsia/polyphagia at this time.    HTN-The patient is taking hypertensive medications compliantly without side effects.  Denies chest pain, dyspnea, edema, or TIA's.    Right comminuted humerus fx-pt was getting into a truck 1/22/2024 and has asked for assistance from his friend-unfortunately, pt slipped as he was trying to get in and fell backwards onto right shoulder. Xray positive for humeral fracture-he has seen ortho and currently wearing sling. Ortho note reviewed in EMT today. Pt states pain is beginning to improve overall and PT is currently on hold for now.         1/31/2024

## 2024-01-31 NOTE — PATIENT INSTRUCTIONS
We are committed to providing you the best care possible.    If you receive a survey after visiting one of our offices, please take time to share your experience concerning your physician office visit.  These surveys are confidential and no health information about you is shared.    We are eager to improve for you and continue to give you satisfactory care, we are counting on your feedback to help make that happen.            Welcome to [facility] and Pediatrics:    Did you know we now have a faster way for you to move through your appointment? For your convenience, we now have digital registration available. When you schedule your next appointment, you will receive a link via your email as well as a text message that will allow you to complete any paperwork digitally before your appointment. We are committed to providing you the best care possible.    If you receive a survey after visiting one of our offices, please take time to share your experience concerning your physician office visit.  These surveys are confidential and no health information about you is shared.    We are eager to improve for you and continue to give you satisfactory care, we are counting on your feedback to help make that happen.

## 2024-02-02 RX ORDER — AMLODIPINE BESYLATE 10 MG/1
10 TABLET ORAL DAILY
Qty: 90 TABLET | Refills: 3 | Status: SHIPPED | OUTPATIENT
Start: 2024-02-02

## 2024-02-02 RX ORDER — LISINOPRIL 20 MG/1
20 TABLET ORAL 2 TIMES DAILY
Qty: 180 TABLET | Refills: 3 | Status: SHIPPED | OUTPATIENT
Start: 2024-02-02

## 2024-02-02 RX ORDER — ESCITALOPRAM OXALATE 10 MG/1
10 TABLET ORAL DAILY
Qty: 90 TABLET | Refills: 3 | Status: SHIPPED | OUTPATIENT
Start: 2024-02-02

## 2024-02-04 PROBLEM — I67.7 CEREBRAL VASCULITIS: Status: ACTIVE | Noted: 2023-11-07

## 2024-02-04 PROBLEM — I77.6 VASCULITIS (HCC): Status: RESOLVED | Noted: 2023-08-14 | Resolved: 2024-02-04

## 2024-02-04 PROBLEM — S42.201D CLOSED FRACTURE OF PROXIMAL END OF RIGHT HUMERUS WITH ROUTINE HEALING: Status: ACTIVE | Noted: 2024-02-04

## 2024-02-08 ENCOUNTER — OFFICE VISIT (OUTPATIENT)
Dept: ORTHOPEDIC SURGERY | Age: 54
End: 2024-02-08

## 2024-02-08 VITALS — OXYGEN SATURATION: 96 % | RESPIRATION RATE: 14 BRPM | HEART RATE: 76 BPM

## 2024-02-08 DIAGNOSIS — S42.291A OTHER CLOSED DISPLACED FRACTURE OF PROXIMAL END OF RIGHT HUMERUS, INITIAL ENCOUNTER: Primary | ICD-10-CM

## 2024-02-08 PROCEDURE — 99024 POSTOP FOLLOW-UP VISIT: CPT | Performed by: ORTHOPAEDIC SURGERY

## 2024-02-08 RX ORDER — CYCLOPHOSPHAMIDE 50 MG/1
50 CAPSULE ORAL DAILY
COMMUNITY
Start: 2024-01-29

## 2024-02-08 NOTE — PATIENT INSTRUCTIONS
Follow up in 3-4 weeks for repeat x-rays.  Remain in the sling for comfort.  Medications and ice as needed for pain.

## 2024-02-08 NOTE — PROGRESS NOTES
2/8/2024   Chief Complaint   Patient presents with    Arm Injury     right        History of Present Illness:                             Tomas La is a 53 y.o. male returns today for follow-up of his right proximal humerus fracture.  He has done well in his sling.  He denies any problems or setbacks or complications.  He feels that his pain level is gradually improved.    Patient is here today for his humerus fx sustained 1/21/24. He states that his pain level is 2/10 and has been wearing his sling as directed.       Medical History  Patient's medications, allergies, past medical, surgical, social and family histories were reviewed and updated as appropriate.      Review of Systems                                            Examination:  General Exam:  Vitals: Pulse 76   Resp 14   SpO2 96%    Physical Exam   Right upper extremity:  Improved but persistent tenderness to palpation over the proximal humerus.  Normal alignment of the shoulder and upper extremity    Improved mild swelling of the soft tissues of the shoulder.  No instability with gentle passive rotational movements of the arm with the arm at the side.  Sensation and motor function is intact to baseline where he has severe deficits related to his underlying stroke with small amounts of active movement through the wrist and fingers.      Diagnostic testing:  X-rays reviewed in office, I independently reviewed the films in the office today:   X-ray images of the right shoulder show stable alignment of the mildly impacted comminuted surgical neck proximal humerus fracture.     There is a mottled appearance of the proximal humerus but no collapse of the articular surface.  Maintained alignment of the glenohumeral joint.  Mild spurring at the undersurface of the acromion.     Impression: Stable alignment with healing at the proximal humerus fracture site      Office Procedures:  No orders of the defined types were placed in this

## 2024-02-21 ENCOUNTER — HOSPITAL ENCOUNTER (OUTPATIENT)
Age: 54
Discharge: HOME | End: 2024-02-21
Payer: COMMERCIAL

## 2024-02-21 LAB
ALBUMIN SERPL-MCNC: 3.7 GM/DL (ref 3.4–5)
ALP BLD-CCNC: 87 IU/L (ref 40–129)
ALT SERPL-CCNC: 24 U/L (ref 10–40)
ANION GAP SERPL CALCULATED.3IONS-SCNC: 10 MMOL/L (ref 7–16)
AST SERPL-CCNC: 11 IU/L (ref 15–37)
BASOPHILS ABSOLUTE: 0 K/CU MM
BASOPHILS RELATIVE PERCENT: 0.1 % (ref 0–1)
BILIRUB SERPL-MCNC: 0.6 MG/DL (ref 0–1)
BUN SERPL-MCNC: 14 MG/DL (ref 6–23)
CALCIUM SERPL-MCNC: 8.9 MG/DL (ref 8.3–10.6)
CHLORIDE BLD-SCNC: 98 MMOL/L (ref 99–110)
CO2: 28 MMOL/L (ref 21–32)
CREAT SERPL-MCNC: 0.5 MG/DL (ref 0.9–1.3)
DIFFERENTIAL TYPE: ABNORMAL
EOSINOPHILS ABSOLUTE: 0.1 K/CU MM
EOSINOPHILS RELATIVE PERCENT: 1 % (ref 0–3)
GFR SERPL CREATININE-BSD FRML MDRD: >60 ML/MIN/1.73M2
GLUCOSE SERPL-MCNC: 164 MG/DL (ref 70–99)
HCT VFR BLD CALC: 45.9 % (ref 42–52)
HEMOGLOBIN: 14.7 GM/DL (ref 13.5–18)
IMMATURE NEUTROPHIL %: 0.4 % (ref 0–0.43)
LYMPHOCYTES ABSOLUTE: 2.8 K/CU MM
LYMPHOCYTES RELATIVE PERCENT: 28.4 % (ref 24–44)
MCH RBC QN AUTO: 26.3 PG (ref 27–31)
MCHC RBC AUTO-ENTMCNC: 32 % (ref 32–36)
MCV RBC AUTO: 82.3 FL (ref 78–100)
MONOCYTES ABSOLUTE: 0.6 K/CU MM
MONOCYTES RELATIVE PERCENT: 6 % (ref 0–4)
PDW BLD-RTO: 15.5 % (ref 11.7–14.9)
PLATELET # BLD: 161 K/CU MM (ref 140–440)
PMV BLD AUTO: 10 FL (ref 7.5–11.1)
POTASSIUM SERPL-SCNC: 3.8 MMOL/L (ref 3.5–5.1)
RBC # BLD: 5.58 M/CU MM (ref 4.6–6.2)
SEGMENTED NEUTROPHILS ABSOLUTE COUNT: 6.4 K/CU MM
SEGMENTED NEUTROPHILS RELATIVE PERCENT: 64.1 % (ref 36–66)
SODIUM BLD-SCNC: 136 MMOL/L (ref 135–145)
TOTAL IMMATURE NEUTOROPHIL: 0.04 K/CU MM
TOTAL PROTEIN: 6.2 GM/DL (ref 6.4–8.2)
WBC # BLD: 10 K/CU MM (ref 4–10.5)

## 2024-02-21 PROCEDURE — 80053 COMPREHEN METABOLIC PANEL: CPT

## 2024-02-21 PROCEDURE — 85025 COMPLETE CBC W/AUTO DIFF WBC: CPT

## 2024-02-21 PROCEDURE — 36415 COLL VENOUS BLD VENIPUNCTURE: CPT

## 2024-02-28 ENCOUNTER — HOSPITAL ENCOUNTER (OUTPATIENT)
Age: 54
Discharge: HOME | End: 2024-02-28
Payer: COMMERCIAL

## 2024-02-28 LAB
ANION GAP SERPL CALCULATED.3IONS-SCNC: 10 MMOL/L (ref 7–16)
BASOPHILS ABSOLUTE: 0 K/CU MM
BUN SERPL-MCNC: 11 MG/DL (ref 6–23)
CALCIUM SERPL-MCNC: 9 MG/DL (ref 8.3–10.6)
CHLORIDE BLD-SCNC: 98 MMOL/L (ref 99–110)
CO2: 28 MMOL/L (ref 21–32)
CREAT SERPL-MCNC: 0.6 MG/DL (ref 0.9–1.3)
DIFFERENTIAL TYPE: ABNORMAL
EOSINOPHILS ABSOLUTE: 0.1 K/CU MM
EOSINOPHILS RELATIVE PERCENT: 1.1 % (ref 0–3)
ERYTHROCYTE SEDIMENTATION RATE: 8 MM/HR (ref 0–20)
ESTIMATED AVERAGE GLUCOSE: 217 MG/DL
GFR SERPL CREATININE-BSD FRML MDRD: >60 ML/MIN/1.73M2
GLUCOSE SERPL-MCNC: 138 MG/DL (ref 70–99)
HBA1C MFR BLD: 9.2 % (ref 4.2–6.3)
HCT VFR BLD CALC: 48.1 % (ref 42–52)
HEMOGLOBIN: 15.2 GM/DL (ref 13.5–18)
IMMATURE NEUTROPHIL %: 0.6 % (ref 0–0.43)
LYMPHOCYTES ABSOLUTE: 1.4 K/CU MM
LYMPHOCYTES RELATIVE PERCENT: 15.4 % (ref 24–44)
MCH RBC QN AUTO: 26.2 PG (ref 27–31)
MCHC RBC AUTO-ENTMCNC: 31.6 % (ref 32–36)
MCV RBC AUTO: 82.9 FL (ref 78–100)
MONOCYTES ABSOLUTE: 0.5 K/CU MM
MONOCYTES RELATIVE PERCENT: 5.5 % (ref 0–4)
PDW BLD-RTO: 15.9 % (ref 11.7–14.9)
PLATELET # BLD: 140 K/CU MM (ref 140–440)
PMV BLD AUTO: 9.4 FL (ref 7.5–11.1)
POTASSIUM SERPL-SCNC: 4.1 MMOL/L (ref 3.5–5.1)
RBC # BLD: 5.8 M/CU MM (ref 4.6–6.2)
SEGMENTED NEUTROPHILS ABSOLUTE COUNT: 6.7 K/CU MM
SEGMENTED NEUTROPHILS RELATIVE PERCENT: 77.1 % (ref 36–66)
SODIUM BLD-SCNC: 136 MMOL/L (ref 135–145)
TOTAL IMMATURE NEUTOROPHIL: 0.05 K/CU MM
WBC # BLD: 8.7 K/CU MM (ref 4–10.5)

## 2024-02-28 PROCEDURE — 36415 COLL VENOUS BLD VENIPUNCTURE: CPT

## 2024-02-28 PROCEDURE — 80048 BASIC METABOLIC PNL TOTAL CA: CPT

## 2024-02-28 PROCEDURE — 85025 COMPLETE CBC W/AUTO DIFF WBC: CPT

## 2024-02-28 PROCEDURE — 83036 HEMOGLOBIN GLYCOSYLATED A1C: CPT

## 2024-02-28 PROCEDURE — 85652 RBC SED RATE AUTOMATED: CPT

## 2024-03-05 ENCOUNTER — OFFICE VISIT (OUTPATIENT)
Dept: ORTHOPEDIC SURGERY | Age: 54
End: 2024-03-05

## 2024-03-05 VITALS — WEIGHT: 240 LBS | HEART RATE: 84 BPM | OXYGEN SATURATION: 94 % | BODY MASS INDEX: 32.51 KG/M2 | HEIGHT: 72 IN

## 2024-03-05 DIAGNOSIS — S42.291A OTHER CLOSED DISPLACED FRACTURE OF PROXIMAL END OF RIGHT HUMERUS, INITIAL ENCOUNTER: ICD-10-CM

## 2024-03-05 DIAGNOSIS — Z09 FOLLOW-UP EXAM: Primary | ICD-10-CM

## 2024-03-05 NOTE — PROGRESS NOTES
Patient returns to the office today for FU of the right humerus fx DOI 1/21/24. Pt states overall pain today is a 0/10 and he is doing well. He is still sleeping in the recliner to avoid increase pain at night.

## 2024-03-05 NOTE — PATIENT INSTRUCTIONS
Continue weight-bearing as tolerated.  Continue range of motion exercises as instructed.  Ice and elevate as needed.  Tylenol or Motrin for pain.   Wear the sling for comfort  Follow up in 6 weeks

## 2024-03-20 RX ORDER — ESOMEPRAZOLE MAGNESIUM 40 MG/1
40 CAPSULE, DELAYED RELEASE ORAL DAILY
Qty: 30 CAPSULE | Refills: 3 | OUTPATIENT
Start: 2024-03-20

## 2024-03-21 ENCOUNTER — HOSPITAL ENCOUNTER (OUTPATIENT)
Dept: CT IMAGING | Age: 54
Discharge: HOME | End: 2024-03-21
Payer: COMMERCIAL

## 2024-03-21 DIAGNOSIS — R06.02 SHORTNESS OF BREATH: ICD-10-CM

## 2024-03-21 PROCEDURE — 71250 CT THORAX DX C-: CPT

## 2024-04-16 ENCOUNTER — OFFICE VISIT (OUTPATIENT)
Dept: ORTHOPEDIC SURGERY | Age: 54
End: 2024-04-16

## 2024-04-16 VITALS
OXYGEN SATURATION: 96 % | BODY MASS INDEX: 32.51 KG/M2 | WEIGHT: 240 LBS | TEMPERATURE: 97.8 F | RESPIRATION RATE: 16 BRPM | HEART RATE: 70 BPM | HEIGHT: 72 IN

## 2024-04-16 DIAGNOSIS — Z09 FOLLOW-UP EXAM: Primary | ICD-10-CM

## 2024-04-16 NOTE — PROGRESS NOTES
4/16/2024   Chief Complaint   Patient presents with    Arm Pain     Right arm fx        History of Present Illness:                             Tomas La is a 53 y.o. male who returns today for follow-up of his right shoulder fracture.  He feels that his arm is healing very well.  He has been using his hand for light activities.  He denies any pain or instability at the healing fracture site.        Medical History  Patient's medications, allergies, past medical, surgical, social and family histories were reviewed and updated as appropriate.      Review of Systems                                            Examination:  General Exam:  Vitals: Pulse 70   Temp 97.8 °F (36.6 °C)   Resp 16   Ht 1.829 m (6')   Wt 108.9 kg (240 lb)   SpO2 96%   BMI 32.55 kg/m²    Physical Exam   Right upper extremity:  Normal alignment of the shoulder.  No pain with active and passive range of motion.  No tenderness to palpation of the healing fracture site.  Persistent neurologic deficits in the upper extremity from his previous stroke but he does have some limited movements of the hand and wrist.      Diagnostic testing:  X-rays reviewed in office, I independently reviewed the films in the office today:     X-ray images of the right shoulder show healed proximal humerus fracture with abundant healing through the comminuted fracture site of the surgical neck.     Maintained alignment of the glenohumeral joint.  Mild decreased bone mineral density in cystic changes present at the humeral head.     Impression: Healed proximal humerus fracture       Office Procedures:  No orders of the defined types were placed in this encounter.      Assessment and Plan  1.  Right comminuted surgical neck proximal humerus fracture, healed    His x-rays show appropriate healing and alignment.  On exam he is showing excellent recovery with persistent neurologic deficits from his previous stroke.    He can advance activities as tolerated.

## 2024-04-16 NOTE — PROGRESS NOTES
4/16/2024    10:25 AM   AMB PAIN ASSESSMENT   Location of Pain Arm   Location Modifiers Right   Severity of Pain 0   Result of Injury Yes   Work-Related Injury No   Are there other pain locations you wish to document? No     Pt doing very well 0/10 pain level

## 2024-04-30 RX ORDER — PROCHLORPERAZINE 25 MG/1
SUPPOSITORY RECTAL
Qty: 1 EACH | Refills: 1 | Status: SHIPPED | OUTPATIENT
Start: 2024-04-30

## 2024-05-01 ENCOUNTER — OFFICE VISIT (OUTPATIENT)
Age: 54
End: 2024-05-01
Payer: COMMERCIAL

## 2024-05-01 VITALS
BODY MASS INDEX: 30.38 KG/M2 | HEART RATE: 67 BPM | WEIGHT: 224 LBS | RESPIRATION RATE: 20 BRPM | OXYGEN SATURATION: 98 %

## 2024-05-01 DIAGNOSIS — E11.42 TYPE 2 DIABETES MELLITUS WITH DIABETIC POLYNEUROPATHY, WITHOUT LONG-TERM CURRENT USE OF INSULIN (HCC): Primary | ICD-10-CM

## 2024-05-01 DIAGNOSIS — I67.7 CEREBRAL VASCULITIS: ICD-10-CM

## 2024-05-01 DIAGNOSIS — H61.23 BILATERAL IMPACTED CERUMEN: ICD-10-CM

## 2024-05-01 LAB
CREATININE URINE POCT: NORMAL
HBA1C MFR BLD: 7.4 %
MICROALBUMIN/CREAT 24H UR: NORMAL MG/G{CREAT}
MICROALBUMIN/CREAT UR-RTO: NORMAL

## 2024-05-01 PROCEDURE — 3051F HG A1C>EQUAL 7.0%<8.0%: CPT | Performed by: PHYSICIAN ASSISTANT

## 2024-05-01 PROCEDURE — 83036 HEMOGLOBIN GLYCOSYLATED A1C: CPT | Performed by: PHYSICIAN ASSISTANT

## 2024-05-01 PROCEDURE — 82044 UR ALBUMIN SEMIQUANTITATIVE: CPT | Performed by: PHYSICIAN ASSISTANT

## 2024-05-01 PROCEDURE — 99213 OFFICE O/P EST LOW 20 MIN: CPT | Performed by: PHYSICIAN ASSISTANT

## 2024-05-01 RX ORDER — METFORMIN HYDROCHLORIDE 500 MG/1
500 TABLET, EXTENDED RELEASE ORAL
Qty: 30 TABLET | Refills: 11 | Status: SHIPPED | OUTPATIENT
Start: 2024-05-01

## 2024-05-01 RX ORDER — CYCLOPHOSPHAMIDE 25 MG/1
25 CAPSULE ORAL DAILY
COMMUNITY
Start: 2024-04-02

## 2024-05-01 RX ORDER — GABAPENTIN 300 MG/1
300 CAPSULE ORAL 3 TIMES DAILY
Qty: 90 CAPSULE | Refills: 2 | Status: SHIPPED | OUTPATIENT
Start: 2024-05-01 | End: 2024-07-30

## 2024-05-01 RX ORDER — DAPSONE 100 MG/1
1 TABLET ORAL DAILY
COMMUNITY
Start: 2024-02-29 | End: 2024-08-27

## 2024-05-01 NOTE — PROGRESS NOTES
Endocrine: Negative for polydipsia, polyphagia and polyuria.   Skin: Negative.    Neurological:  Positive for weakness.   Psychiatric/Behavioral: Negative.         OBJECTIVE:    Pulse 67   Resp 20   Wt 101.6 kg (224 lb)   SpO2 98%   BMI 30.38 kg/m²     Physical Exam  Vitals reviewed.   Constitutional:       General: He is not in acute distress.     Appearance: Normal appearance. He is obese.   HENT:      Head: Normocephalic.      Right Ear: There is impacted cerumen.      Left Ear: There is impacted cerumen.   Cardiovascular:      Rate and Rhythm: Normal rate and regular rhythm.      Heart sounds: Normal heart sounds.   Pulmonary:      Breath sounds: Normal breath sounds.   Skin:     General: Skin is warm and dry.   Neurological:      Mental Status: He is alert and oriented to person, place, and time.      Motor: Weakness present.      Comments: Right sided weakness s/p CVA   Psychiatric:         Mood and Affect: Mood normal.         Behavior: Behavior normal.         /PLAN:    Problem List          Circulatory    Cerebral vasculitis     Following with specialist at [facility], currently weaning down steroid dosing and A1C is improving. Discussed further dietary changes pt can make to help, he is compliant with current meds             Endocrine    Type 2 diabetes mellitus with diabetic polyneuropathy, without long-term current use of insulin (HCC) - Primary     Continue current meds, will monitor closely as steroid is weaned if we need to adjust any dosing due to possible hypoglycemia. Recheck A1C in three months. Refill gabapentin today          Relevant Medications    atorvastatin (LIPITOR) 80 MG tablet    Continuous Blood Gluc Sensor (DEXCOM G6 SENSOR) MISC    methocarbamol (ROBAXIN-750) 750 MG tablet    insulin lispro, 1 Unit Dial, (HUMALOG KWIKPEN) 100 UNIT/ML SOPN    insulin glargine (BASAGLAR KWIKPEN) 100 UNIT/ML injection pen    escitalopram (LEXAPRO) 10 MG tablet    metFORMIN (GLUCOPHAGE-XR) 500 MG extended

## 2024-05-05 PROBLEM — M79.604 RIGHT LEG PAIN: Status: RESOLVED | Noted: 2017-08-25 | Resolved: 2024-05-05

## 2024-05-05 PROBLEM — H61.23 BILATERAL IMPACTED CERUMEN: Status: ACTIVE | Noted: 2024-05-05

## 2024-05-05 PROBLEM — B07.0 PLANTAR WART, LEFT FOOT: Status: RESOLVED | Noted: 2018-08-13 | Resolved: 2024-05-05

## 2024-06-18 ENCOUNTER — HOSPITAL ENCOUNTER (OUTPATIENT)
Dept: WOUND CARE | Age: 54
Discharge: HOME | End: 2024-06-18
Attending: NURSE PRACTITIONER
Payer: COMMERCIAL

## 2024-06-18 VITALS — TEMPERATURE: 97.8 F | SYSTOLIC BLOOD PRESSURE: 138 MMHG | DIASTOLIC BLOOD PRESSURE: 87 MMHG | HEART RATE: 90 BPM

## 2024-06-18 DIAGNOSIS — L97.422 DIABETIC ULCER OF LEFT MIDFOOT ASSOCIATED WITH TYPE 2 DIABETES MELLITUS, WITH FAT LAYER EXPOSED (HCC): Primary | ICD-10-CM

## 2024-06-18 DIAGNOSIS — E11.621 DIABETIC ULCER OF LEFT MIDFOOT ASSOCIATED WITH TYPE 2 DIABETES MELLITUS, WITH FAT LAYER EXPOSED (HCC): Primary | ICD-10-CM

## 2024-06-18 PROCEDURE — 29445 APPL RIGID TOT CNTC LEG CAST: CPT

## 2024-06-18 PROCEDURE — 99203 OFFICE O/P NEW LOW 30 MIN: CPT | Performed by: NURSE PRACTITIONER

## 2024-06-18 PROCEDURE — 11042 DBRDMT SUBQ TIS 1ST 20SQCM/<: CPT

## 2024-06-18 PROCEDURE — 99213 OFFICE O/P EST LOW 20 MIN: CPT

## 2024-06-18 PROCEDURE — 11042 DBRDMT SUBQ TIS 1ST 20SQCM/<: CPT | Performed by: NURSE PRACTITIONER

## 2024-06-18 RX ORDER — TRIAMCINOLONE ACETONIDE 1 MG/G
OINTMENT TOPICAL ONCE
OUTPATIENT
Start: 2024-06-18 | End: 2024-06-18

## 2024-06-18 RX ORDER — LIDOCAINE HYDROCHLORIDE 20 MG/ML
JELLY TOPICAL ONCE
OUTPATIENT
Start: 2024-06-18 | End: 2024-06-18

## 2024-06-18 RX ORDER — GINSENG 100 MG
CAPSULE ORAL ONCE
OUTPATIENT
Start: 2024-06-18 | End: 2024-06-18

## 2024-06-18 RX ORDER — CLOBETASOL PROPIONATE 0.5 MG/G
OINTMENT TOPICAL ONCE
OUTPATIENT
Start: 2024-06-18 | End: 2024-06-18

## 2024-06-18 RX ORDER — LIDOCAINE HYDROCHLORIDE 40 MG/ML
SOLUTION TOPICAL ONCE
OUTPATIENT
Start: 2024-06-18 | End: 2024-06-18

## 2024-06-18 RX ORDER — BETAMETHASONE DIPROPIONATE 0.5 MG/G
CREAM TOPICAL ONCE
OUTPATIENT
Start: 2024-06-18 | End: 2024-06-18

## 2024-06-18 RX ORDER — SODIUM CHLOR/HYPOCHLOROUS ACID 0.033 %
SOLUTION, IRRIGATION IRRIGATION ONCE
OUTPATIENT
Start: 2024-06-18 | End: 2024-06-18

## 2024-06-18 RX ORDER — GENTAMICIN SULFATE 1 MG/G
OINTMENT TOPICAL ONCE
OUTPATIENT
Start: 2024-06-18 | End: 2024-06-18

## 2024-06-18 RX ORDER — LIDOCAINE 50 MG/G
OINTMENT TOPICAL ONCE
OUTPATIENT
Start: 2024-06-18 | End: 2024-06-18

## 2024-06-18 RX ORDER — IBUPROFEN 200 MG
TABLET ORAL ONCE
OUTPATIENT
Start: 2024-06-18 | End: 2024-06-18

## 2024-06-18 RX ORDER — BACITRACIN ZINC AND POLYMYXIN B SULFATE 500; 1000 [USP'U]/G; [USP'U]/G
OINTMENT TOPICAL ONCE
OUTPATIENT
Start: 2024-06-18 | End: 2024-06-18

## 2024-06-18 RX ORDER — LIDOCAINE 40 MG/G
CREAM TOPICAL ONCE
OUTPATIENT
Start: 2024-06-18 | End: 2024-06-18

## 2024-06-18 NOTE — PROGRESS NOTES
Contact Cast    NAME:  Tomas La  YOB: 1970  MEDICAL RECORD NUMBER:  [id number]  DATE:  6/18/2024    Goal:  Patient will maintain integrity of cast, avoid mobility hazards, and report complications that may occur (foul odor, pain, numbness, cracked cast).    [] Removed old contact cast if indicated and wash extremity with soap and water.  [x] Applied ordered dressing.    Applied per NATASHA JAY CNP     Applied to LEFT LOWER extremity  [x] Allow cast to dry.   [x] Instructed patient to report to health care provider, including wound care center, any back pain, hip pain, or leg pain, numbness of toes, or any odor  coming from the cast.   [x] Instructed patient not to stick any foreign objects down into cast.   [x] Instructed patient to utilize assistive devices(crutches, cane or walker) as ordered   [x] Instructed patient to continue offloading as directed.     Applied cast per  Guidelines    Electronically signed by Yessy Sheth LPN on 6/18/2024 at 4:09 PM  
healing and evaluate the extent of previous healing.    Performed by: ADELFO Morton CNP    Consent obtained: Yes    Time out taken:  Yes    Pain Control: N/A      Debridement:Excisional Debridement    Using curette the wound(s) was/were sharply debrided down through and including the removal of subcutaneous tissue.        Devitalized Tissue Debrided:  fibrin, biofilm, slough, necrotic/eschar, exudate, and callus    Pre Debridement Measurements:  Are located in the Wound Documentation Flow Sheet    All active wounds listed below with today's date are evaluated  Wound(s)    debrided this date include # : 1     Post  Debridement Measurements:  Wound 08/28/23 Foot Left;Anterior 2nd toe (Active)   Number of days: 294       Wound 09/12/23 Toe (Comment  which one) Right 2nd toe (Active)   Number of days: 280       Wound 09/12/23 Ankle Right;Lateral (Active)   Number of days: 280       Wound 06/18/24 Left;Plantar foot wound #1 (Active)   Wound Image   06/18/24 1516   Wound Cleansed Soap and water;Wound cleanser 06/18/24 1516   Offloading for Diabetic Foot Ulcers Other (comment) 06/18/24 1516   Wound Length (cm) 0.6 cm 06/18/24 1516   Wound Width (cm) 0.6 cm 06/18/24 1516   Wound Depth (cm) 0.3 cm 06/18/24 1516   Wound Surface Area (cm^2) 0.36 cm^2 06/18/24 1516   Wound Volume (cm^3) 0.108 cm^3 06/18/24 1516   Post-Procedure Length (cm) 0.6 cm 06/18/24 1524   Post-Procedure Width (cm) 0.6 cm 06/18/24 1524   Post-Procedure Depth (cm) 0.3 cm 06/18/24 1524   Post-Procedure Surface Area (cm^2) 0.36 cm^2 06/18/24 1524   Post-Procedure Volume (cm^3) 0.108 cm^3 06/18/24 1524   Distance Tunneling (cm) 0 cm 06/18/24 1516   Tunneling Position ___ O'Clock 0 06/18/24 1516   Undermining Starts ___ O'Clock 0 06/18/24 1516   Undermining Ends___ O'Clock 0 06/18/24 1516   Undermining Maxium Distance (cm) 0 06/18/24 1516   Wound Assessment Pink/red;Slough 06/18/24 1516   Drainage Amount None (dry) 06/18/24 1516   Odor None 06/18/24

## 2024-06-18 NOTE — PATIENT INSTRUCTIONS
PHYSICIAN ORDERS AND DISCHARGE INSTRUCTIONS  NOTE: Upon discharge from the Wound Center, you will receive a patient experience survey via E-mail. We would be grateful if you would take the time to fill this survey out.  Wound care order history:   GREGG's   Right       Left    Date    Vascular studies/Intervention: .     Cultures: .               Antibiotics: .               HbA1c:  .               Grafts:  .   Juxta Lites & Lymph Pumps:  Continuing wound care orders and information:              Residence: .              Continue home health care with:.    Your wound-care supplies will be provided by: .              Pharmacy: .  Wound cleansing:      Do not scrub or use excessive force.    Wash hands with soap and water before and after dressing changes.    Prior to applying a clean dressing, cleanse wound with normal saline,    wound cleanser, or mild soap and water.     Ask your physician or nurse before getting the wound(s) wet in the shower.  Daily Wound management:    Keep weight off wounds and reposition every 2 hours.    Avoid standing for long periods of time.    Evaluate legs to the level of the heart or above for 30 minutes 4-5 times a day and/or when sitting.       When taking antibiotics take entire prescription as ordered by MD do not stop taking until medicine is all gone.     Documentation:  Compression: .   Offloading: .        Orders for this week (6/18/2024):  Left Plantar foot - Wash with mild soap and water, rinse with saline, pat dry with 4x4  Apply betadine and Puracol Ag to wound  Cover with calcium alginate  Wrap Total Contact Cast  Change 1 week, Keep cast dry    Give cast shoe 6/18/24   Please dispense 30 day quantity when sending supplies     Follow up with Ramirez WEAVER  In 1 weeks in the wound care center  Call [phone] for any questions or concerns.

## 2024-06-20 ENCOUNTER — HOSPITAL ENCOUNTER (OUTPATIENT)
Dept: WOUND CARE | Age: 54
Discharge: HOME | End: 2024-06-20
Attending: NURSE PRACTITIONER
Payer: COMMERCIAL

## 2024-06-20 VITALS
HEART RATE: 75 BPM | TEMPERATURE: 97.6 F | DIASTOLIC BLOOD PRESSURE: 70 MMHG | SYSTOLIC BLOOD PRESSURE: 110 MMHG | RESPIRATION RATE: 16 BRPM

## 2024-06-20 DIAGNOSIS — E11.621 DIABETIC ULCER OF LEFT MIDFOOT ASSOCIATED WITH TYPE 2 DIABETES MELLITUS, WITH FAT LAYER EXPOSED (HCC): Primary | ICD-10-CM

## 2024-06-20 DIAGNOSIS — L97.422 DIABETIC ULCER OF LEFT MIDFOOT ASSOCIATED WITH TYPE 2 DIABETES MELLITUS, WITH FAT LAYER EXPOSED (HCC): Primary | ICD-10-CM

## 2024-06-20 PROCEDURE — 11042 DBRDMT SUBQ TIS 1ST 20SQCM/<: CPT | Performed by: NURSE PRACTITIONER

## 2024-06-20 PROCEDURE — 11042 DBRDMT SUBQ TIS 1ST 20SQCM/<: CPT

## 2024-06-20 RX ORDER — GINSENG 100 MG
CAPSULE ORAL ONCE
OUTPATIENT
Start: 2024-06-20 | End: 2024-06-20

## 2024-06-20 RX ORDER — LIDOCAINE HYDROCHLORIDE 20 MG/ML
JELLY TOPICAL ONCE
OUTPATIENT
Start: 2024-06-20 | End: 2024-06-20

## 2024-06-20 RX ORDER — BETAMETHASONE DIPROPIONATE 0.5 MG/G
CREAM TOPICAL ONCE
OUTPATIENT
Start: 2024-06-20 | End: 2024-06-20

## 2024-06-20 RX ORDER — TRIAMCINOLONE ACETONIDE 1 MG/G
OINTMENT TOPICAL ONCE
OUTPATIENT
Start: 2024-06-20 | End: 2024-06-20

## 2024-06-20 RX ORDER — LIDOCAINE 50 MG/G
OINTMENT TOPICAL ONCE
OUTPATIENT
Start: 2024-06-20 | End: 2024-06-20

## 2024-06-20 RX ORDER — BACITRACIN ZINC AND POLYMYXIN B SULFATE 500; 1000 [USP'U]/G; [USP'U]/G
OINTMENT TOPICAL ONCE
OUTPATIENT
Start: 2024-06-20 | End: 2024-06-20

## 2024-06-20 RX ORDER — SODIUM CHLOR/HYPOCHLOROUS ACID 0.033 %
SOLUTION, IRRIGATION IRRIGATION ONCE
OUTPATIENT
Start: 2024-06-20 | End: 2024-06-20

## 2024-06-20 RX ORDER — GENTAMICIN SULFATE 1 MG/G
OINTMENT TOPICAL ONCE
OUTPATIENT
Start: 2024-06-20 | End: 2024-06-20

## 2024-06-20 RX ORDER — LIDOCAINE HYDROCHLORIDE 40 MG/ML
SOLUTION TOPICAL ONCE
OUTPATIENT
Start: 2024-06-20 | End: 2024-06-20

## 2024-06-20 RX ORDER — LIDOCAINE 40 MG/G
CREAM TOPICAL ONCE
OUTPATIENT
Start: 2024-06-20 | End: 2024-06-20

## 2024-06-20 RX ORDER — IBUPROFEN 200 MG
TABLET ORAL ONCE
OUTPATIENT
Start: 2024-06-20 | End: 2024-06-20

## 2024-06-20 RX ORDER — CLOBETASOL PROPIONATE 0.5 MG/G
OINTMENT TOPICAL ONCE
OUTPATIENT
Start: 2024-06-20 | End: 2024-06-20

## 2024-06-20 NOTE — PATIENT INSTRUCTIONS
PHYSICIAN ORDERS AND DISCHARGE INSTRUCTIONS  NOTE: Upon discharge from the Wound Center, you will receive a patient experience survey via E-mail. We would be grateful if you would take the time to fill this survey out.  Wound care order history:   GREGG's   Right       Left    Date    Vascular studies/Intervention: .     Cultures: .               Antibiotics: .               HbA1c:  .               Grafts:  .   Juxta Lites & Lymph Pumps:  Continuing wound care orders and information:              Residence: .              Continue home health care with:.    Your wound-care supplies will be provided by: .              Pharmacy: .  Wound cleansing:      Do not scrub or use excessive force.    Wash hands with soap and water before and after dressing changes.    Prior to applying a clean dressing, cleanse wound with normal saline,    wound cleanser, or mild soap and water.     Ask your physician or nurse before getting the wound(s) wet in the shower.  Daily Wound management:    Keep weight off wounds and reposition every 2 hours.    Avoid standing for long periods of time.    Evaluate legs to the level of the heart or above for 30 minutes 4-5 times a day and/or when sitting.       When taking antibiotics take entire prescription as ordered by MD do not stop taking until medicine is all gone.     Documentation:  Compression: .   Offloading: .        Orders for this week (6/20/2024):  Left Plantar foot - Wash with mild soap and water, rinse with saline, pat dry with 4x4  Apply betadine and Puracol Ag to wound  Cover with calcium alginate  Wrap with conform coban up to ankle.   Change 1 week, Keep cast dry    Forefoot off loading shoe given 6/20/24   Please dispense 30 day quantity when sending supplies     Follow up with Ramirez WEAVER  In 1 weeks in the wound care center  Call [phone] for any questions or concerns.

## 2024-06-20 NOTE — PROGRESS NOTES
management:    Keep weight off wounds and reposition every 2 hours.    Avoid standing for long periods of time.    Evaluate legs to the level of the heart or above for 30 minutes 4-5 times a day and/or when sitting.       When taking antibiotics take entire prescription as ordered by MD do not stop taking until medicine is all gone.     Documentation:  Compression: .   Offloading: .        Orders for this week (6/20/2024):  Left Plantar foot - Wash with mild soap and water, rinse with saline, pat dry with 4x4  Apply betadine and Puracol Ag to wound  Cover with calcium alginate  Wrap with conform coban up to ankle.   Change 1 week, Keep cast dry       Please dispense 30 day quantity when sending supplies     Follow up with Ramirez WEAVER  In 1 weeks in the wound care center  Call [phone] for any questions or concerns.     Treatment Note Wound 06/18/24 Left;Plantar foot wound #1-Dressing/Treatment:  (betadine and Puracol Ag to wound  Cover with calcium alginate  Wrap with conform coban up to ankle.)    Written Patient Dismissal Instructions Given            Electronically signed by ADELFO Morton CNP on 6/20/2024 at 9:54 AM

## 2024-06-25 ENCOUNTER — HOSPITAL ENCOUNTER (OUTPATIENT)
Dept: WOUND CARE | Age: 54
Discharge: HOME | End: 2024-06-25
Attending: NURSE PRACTITIONER
Payer: COMMERCIAL

## 2024-06-25 ENCOUNTER — HOSPITAL ENCOUNTER (OUTPATIENT)
Age: 54
Setting detail: SPECIMEN
Discharge: HOME | End: 2024-06-25
Payer: COMMERCIAL

## 2024-06-25 VITALS
RESPIRATION RATE: 17 BRPM | TEMPERATURE: 97.1 F | SYSTOLIC BLOOD PRESSURE: 136 MMHG | HEART RATE: 89 BPM | DIASTOLIC BLOOD PRESSURE: 70 MMHG

## 2024-06-25 DIAGNOSIS — E11.621 DIABETIC ULCER OF LEFT MIDFOOT ASSOCIATED WITH TYPE 2 DIABETES MELLITUS, WITH FAT LAYER EXPOSED (HCC): Primary | ICD-10-CM

## 2024-06-25 DIAGNOSIS — L97.422 DIABETIC ULCER OF LEFT MIDFOOT ASSOCIATED WITH TYPE 2 DIABETES MELLITUS, WITH FAT LAYER EXPOSED (HCC): ICD-10-CM

## 2024-06-25 DIAGNOSIS — L97.422 DIABETIC ULCER OF LEFT MIDFOOT ASSOCIATED WITH TYPE 2 DIABETES MELLITUS, WITH FAT LAYER EXPOSED (HCC): Primary | ICD-10-CM

## 2024-06-25 DIAGNOSIS — E11.621 DIABETIC ULCER OF LEFT MIDFOOT ASSOCIATED WITH TYPE 2 DIABETES MELLITUS, WITH FAT LAYER EXPOSED (HCC): ICD-10-CM

## 2024-06-25 PROCEDURE — 11042 DBRDMT SUBQ TIS 1ST 20SQCM/<: CPT | Performed by: NURSE PRACTITIONER

## 2024-06-25 PROCEDURE — 87077 CULTURE AEROBIC IDENTIFY: CPT

## 2024-06-25 PROCEDURE — 87186 SC STD MICRODIL/AGAR DIL: CPT

## 2024-06-25 PROCEDURE — 11042 DBRDMT SUBQ TIS 1ST 20SQCM/<: CPT

## 2024-06-25 PROCEDURE — 87070 CULTURE OTHR SPECIMN AEROBIC: CPT

## 2024-06-25 PROCEDURE — 87075 CULTR BACTERIA EXCEPT BLOOD: CPT

## 2024-06-25 RX ORDER — BACITRACIN ZINC AND POLYMYXIN B SULFATE 500; 1000 [USP'U]/G; [USP'U]/G
OINTMENT TOPICAL ONCE
OUTPATIENT
Start: 2024-06-25 | End: 2024-06-25

## 2024-06-25 RX ORDER — LIDOCAINE HYDROCHLORIDE 40 MG/ML
SOLUTION TOPICAL ONCE
OUTPATIENT
Start: 2024-06-25 | End: 2024-06-25

## 2024-06-25 RX ORDER — BETAMETHASONE DIPROPIONATE 0.5 MG/G
CREAM TOPICAL ONCE
OUTPATIENT
Start: 2024-06-25 | End: 2024-06-25

## 2024-06-25 RX ORDER — LIDOCAINE 40 MG/G
CREAM TOPICAL ONCE
OUTPATIENT
Start: 2024-06-25 | End: 2024-06-25

## 2024-06-25 RX ORDER — LIDOCAINE 50 MG/G
OINTMENT TOPICAL ONCE
OUTPATIENT
Start: 2024-06-25 | End: 2024-06-25

## 2024-06-25 RX ORDER — LIDOCAINE HYDROCHLORIDE 20 MG/ML
JELLY TOPICAL ONCE
OUTPATIENT
Start: 2024-06-25 | End: 2024-06-25

## 2024-06-25 RX ORDER — GENTAMICIN SULFATE 1 MG/G
OINTMENT TOPICAL ONCE
OUTPATIENT
Start: 2024-06-25 | End: 2024-06-25

## 2024-06-25 RX ORDER — IBUPROFEN 200 MG
TABLET ORAL ONCE
OUTPATIENT
Start: 2024-06-25 | End: 2024-06-25

## 2024-06-25 RX ORDER — SODIUM CHLOR/HYPOCHLOROUS ACID 0.033 %
SOLUTION, IRRIGATION IRRIGATION ONCE
OUTPATIENT
Start: 2024-06-25 | End: 2024-06-25

## 2024-06-25 RX ORDER — TRIAMCINOLONE ACETONIDE 1 MG/G
OINTMENT TOPICAL ONCE
OUTPATIENT
Start: 2024-06-25 | End: 2024-06-25

## 2024-06-25 RX ORDER — CLOBETASOL PROPIONATE 0.5 MG/G
OINTMENT TOPICAL ONCE
OUTPATIENT
Start: 2024-06-25 | End: 2024-06-25

## 2024-06-25 RX ORDER — GINSENG 100 MG
CAPSULE ORAL ONCE
OUTPATIENT
Start: 2024-06-25 | End: 2024-06-25

## 2024-06-25 NOTE — PROGRESS NOTES
Wound Care Center Progress Note With Procedure    Tomas La  AGE: 53 y.o.   GENDER: male  : 1970  EPISODE DATE:  2024     Subjective:     Chief Complaint   Patient presents with    Wound Check         HISTORY of PRESENT ILLNESS     Tomas La is a 53 y.o. male who presents to the Wound Clinic for a visit for evaluation and treatment of Chronic diabetic  ulcer(s) of  Lt. foot medial.  The condition is of moderate severity. The ulcer has been present for several months since march.  The underlying cause is thought to be diabetes.  The patients care to date has included offloading and normal care. The patient has significant underlying medical conditions as below.     2024: Patient looks ok. Not much improvement. There is some maceration so we need to concentrate on drying.   Not able to tolerate walking boot or off . Due to stroke he has impaired balance and risks fall if he wears anything other than his normal shoes. Will educate further on offloading whenever possible  He is taking keflex from another provider but would be a good idea to culture today and get wound as clean as possible     2024: Patient could not tolerate cast and is very unsteady and reports a fall, without injury.  Will wear his walking boot at home and we can order him a defender boot if he wants next week  Will do the same treatment essentially but will apply a wrap     Patient requesting TCC to get wound healed     Patient is a diabetic  Not a smoker and not on a blood thinner     Wound Pain Timing/Severity: none  Quality of pain: N/A  Severity of pain:  0 / 10   Modifying Factors: diabetes and chronic pressure  Associated Signs/Symptoms: edema, erythema, and drainage        PAST MEDICAL HISTORY        Diagnosis Date    Blood circulation, collateral     Callus of foot     Callus of foot 2018    Cerebral vasculitis     Charcot's joint of foot     Charcot's joint of foot in type 2 diabetes

## 2024-06-25 NOTE — PATIENT INSTRUCTIONS
PHYSICIAN ORDERS AND DISCHARGE INSTRUCTIONS  NOTE: Upon discharge from the Wound Center, you will receive a patient experience survey via E-mail. We would be grateful if you would take the time to fill this survey out.  Wound care order history:   GREGG's   Right       Left    Date    Vascular studies/Intervention: .     Cultures: .               Antibiotics: .               HbA1c:  .               Grafts:  .   Juxta Lites & Lymph Pumps:  Continuing wound care orders and information:              Residence: .              Continue home health care with:.    Your wound-care supplies will be provided by: .              Pharmacy: .  Wound cleansing:      Do not scrub or use excessive force.    Wash hands with soap and water before and after dressing changes.    Prior to applying a clean dressing, cleanse wound with normal saline,    wound cleanser, or mild soap and water.     Ask your physician or nurse before getting the wound(s) wet in the shower.  Daily Wound management:    Keep weight off wounds and reposition every 2 hours.    Avoid standing for long periods of time.    Evaluate legs to the level of the heart or above for 30 minutes 4-5 times a day and/or when sitting.       When taking antibiotics take entire prescription as ordered by MD do not stop taking until medicine is all gone.     Documentation:  Compression: .   Offloading: .        Orders for this week (6/25/2024):  Left Plantar foot - Wash with mild soap and water, rinse with saline, pat dry with 4x4  Apply betadine and Puracol Ag to wound  Cover with ioplex  Wrap with conform coban up to ankle.   Change 1 week, Keep dressing dry    Forefoot off loading shoe given 6/20/24   Please dispense 30 day quantity when sending supplies     Follow up with Ramirez WEAVER  In 1 weeks in the wound care center  Call [phone] for any questions or concerns.

## 2024-06-25 NOTE — PLAN OF CARE
Problem: Chronic Conditions and Co-morbidities  Goal: Patient's chronic conditions and co-morbidity symptoms are monitored and maintained or improved  Outcome: Progressing     Problem: Cognitive:  Goal: Knowledge of wound care  Description: Knowledge of wound care  Outcome: Progressing  Goal: Understands risk factors for wounds  Description: Understands risk factors for wounds  Outcome: Progressing

## 2024-06-30 LAB
CULTURE: ABNORMAL
Lab: ABNORMAL
SPECIMEN: ABNORMAL

## 2024-07-02 ENCOUNTER — HOSPITAL ENCOUNTER (OUTPATIENT)
Dept: WOUND CARE | Age: 54
Discharge: HOME | End: 2024-07-02
Attending: NURSE PRACTITIONER
Payer: COMMERCIAL

## 2024-07-02 VITALS
TEMPERATURE: 97.8 F | HEART RATE: 82 BPM | DIASTOLIC BLOOD PRESSURE: 68 MMHG | SYSTOLIC BLOOD PRESSURE: 109 MMHG | RESPIRATION RATE: 16 BRPM

## 2024-07-02 DIAGNOSIS — E11.621 DIABETIC ULCER OF LEFT MIDFOOT ASSOCIATED WITH TYPE 2 DIABETES MELLITUS, WITH FAT LAYER EXPOSED (HCC): Primary | ICD-10-CM

## 2024-07-02 DIAGNOSIS — L97.422 DIABETIC ULCER OF LEFT MIDFOOT ASSOCIATED WITH TYPE 2 DIABETES MELLITUS, WITH FAT LAYER EXPOSED (HCC): ICD-10-CM

## 2024-07-02 DIAGNOSIS — L97.422 DIABETIC ULCER OF LEFT MIDFOOT ASSOCIATED WITH TYPE 2 DIABETES MELLITUS, WITH FAT LAYER EXPOSED (HCC): Primary | ICD-10-CM

## 2024-07-02 DIAGNOSIS — E11.621 DIABETIC ULCER OF LEFT MIDFOOT ASSOCIATED WITH TYPE 2 DIABETES MELLITUS, WITH FAT LAYER EXPOSED (HCC): ICD-10-CM

## 2024-07-02 PROCEDURE — 11042 DBRDMT SUBQ TIS 1ST 20SQCM/<: CPT

## 2024-07-02 PROCEDURE — 11042 DBRDMT SUBQ TIS 1ST 20SQCM/<: CPT | Performed by: NURSE PRACTITIONER

## 2024-07-02 RX ORDER — LIDOCAINE HYDROCHLORIDE 20 MG/ML
JELLY TOPICAL ONCE
OUTPATIENT
Start: 2024-07-02 | End: 2024-07-02

## 2024-07-02 RX ORDER — CLOBETASOL PROPIONATE 0.5 MG/G
OINTMENT TOPICAL ONCE
OUTPATIENT
Start: 2024-07-02 | End: 2024-07-02

## 2024-07-02 RX ORDER — TRIAMCINOLONE ACETONIDE 1 MG/G
OINTMENT TOPICAL ONCE
OUTPATIENT
Start: 2024-07-02 | End: 2024-07-02

## 2024-07-02 RX ORDER — GENTAMICIN SULFATE 1 MG/G
OINTMENT TOPICAL ONCE
OUTPATIENT
Start: 2024-07-02 | End: 2024-07-02

## 2024-07-02 RX ORDER — LIDOCAINE HYDROCHLORIDE 40 MG/ML
SOLUTION TOPICAL ONCE
OUTPATIENT
Start: 2024-07-02 | End: 2024-07-02

## 2024-07-02 RX ORDER — BACITRACIN ZINC AND POLYMYXIN B SULFATE 500; 1000 [USP'U]/G; [USP'U]/G
OINTMENT TOPICAL ONCE
OUTPATIENT
Start: 2024-07-02 | End: 2024-07-02

## 2024-07-02 RX ORDER — IBUPROFEN 200 MG
TABLET ORAL ONCE
OUTPATIENT
Start: 2024-07-02 | End: 2024-07-02

## 2024-07-02 RX ORDER — GINSENG 100 MG
CAPSULE ORAL ONCE
OUTPATIENT
Start: 2024-07-02 | End: 2024-07-02

## 2024-07-02 RX ORDER — LIDOCAINE 40 MG/G
CREAM TOPICAL ONCE
OUTPATIENT
Start: 2024-07-02 | End: 2024-07-02

## 2024-07-02 RX ORDER — BETAMETHASONE DIPROPIONATE 0.5 MG/G
CREAM TOPICAL ONCE
OUTPATIENT
Start: 2024-07-02 | End: 2024-07-02

## 2024-07-02 RX ORDER — LIDOCAINE 50 MG/G
OINTMENT TOPICAL ONCE
OUTPATIENT
Start: 2024-07-02 | End: 2024-07-02

## 2024-07-02 RX ORDER — SODIUM CHLOR/HYPOCHLOROUS ACID 0.033 %
SOLUTION, IRRIGATION IRRIGATION ONCE
OUTPATIENT
Start: 2024-07-02 | End: 2024-07-02

## 2024-07-02 NOTE — PLAN OF CARE
Problem: Chronic Conditions and Co-morbidities  Goal: Patient's chronic conditions and co-morbidity symptoms are monitored and maintained or improved  Outcome: Progressing     Problem: Chronic Conditions and Co-morbidities  Goal: Patient's chronic conditions and co-morbidity symptoms are monitored and maintained or improved  Outcome: Progressing     Problem: Chronic Conditions and Co-morbidities  Goal: Patient's chronic conditions and co-morbidity symptoms are monitored and maintained or improved  Outcome: Progressing     Problem: Cognitive:  Goal: Knowledge of wound care  Description: Knowledge of wound care  Outcome: Progressing  Goal: Understands risk factors for wounds  Description: Understands risk factors for wounds  Outcome: Progressing

## 2024-07-02 NOTE — PATIENT INSTRUCTIONS
PHYSICIAN ORDERS AND DISCHARGE INSTRUCTIONS  NOTE: Upon discharge from the Wound Center, you will receive a patient experience survey via E-mail. We would be grateful if you would take the time to fill this survey out.  Wound care order history:   GREGG's   Right       Left    Date    Vascular studies/Intervention: .     Cultures: .               Antibiotics: .Gent and Doxy 7/2/24               HbA1c:  .               Grafts:  .   Juxta Lites & Lymph Pumps:  Continuing wound care orders and information:              Residence: .              Continue home health care with:.    Your wound-care supplies will be provided by: .              Pharmacy: [facility]   Wound cleansing:      Do not scrub or use excessive force.    Wash hands with soap and water before and after dressing changes.    Prior to applying a clean dressing, cleanse wound with normal saline,    wound cleanser, or mild soap and water.     Ask your physician or nurse before getting the wound(s) wet in the shower.  Daily Wound management:    Keep weight off wounds and reposition every 2 hours.    Avoid standing for long periods of time.    Evaluate legs to the level of the heart or above for 30 minutes 4-5 times a day and/or when sitting.       When taking antibiotics take entire prescription as ordered by MD do not stop taking until medicine is all gone.     Documentation:  Compression: .   Offloading: .        Orders for this week (7/2/2024):    Left Plantar foot - Wash with mild soap and water, rinse with saline, pat dry with 4x4  Apply gentamicin and Puracol Ag to wound  Cover with ioplex  Wrap with conform coban up to ankle.   Change 1 week, Keep dressing dry    Forefoot off loading shoe given 6/20/24   Please dispense 30 day quantity when sending supplies     Follow up with Ramirez WEAVER  In 1 weeks in the wound care center  Call [phone] for any questions or concerns.

## 2024-07-02 NOTE — PROGRESS NOTES
Wound Care Center Progress Note With Procedure    Tomas La  AGE: 53 y.o.   GENDER: male  : 1970  EPISODE DATE:  2024     Subjective:     Chief Complaint   Patient presents with    Wound Check         HISTORY of PRESENT ILLNESS     Tomas La is a 53 y.o. male who presents to the Wound Clinic for a visit for evaluation and treatment of Chronic diabetic  ulcer(s) of  Lt. foot medial.  The condition is of moderate severity. The ulcer has been present for several months since march.  The underlying cause is thought to be diabetes.  The patients care to date has included offloading and normal care. The patient has significant underlying medical conditions as below.     2024: Patient improved this week. Will address culture with PO and topical antibiotics.      2024: Patient looks ok. Not much improvement. There is some maceration so we need to concentrate on drying.   Not able to tolerate walking boot or off . Due to stroke he has impaired balance and risks fall if he wears anything other than his normal shoes. Will educate further on offloading whenever possible  He is taking keflex from another provider but would be a good idea to culture today and get wound as clean as possible     2024: Patient could not tolerate cast and is very unsteady and reports a fall, without injury.  Will wear his walking boot at home and we can order him a defender boot if he wants next week  Will do the same treatment essentially but will apply a wrap     Patient requesting TCC to get wound healed     Patient is a diabetic  Not a smoker and not on a blood thinner     Wound Pain Timing/Severity: none  Quality of pain: N/A  Severity of pain:  0 / 10   Modifying Factors: diabetes and chronic pressure  Associated Signs/Symptoms: edema, erythema, and drainage        PAST MEDICAL HISTORY        Diagnosis Date    Blood circulation, collateral     Callus of foot     Callus of foot 2018

## 2024-07-09 ENCOUNTER — HOSPITAL ENCOUNTER (OUTPATIENT)
Dept: WOUND CARE | Age: 54
Discharge: HOME | End: 2024-07-09
Attending: NURSE PRACTITIONER
Payer: COMMERCIAL

## 2024-07-09 VITALS
SYSTOLIC BLOOD PRESSURE: 111 MMHG | RESPIRATION RATE: 16 BRPM | HEART RATE: 94 BPM | DIASTOLIC BLOOD PRESSURE: 73 MMHG | TEMPERATURE: 97.6 F

## 2024-07-09 DIAGNOSIS — L97.422 DIABETIC ULCER OF LEFT MIDFOOT ASSOCIATED WITH TYPE 2 DIABETES MELLITUS, WITH FAT LAYER EXPOSED (HCC): ICD-10-CM

## 2024-07-09 DIAGNOSIS — L97.422 DIABETIC ULCER OF LEFT MIDFOOT ASSOCIATED WITH TYPE 2 DIABETES MELLITUS, WITH FAT LAYER EXPOSED (HCC): Primary | ICD-10-CM

## 2024-07-09 DIAGNOSIS — E11.621 DIABETIC ULCER OF LEFT MIDFOOT ASSOCIATED WITH TYPE 2 DIABETES MELLITUS, WITH FAT LAYER EXPOSED (HCC): Primary | ICD-10-CM

## 2024-07-09 DIAGNOSIS — E11.621 DIABETIC ULCER OF LEFT MIDFOOT ASSOCIATED WITH TYPE 2 DIABETES MELLITUS, WITH FAT LAYER EXPOSED (HCC): ICD-10-CM

## 2024-07-09 PROCEDURE — 11042 DBRDMT SUBQ TIS 1ST 20SQCM/<: CPT | Performed by: NURSE PRACTITIONER

## 2024-07-09 PROCEDURE — 6370000000 HC RX 637 (ALT 250 FOR IP): Performed by: NURSE PRACTITIONER

## 2024-07-09 PROCEDURE — 11042 DBRDMT SUBQ TIS 1ST 20SQCM/<: CPT

## 2024-07-09 RX ORDER — SODIUM CHLOR/HYPOCHLOROUS ACID 0.033 %
SOLUTION, IRRIGATION IRRIGATION ONCE
OUTPATIENT
Start: 2024-07-09 | End: 2024-07-09

## 2024-07-09 RX ORDER — GENTAMICIN SULFATE 1 MG/G
OINTMENT TOPICAL ONCE
OUTPATIENT
Start: 2024-07-09 | End: 2024-07-09

## 2024-07-09 RX ORDER — CLOBETASOL PROPIONATE 0.5 MG/G
OINTMENT TOPICAL ONCE
OUTPATIENT
Start: 2024-07-09 | End: 2024-07-09

## 2024-07-09 RX ORDER — LIDOCAINE HYDROCHLORIDE 20 MG/ML
JELLY TOPICAL ONCE
OUTPATIENT
Start: 2024-07-09 | End: 2024-07-09

## 2024-07-09 RX ORDER — LIDOCAINE 40 MG/G
CREAM TOPICAL ONCE
OUTPATIENT
Start: 2024-07-09 | End: 2024-07-09

## 2024-07-09 RX ORDER — GINSENG 100 MG
CAPSULE ORAL ONCE
OUTPATIENT
Start: 2024-07-09 | End: 2024-07-09

## 2024-07-09 RX ORDER — BETAMETHASONE DIPROPIONATE 0.5 MG/G
CREAM TOPICAL ONCE
OUTPATIENT
Start: 2024-07-09 | End: 2024-07-09

## 2024-07-09 RX ORDER — LIDOCAINE 50 MG/G
OINTMENT TOPICAL ONCE
OUTPATIENT
Start: 2024-07-09 | End: 2024-07-09

## 2024-07-09 RX ORDER — BACITRACIN ZINC AND POLYMYXIN B SULFATE 500; 1000 [USP'U]/G; [USP'U]/G
OINTMENT TOPICAL ONCE
OUTPATIENT
Start: 2024-07-09 | End: 2024-07-09

## 2024-07-09 RX ORDER — TRIAMCINOLONE ACETONIDE 1 MG/G
OINTMENT TOPICAL ONCE
OUTPATIENT
Start: 2024-07-09 | End: 2024-07-09

## 2024-07-09 RX ORDER — LIDOCAINE HYDROCHLORIDE 40 MG/ML
SOLUTION TOPICAL ONCE
OUTPATIENT
Start: 2024-07-09 | End: 2024-07-09

## 2024-07-09 RX ORDER — IBUPROFEN 200 MG
TABLET ORAL ONCE
OUTPATIENT
Start: 2024-07-09 | End: 2024-07-09

## 2024-07-09 RX ORDER — GENTAMICIN SULFATE 1 MG/G
OINTMENT TOPICAL ONCE
Status: COMPLETED | OUTPATIENT
Start: 2024-07-09 | End: 2024-07-09

## 2024-07-09 RX ADMIN — GENTAMICIN SULFATE: 1 OINTMENT TOPICAL at 13:25

## 2024-07-09 NOTE — PATIENT INSTRUCTIONS
PHYSICIAN ORDERS AND DISCHARGE INSTRUCTIONS  NOTE: Upon discharge from the Wound Center, you will receive a patient experience survey via E-mail. We would be grateful if you would take the time to fill this survey out.  Wound care order history:   GREGG's   Right       Left    Date    Vascular studies/Intervention: .     Cultures: .               Antibiotics: .Gent and Doxy 7/2/24               HbA1c:  .               Grafts:  .   Juxta Lites & Lymph Pumps:  Continuing wound care orders and information:              Residence: .              Continue home health care with:.    Your wound-care supplies will be provided by: .              Pharmacy: [facility]   Wound cleansing:      Do not scrub or use excessive force.    Wash hands with soap and water before and after dressing changes.    Prior to applying a clean dressing, cleanse wound with normal saline,    wound cleanser, or mild soap and water.     Ask your physician or nurse before getting the wound(s) wet in the shower.  Daily Wound management:    Keep weight off wounds and reposition every 2 hours.    Avoid standing for long periods of time.    Evaluate legs to the level of the heart or above for 30 minutes 4-5 times a day and/or when sitting.       When taking antibiotics take entire prescription as ordered by MD do not stop taking until medicine is all gone.     Documentation:  Compression: .   Offloading: .        Orders for this week (7/9/2024):    Left Plantar foot - Wash with mild soap and water, rinse with saline, pat dry with 4x4  Apply gentamicin and Puracol Ag to wound  Cover with ioplex  Wrap with conform, coban up to ankle.   Change 1 week, Keep dressing dry, change if dressing falls off    Forefoot off loading shoe given 6/20/24   Please dispense 30 day quantity when sending supplies     Follow up with Ramirez WEAVER  In 1 weeks in the wound care center  Call [phone] for any questions or concerns.

## 2024-07-09 NOTE — PROGRESS NOTES
pain:  0 / 10   Modifying Factors: diabetes and chronic pressure  Associated Signs/Symptoms: edema, erythema, and drainage        PAST MEDICAL HISTORY        Diagnosis Date    Blood circulation, collateral     Callus of foot     Callus of foot 08/13/2018    Cerebral vasculitis     Charcot's joint of foot     Charcot's joint of foot in type 2 diabetes mellitus ([facility]) 02/06/2014    Closed nondisplaced fracture of fifth metatarsal bone of right foot with routine healing 08/28/2017    CVA (cerebral vascular accident) ([facility])     Diabetes mellitus ([facility])     Diabetes mellitus due to underlying condition with diabetic polyneuropathy ([facility]) 11/07/2023    Diabetes mellitus due to underlying condition with kidney complication ([facility]) 11/07/2023    Diverticulitis     Hyperlipidemia     Hypertension     Ischemic ulcer of left foot, limited to breakdown of skin ([facility]) 07/12/2016    Leg edema, right 08/25/2017    Neuropathy     Neuropathy 07/11/2023    Plantar wart, left foot 08/13/2018    Poorly controlled type 2 diabetes mellitus with peripheral neuropathy ([facility]) 11/08/2016    Right hemiparesis ([facility]) 07/11/2023    Ulcer of right foot with fat layer exposed ([facility]) 11/28/2016    Ulcer of right foot with necrosis of muscle, Hannon Grade III 08/11/2016    Uncontrolled type 2 diabetes mellitus with hyperglycemia ([facility]) 11/02/2023    WD-Chronic ulcer of left foot with fat layer exposed ([facility]) 08/11/2016    WD-Chronic ulcer of toe of right foot, limited to breakdown of skin ([facility]) 07/12/2016    WD-Diabetic ulcer of left foot associated with type 2 diabetes mellitus ([facility]) 08/11/2016    WD-Diabetic ulcer of right lateral foot associated with type 2 diabetes mellitus, with fat layer exposed ([facility])     WD-Type 2 diabetes mellitus with right diabetic foot ulcer ([facility]) 07/12/2016       PAST SURGICAL HISTORY    Past Surgical History:   Procedure Laterality Date    ABDOMEN SURGERY      CEREBRAL ANGIOGRAM  2023    COLONOSCOPY  05/06/2019    divertic,

## 2024-07-16 ENCOUNTER — HOSPITAL ENCOUNTER (OUTPATIENT)
Dept: WOUND CARE | Age: 54
Discharge: HOME | End: 2024-07-16
Attending: NURSE PRACTITIONER
Payer: COMMERCIAL

## 2024-07-16 ENCOUNTER — PATIENT MESSAGE (OUTPATIENT)
Age: 54
End: 2024-07-16

## 2024-07-16 VITALS
HEART RATE: 75 BPM | SYSTOLIC BLOOD PRESSURE: 106 MMHG | TEMPERATURE: 97.6 F | DIASTOLIC BLOOD PRESSURE: 70 MMHG | RESPIRATION RATE: 16 BRPM

## 2024-07-16 DIAGNOSIS — E11.621 DIABETIC ULCER OF LEFT MIDFOOT ASSOCIATED WITH TYPE 2 DIABETES MELLITUS, WITH FAT LAYER EXPOSED (HCC): Primary | ICD-10-CM

## 2024-07-16 DIAGNOSIS — L97.422 DIABETIC ULCER OF LEFT MIDFOOT ASSOCIATED WITH TYPE 2 DIABETES MELLITUS, WITH FAT LAYER EXPOSED (HCC): Primary | ICD-10-CM

## 2024-07-16 PROCEDURE — 11042 DBRDMT SUBQ TIS 1ST 20SQCM/<: CPT | Performed by: NURSE PRACTITIONER

## 2024-07-16 PROCEDURE — 97597 DBRDMT OPN WND 1ST 20 CM/<: CPT

## 2024-07-16 RX ORDER — LIDOCAINE 40 MG/G
CREAM TOPICAL ONCE
OUTPATIENT
Start: 2024-07-16 | End: 2024-07-16

## 2024-07-16 RX ORDER — LIDOCAINE HYDROCHLORIDE 20 MG/ML
JELLY TOPICAL ONCE
OUTPATIENT
Start: 2024-07-16 | End: 2024-07-16

## 2024-07-16 RX ORDER — GINSENG 100 MG
CAPSULE ORAL ONCE
OUTPATIENT
Start: 2024-07-16 | End: 2024-07-16

## 2024-07-16 RX ORDER — SODIUM CHLOR/HYPOCHLOROUS ACID 0.033 %
SOLUTION, IRRIGATION IRRIGATION ONCE
OUTPATIENT
Start: 2024-07-16 | End: 2024-07-16

## 2024-07-16 RX ORDER — CLOBETASOL PROPIONATE 0.5 MG/G
OINTMENT TOPICAL ONCE
OUTPATIENT
Start: 2024-07-16 | End: 2024-07-16

## 2024-07-16 RX ORDER — GENTAMICIN SULFATE 1 MG/G
OINTMENT TOPICAL ONCE
OUTPATIENT
Start: 2024-07-16 | End: 2024-07-16

## 2024-07-16 RX ORDER — BACITRACIN ZINC AND POLYMYXIN B SULFATE 500; 1000 [USP'U]/G; [USP'U]/G
OINTMENT TOPICAL ONCE
OUTPATIENT
Start: 2024-07-16 | End: 2024-07-16

## 2024-07-16 RX ORDER — LIDOCAINE HYDROCHLORIDE 40 MG/ML
SOLUTION TOPICAL ONCE
OUTPATIENT
Start: 2024-07-16 | End: 2024-07-16

## 2024-07-16 RX ORDER — IBUPROFEN 200 MG
TABLET ORAL ONCE
OUTPATIENT
Start: 2024-07-16 | End: 2024-07-16

## 2024-07-16 RX ORDER — LIDOCAINE 50 MG/G
OINTMENT TOPICAL ONCE
OUTPATIENT
Start: 2024-07-16 | End: 2024-07-16

## 2024-07-16 RX ORDER — TRIAMCINOLONE ACETONIDE 1 MG/G
OINTMENT TOPICAL ONCE
OUTPATIENT
Start: 2024-07-16 | End: 2024-07-16

## 2024-07-16 RX ORDER — BETAMETHASONE DIPROPIONATE 0.5 MG/G
CREAM TOPICAL ONCE
OUTPATIENT
Start: 2024-07-16 | End: 2024-07-16

## 2024-07-16 NOTE — PATIENT INSTRUCTIONS
PHYSICIAN ORDERS AND DISCHARGE INSTRUCTIONS  NOTE: Upon discharge from the Wound Center, you will receive a patient experience survey via E-mail. We would be grateful if you would take the time to fill this survey out.  Wound care order history:   GREGG's   Right       Left    Date    Vascular studies/Intervention: .     Cultures: .               Antibiotics: .Gent and Doxy 7/2/24               HbA1c:  .               Grafts:  .   Juxta Lites & Lymph Pumps:  Continuing wound care orders and information:              Residence: .              Continue home health care with:.    Your wound-care supplies will be provided by: .              Pharmacy: [facility]   Wound cleansing:      Do not scrub or use excessive force.    Wash hands with soap and water before and after dressing changes.    Prior to applying a clean dressing, cleanse wound with normal saline,    wound cleanser, or mild soap and water.     Ask your physician or nurse before getting the wound(s) wet in the shower.  Daily Wound management:    Keep weight off wounds and reposition every 2 hours.    Avoid standing for long periods of time.    Evaluate legs to the level of the heart or above for 30 minutes 4-5 times a day and/or when sitting.       When taking antibiotics take entire prescription as ordered by MD do not stop taking until medicine is all gone.     Documentation:  Compression: .   Offloading: .        Orders for this week (7/16/2024):    Left Plantar foot - Wash with mild soap and water, rinse with saline, pat dry with 4x4  Apply gentamicin and Puracol Ag to wound  Cover with ioplex  Wrap with conform, coban up to ankle.   Change 1 week, Keep dressing dry, change if dressing falls off    Forefoot off loading shoe given 6/20/24   Please dispense 30 day quantity when sending supplies     Follow up with Ramirez WEAVER  In 1 weeks in the wound care center  Call [phone] for any questions or concerns.

## 2024-07-16 NOTE — PROGRESS NOTES
Healed wound painted with betadine, padded with puracol ag+, ioplex. Wrapped with conform and coban and secured with tape.     Electronically signed by Izzy Salvador RN on 7/16/2024 at 1:33 PM    
current facility-administered medications on file prior to encounter.       REVIEW OF SYSTEMS    Pertinent items are noted in HPI.    Constitutional: Negative for systemic symptoms including fever, chills and malaise.      Objective:      /70   Pulse 75   Temp 97.6 °F (36.4 °C) (Temporal)   Resp 16     PHYSICAL EXAM      General: The patient is in no acute distress.    Mental status:  Patient is appropriate, is  oriented to place and plan of care.  Dermatologic exam: Visual inspection of the periwound reveals the skin to be normal in turgor and texture and dry  Wound exam: see wound description below in procedure note      Assessment:     Problem List Items Addressed This Visit          Endocrine    WD-Diabetic ulcer of left midfoot associated with type 2 diabetes mellitus, with fat layer exposed (HCC) - Primary    Relevant Orders    Initiate Outpatient Wound Care Protocol     Procedure Note    Indications:  Based on my examination of this patient's wound(s) today, sharp excision into necrotic subcutaneous tissue is required to promote healing and evaluate the extent of previous healing.    Performed by: ADELFO Morton - CNP    Consent obtained: Yes    Time out taken:  Yes    Pain Control: N/A      Debridement:Excisional Debridement    Using curette the wound(s) was/were sharply debrided down through and including the removal of subcutaneous tissue.        Devitalized Tissue Debrided:  fibrin, biofilm, slough, necrotic/eschar, and exudate    Pre Debridement Measurements:  Are located in the Wound Documentation Flow Sheet    All active wounds listed below with today's date are evaluated  Wound(s)    debrided this date include # :  left plantar foot      Post  Debridement Measurements:  Wound 08/28/23 Foot Left;Anterior 2nd toe (Active)   Number of days: 322       Wound 09/12/23 Toe (Comment  which one) Right 2nd toe (Active)   Number of days: 308       Wound 09/12/23 Ankle Right;Lateral (Active)

## 2024-07-17 NOTE — TELEPHONE ENCOUNTER
From: Tomas La  To: Jocelynn Gil  Sent: 7/16/2024 9:12 PM EDT  Subject: Test     Hi Jocelynn,  Wanted your team to know Tomas is set to have MRI's done on Monday, July 22 at [facility] if you want to check out those results when they are released. He is done with the initial round of treatments.   Thanks!

## 2024-07-23 ENCOUNTER — HOSPITAL ENCOUNTER (OUTPATIENT)
Dept: WOUND CARE | Age: 54
Discharge: HOME | End: 2024-07-23
Attending: NURSE PRACTITIONER
Payer: COMMERCIAL

## 2024-07-23 VITALS
TEMPERATURE: 98.5 F | HEART RATE: 76 BPM | SYSTOLIC BLOOD PRESSURE: 112 MMHG | RESPIRATION RATE: 19 BRPM | DIASTOLIC BLOOD PRESSURE: 70 MMHG

## 2024-07-23 DIAGNOSIS — E11.621 DIABETIC ULCER OF LEFT MIDFOOT ASSOCIATED WITH TYPE 2 DIABETES MELLITUS, WITH FAT LAYER EXPOSED (HCC): Primary | ICD-10-CM

## 2024-07-23 DIAGNOSIS — L97.422 DIABETIC ULCER OF LEFT MIDFOOT ASSOCIATED WITH TYPE 2 DIABETES MELLITUS, WITH FAT LAYER EXPOSED (HCC): Primary | ICD-10-CM

## 2024-07-23 PROCEDURE — 99213 OFFICE O/P EST LOW 20 MIN: CPT | Performed by: NURSE PRACTITIONER

## 2024-07-23 PROCEDURE — 97597 DBRDMT OPN WND 1ST 20 CM/<: CPT

## 2024-07-23 PROCEDURE — 11055 PARING/CUTG B9 HYPRKER LES 1: CPT | Performed by: NURSE PRACTITIONER

## 2024-07-23 PROCEDURE — 11055 PARING/CUTG B9 HYPRKER LES 1: CPT

## 2024-07-23 NOTE — PATIENT INSTRUCTIONS
PHYSICIAN ORDERS AND DISCHARGE INSTRUCTIONS  NOTE: Upon discharge from the Wound Center, you will receive a patient experience survey via E-mail. We would be grateful if you would take the time to fill this survey out.  Wound care order history:   GREGG's   Right       Left    Date    Vascular studies/Intervention: .     Cultures: .               Antibiotics: .Gent and Doxy 7/2/24               HbA1c:  .               Grafts:  .   Juxta Lites & Lymph Pumps:  Continuing wound care orders and information:              Residence: .              Continue home health care with:.    Your wound-care supplies will be provided by: .              Pharmacy: [facility]   Wound cleansing:      Do not scrub or use excessive force.    Wash hands with soap and water before and after dressing changes.    Prior to applying a clean dressing, cleanse wound with normal saline,    wound cleanser, or mild soap and water.     Ask your physician or nurse before getting the wound(s) wet in the shower.  Daily Wound management:    Keep weight off wounds and reposition every 2 hours.    Avoid standing for long periods of time.    Evaluate legs to the level of the heart or above for 30 minutes 4-5 times a day and/or when sitting.       When taking antibiotics take entire prescription as ordered by MD do not stop taking until medicine is all gone.     Documentation:  Compression: .   Offloading: .        Orders for this week (7/23/2024):    Left Plantar foot - Wash with mild soap and water, rinse with saline, pat dry with 4x4  Wash and Check feet daily.  Take care of calluses weekly as instructed.       Change 1 week, Keep dressing dry, change if dressing falls off    Forefoot off loading shoe given 6/20/24   Please dispense 30 day quantity when sending supplies     Follow up with Ramirez WEAVER  In 1 weeks in the wound care center  Call [phone] for any questions or concerns.

## 2024-07-23 NOTE — PROGRESS NOTES
Wound Care Center Progress Note       Tomas La  AGE: 53 y.o.   GENDER: male  : 1970  TODAY'S DATE:  2024        Subjective:     Chief Complaint   Patient presents with    Wound Check         HISTORY of PRESENT ILLNESS    Tomas La is a 53 y.o. male who presents to the Wound Clinic for a visit for evaluation and treatment of Chronic diabetic  ulcer(s) of  Lt. foot medial.  The condition is of moderate severity. The ulcer has been present for several months since march.  The underlying cause is thought to be diabetes.  The patients care to date has included offloading and normal care. The patient has significant underlying medical conditions as below.      Healed today     Wound Pain Timing/Severity: none  Quality of pain: N/A  Severity of pain:  0 / 10   Modifying Factors: diabetes and chronic pressure  Associated Signs/Symptoms: edema, erythema, and drainage                              PAST MEDICAL HISTORY        Diagnosis Date    Blood circulation, collateral     Callus of foot     Callus of foot 2018    Cerebral vasculitis     Charcot's joint of foot     Charcot's joint of foot in type 2 diabetes mellitus ([facility]) 2014    Closed nondisplaced fracture of fifth metatarsal bone of right foot with routine healing 2017    CVA (cerebral vascular accident) ([facility])     Diabetes mellitus ([facility])     Diabetes mellitus due to underlying condition with diabetic polyneuropathy ([facility]) 2023    Diabetes mellitus due to underlying condition with kidney complication ([facility]) 2023    Diverticulitis     Hyperlipidemia     Hypertension     Ischemic ulcer of left foot, limited to breakdown of skin ([facility]) 2016    Leg edema, right 2017    Neuropathy     Neuropathy 2023    Plantar wart, left foot 2018    Poorly controlled type 2 diabetes mellitus with peripheral neuropathy ([facility]) 2016    Right hemiparesis ([facility]) 2023    Ulcer of right foot with fat layer

## 2024-07-30 ENCOUNTER — HOSPITAL ENCOUNTER (OUTPATIENT)
Dept: WOUND CARE | Age: 54
Discharge: HOME | End: 2024-07-30
Attending: NURSE PRACTITIONER
Payer: COMMERCIAL

## 2024-07-30 VITALS
HEART RATE: 67 BPM | DIASTOLIC BLOOD PRESSURE: 76 MMHG | SYSTOLIC BLOOD PRESSURE: 123 MMHG | RESPIRATION RATE: 18 BRPM | TEMPERATURE: 98 F

## 2024-07-30 DIAGNOSIS — E11.621 DIABETIC ULCER OF RIGHT MIDFOOT ASSOCIATED WITH TYPE 2 DIABETES MELLITUS, WITH FAT LAYER EXPOSED (HCC): Primary | ICD-10-CM

## 2024-07-30 DIAGNOSIS — L97.412 DIABETIC ULCER OF RIGHT MIDFOOT ASSOCIATED WITH TYPE 2 DIABETES MELLITUS, WITH FAT LAYER EXPOSED (HCC): Primary | ICD-10-CM

## 2024-07-30 PROBLEM — L97.512 DIABETIC ULCER OF RIGHT FOOT ASSOCIATED WITH TYPE 2 DIABETES MELLITUS, WITH FAT LAYER EXPOSED (HCC): Status: ACTIVE | Noted: 2024-07-30

## 2024-07-30 PROCEDURE — 99213 OFFICE O/P EST LOW 20 MIN: CPT | Performed by: NURSE PRACTITIONER

## 2024-07-30 PROCEDURE — 11042 DBRDMT SUBQ TIS 1ST 20SQCM/<: CPT | Performed by: NURSE PRACTITIONER

## 2024-07-30 PROCEDURE — 11042 DBRDMT SUBQ TIS 1ST 20SQCM/<: CPT

## 2024-07-30 RX ORDER — LIDOCAINE HYDROCHLORIDE 20 MG/ML
JELLY TOPICAL ONCE
OUTPATIENT
Start: 2024-07-30 | End: 2024-07-30

## 2024-07-30 RX ORDER — SODIUM CHLOR/HYPOCHLOROUS ACID 0.033 %
SOLUTION, IRRIGATION IRRIGATION ONCE
OUTPATIENT
Start: 2024-07-30 | End: 2024-07-30

## 2024-07-30 RX ORDER — LIDOCAINE 50 MG/G
OINTMENT TOPICAL ONCE
OUTPATIENT
Start: 2024-07-30 | End: 2024-07-30

## 2024-07-30 RX ORDER — BETAMETHASONE DIPROPIONATE 0.5 MG/G
CREAM TOPICAL ONCE
OUTPATIENT
Start: 2024-07-30 | End: 2024-07-30

## 2024-07-30 RX ORDER — LIDOCAINE 40 MG/G
CREAM TOPICAL ONCE
OUTPATIENT
Start: 2024-07-30 | End: 2024-07-30

## 2024-07-30 RX ORDER — CLOBETASOL PROPIONATE 0.5 MG/G
OINTMENT TOPICAL ONCE
OUTPATIENT
Start: 2024-07-30 | End: 2024-07-30

## 2024-07-30 RX ORDER — DOXYCYCLINE HYCLATE 100 MG
100 TABLET ORAL 2 TIMES DAILY
Qty: 20 TABLET | Refills: 0 | Status: SHIPPED | OUTPATIENT
Start: 2024-07-30 | End: 2024-08-09

## 2024-07-30 RX ORDER — TRIAMCINOLONE ACETONIDE 1 MG/G
OINTMENT TOPICAL ONCE
OUTPATIENT
Start: 2024-07-30 | End: 2024-07-30

## 2024-07-30 RX ORDER — GINSENG 100 MG
CAPSULE ORAL ONCE
OUTPATIENT
Start: 2024-07-30 | End: 2024-07-30

## 2024-07-30 RX ORDER — LIDOCAINE HYDROCHLORIDE 40 MG/ML
SOLUTION TOPICAL ONCE
OUTPATIENT
Start: 2024-07-30 | End: 2024-07-30

## 2024-07-30 RX ORDER — BACITRACIN ZINC AND POLYMYXIN B SULFATE 500; 1000 [USP'U]/G; [USP'U]/G
OINTMENT TOPICAL ONCE
OUTPATIENT
Start: 2024-07-30 | End: 2024-07-30

## 2024-07-30 RX ORDER — IBUPROFEN 200 MG
TABLET ORAL ONCE
OUTPATIENT
Start: 2024-07-30 | End: 2024-07-30

## 2024-07-30 RX ORDER — GENTAMICIN SULFATE 1 MG/G
OINTMENT TOPICAL ONCE
OUTPATIENT
Start: 2024-07-30 | End: 2024-07-30

## 2024-07-30 NOTE — PATIENT INSTRUCTIONS
PHYSICIAN ORDERS AND DISCHARGE INSTRUCTIONS  NOTE: Upon discharge from the Wound Center, you will receive a patient experience survey via E-mail. We would be grateful if you would take the time to fill this survey out.  Wound care order history:   GREGG's   Right       Left    Date    Vascular studies/Intervention: .     Cultures: .               Antibiotics: .Gent and Doxy 7/2/24               HbA1c:  .               Grafts:  .   Juxta Lites & Lymph Pumps:  Continuing wound care orders and information:              Residence: .              Continue home health care with:.    Your wound-care supplies will be provided by: .              Ph[facility]acy: [facility]   Wound cleansing:      Do not scrub or use excessive force.    Wash hands with soap and water before and after dressing changes.    Prior to applying a clean dressing, cleanse wound with normal saline,    wound cleanser, or mild soap and water.     Ask your physician or nurse before getting the wound(s) wet in the shower.  Daily Wound management:    Keep weight off wounds and reposition every 2 hours.    Avoid standing for long periods of time.    Evaluate legs to the level of the heart or above for 30 minutes 4-5 times a day and/or when sitting.       When taking antibiotics take entire prescription as ordered by MD do not stop taking until medicine is all gone.     Documentation:  Compression: .   Offloading: .        Orders for this week (7/30/2024):    Right lateral foot - Wash with mild soap and water, rinse with saline, pat dry with 4x4  Apply silvadene and stimulen to wound  Cover with calcium alginate and ABD  Wrap with conform, coban up to ankle secure with tape.   Change 1 week, Keep dressing dry, change if dressing falls off    Antibiotic prescription sent to [facility] 7/30/24  Forefoot off loading shoe given 6/20/24   Please dispense 30 day quantity when sending supplies     Follow up with Ramirez WEAVER  In 1 weeks in the wound care center  Call

## 2024-07-30 NOTE — PROGRESS NOTES
Wound Care Center Progress Note With Procedure    Tomas La  AGE: 53 y.o.   GENDER: male  : 1970  EPISODE DATE:  2024     Subjective:     Chief Complaint   Patient presents with    Wound Check         HISTORY of PRESENT ILLNESS     Tomas La is a 53 y.o. male who presents to the Wound Clinic for a visit for evaluation and treatment of acute diabetic  ulcer(s) of  right . foot medial.  The condition is of moderate severity. The ulcer has been present for several months since march.  The underlying cause is thought to be diabetes.  The patients care to date has included offloading and normal care. The patient has significant underlying medical conditions as below.     2024: Patient was discharged last week but returns with new diabetic wound to right lateral foot. No antibiotics yet but we will order some this week  Ok with wrap for the week. Needs debrided today     2024: Patient is essentially healed. Will trim down callus and treat him for 1 more week.  Patient previously had orthotics from [facility]. Recommended he contact them and get new inserts. He will report back next week and we will refer him or send rx whatever is needed     2024: Patient wound clean and filling in and improving in size. He was not able to  his meds yet but plans to start doxy today. Had to change dressing once but had supplies leftover to wrap this. Continue plan of care for now and follow up 1 week      2024: Patient improved this week. Will address culture with PO and topical antibiotics.      2024: Patient looks ok. Not much improvement. There is some maceration so we need to concentrate on drying.   Not able to tolerate walking boot or off . Due to stroke he has impaired balance and risks fall if he wears anything other than his normal shoes. Will educate further on offloading whenever possible  He is taking keflex from another provider but would be a good

## 2024-08-06 ENCOUNTER — HOSPITAL ENCOUNTER (OUTPATIENT)
Dept: WOUND CARE | Age: 54
Discharge: HOME | End: 2024-08-06
Attending: NURSE PRACTITIONER
Payer: COMMERCIAL

## 2024-08-06 VITALS
TEMPERATURE: 97.9 F | HEART RATE: 71 BPM | RESPIRATION RATE: 16 BRPM | SYSTOLIC BLOOD PRESSURE: 118 MMHG | DIASTOLIC BLOOD PRESSURE: 77 MMHG

## 2024-08-06 DIAGNOSIS — L97.412 DIABETIC ULCER OF RIGHT MIDFOOT ASSOCIATED WITH TYPE 2 DIABETES MELLITUS, WITH FAT LAYER EXPOSED (HCC): Primary | ICD-10-CM

## 2024-08-06 DIAGNOSIS — E11.621 DIABETIC ULCER OF RIGHT MIDFOOT ASSOCIATED WITH TYPE 2 DIABETES MELLITUS, WITH FAT LAYER EXPOSED (HCC): Primary | ICD-10-CM

## 2024-08-06 PROCEDURE — 11042 DBRDMT SUBQ TIS 1ST 20SQCM/<: CPT | Performed by: NURSE PRACTITIONER

## 2024-08-06 PROCEDURE — 11042 DBRDMT SUBQ TIS 1ST 20SQCM/<: CPT

## 2024-08-06 NOTE — PROGRESS NOTES
Wound Care Center Progress Note With Procedure    Tomas La  AGE: 53 y.o.   GENDER: male  : 1970  EPISODE DATE:  2024     Subjective:     Chief Complaint   Patient presents with    Wound Check     Right foot           HISTORY of PRESENT ILLNESS     Tomas La is a 53 y.o. male who presents to the Wound Clinic for a visit for evaluation and treatment of acute diabetic  ulcer(s) of  right . foot medial.  The condition is of moderate severity. The ulcer has been present for several months since march.  The underlying cause is thought to be diabetes.  The patients care to date has included offloading and normal care. The patient has significant underlying medical conditions as below.     2024: Patient great improvement in the first week. Clean and dry and tolerating antibiotics without issue  Need to trim callus down each week     2024: Patient was discharged last week but returns with new diabetic wound to right lateral foot. No antibiotics yet but we will order some this week  Ok with wrap for the week. Needs debrided today     2024: Patient is essentially healed. Will trim down callus and treat him for 1 more week.  Patient previously had orthotics from [facility]. Recommended he contact them and get new inserts. He will report back next week and we will refer him or send rx whatever is needed     2024: Patient wound clean and filling in and improving in size. He was not able to  his meds yet but plans to start doxy today. Had to change dressing once but had supplies leftover to wrap this. Continue plan of care for now and follow up 1 week      2024: Patient improved this week. Will address culture with PO and topical antibiotics.      2024: Patient looks ok. Not much improvement. There is some maceration so we need to concentrate on drying.   Not able to tolerate walking boot or off . Due to stroke he has impaired balance and risks fall if

## 2024-08-08 ENCOUNTER — OFFICE VISIT (OUTPATIENT)
Age: 54
End: 2024-08-08
Payer: COMMERCIAL

## 2024-08-08 ENCOUNTER — TELEPHONE (OUTPATIENT)
Age: 54
End: 2024-08-08

## 2024-08-08 VITALS
WEIGHT: 243.4 LBS | HEIGHT: 72 IN | HEART RATE: 78 BPM | SYSTOLIC BLOOD PRESSURE: 124 MMHG | BODY MASS INDEX: 32.97 KG/M2 | OXYGEN SATURATION: 98 % | DIASTOLIC BLOOD PRESSURE: 76 MMHG | RESPIRATION RATE: 20 BRPM

## 2024-08-08 DIAGNOSIS — R06.6 HICCUPS: ICD-10-CM

## 2024-08-08 DIAGNOSIS — E11.42 TYPE 2 DIABETES MELLITUS WITH DIABETIC POLYNEUROPATHY, WITHOUT LONG-TERM CURRENT USE OF INSULIN (HCC): Primary | ICD-10-CM

## 2024-08-08 DIAGNOSIS — R15.2 FECAL URGENCY: ICD-10-CM

## 2024-08-08 LAB — HBA1C MFR BLD: 6.8 %

## 2024-08-08 PROCEDURE — 3074F SYST BP LT 130 MM HG: CPT

## 2024-08-08 PROCEDURE — 99212 OFFICE O/P EST SF 10 MIN: CPT

## 2024-08-08 PROCEDURE — 3078F DIAST BP <80 MM HG: CPT

## 2024-08-08 PROCEDURE — 83036 HEMOGLOBIN GLYCOSYLATED A1C: CPT

## 2024-08-08 PROCEDURE — 3044F HG A1C LEVEL LT 7.0%: CPT

## 2024-08-08 SDOH — ECONOMIC STABILITY: FOOD INSECURITY: WITHIN THE PAST 12 MONTHS, THE FOOD YOU BOUGHT JUST DIDN'T LAST AND YOU DIDN'T HAVE MONEY TO GET MORE.: NEVER TRUE

## 2024-08-08 SDOH — ECONOMIC STABILITY: FOOD INSECURITY: WITHIN THE PAST 12 MONTHS, YOU WORRIED THAT YOUR FOOD WOULD RUN OUT BEFORE YOU GOT MONEY TO BUY MORE.: NEVER TRUE

## 2024-08-08 SDOH — ECONOMIC STABILITY: INCOME INSECURITY: HOW HARD IS IT FOR YOU TO PAY FOR THE VERY BASICS LIKE FOOD, HOUSING, MEDICAL CARE, AND HEATING?: NOT HARD AT ALL

## 2024-08-08 SDOH — ECONOMIC STABILITY: HOUSING INSECURITY
IN THE LAST 12 MONTHS, WAS THERE A TIME WHEN YOU DID NOT HAVE A STEADY PLACE TO SLEEP OR SLEPT IN A SHELTER (INCLUDING NOW)?: NO

## 2024-08-08 ASSESSMENT — PATIENT HEALTH QUESTIONNAIRE - PHQ9
10. IF YOU CHECKED OFF ANY PROBLEMS, HOW DIFFICULT HAVE THESE PROBLEMS MADE IT FOR YOU TO DO YOUR WORK, TAKE CARE OF THINGS AT HOME, OR GET ALONG WITH OTHER PEOPLE: NOT DIFFICULT AT ALL
9. THOUGHTS THAT YOU WOULD BE BETTER OFF DEAD, OR OF HURTING YOURSELF: NOT AT ALL
SUM OF ALL RESPONSES TO PHQ QUESTIONS 1-9: 0
4. FEELING TIRED OR HAVING LITTLE ENERGY: NOT AT ALL
6. FEELING BAD ABOUT YOURSELF - OR THAT YOU ARE A FAILURE OR HAVE LET YOURSELF OR YOUR FAMILY DOWN: NOT AT ALL
1. LITTLE INTEREST OR PLEASURE IN DOING THINGS: NOT AT ALL
SUM OF ALL RESPONSES TO PHQ QUESTIONS 1-9: 0
8. MOVING OR SPEAKING SO SLOWLY THAT OTHER PEOPLE COULD HAVE NOTICED. OR THE OPPOSITE, BEING SO FIGETY OR RESTLESS THAT YOU HAVE BEEN MOVING AROUND A LOT MORE THAN USUAL: NOT AT ALL
SUM OF ALL RESPONSES TO PHQ9 QUESTIONS 1 & 2: 0
2. FEELING DOWN, DEPRESSED OR HOPELESS: NOT AT ALL
SUM OF ALL RESPONSES TO PHQ QUESTIONS 1-9: 0
5. POOR APPETITE OR OVEREATING: NOT AT ALL
SUM OF ALL RESPONSES TO PHQ QUESTIONS 1-9: 0
3. TROUBLE FALLING OR STAYING ASLEEP: NOT AT ALL
7. TROUBLE CONCENTRATING ON THINGS, SUCH AS READING THE NEWSPAPER OR WATCHING TELEVISION: NOT AT ALL

## 2024-08-08 ASSESSMENT — ENCOUNTER SYMPTOMS
RESPIRATORY NEGATIVE: 1
GASTROINTESTINAL NEGATIVE: 1

## 2024-08-08 NOTE — PROGRESS NOTES
Tomas La (:  1970) is a 53 y.o. male,Established patient, here for evaluation of the following chief complaint(s):  Follow-up (On diabetes /To establish care with provider )      Subjective   Pt here to establish care and for routine follow up. Following with dermatology for psoriasis. Also has significant medical history of two CVAs. He is following with Neurology.     IBS- Reports he has had problems with his bowels since the stroke. He is having increased urgency but denies any incontinence or loss of control.  He denies trying any interventions for symptoms.  Reports this is happening 2-3 times a week.  Patient is currently on metformin but denies any GI upset due to medication.     T2DM- Denies polyuria/polydipsia/polyphagia at this time.  Currently taking insulin, lantus, and metformin. Denies SE to medication and is tolerating well.  Patient has continuous glucose monitor and does monitor glucose multiple times a day.    Hiccups- Reports he has noticed that he is having increasing hiccups recently.  He is experiencing this multiple times a day.  He was treated for acid reflux and was taking Nexium but symptoms improved so he had stopped the medication.  Denies any heartburn or indigestion.  Denies trying any interventions for the hiccups.             2024    11:10 AM 2024    12:21 PM 2023     9:34 AM 2023     4:19 PM 2023     2:10 PM 2023     7:11 AM 10/5/2022     4:05 PM   PHQ Scores   PHQ2 Score 0 0  0 0 0 0   PHQ9 Score 0 0 0 1 0 0 0         I reviewed the medical records and past history for Tomas.    Allergies   Allergen Reactions    Bactrim [Sulfamethoxazole-Trimethoprim] Swelling, Dermatitis and Other (See Comments)     Causes vasculitis    Sulfa Antibiotics Anaphylaxis       Current Outpatient Medications   Medication Sig Dispense Refill    doxycycline hyclate (VIBRA-TABS) 100 MG tablet Take 1 tablet by mouth 2 times daily for 10 days 20 tablet 0

## 2024-08-08 NOTE — PATIENT INSTRUCTIONS
We are committed to providing you the best care possible.    If you receive a survey after visiting one of our offices, please take time to share your experience concerning your physician office visit.  These surveys are confidential and no health information about you is shared.    We are eager to improve for you and continue to give you satisfactory care, we are counting on your feedback to help make that happen.            Welcome to [facility]:    Did you know we now have a faster way for you to move through your appointment? For your convenience, we now have digital registration available. When you schedule your next appointment, you will receive a link via your email as well as a text message that will allow you to complete any paperwork digitally before your appointment.

## 2024-08-12 DIAGNOSIS — E08.29 DIABETES MELLITUS DUE TO UNDERLYING CONDITION WITH OTHER KIDNEY COMPLICATION, UNSPECIFIED WHETHER LONG TERM INSULIN USE (HCC): ICD-10-CM

## 2024-08-12 RX ORDER — ATORVASTATIN CALCIUM 80 MG/1
80 TABLET, FILM COATED ORAL NIGHTLY
Qty: 90 TABLET | Refills: 3 | Status: SHIPPED | OUTPATIENT
Start: 2024-08-12

## 2024-08-13 ENCOUNTER — HOSPITAL ENCOUNTER (OUTPATIENT)
Dept: WOUND CARE | Age: 54
Discharge: HOME | End: 2024-08-13
Attending: NURSE PRACTITIONER
Payer: COMMERCIAL

## 2024-08-13 VITALS
DIASTOLIC BLOOD PRESSURE: 82 MMHG | TEMPERATURE: 97.2 F | RESPIRATION RATE: 18 BRPM | HEART RATE: 81 BPM | SYSTOLIC BLOOD PRESSURE: 123 MMHG

## 2024-08-13 DIAGNOSIS — L97.412 DIABETIC ULCER OF RIGHT MIDFOOT ASSOCIATED WITH TYPE 2 DIABETES MELLITUS, WITH FAT LAYER EXPOSED (HCC): Primary | ICD-10-CM

## 2024-08-13 DIAGNOSIS — E11.621 DIABETIC ULCER OF RIGHT MIDFOOT ASSOCIATED WITH TYPE 2 DIABETES MELLITUS, WITH FAT LAYER EXPOSED (HCC): Primary | ICD-10-CM

## 2024-08-13 PROCEDURE — 11042 DBRDMT SUBQ TIS 1ST 20SQCM/<: CPT | Performed by: NURSE PRACTITIONER

## 2024-08-13 PROCEDURE — 11042 DBRDMT SUBQ TIS 1ST 20SQCM/<: CPT

## 2024-08-13 RX ORDER — LIDOCAINE 50 MG/G
OINTMENT TOPICAL ONCE
OUTPATIENT
Start: 2024-08-13 | End: 2024-08-13

## 2024-08-13 RX ORDER — CLOBETASOL PROPIONATE 0.5 MG/G
OINTMENT TOPICAL ONCE
OUTPATIENT
Start: 2024-08-13 | End: 2024-08-13

## 2024-08-13 RX ORDER — LIDOCAINE 40 MG/G
CREAM TOPICAL ONCE
OUTPATIENT
Start: 2024-08-13 | End: 2024-08-13

## 2024-08-13 RX ORDER — BETAMETHASONE DIPROPIONATE 0.5 MG/G
CREAM TOPICAL ONCE
OUTPATIENT
Start: 2024-08-13 | End: 2024-08-13

## 2024-08-13 RX ORDER — LIDOCAINE HYDROCHLORIDE 20 MG/ML
JELLY TOPICAL ONCE
OUTPATIENT
Start: 2024-08-13 | End: 2024-08-13

## 2024-08-13 RX ORDER — GINSENG 100 MG
CAPSULE ORAL ONCE
OUTPATIENT
Start: 2024-08-13 | End: 2024-08-13

## 2024-08-13 RX ORDER — LIDOCAINE HYDROCHLORIDE 40 MG/ML
SOLUTION TOPICAL ONCE
OUTPATIENT
Start: 2024-08-13 | End: 2024-08-13

## 2024-08-13 RX ORDER — SODIUM CHLOR/HYPOCHLOROUS ACID 0.033 %
SOLUTION, IRRIGATION IRRIGATION ONCE
OUTPATIENT
Start: 2024-08-13 | End: 2024-08-13

## 2024-08-13 RX ORDER — IBUPROFEN 200 MG
TABLET ORAL ONCE
OUTPATIENT
Start: 2024-08-13 | End: 2024-08-13

## 2024-08-13 RX ORDER — TRIAMCINOLONE ACETONIDE 1 MG/G
OINTMENT TOPICAL ONCE
OUTPATIENT
Start: 2024-08-13 | End: 2024-08-13

## 2024-08-13 RX ORDER — GENTAMICIN SULFATE 1 MG/G
OINTMENT TOPICAL ONCE
OUTPATIENT
Start: 2024-08-13 | End: 2024-08-13

## 2024-08-13 RX ORDER — BACITRACIN ZINC AND POLYMYXIN B SULFATE 500; 1000 [USP'U]/G; [USP'U]/G
OINTMENT TOPICAL ONCE
OUTPATIENT
Start: 2024-08-13 | End: 2024-08-13

## 2024-08-13 NOTE — PATIENT INSTRUCTIONS
PHYSICIAN ORDERS AND DISCHARGE INSTRUCTIONS  NOTE: Upon discharge from the Wound Center, you will receive a patient experience survey via E-mail. We would be grateful if you would take the time to fill this survey out.  Wound care order history:   GREGG's   Right       Left    Date    Vascular studies/Intervention: .     Cultures: .               Antibiotics: .Gent and Doxy 7/2/24               HbA1c:  .               Grafts:  .   Juxta Lites & Lymph Pumps:  Continuing wound care orders and information:              Residence: .              Continue home health care with:.    Your wound-care supplies will be provided by: .              Pharmacy: [facility]   Wound cleansing:      Do not scrub or use excessive force.    Wash hands with soap and water before and after dressing changes.    Prior to applying a clean dressing, cleanse wound with normal saline,    wound cleanser, or mild soap and water.     Ask your physician or nurse before getting the wound(s) wet in the shower.  Daily Wound management:    Keep weight off wounds and reposition every 2 hours.    Avoid standing for long periods of time.    Evaluate legs to the level of the heart or above for 30 minutes 4-5 times a day and/or when sitting.       When taking antibiotics take entire prescription as ordered by MD do not stop taking until medicine is all gone.     Documentation:  Compression: .   Offloading: .        Orders for this week (8/13/2024):    Right lateral foot - Wash with mild soap and water, rinse with saline, pat dry with 4x4  Apply silvadene and stimulen to wound  Cover with calcium alginate and ABD  Wrap with conform, coban up to ankle secure with tape.   Change 1 week, Keep dressing dry, change if dressing falls off    Antibiotic prescription sent to [facility] 7/30/24  Forefoot off loading shoe given 6/20/24   Please dispense 30 day quantity when sending supplies     Follow up with Ramirez WEAVER  In 1 weeks in the wound care center  Call 
To get better and follow your care plan as instructed.

## 2024-08-13 NOTE — PROGRESS NOTES
including the removal of subcutaneous tissue.        Devitalized Tissue Debrided:  fibrin, biofilm, slough, necrotic/eschar, exudate, and callus    Pre Debridement Measurements:  Are located in the Wound Documentation Flow Sheet    All active wounds listed below with today's date are evaluated  Wound(s)    debrided this date include # : 1     Post  Debridement Measurements:  Wound 08/28/23 Foot Left;Anterior 2nd toe (Active)   Number of days: 350       Wound 09/12/23 Toe (Comment  which one) Right 2nd toe (Active)   Number of days: 336       Wound 09/12/23 Ankle Right;Lateral (Active)   Number of days: 336       Wound 07/30/24 #1 Right Lateral Foot (Active)   Wound Image   08/06/24 1312   Dressing Status Clean;Dry;New dressing applied 08/13/24 1318   Wound Cleansed Soap and water;Wound cleanser;Cleansed with saline 08/13/24 1306   Offloading for Diabetic Foot Ulcers Offloading ordered 08/13/24 1318   Wound Length (cm) 0.3 cm 08/13/24 1306   Wound Width (cm) 0.2 cm 08/13/24 1306   Wound Depth (cm) 0.1 cm 08/13/24 1306   Wound Surface Area (cm^2) 0.06 cm^2 08/13/24 1306   Change in Wound Size % (l*w) 96 08/13/24 1306   Wound Volume (cm^3) 0.006 cm^3 08/13/24 1306   Wound Healing % 99 08/13/24 1306   Post-Procedure Length (cm) 0.3 cm 08/13/24 1314   Post-Procedure Width (cm) 0.2 cm 08/13/24 1314   Post-Procedure Depth (cm) 0.1 cm 08/13/24 1314   Post-Procedure Surface Area (cm^2) 0.06 cm^2 08/13/24 1314   Post-Procedure Volume (cm^3) 0.006 cm^3 08/13/24 1314   Distance Tunneling (cm) 0 cm 08/13/24 1306   Tunneling Position ___ O'Clock 0 08/13/24 1306   Undermining Starts ___ O'Clock 0 08/13/24 1306   Undermining Ends___ O'Clock 0 08/13/24 1306   Undermining Maxium Distance (cm) 0 08/13/24 1306   Wound Assessment Slough;Pink/red 08/13/24 1306   Drainage Amount Moderate (25-50%) 08/13/24 1306   Drainage Description Serosanguinous 08/13/24 1306   Odor None 08/13/24 1306   Violeta-wound Assessment Hyperkeratosis (callous)

## 2024-08-15 NOTE — PROGRESS NOTES
I reviewed results with patient. DA/CNM atorvastatin (LIPITOR) 80 MG tablet Take 1 tablet by mouth daily 90 tablet 3    fluticasone (FLONASE) 50 MCG/ACT nasal spray 1 spray by Nasal route daily      gabapentin (NEURONTIN) 600 MG tablet Take 0.5 tablets by mouth 3 times daily for 30 days. . 45 tablet 0    betamethasone valerate 0.12 % FOAM Apply topically 2 times daily as needed (Skin irritation / itching) Apply a small amount topically to affected area       No current facility-administered medications on file prior to encounter. REVIEW OF SYSTEMS    Pertinent items are noted in HPI. Constitutional: Negative for systemic symptoms including fever, chills and malaise. Objective:      /69   Pulse 78   Temp 98.3 °F (36.8 °C) (Temporal)   Resp 16     PHYSICAL EXAM    General: The patient is in no acute distress. Mental status:  Patient is appropriate, is  oriented to place and plan of care. Dermatologic exam: Visual inspection of the periwound reveals the skin to be atrophic  Wound exam: see wound description below in procedure note      Assessment:     Problem List Items Addressed This Visit     Charcot's joint of foot in type 2 diabetes mellitus ([address] - Primary    HBO-WD-Diabetic ulcer of toe of right foot associated with type 2 diabetes mellitus, with necrosis of muscle Hannon grade 3 ([address]    WD-Ulcer of right foot with fat layer exposed ([address]    WD-Type 2 diabetes mellitus with right lateral diabetic foot ulcer ([address]        Procedure Note    Indications:  Based on my examination of this patient's wound(s) today, sharp excision into necrotic subcutaneous tissue is required to promote healing and evaluate the extent of previous healing. Performed by: Frank Salazar DPM    Consent obtained: Yes    Time out taken:  Yes    Pain Control: none       Debridement:Excisional Debridement    Using tissue nippers the wound(s) was/were sharply debrided down through and including the removal of subcutaneous tissue.         Devitalized description from last visit:   Improved vs my office last week. Plan:       Discharge Instructions       . PHYSICIAN ORDERS AND DISCHARGE INSTRUCTIONS    NOTE: Upon discharge from the [address], you will receive a patient experience survey. We would be grateful if you would take the time to fill this survey out. Wound care order history:     GREGG's   Right       Left    Date    Vascular studies:   Date    Imaging:   Date    Cultures:   Date    Labs/ HbA1c:   Date    Grafts:  Date    HBO:  N/a    Antibiotics:               Earlier Wound care treatments:  Silver alginate              Authorizations:      Consults:   Date     PCP: Dr. Angeline Albrecht wound care orders and information:              Residence: home              Continue home health care with: n/a   Your wound-care supplies will be provided by: self   DME provider:   Compression with   Off loading:  Date    Wound Meds:    Wound cleansing:      Do not scrub or use excessive force. Wash hands with soap and water before and after dressing changes. Prior to applying a clean dressing, cleanse wound with normal saline,    wound cleanser, or mild soap and water. Ask your physician or nurse before getting the wound(s) wet in the shower. Daily Wound management:    Keep weight off wounds and reposition every 2 hours. Avoid standing for long periods of time. Apply wraps/stockings in AM and remove at bedtime. Elevate legs to the level of the heart or above for 30 minutes 4-5 times a day and/or when sitting. When taking antibiotics take entire prescription as ordered by MD do not stop taking until medicine is all gone. Orders for this week: 8/6/18     Right lateral foot- cleanse with soap and water, pat dry. Apply silver algniate, fluffed 4x4s, wrap with conform and tape. Change daily.     Culture obtained 8/6/18    Follow up with Dr COW[facility]  In 1 week in the wound care center    Call [phone] for any questions or

## 2024-08-16 ENCOUNTER — TELEPHONE (OUTPATIENT)
Dept: GASTROENTEROLOGY | Age: 54
End: 2024-08-16

## 2024-08-16 NOTE — TELEPHONE ENCOUNTER
Called pt. in regards to a referral for diarrhea. Made appt for pt to see dr. Gardner on 8/21/24 @1:30pm

## 2024-08-20 ENCOUNTER — HOSPITAL ENCOUNTER (OUTPATIENT)
Dept: WOUND CARE | Age: 54
Discharge: HOME | End: 2024-08-20
Attending: NURSE PRACTITIONER
Payer: COMMERCIAL

## 2024-08-20 VITALS
SYSTOLIC BLOOD PRESSURE: 138 MMHG | RESPIRATION RATE: 18 BRPM | HEART RATE: 86 BPM | DIASTOLIC BLOOD PRESSURE: 87 MMHG | TEMPERATURE: 97.6 F

## 2024-08-20 DIAGNOSIS — L97.412 DIABETIC ULCER OF RIGHT MIDFOOT ASSOCIATED WITH TYPE 2 DIABETES MELLITUS, WITH FAT LAYER EXPOSED (HCC): Primary | ICD-10-CM

## 2024-08-20 DIAGNOSIS — E11.621 DIABETIC ULCER OF RIGHT MIDFOOT ASSOCIATED WITH TYPE 2 DIABETES MELLITUS, WITH FAT LAYER EXPOSED (HCC): Primary | ICD-10-CM

## 2024-08-20 PROCEDURE — 11042 DBRDMT SUBQ TIS 1ST 20SQCM/<: CPT

## 2024-08-20 PROCEDURE — 11042 DBRDMT SUBQ TIS 1ST 20SQCM/<: CPT | Performed by: NURSE PRACTITIONER

## 2024-08-20 RX ORDER — GENTAMICIN SULFATE 1 MG/G
OINTMENT TOPICAL ONCE
OUTPATIENT
Start: 2024-08-20 | End: 2024-08-20

## 2024-08-20 RX ORDER — GINSENG 100 MG
CAPSULE ORAL ONCE
OUTPATIENT
Start: 2024-08-20 | End: 2024-08-20

## 2024-08-20 RX ORDER — LIDOCAINE HYDROCHLORIDE 40 MG/ML
SOLUTION TOPICAL ONCE
OUTPATIENT
Start: 2024-08-20 | End: 2024-08-20

## 2024-08-20 RX ORDER — BACITRACIN ZINC AND POLYMYXIN B SULFATE 500; 1000 [USP'U]/G; [USP'U]/G
OINTMENT TOPICAL ONCE
OUTPATIENT
Start: 2024-08-20 | End: 2024-08-20

## 2024-08-20 RX ORDER — CLOBETASOL PROPIONATE 0.5 MG/G
OINTMENT TOPICAL ONCE
OUTPATIENT
Start: 2024-08-20 | End: 2024-08-20

## 2024-08-20 RX ORDER — IBUPROFEN 200 MG
TABLET ORAL ONCE
OUTPATIENT
Start: 2024-08-20 | End: 2024-08-20

## 2024-08-20 RX ORDER — LIDOCAINE HYDROCHLORIDE 20 MG/ML
JELLY TOPICAL ONCE
OUTPATIENT
Start: 2024-08-20 | End: 2024-08-20

## 2024-08-20 RX ORDER — LIDOCAINE 50 MG/G
OINTMENT TOPICAL ONCE
OUTPATIENT
Start: 2024-08-20 | End: 2024-08-20

## 2024-08-20 RX ORDER — SODIUM CHLOR/HYPOCHLOROUS ACID 0.033 %
SOLUTION, IRRIGATION IRRIGATION ONCE
OUTPATIENT
Start: 2024-08-20 | End: 2024-08-20

## 2024-08-20 RX ORDER — LIDOCAINE 40 MG/G
CREAM TOPICAL ONCE
OUTPATIENT
Start: 2024-08-20 | End: 2024-08-20

## 2024-08-20 RX ORDER — BETAMETHASONE DIPROPIONATE 0.5 MG/G
CREAM TOPICAL ONCE
OUTPATIENT
Start: 2024-08-20 | End: 2024-08-20

## 2024-08-20 RX ORDER — TRIAMCINOLONE ACETONIDE 1 MG/G
OINTMENT TOPICAL ONCE
OUTPATIENT
Start: 2024-08-20 | End: 2024-08-20

## 2024-08-20 NOTE — PATIENT INSTRUCTIONS
PHYSICIAN ORDERS AND DISCHARGE INSTRUCTIONS  NOTE: Upon discharge from the Wound Center, you will receive a patient experience survey via E-mail. We would be grateful if you would take the time to fill this survey out.  Wound care order history:   GREGG's   Right       Left    Date    Vascular studies/Intervention: .     Cultures: .               Antibiotics: .Gent and Doxy 7/2/24               HbA1c:  .               Grafts:  .   Juxta Lites & Lymph Pumps:  Continuing wound care orders and information:              Residence: .              Continue home health care with:.    Your wound-care supplies will be provided by: .              Pharmacy: [facility]   Wound cleansing:      Do not scrub or use excessive force.    Wash hands with soap and water before and after dressing changes.    Prior to applying a clean dressing, cleanse wound with normal saline,    wound cleanser, or mild soap and water.     Ask your physician or nurse before getting the wound(s) wet in the shower.  Daily Wound management:    Keep weight off wounds and reposition every 2 hours.    Avoid standing for long periods of time.    Evaluate legs to the level of the heart or above for 30 minutes 4-5 times a day and/or when sitting.       When taking antibiotics take entire prescription as ordered by MD do not stop taking until medicine is all gone.     Documentation:  Compression: .   Offloading: .        Orders for this week (8/20/2024):    Right lateral foot - Wash with mild soap and water, rinse with saline, pat dry with 4x4  Apply stimulen and A&D to wound and santi area  Cover with calcium alginate.  Wrap with conform, coban up to ankle secure with tape.   Change 1 week, Keep dressing dry, change if dressing falls off    Antibiotic prescription sent to [facility] 7/30/24  Forefoot off loading shoe given 6/20/24   Please dispense 30 day quantity when sending supplies     Follow up with Ramirez WEAVER  In 1 weeks in the wound care center  Call

## 2024-08-20 NOTE — PROGRESS NOTES
Wound Care Center Progress Note With Procedure    Tomas La  AGE: 53 y.o.   GENDER: male  : 1970  EPISODE DATE:  2024     Subjective:     Chief Complaint   Patient presents with    Wound Check     R foot          HISTORY of PRESENT ILLNESS     Tomas La is a 53 y.o. male who presents to the Wound Clinic for a visit for evaluation and treatment of acute diabetic  ulcer(s) of  right . foot medial.  The condition is of moderate severity. The ulcer has been present for several months since march.  The underlying cause is thought to be diabetes.  The patients care to date has included offloading and normal care. The patient has significant underlying medical conditions as below.     2024: Patient looking ok but has some depth and undermining because of callus. Will try to get this off this week and next and try to moisturize this area to soften skin up.      2024: Patient looking better. Emphasized foot and callus care again and patient would like to get into a shoe fitting. Will refer to  clinic at this time.  **Patient would benefit from custom fitted diabetic shoes. He has had multiple pressure and diabetic ulcers in the past. He has been diagnosed with a Charcot deformity which makes wearing off the shelf footwear a hazard and puts him at risk for further injury.      2024: Patient great improvement in the first week. Clean and dry and tolerating antibiotics without issue  Need to trim callus down each week     2024: Patient was discharged last week but returns with new diabetic wound to right lateral foot. No antibiotics yet but we will order some this week  Ok with wrap for the week. Needs debrided today     2024: Patient is essentially healed. Will trim down callus and treat him for 1 more week.  Patient previously had orthotics from [facility]. Recommended he contact them and get new inserts. He will report back next week and we will refer him

## 2024-08-21 ENCOUNTER — OFFICE VISIT (OUTPATIENT)
Dept: GASTROENTEROLOGY | Age: 54
End: 2024-08-21
Payer: COMMERCIAL

## 2024-08-21 VITALS
RESPIRATION RATE: 14 BRPM | OXYGEN SATURATION: 90 % | HEIGHT: 72 IN | BODY MASS INDEX: 32.64 KG/M2 | DIASTOLIC BLOOD PRESSURE: 58 MMHG | WEIGHT: 241 LBS | HEART RATE: 79 BPM | SYSTOLIC BLOOD PRESSURE: 122 MMHG

## 2024-08-21 DIAGNOSIS — R15.9 INCONTINENCE OF FECES, UNSPECIFIED FECAL INCONTINENCE TYPE: ICD-10-CM

## 2024-08-21 DIAGNOSIS — K52.9 CHRONIC DIARRHEA: Primary | ICD-10-CM

## 2024-08-21 DIAGNOSIS — I69.391 DYSPHAGIA DUE TO RECENT STROKE: ICD-10-CM

## 2024-08-21 PROCEDURE — 3074F SYST BP LT 130 MM HG: CPT | Performed by: INTERNAL MEDICINE

## 2024-08-21 PROCEDURE — 99214 OFFICE O/P EST MOD 30 MIN: CPT | Performed by: INTERNAL MEDICINE

## 2024-08-21 PROCEDURE — 3078F DIAST BP <80 MM HG: CPT | Performed by: INTERNAL MEDICINE

## 2024-08-21 RX ORDER — DICYCLOMINE HYDROCHLORIDE 10 MG/1
10 CAPSULE ORAL
Qty: 120 CAPSULE | Refills: 0 | Status: SHIPPED | OUTPATIENT
Start: 2024-08-21

## 2024-08-21 NOTE — PROGRESS NOTES
accommodate PRN testing needs., Disp: 120 strip, Rfl: 0    gabapentin (NEURONTIN) 300 MG capsule, Take 1 capsule by mouth 3 times daily for 90 days., Disp: 90 capsule, Rfl: 2     Allergies:  Bactrim [sulfamethoxazole-trimethoprim] and Sulfa antibiotics     Objective:    Blood pressure (!) 122/58, pulse 79, resp. rate 14, height 1.829 m (6' 0.01\"), weight 109.3 kg (241 lb), SpO2 90%.    General appearance: alert, cooperative, no distress, appears stated age  Head: Normocephalic, without obvious abnormality, atraumatic  Eyes: conjunctivae/corneas clear.   Nose: Nares normal. No discharge  Throat: Lips, mucosa, and tongue normal. Teeth and gums normal  Neck: supple, symmetrical, trachea midline and no adenopathy  Back: symmetric, no curvature. no scoliosis present  Lungs: normal respiratory effort  Heart: regular rate and rhythm  Abdomen: soft, non-tender. No masses,  no hepatospleenomegally  Extremities: extremities normal, atraumatic, no cyanosis or edema  Skin: Skin color, texture, turgor normal. No rashes or lesions  Neurologic: Non focal, speech clear,   Psychiatry: Mood appropriate, no evidence of psychosis        Labs: Reviewed last labs/outside records          Assessment and Plan:  Tomas was seen today for diarrhea.    Diagnoses and all orders for this visit:    Chronic diarrhea  -     IgA; Future  -     Tissue Transglutaminase, IgA; Future  -     Calprotectin Stool; Future    Incontinence of feces, unspecified fecal incontinence type  -     External Referral To Gastroenterology    Dysphagia due to recent stroke  -     FL MODIFIED BARIUM SWALLOW W VIDEO; Future    Other orders  -     dicyclomine (BENTYL) 10 MG capsule; Take 1 capsule by mouth 2 times daily (before meals)       Diagnosis Orders   1. Chronic diarrhea  IgA    Tissue Transglutaminase, IgA    Calprotectin Stool      2. Incontinence of feces, unspecified fecal incontinence type  External Referral To Gastroenterology      3. Dysphagia due to recent

## 2024-08-27 ENCOUNTER — HOSPITAL ENCOUNTER (OUTPATIENT)
Dept: WOUND CARE | Age: 54
Discharge: HOME | End: 2024-08-27
Attending: NURSE PRACTITIONER
Payer: COMMERCIAL

## 2024-08-27 VITALS
SYSTOLIC BLOOD PRESSURE: 121 MMHG | TEMPERATURE: 98.1 F | RESPIRATION RATE: 16 BRPM | DIASTOLIC BLOOD PRESSURE: 76 MMHG | HEART RATE: 88 BPM

## 2024-08-27 DIAGNOSIS — L97.412 DIABETIC ULCER OF RIGHT MIDFOOT ASSOCIATED WITH TYPE 2 DIABETES MELLITUS, WITH FAT LAYER EXPOSED (HCC): Primary | ICD-10-CM

## 2024-08-27 DIAGNOSIS — R60.0 LEG EDEMA, RIGHT: ICD-10-CM

## 2024-08-27 DIAGNOSIS — E11.621 DIABETIC ULCER OF RIGHT MIDFOOT ASSOCIATED WITH TYPE 2 DIABETES MELLITUS, WITH FAT LAYER EXPOSED (HCC): Primary | ICD-10-CM

## 2024-08-27 DIAGNOSIS — E11.610 CHARCOT'S JOINT OF FOOT IN TYPE 2 DIABETES MELLITUS (HCC): ICD-10-CM

## 2024-08-27 PROCEDURE — 11042 DBRDMT SUBQ TIS 1ST 20SQCM/<: CPT | Performed by: NURSE PRACTITIONER

## 2024-08-27 PROCEDURE — 11042 DBRDMT SUBQ TIS 1ST 20SQCM/<: CPT

## 2024-08-27 RX ORDER — GENTAMICIN SULFATE 1 MG/G
OINTMENT TOPICAL ONCE
OUTPATIENT
Start: 2024-08-27 | End: 2024-08-27

## 2024-08-27 RX ORDER — LIDOCAINE 50 MG/G
OINTMENT TOPICAL ONCE
OUTPATIENT
Start: 2024-08-27 | End: 2024-08-27

## 2024-08-27 RX ORDER — LIDOCAINE HYDROCHLORIDE 20 MG/ML
JELLY TOPICAL ONCE
OUTPATIENT
Start: 2024-08-27 | End: 2024-08-27

## 2024-08-27 RX ORDER — SILVER SULFADIAZINE 10 MG/G
CREAM TOPICAL ONCE
OUTPATIENT
Start: 2024-08-27 | End: 2024-08-27

## 2024-08-27 RX ORDER — NEOMYCIN/BACITRACIN/POLYMYXINB 3.5-400-5K
OINTMENT (GRAM) TOPICAL ONCE
OUTPATIENT
Start: 2024-08-27 | End: 2024-08-27

## 2024-08-27 RX ORDER — BETAMETHASONE DIPROPIONATE 0.5 MG/G
CREAM TOPICAL ONCE
OUTPATIENT
Start: 2024-08-27 | End: 2024-08-27

## 2024-08-27 RX ORDER — LIDOCAINE HYDROCHLORIDE 40 MG/ML
SOLUTION TOPICAL ONCE
OUTPATIENT
Start: 2024-08-27 | End: 2024-08-27

## 2024-08-27 RX ORDER — MUPIROCIN 20 MG/G
OINTMENT TOPICAL ONCE
OUTPATIENT
Start: 2024-08-27 | End: 2024-08-27

## 2024-08-27 RX ORDER — CLOBETASOL PROPIONATE 0.5 MG/G
OINTMENT TOPICAL ONCE
OUTPATIENT
Start: 2024-08-27 | End: 2024-08-27

## 2024-08-27 RX ORDER — BACITRACIN ZINC AND POLYMYXIN B SULFATE 500; 1000 [USP'U]/G; [USP'U]/G
OINTMENT TOPICAL ONCE
OUTPATIENT
Start: 2024-08-27 | End: 2024-08-27

## 2024-08-27 RX ORDER — SODIUM CHLOR/HYPOCHLOROUS ACID 0.033 %
SOLUTION, IRRIGATION IRRIGATION ONCE
OUTPATIENT
Start: 2024-08-27 | End: 2024-08-27

## 2024-08-27 RX ORDER — TRIAMCINOLONE ACETONIDE 1 MG/G
OINTMENT TOPICAL ONCE
OUTPATIENT
Start: 2024-08-27 | End: 2024-08-27

## 2024-08-27 RX ORDER — GINSENG 100 MG
CAPSULE ORAL ONCE
OUTPATIENT
Start: 2024-08-27 | End: 2024-08-27

## 2024-08-27 RX ORDER — LIDOCAINE 40 MG/G
CREAM TOPICAL ONCE
OUTPATIENT
Start: 2024-08-27 | End: 2024-08-27

## 2024-08-27 RX ORDER — AZATHIOPRINE 50 MG/1
50 TABLET ORAL DAILY
COMMUNITY
Start: 2024-08-20

## 2024-08-27 NOTE — PLAN OF CARE
Problem: Chronic Conditions and Co-morbidities  Goal: Patient's chronic conditions and co-morbidity symptoms are monitored and maintained or improved  8/27/2024 1328 by Yessy Sheth LPN  Outcome: Progressing  8/27/2024 1328 by Yessy Sheth LPN  Outcome: Progressing

## 2024-08-27 NOTE — PATIENT INSTRUCTIONS
PHYSICIAN ORDERS AND DISCHARGE INSTRUCTIONS  NOTE: Upon discharge from the Wound Center, you will receive a patient experience survey via E-mail. We would be grateful if you would take the time to fill this survey out.  Wound care order history:   GREGG's   Right       Left    Date    Vascular studies/Intervention: .     Cultures: .               Antibiotics: .Gent and Doxy 7/2/24               HbA1c:  .               Grafts:  .   Juxta Lites & Lymph Pumps:  Continuing wound care orders and information:              Residence: .              Continue home health care with:.    Your wound-care supplies will be provided by: .              Pharmacy: [facility]   Wound cleansing:      Do not scrub or use excessive force.    Wash hands with soap and water before and after dressing changes.    Prior to applying a clean dressing, cleanse wound with normal saline,    wound cleanser, or mild soap and water.     Ask your physician or nurse before getting the wound(s) wet in the shower.  Daily Wound management:    Keep weight off wounds and reposition every 2 hours.    Avoid standing for long periods of time.    Evaluate legs to the level of the heart or above for 30 minutes 4-5 times a day and/or when sitting.       When taking antibiotics take entire prescription as ordered by MD do not stop taking until medicine is all gone.     Documentation:  Compression: .   Offloading: .        Orders for this week (8/27/2024):    Right lateral foot - Wash with mild soap and water, rinse with saline, pat dry with 4x4  Apply stimulen to wound and A&D to santi area  Cover with calcium alginate.  Wrap with conform, coban up to ankle secure with tape.   Change 1 week, Keep dressing dry, change if dressing falls off    Antibiotic prescription sent to [facility] 7/30/24  Forefoot off loading shoe given 6/20/24   Please dispense 30 day quantity when sending supplies     (Nurse visit 9-3-24)  Follow up with Ramirez WEAVER  In 2 weeks in the

## 2024-08-27 NOTE — PROGRESS NOTES
Wound Care Center Progress Note With Procedure    Tomas La  AGE: 53 y.o.   GENDER: male  : 1970  EPISODE DATE:  2024     Subjective:     Chief Complaint   Patient presents with    Wound Check     Right foot         HISTORY of PRESENT ILLNESS     Tomas La is a 53 y.o. male who presents to the Wound Clinic for a visit for evaluation and treatment of acute diabetic  ulcer(s) of  right . foot medial.  The condition is of moderate severity. The ulcer has been present for several months since march.  The underlying cause is thought to be diabetes.  The patients care to date has included offloading and normal care. The patient has significant underlying medical conditions as below.     2024: Patient looking good. Will finish trimming callus today and A&D has softened skin somewhat. RN visit next week and hope he is about healed in 2 when he sees provider     2024: Patient looking ok but has some depth and undermining because of callus. Will try to get this off this week and next and try to moisturize this area to soften skin up.      2024: Patient looking better. Emphasized foot and callus care again and patient would like to get into a shoe fitting. Will refer to  clinic at this time.  **Patient would benefit from custom fitted diabetic shoes. He has had multiple pressure and diabetic ulcers in the past. He has been diagnosed with a Charcot deformity which makes wearing off the shelf footwear a hazard and puts him at risk for further injury.      2024: Patient great improvement in the first week. Clean and dry and tolerating antibiotics without issue  Need to trim callus down each week     2024: Patient was discharged last week but returns with new diabetic wound to right lateral foot. No antibiotics yet but we will order some this week  Ok with wrap for the week. Needs debrided today     2024: Patient is essentially healed. Will trim down callus and treat

## 2024-09-03 ENCOUNTER — HOSPITAL ENCOUNTER (OUTPATIENT)
Dept: WOUND CARE | Age: 54
Discharge: HOME | End: 2024-09-03
Attending: NURSE PRACTITIONER
Payer: COMMERCIAL

## 2024-09-03 VITALS
TEMPERATURE: 98.7 F | HEART RATE: 79 BPM | SYSTOLIC BLOOD PRESSURE: 103 MMHG | DIASTOLIC BLOOD PRESSURE: 66 MMHG | RESPIRATION RATE: 16 BRPM

## 2024-09-03 PROCEDURE — 99213 OFFICE O/P EST LOW 20 MIN: CPT

## 2024-09-03 NOTE — PROGRESS NOTES
Patient arrived for nurse visit. Old dressings were removed and wounds were cleansed as documented in flowsheet. Wound care was performed per provider orders. Patient tolerated well. Patient had no further questions or concerns. Informed of follow up care, and verbalized understanding.   Patient tolerated treatment well.    Electronically signed by Marylou Patel RN on 9/3/2024 at 1:19 PM

## 2024-09-10 ENCOUNTER — HOSPITAL ENCOUNTER (OUTPATIENT)
Dept: WOUND CARE | Age: 54
Discharge: HOME | End: 2024-09-10
Attending: NURSE PRACTITIONER
Payer: COMMERCIAL

## 2024-09-10 VITALS
TEMPERATURE: 98.4 F | SYSTOLIC BLOOD PRESSURE: 115 MMHG | HEART RATE: 83 BPM | DIASTOLIC BLOOD PRESSURE: 76 MMHG | RESPIRATION RATE: 16 BRPM

## 2024-09-10 DIAGNOSIS — L97.412 DIABETIC ULCER OF RIGHT MIDFOOT ASSOCIATED WITH TYPE 2 DIABETES MELLITUS, WITH FAT LAYER EXPOSED (HCC): Primary | ICD-10-CM

## 2024-09-10 DIAGNOSIS — E11.621 DIABETIC ULCER OF RIGHT MIDFOOT ASSOCIATED WITH TYPE 2 DIABETES MELLITUS, WITH FAT LAYER EXPOSED (HCC): Primary | ICD-10-CM

## 2024-09-10 PROCEDURE — 11042 DBRDMT SUBQ TIS 1ST 20SQCM/<: CPT | Performed by: NURSE PRACTITIONER

## 2024-09-10 PROCEDURE — 11042 DBRDMT SUBQ TIS 1ST 20SQCM/<: CPT

## 2024-09-10 RX ORDER — TRIAMCINOLONE ACETONIDE 1 MG/G
OINTMENT TOPICAL ONCE
OUTPATIENT
Start: 2024-09-10 | End: 2024-09-10

## 2024-09-10 RX ORDER — MUPIROCIN 20 MG/G
OINTMENT TOPICAL ONCE
OUTPATIENT
Start: 2024-09-10 | End: 2024-09-10

## 2024-09-10 RX ORDER — LIDOCAINE 50 MG/G
OINTMENT TOPICAL ONCE
OUTPATIENT
Start: 2024-09-10 | End: 2024-09-10

## 2024-09-10 RX ORDER — SILVER SULFADIAZINE 10 MG/G
CREAM TOPICAL ONCE
OUTPATIENT
Start: 2024-09-10 | End: 2024-09-10

## 2024-09-10 RX ORDER — LIDOCAINE HYDROCHLORIDE 20 MG/ML
JELLY TOPICAL ONCE
OUTPATIENT
Start: 2024-09-10 | End: 2024-09-10

## 2024-09-10 RX ORDER — GINSENG 100 MG
CAPSULE ORAL ONCE
OUTPATIENT
Start: 2024-09-10 | End: 2024-09-10

## 2024-09-10 RX ORDER — LIDOCAINE HYDROCHLORIDE 40 MG/ML
SOLUTION TOPICAL ONCE
OUTPATIENT
Start: 2024-09-10 | End: 2024-09-10

## 2024-09-10 RX ORDER — GENTAMICIN SULFATE 1 MG/G
OINTMENT TOPICAL ONCE
OUTPATIENT
Start: 2024-09-10 | End: 2024-09-10

## 2024-09-10 RX ORDER — NEOMYCIN/BACITRACIN/POLYMYXINB 3.5-400-5K
OINTMENT (GRAM) TOPICAL ONCE
OUTPATIENT
Start: 2024-09-10 | End: 2024-09-10

## 2024-09-10 RX ORDER — SODIUM CHLOR/HYPOCHLOROUS ACID 0.033 %
SOLUTION, IRRIGATION IRRIGATION ONCE
OUTPATIENT
Start: 2024-09-10 | End: 2024-09-10

## 2024-09-10 RX ORDER — BETAMETHASONE DIPROPIONATE 0.5 MG/G
CREAM TOPICAL ONCE
OUTPATIENT
Start: 2024-09-10 | End: 2024-09-10

## 2024-09-10 RX ORDER — BACITRACIN ZINC AND POLYMYXIN B SULFATE 500; 1000 [USP'U]/G; [USP'U]/G
OINTMENT TOPICAL ONCE
OUTPATIENT
Start: 2024-09-10 | End: 2024-09-10

## 2024-09-10 RX ORDER — LIDOCAINE 40 MG/G
CREAM TOPICAL ONCE
OUTPATIENT
Start: 2024-09-10 | End: 2024-09-10

## 2024-09-10 RX ORDER — CLOBETASOL PROPIONATE 0.5 MG/G
OINTMENT TOPICAL ONCE
OUTPATIENT
Start: 2024-09-10 | End: 2024-09-10

## 2024-09-11 ENCOUNTER — HOSPITAL ENCOUNTER (OUTPATIENT)
Dept: PHYSICAL THERAPY | Age: 54
Setting detail: THERAPIES SERIES
Discharge: HOME | End: 2024-09-11
Payer: COMMERCIAL

## 2024-09-11 PROCEDURE — 97530 THERAPEUTIC ACTIVITIES: CPT

## 2024-09-11 PROCEDURE — 97162 PT EVAL MOD COMPLEX 30 MIN: CPT

## 2024-09-11 PROCEDURE — 97166 OT EVAL MOD COMPLEX 45 MIN: CPT

## 2024-09-11 PROCEDURE — 97110 THERAPEUTIC EXERCISES: CPT

## 2024-09-13 ENCOUNTER — HOSPITAL ENCOUNTER (OUTPATIENT)
Dept: PHYSICAL THERAPY | Age: 54
Setting detail: THERAPIES SERIES
Discharge: HOME | End: 2024-09-13
Payer: COMMERCIAL

## 2024-09-13 PROCEDURE — 97110 THERAPEUTIC EXERCISES: CPT

## 2024-09-13 PROCEDURE — 97530 THERAPEUTIC ACTIVITIES: CPT

## 2024-09-13 PROCEDURE — 97140 MANUAL THERAPY 1/> REGIONS: CPT

## 2024-09-13 PROCEDURE — 97112 NEUROMUSCULAR REEDUCATION: CPT

## 2024-09-16 ENCOUNTER — HOSPITAL ENCOUNTER (OUTPATIENT)
Dept: PHYSICAL THERAPY | Age: 54
Setting detail: THERAPIES SERIES
Discharge: HOME | End: 2024-09-16
Payer: COMMERCIAL

## 2024-09-16 PROCEDURE — 97110 THERAPEUTIC EXERCISES: CPT

## 2024-09-16 PROCEDURE — 97112 NEUROMUSCULAR REEDUCATION: CPT

## 2024-09-16 PROCEDURE — 97530 THERAPEUTIC ACTIVITIES: CPT

## 2024-09-16 RX ORDER — METHOCARBAMOL 750 MG/1
750 TABLET, FILM COATED ORAL 3 TIMES DAILY
Qty: 90 TABLET | Refills: 5 | Status: SHIPPED | OUTPATIENT
Start: 2024-09-16

## 2024-09-17 ENCOUNTER — HOSPITAL ENCOUNTER (OUTPATIENT)
Dept: WOUND CARE | Age: 54
Discharge: HOME | End: 2024-09-17
Attending: NURSE PRACTITIONER
Payer: COMMERCIAL

## 2024-09-17 VITALS
HEART RATE: 86 BPM | DIASTOLIC BLOOD PRESSURE: 74 MMHG | RESPIRATION RATE: 16 BRPM | SYSTOLIC BLOOD PRESSURE: 131 MMHG | TEMPERATURE: 97.9 F

## 2024-09-17 DIAGNOSIS — E11.621 DIABETIC ULCER OF RIGHT MIDFOOT ASSOCIATED WITH TYPE 2 DIABETES MELLITUS, WITH FAT LAYER EXPOSED (HCC): ICD-10-CM

## 2024-09-17 DIAGNOSIS — L97.422 DIABETIC ULCER OF LEFT MIDFOOT ASSOCIATED WITH TYPE 2 DIABETES MELLITUS, WITH FAT LAYER EXPOSED (HCC): Primary | ICD-10-CM

## 2024-09-17 DIAGNOSIS — E11.621 DIABETIC ULCER OF LEFT MIDFOOT ASSOCIATED WITH TYPE 2 DIABETES MELLITUS, WITH FAT LAYER EXPOSED (HCC): Primary | ICD-10-CM

## 2024-09-17 DIAGNOSIS — L97.412 DIABETIC ULCER OF RIGHT MIDFOOT ASSOCIATED WITH TYPE 2 DIABETES MELLITUS, WITH FAT LAYER EXPOSED (HCC): ICD-10-CM

## 2024-09-17 PROCEDURE — 11042 DBRDMT SUBQ TIS 1ST 20SQCM/<: CPT

## 2024-09-17 PROCEDURE — 11042 DBRDMT SUBQ TIS 1ST 20SQCM/<: CPT | Performed by: NURSE PRACTITIONER

## 2024-09-17 RX ORDER — LIDOCAINE HYDROCHLORIDE 40 MG/ML
SOLUTION TOPICAL ONCE
OUTPATIENT
Start: 2024-09-17 | End: 2024-09-17

## 2024-09-17 RX ORDER — TRIAMCINOLONE ACETONIDE 1 MG/G
OINTMENT TOPICAL ONCE
OUTPATIENT
Start: 2024-09-17 | End: 2024-09-17

## 2024-09-17 RX ORDER — LIDOCAINE 40 MG/G
CREAM TOPICAL ONCE
OUTPATIENT
Start: 2024-09-17 | End: 2024-09-17

## 2024-09-17 RX ORDER — SILVER SULFADIAZINE 10 MG/G
CREAM TOPICAL ONCE
OUTPATIENT
Start: 2024-09-17 | End: 2024-09-17

## 2024-09-17 RX ORDER — GINSENG 100 MG
CAPSULE ORAL ONCE
OUTPATIENT
Start: 2024-09-17 | End: 2024-09-17

## 2024-09-17 RX ORDER — BETAMETHASONE DIPROPIONATE 0.5 MG/G
CREAM TOPICAL ONCE
OUTPATIENT
Start: 2024-09-17 | End: 2024-09-17

## 2024-09-17 RX ORDER — LIDOCAINE HYDROCHLORIDE 20 MG/ML
JELLY TOPICAL ONCE
OUTPATIENT
Start: 2024-09-17 | End: 2024-09-17

## 2024-09-17 RX ORDER — CLOBETASOL PROPIONATE 0.5 MG/G
OINTMENT TOPICAL ONCE
OUTPATIENT
Start: 2024-09-17 | End: 2024-09-17

## 2024-09-17 RX ORDER — LEVOFLOXACIN 750 MG/1
750 TABLET, FILM COATED ORAL DAILY
Qty: 10 TABLET | Refills: 0 | Status: SHIPPED | OUTPATIENT
Start: 2024-09-17 | End: 2024-09-27

## 2024-09-17 RX ORDER — GENTAMICIN SULFATE 1 MG/G
OINTMENT TOPICAL ONCE
OUTPATIENT
Start: 2024-09-17 | End: 2024-09-17

## 2024-09-17 RX ORDER — SODIUM CHLOR/HYPOCHLOROUS ACID 0.033 %
SOLUTION, IRRIGATION IRRIGATION ONCE
OUTPATIENT
Start: 2024-09-17 | End: 2024-09-17

## 2024-09-17 RX ORDER — LIDOCAINE 50 MG/G
OINTMENT TOPICAL ONCE
OUTPATIENT
Start: 2024-09-17 | End: 2024-09-17

## 2024-09-17 RX ORDER — MUPIROCIN 20 MG/G
OINTMENT TOPICAL ONCE
OUTPATIENT
Start: 2024-09-17 | End: 2024-09-17

## 2024-09-17 RX ORDER — NEOMYCIN/BACITRACIN/POLYMYXINB 3.5-400-5K
OINTMENT (GRAM) TOPICAL ONCE
OUTPATIENT
Start: 2024-09-17 | End: 2024-09-17

## 2024-09-17 RX ORDER — BACITRACIN ZINC AND POLYMYXIN B SULFATE 500; 1000 [USP'U]/G; [USP'U]/G
OINTMENT TOPICAL ONCE
OUTPATIENT
Start: 2024-09-17 | End: 2024-09-17

## 2024-09-18 ENCOUNTER — TELEPHONE (OUTPATIENT)
Age: 54
End: 2024-09-18

## 2024-09-19 ENCOUNTER — HOSPITAL ENCOUNTER (OUTPATIENT)
Dept: PHYSICAL THERAPY | Age: 54
Setting detail: THERAPIES SERIES
Discharge: HOME | End: 2024-09-19
Payer: COMMERCIAL

## 2024-09-19 PROCEDURE — 97110 THERAPEUTIC EXERCISES: CPT

## 2024-09-19 PROCEDURE — 97530 THERAPEUTIC ACTIVITIES: CPT

## 2024-09-23 ENCOUNTER — HOSPITAL ENCOUNTER (OUTPATIENT)
Dept: PHYSICAL THERAPY | Age: 54
Setting detail: THERAPIES SERIES
Discharge: HOME | End: 2024-09-23
Payer: COMMERCIAL

## 2024-09-23 PROCEDURE — 97110 THERAPEUTIC EXERCISES: CPT

## 2024-09-23 PROCEDURE — 97014 ELECTRIC STIMULATION THERAPY: CPT

## 2024-09-23 PROCEDURE — 97112 NEUROMUSCULAR REEDUCATION: CPT

## 2024-09-24 ENCOUNTER — OFFICE VISIT (OUTPATIENT)
Age: 54
End: 2024-09-24
Payer: COMMERCIAL

## 2024-09-24 ENCOUNTER — HOSPITAL ENCOUNTER (OUTPATIENT)
Dept: WOUND CARE | Age: 54
Discharge: HOME | End: 2024-09-24
Attending: NURSE PRACTITIONER
Payer: COMMERCIAL

## 2024-09-24 VITALS
WEIGHT: 239.6 LBS | DIASTOLIC BLOOD PRESSURE: 80 MMHG | BODY MASS INDEX: 32.49 KG/M2 | SYSTOLIC BLOOD PRESSURE: 126 MMHG | RESPIRATION RATE: 18 BRPM | OXYGEN SATURATION: 95 % | HEART RATE: 90 BPM

## 2024-09-24 VITALS
HEART RATE: 96 BPM | TEMPERATURE: 97.3 F | RESPIRATION RATE: 17 BRPM | DIASTOLIC BLOOD PRESSURE: 82 MMHG | SYSTOLIC BLOOD PRESSURE: 124 MMHG

## 2024-09-24 DIAGNOSIS — Z79.899 MEDICATION MANAGEMENT: Primary | ICD-10-CM

## 2024-09-24 DIAGNOSIS — E11.621 DIABETIC ULCER OF LEFT MIDFOOT ASSOCIATED WITH TYPE 2 DIABETES MELLITUS, WITH FAT LAYER EXPOSED (HCC): ICD-10-CM

## 2024-09-24 DIAGNOSIS — E11.621 DIABETIC ULCER OF RIGHT MIDFOOT ASSOCIATED WITH TYPE 2 DIABETES MELLITUS, WITH FAT LAYER EXPOSED (HCC): Primary | ICD-10-CM

## 2024-09-24 DIAGNOSIS — R11.0 NAUSEA: ICD-10-CM

## 2024-09-24 DIAGNOSIS — L97.422 DIABETIC ULCER OF LEFT MIDFOOT ASSOCIATED WITH TYPE 2 DIABETES MELLITUS, WITH FAT LAYER EXPOSED (HCC): ICD-10-CM

## 2024-09-24 DIAGNOSIS — L97.412 DIABETIC ULCER OF RIGHT MIDFOOT ASSOCIATED WITH TYPE 2 DIABETES MELLITUS, WITH FAT LAYER EXPOSED (HCC): Primary | ICD-10-CM

## 2024-09-24 DIAGNOSIS — E11.42 TYPE 2 DIABETES MELLITUS WITH DIABETIC POLYNEUROPATHY, WITHOUT LONG-TERM CURRENT USE OF INSULIN (HCC): ICD-10-CM

## 2024-09-24 PROCEDURE — 6370000000 HC RX 637 (ALT 250 FOR IP): Performed by: NURSE PRACTITIONER

## 2024-09-24 PROCEDURE — 3079F DIAST BP 80-89 MM HG: CPT

## 2024-09-24 PROCEDURE — 11042 DBRDMT SUBQ TIS 1ST 20SQCM/<: CPT

## 2024-09-24 PROCEDURE — 80305 DRUG TEST PRSMV DIR OPT OBS: CPT

## 2024-09-24 PROCEDURE — 3074F SYST BP LT 130 MM HG: CPT

## 2024-09-24 PROCEDURE — 11042 DBRDMT SUBQ TIS 1ST 20SQCM/<: CPT | Performed by: NURSE PRACTITIONER

## 2024-09-24 PROCEDURE — 99214 OFFICE O/P EST MOD 30 MIN: CPT

## 2024-09-24 PROCEDURE — 3044F HG A1C LEVEL LT 7.0%: CPT

## 2024-09-24 PROCEDURE — 99213 OFFICE O/P EST LOW 20 MIN: CPT | Performed by: NURSE PRACTITIONER

## 2024-09-24 RX ORDER — TRIAMCINOLONE ACETONIDE 1 MG/G
OINTMENT TOPICAL ONCE
OUTPATIENT
Start: 2024-09-24 | End: 2024-09-24

## 2024-09-24 RX ORDER — LIDOCAINE 40 MG/G
CREAM TOPICAL ONCE
OUTPATIENT
Start: 2024-09-24 | End: 2024-09-24

## 2024-09-24 RX ORDER — GABAPENTIN 600 MG/1
600 TABLET ORAL 2 TIMES DAILY
Qty: 60 TABLET | Refills: 1 | Status: SHIPPED | OUTPATIENT
Start: 2024-09-24 | End: 2024-11-23

## 2024-09-24 RX ORDER — FAMOTIDINE 20 MG/1
20 TABLET, FILM COATED ORAL 2 TIMES DAILY
Qty: 60 TABLET | Refills: 1 | Status: SHIPPED | OUTPATIENT
Start: 2024-09-24

## 2024-09-24 RX ORDER — BETAMETHASONE DIPROPIONATE 0.5 MG/G
CREAM TOPICAL ONCE
OUTPATIENT
Start: 2024-09-24 | End: 2024-09-24

## 2024-09-24 RX ORDER — ONDANSETRON 4 MG/1
4 TABLET, FILM COATED ORAL 3 TIMES DAILY PRN
Qty: 30 TABLET | Refills: 1 | Status: SHIPPED | OUTPATIENT
Start: 2024-09-24

## 2024-09-24 RX ORDER — SILVER SULFADIAZINE 10 MG/G
CREAM TOPICAL ONCE
OUTPATIENT
Start: 2024-09-24 | End: 2024-09-24

## 2024-09-24 RX ORDER — GENTAMICIN SULFATE 1 MG/G
OINTMENT TOPICAL ONCE
OUTPATIENT
Start: 2024-09-24 | End: 2024-09-24

## 2024-09-24 RX ORDER — LIDOCAINE HYDROCHLORIDE 40 MG/ML
SOLUTION TOPICAL ONCE
OUTPATIENT
Start: 2024-09-24 | End: 2024-09-24

## 2024-09-24 RX ORDER — BACITRACIN ZINC AND POLYMYXIN B SULFATE 500; 1000 [USP'U]/G; [USP'U]/G
OINTMENT TOPICAL ONCE
OUTPATIENT
Start: 2024-09-24 | End: 2024-09-24

## 2024-09-24 RX ORDER — GINSENG 100 MG
CAPSULE ORAL ONCE
OUTPATIENT
Start: 2024-09-24 | End: 2024-09-24

## 2024-09-24 RX ORDER — SODIUM CHLOR/HYPOCHLOROUS ACID 0.033 %
SOLUTION, IRRIGATION IRRIGATION ONCE
OUTPATIENT
Start: 2024-09-24 | End: 2024-09-24

## 2024-09-24 RX ORDER — GABAPENTIN 300 MG/1
300 CAPSULE ORAL 3 TIMES DAILY
Qty: 90 CAPSULE | Refills: 2 | Status: CANCELLED | OUTPATIENT
Start: 2024-09-24 | End: 2024-12-23

## 2024-09-24 RX ORDER — CLOBETASOL PROPIONATE 0.5 MG/G
OINTMENT TOPICAL ONCE
OUTPATIENT
Start: 2024-09-24 | End: 2024-09-24

## 2024-09-24 RX ORDER — LIDOCAINE HYDROCHLORIDE 20 MG/ML
JELLY TOPICAL ONCE
OUTPATIENT
Start: 2024-09-24 | End: 2024-09-24

## 2024-09-24 RX ORDER — NEOMYCIN/BACITRACIN/POLYMYXINB 3.5-400-5K
OINTMENT (GRAM) TOPICAL ONCE
OUTPATIENT
Start: 2024-09-24 | End: 2024-09-24

## 2024-09-24 RX ORDER — LIDOCAINE 50 MG/G
OINTMENT TOPICAL ONCE
OUTPATIENT
Start: 2024-09-24 | End: 2024-09-24

## 2024-09-24 RX ORDER — GENTAMICIN SULFATE 1 MG/G
OINTMENT TOPICAL ONCE
Status: COMPLETED | OUTPATIENT
Start: 2024-09-24 | End: 2024-09-24

## 2024-09-24 RX ORDER — MUPIROCIN 20 MG/G
OINTMENT TOPICAL ONCE
OUTPATIENT
Start: 2024-09-24 | End: 2024-09-24

## 2024-09-24 RX ADMIN — GENTAMICIN SULFATE: 1 OINTMENT TOPICAL at 13:27

## 2024-09-24 ASSESSMENT — ENCOUNTER SYMPTOMS
RESPIRATORY NEGATIVE: 1
GASTROINTESTINAL NEGATIVE: 1

## 2024-09-24 ASSESSMENT — PATIENT HEALTH QUESTIONNAIRE - PHQ9
7. TROUBLE CONCENTRATING ON THINGS, SUCH AS READING THE NEWSPAPER OR WATCHING TELEVISION: NOT AT ALL
SUM OF ALL RESPONSES TO PHQ QUESTIONS 1-9: 4
4. FEELING TIRED OR HAVING LITTLE ENERGY: SEVERAL DAYS
2. FEELING DOWN, DEPRESSED OR HOPELESS: NOT AT ALL
SUM OF ALL RESPONSES TO PHQ9 QUESTIONS 1 & 2: 0
6. FEELING BAD ABOUT YOURSELF - OR THAT YOU ARE A FAILURE OR HAVE LET YOURSELF OR YOUR FAMILY DOWN: NOT AT ALL
8. MOVING OR SPEAKING SO SLOWLY THAT OTHER PEOPLE COULD HAVE NOTICED. OR THE OPPOSITE, BEING SO FIGETY OR RESTLESS THAT YOU HAVE BEEN MOVING AROUND A LOT MORE THAN USUAL: NOT AT ALL
SUM OF ALL RESPONSES TO PHQ QUESTIONS 1-9: 4
10. IF YOU CHECKED OFF ANY PROBLEMS, HOW DIFFICULT HAVE THESE PROBLEMS MADE IT FOR YOU TO DO YOUR WORK, TAKE CARE OF THINGS AT HOME, OR GET ALONG WITH OTHER PEOPLE: NOT DIFFICULT AT ALL
SUM OF ALL RESPONSES TO PHQ QUESTIONS 1-9: 4
3. TROUBLE FALLING OR STAYING ASLEEP: NEARLY EVERY DAY
9. THOUGHTS THAT YOU WOULD BE BETTER OFF DEAD, OR OF HURTING YOURSELF: NOT AT ALL
SUM OF ALL RESPONSES TO PHQ QUESTIONS 1-9: 4
5. POOR APPETITE OR OVEREATING: NOT AT ALL
1. LITTLE INTEREST OR PLEASURE IN DOING THINGS: NOT AT ALL

## 2024-10-01 ENCOUNTER — HOSPITAL ENCOUNTER (OUTPATIENT)
Dept: WOUND CARE | Age: 54
Discharge: HOME | End: 2024-10-01
Attending: NURSE PRACTITIONER
Payer: COMMERCIAL

## 2024-10-01 VITALS
HEART RATE: 76 BPM | RESPIRATION RATE: 17 BRPM | TEMPERATURE: 98.3 F | DIASTOLIC BLOOD PRESSURE: 78 MMHG | SYSTOLIC BLOOD PRESSURE: 129 MMHG

## 2024-10-01 DIAGNOSIS — L97.422 DIABETIC ULCER OF LEFT MIDFOOT ASSOCIATED WITH TYPE 2 DIABETES MELLITUS, WITH FAT LAYER EXPOSED (HCC): Primary | ICD-10-CM

## 2024-10-01 DIAGNOSIS — E11.621 DIABETIC ULCER OF LEFT MIDFOOT ASSOCIATED WITH TYPE 2 DIABETES MELLITUS, WITH FAT LAYER EXPOSED (HCC): Primary | ICD-10-CM

## 2024-10-01 DIAGNOSIS — E11.621 DIABETIC ULCER OF RIGHT MIDFOOT ASSOCIATED WITH TYPE 2 DIABETES MELLITUS, WITH FAT LAYER EXPOSED (HCC): ICD-10-CM

## 2024-10-01 DIAGNOSIS — L97.412 DIABETIC ULCER OF RIGHT MIDFOOT ASSOCIATED WITH TYPE 2 DIABETES MELLITUS, WITH FAT LAYER EXPOSED (HCC): ICD-10-CM

## 2024-10-01 PROCEDURE — 11042 DBRDMT SUBQ TIS 1ST 20SQCM/<: CPT

## 2024-10-01 PROCEDURE — 6370000000 HC RX 637 (ALT 250 FOR IP): Performed by: NURSE PRACTITIONER

## 2024-10-01 PROCEDURE — 15275 SKIN SUB GRAFT FACE/NK/HF/G: CPT | Performed by: NURSE PRACTITIONER

## 2024-10-01 PROCEDURE — 15275 SKIN SUB GRAFT FACE/NK/HF/G: CPT

## 2024-10-01 PROCEDURE — 11042 DBRDMT SUBQ TIS 1ST 20SQCM/<: CPT | Performed by: NURSE PRACTITIONER

## 2024-10-01 RX ORDER — TRIAMCINOLONE ACETONIDE 1 MG/G
OINTMENT TOPICAL ONCE
OUTPATIENT
Start: 2024-10-01 | End: 2024-10-01

## 2024-10-01 RX ORDER — GENTAMICIN SULFATE 1 MG/G
OINTMENT TOPICAL ONCE
OUTPATIENT
Start: 2024-10-01 | End: 2024-10-01

## 2024-10-01 RX ORDER — CLOBETASOL PROPIONATE 0.5 MG/G
OINTMENT TOPICAL ONCE
Status: COMPLETED | OUTPATIENT
Start: 2024-10-01 | End: 2024-10-01

## 2024-10-01 RX ORDER — MUPIROCIN 20 MG/G
OINTMENT TOPICAL ONCE
OUTPATIENT
Start: 2024-10-01 | End: 2024-10-01

## 2024-10-01 RX ORDER — LIDOCAINE HYDROCHLORIDE 20 MG/ML
JELLY TOPICAL ONCE
OUTPATIENT
Start: 2024-10-01 | End: 2024-10-01

## 2024-10-01 RX ORDER — LIDOCAINE HYDROCHLORIDE 40 MG/ML
SOLUTION TOPICAL ONCE
OUTPATIENT
Start: 2024-10-01 | End: 2024-10-01

## 2024-10-01 RX ORDER — GINSENG 100 MG
CAPSULE ORAL ONCE
OUTPATIENT
Start: 2024-10-01 | End: 2024-10-01

## 2024-10-01 RX ORDER — CLOBETASOL PROPIONATE 0.5 MG/G
OINTMENT TOPICAL ONCE
OUTPATIENT
Start: 2024-10-01 | End: 2024-10-01

## 2024-10-01 RX ORDER — LIDOCAINE 50 MG/G
OINTMENT TOPICAL ONCE
OUTPATIENT
Start: 2024-10-01 | End: 2024-10-01

## 2024-10-01 RX ORDER — LIDOCAINE 40 MG/G
CREAM TOPICAL ONCE
OUTPATIENT
Start: 2024-10-01 | End: 2024-10-01

## 2024-10-01 RX ORDER — SILVER SULFADIAZINE 10 MG/G
CREAM TOPICAL ONCE
OUTPATIENT
Start: 2024-10-01 | End: 2024-10-01

## 2024-10-01 RX ORDER — BACITRACIN ZINC AND POLYMYXIN B SULFATE 500; 1000 [USP'U]/G; [USP'U]/G
OINTMENT TOPICAL ONCE
OUTPATIENT
Start: 2024-10-01 | End: 2024-10-01

## 2024-10-01 RX ORDER — BETAMETHASONE DIPROPIONATE 0.5 MG/G
CREAM TOPICAL ONCE
OUTPATIENT
Start: 2024-10-01 | End: 2024-10-01

## 2024-10-01 RX ORDER — NEOMYCIN/BACITRACIN/POLYMYXINB 3.5-400-5K
OINTMENT (GRAM) TOPICAL ONCE
OUTPATIENT
Start: 2024-10-01 | End: 2024-10-01

## 2024-10-01 RX ORDER — SODIUM CHLOR/HYPOCHLOROUS ACID 0.033 %
SOLUTION, IRRIGATION IRRIGATION ONCE
OUTPATIENT
Start: 2024-10-01 | End: 2024-10-01

## 2024-10-01 RX ADMIN — CLOBETASOL PROPIONATE: 0.5 OINTMENT TOPICAL at 13:25

## 2024-10-01 NOTE — PROGRESS NOTES
EpiFix Treatment Note    NAME:  Tomas La  YOB: 1970  MEDICAL RECORD NUMBER:  [id number]  DATE:  10/1/2024    Goal:  Patient will receive safe and proper application of skin substitute.  Patient will comply with caring for dressing, offloading and reporting complications.     Expiration date checked immediately prior to use.  Package intact prior to use and no damage noted.  EpiFix is stored at room temperature.  EpiFix was removed from protective sterile packaging by provider and  applied to prepared ulcer bed.  EpiFix was placed using embossment letting as a guide.  EpiFix was hydrated with sterile normal saline per provider.   EpiFix was applied to right lateral foot and affixed with steri-strips by the provider.   EpiFix was covered with non-adherent ulcer dressing.   Applied agile over non-adherent.  Applied dry gauze and/or roll gauze.   Coban or ace wrap was applied to secure graft and decrease edema.   The patient/caregiver was instructed not to remove dressing and to keep it clean and dry.   The patient/family/caregiver was instructed on the need for offloading and elevation of the affected extremity and on using the prescribed offloading device.  The patient/family/caregiver was instructed on signs and symptoms of complication to report, such as draining through dressing, dressing falling down/slipping, getting wet, or severe pain or tingling in toes.     Guidelines followed  EpiFix may only be used every 12 months per wound.    Date of first application of EpiFix for this current wound is October 1, 2025.     Application # 1 out of 10       Electronically signed by Izzy Salvador RN on 10/1/2024 at 1:45 PM

## 2024-10-01 NOTE — PLAN OF CARE
Problem: Wound:  Goal: Will show signs of wound healing; wound closure and no evidence of infection  Description: Will show signs of wound healing; wound closure and no evidence of infection  Outcome: Progressing     Problem: Chronic Conditions and Co-morbidities  Goal: Patient's chronic conditions and co-morbidity symptoms are monitored and maintained or improved  Outcome: Progressing

## 2024-10-01 NOTE — PROGRESS NOTES
Wound Care Center Progress Note and Bioengineered Skin Application      Tomas La  AGE: 53 y.o.   GENDER: male  : 1970  EPISODE DATE:  10/1/2024     Subjective:     Chief Complaint   Patient presents with    Wound Check     Left and right foot         HISTORY of PRESENT ILLNESS     Tomas La is a 53 y.o. male who presents to the Wound Clinic for a visit for evaluation and treatment of acute diabetic  ulcer(s) of  right . foot medial.  The condition is of moderate severity. The ulcer has been present for several months since march.  The underlying cause is thought to be diabetes.  The patients care to date has included offloading and normal care. The patient has significant underlying medical conditions as below.     **Patient getting epiFix graft #1 today     2024: Patient right wound looks ok. Plantar wound to left has opened again and we will add the same treatment as before  Patient only picked up antibiotics today and encouraged him to start ASAP.  Approved for epifix and will plan to apply next week if delivered     2024: Patient looks ok. Will apply for grafts today. Will send antibiotics as well  Concern for lingering infection.   Patient doing PT and is up on his feet a lot      9-: We were able to to finally debride enough callus and macerated tissue to expose the whole wound today. Should help in finally getting this healed as the extent was not visualied previously.  He did have high WBCs on latest lab work. Had been checked for osteo in the past in this foot but was negative and no interventions were needed.  Will give him a week or 2 to improve and then likely need an XR      2024: Patient looking good. Will finish trimming callus today and A&D has softened skin somewhat. RN visit next week and hope he is about healed in 2 when he sees provider     2024: Patient looking ok but has some depth and undermining because of callus. Will try to get this off this

## 2024-10-07 ENCOUNTER — HOSPITAL ENCOUNTER (OUTPATIENT)
Dept: PHYSICAL THERAPY | Age: 54
Discharge: HOME | End: 2024-10-07
Attending: NURSE PRACTITIONER
Payer: COMMERCIAL

## 2024-10-07 PROCEDURE — 97110 THERAPEUTIC EXERCISES: CPT

## 2024-10-07 PROCEDURE — 97014 ELECTRIC STIMULATION THERAPY: CPT

## 2024-10-07 PROCEDURE — 97112 NEUROMUSCULAR REEDUCATION: CPT

## 2024-10-07 NOTE — FLOWSHEET NOTE
Outpatient Physical Therapy  [place]           [] Phone: [phone]   Fax: [phone]  [place]           [x] Phone: [phone]   Fax: [phone]        Physical Therapy Daily Treatment Note  Date:  10/7/2024    Patient Name:  Tomas La    :  1970  MRN: [id number]  Restrictions/Precautions:  fall risk   Diagnosis:   Diabetic ulcer of right midfoot associated with type 2 diabetes mellitus, with fat layer exposed (HCC) [E11.621, L97.412]  Charcot's joint of foot in type 2 diabetes mellitus (HCC) [E11610]    Date of Injury/Surgery:  CVA  and .  Pt reports he started outpt PT . He reports he fell in  and fx his R shoulder. He reports he stopped therapy at that point. He reports he has a wound on the R lateral side of his foot. He has a history of pins and rods and external fixator in  secondary to his charcot foot on R. Pt reports the angel is cracked, but they are not fixing it.   Treatment Diagnosis:   M62.81 muscle weakness, R26.89 abnormality of gait   Insurance/Certification information:  [facility]  Referring Physician:  Charlie Mead APRN *     PCP: Ruma Osman FNP  Plan of care signed (Y/N):  [facility] faxed  Outcome Measure: LEFs:  = 20% ability = 80% disability   2MWT: 168.3 feet  2 LOB  Visit# / total visits:          Pain level: 0/10   Goals:     Patient goals:   increase strength in leg to stand, improve walking     Subjective:  Patient states that he is having pain in his side and thinks that he did something that pulled a muscle.  Not sure how much he is going to be able to do.       Any changes in Ambulatory Summary Sheet?  None    Objective:  Hit multiple hurdles when stepping over with right foot    Exercises: (No more than 4 columns)   Exercise/Equipment Date 24  (4) Date: 9/23/24 10/7/2024            WARM UP         NuStep   S12 Lv3  10'  Foot strapped to pedal and right knee strapped to machine to help with prevent knee rolling in

## 2024-10-07 NOTE — FLOWSHEET NOTE
Occupational Therapy          [facility]patient Occupational Therapy  [address] [address]  Phone: [phone]  Fax: [phone]     Occupational Therapy: Treatment Note   Patient: Tomas La (53 y.o. male)   Examination Date: 2024  Plan of Care Certification Period: 2024 to     :  1970  MRN: [id number]  CSN: [id number]   Insurance: Payor: BCBS / Plan: BCBS OUT OF STATE / Product Type: *No Product type* /   Insurance ID: [id number] - ([place] [employer]) Secondary Insurance (if applicable):    Insurance Information:     Referring Physician: Charlie Mead APRN - CNP Caputo, Nicholas, APRN - CNP   PCP: Ruma Osman FNP Visits to Date/Visits Approved:   /      No Show/Cancelled Appts:   /       Medical Diagnosis: Diabetic ulcer of right midfoot associated with type 2 diabetes mellitus, with fat layer exposed (HCC) [E11.621, L97.412]  Charcot's joint of foot in type 2 diabetes mellitus (HCC) [E11.610] E11.621, L97.412 (ICD-10-CM) - Diabetic ulcer of right midfoot associated with type 2 diabetes mellitus, with fat layer exposed (HCC)  E11.610 (ICD-10-CM) - Charcot's joint of foot in type 2 diabetes mellitus (HCC)  Treatment Diagnosis: M62.81 - Muscle Weakness, M24. 541 for Contracture, right hand      Restrictions:   Restrictions/Precautions: Fall Risk, MRSA    Pain at beginning of treatment session:0/10  Pain at end of treatment session:0/10    Year visit# / POC visits:  6/10    Progress Note: []  Yes  [x]  No  Next due by: Visit #10      Date of evaluation/re-evaluation: 2024    Time In: 1035 Time Out: 1115    Subjective: Pt states \"I feel like I'm slowly getting better\"      Objective/Assessment:  Decreased movement noted with R shoulder during exercises this date versus L shoulder movement    All exercises performed with RUE  Exercises: (no more than 4 columns)  Exercise/Equipment 9/19/24 9/23/24 10/7/24   AROM fingers and wrist      PROM to elbow and

## 2024-10-08 ENCOUNTER — HOSPITAL ENCOUNTER (OUTPATIENT)
Dept: WOUND CARE | Age: 54
Discharge: HOME | End: 2024-10-08
Attending: NURSE PRACTITIONER
Payer: COMMERCIAL

## 2024-10-08 VITALS
DIASTOLIC BLOOD PRESSURE: 80 MMHG | RESPIRATION RATE: 18 BRPM | HEART RATE: 76 BPM | TEMPERATURE: 97.4 F | SYSTOLIC BLOOD PRESSURE: 158 MMHG

## 2024-10-08 DIAGNOSIS — L97.412 DIABETIC ULCER OF RIGHT MIDFOOT ASSOCIATED WITH TYPE 2 DIABETES MELLITUS, WITH FAT LAYER EXPOSED (HCC): Primary | ICD-10-CM

## 2024-10-08 DIAGNOSIS — L97.422 DIABETIC ULCER OF LEFT MIDFOOT ASSOCIATED WITH TYPE 2 DIABETES MELLITUS, WITH FAT LAYER EXPOSED (HCC): ICD-10-CM

## 2024-10-08 DIAGNOSIS — E11.621 DIABETIC ULCER OF RIGHT MIDFOOT ASSOCIATED WITH TYPE 2 DIABETES MELLITUS, WITH FAT LAYER EXPOSED (HCC): Primary | ICD-10-CM

## 2024-10-08 DIAGNOSIS — E11.621 DIABETIC ULCER OF LEFT MIDFOOT ASSOCIATED WITH TYPE 2 DIABETES MELLITUS, WITH FAT LAYER EXPOSED (HCC): ICD-10-CM

## 2024-10-08 PROCEDURE — 15275 SKIN SUB GRAFT FACE/NK/HF/G: CPT

## 2024-10-08 PROCEDURE — 11042 DBRDMT SUBQ TIS 1ST 20SQCM/<: CPT

## 2024-10-08 PROCEDURE — 15275 SKIN SUB GRAFT FACE/NK/HF/G: CPT | Performed by: NURSE PRACTITIONER

## 2024-10-08 NOTE — PROGRESS NOTES
EpiFix Treatment Note    NAME:  Tomas La  YOB: 1970  MEDICAL RECORD NUMBER:  [id number]  DATE:  10/8/2024    Goal:  Patient will receive safe and proper application of skin substitute.  Patient will comply with caring for dressing, offloading and reporting complications.     Expiration date checked immediately prior to use.  Package intact prior to use and no damage noted.  EpiFix is stored at room temperature.  EpiFix was removed from protective sterile packaging by provider and  applied to prepared ulcer bed.  EpiFix was placed using embossment letting as a guide.  EpiFix was hydrated with sterile normal saline per provider.   EpiFix was applied to right lateral foot and affixed with steri-strips by the provider.   EpiFix was covered with non-adherent ulcer dressing.   Applied agile over non-adherent.  Applied dry gauze and/or roll gauze.   Coban or ace wrap was applied to secure graft and decrease edema.   The patient/caregiver was instructed not to remove dressing and to keep it clean and dry.   The patient/family/caregiver was instructed on the need for offloading and elevation of the affected extremity and on using the prescribed offloading device.  The patient/family/caregiver was instructed on signs and symptoms of complication to report, such as draining through dressing, dressing falling down/slipping, getting wet, or severe pain or tingling in toes.     Guidelines followed  EpiFix may only be used every 12 months per wound.    Date of first application of EpiFix for this current wound is October 1, 2024.     Application # 2 out of 10       Electronically signed by Sia Seymour RN on 10/8/2024 at 1:49 PM   
10/08/24 1311   Wound Surface Area (cm^2) 0.2 cm^2 10/08/24 1311   Change in Wound Size % (l*w) -1900 10/08/24 1311   Wound Volume (cm^3) 0.02 cm^3 10/08/24 1311   Wound Healing % -1900 10/08/24 1311   Post-Procedure Length (cm) 0.5 cm 10/08/24 1323   Post-Procedure Width (cm) 0.4 cm 10/08/24 1323   Post-Procedure Depth (cm) 0.1 cm 10/08/24 1323   Post-Procedure Surface Area (cm^2) 0.2 cm^2 10/08/24 1323   Post-Procedure Volume (cm^3) 0.02 cm^3 10/08/24 1323   Distance Tunneling (cm) 0 cm 10/08/24 1311   Tunneling Position ___ O'Clock 0 10/08/24 1311   Undermining Starts ___ O'Clock 0 10/08/24 1311   Undermining Ends___ O'Clock 0 10/08/24 1311   Undermining Maxium Distance (cm) 0 10/08/24 1311   Wound Assessment Pink/red 10/08/24 1311   Drainage Amount Moderate (25-50%) 10/08/24 1311   Drainage Description Serosanguinous 10/08/24 1311   Odor None 10/08/24 1311   Violeta-wound Assessment Hyperkeratosis (callous);Maceration 10/08/24 1311   Margins Attached edges 10/08/24 1311   Wound Thickness Description not for Pressure Injury Full thickness 10/08/24 1311   Number of days: 14         Total Surface Area Covered 0.7 sq/cm    Was the Product Layered  Yes      Amount Wasted 0 sq/cm    Reason for Waste: material provided was greater than wound size      Secured: Yes    Instruments used to complete placement of graft::forceps    Secured With:  versatel  [x]Steri Strips    []Sutures     []Staples []Other    Procedural Pain: 0/10     Post Procedural Pain: 0 / 10    Response to Treatment:  Well tolerated by patient.      Status of wound progress and description from last visit:   improved in size on both wounds.      Plan:     Discharge instructions:    Patient Instructions   PHYSICIAN ORDERS AND DISCHARGE INSTRUCTIONS  NOTE: Upon discharge from the Wound Center, you will receive a patient experience survey via E-mail. We would be grateful if you would take the time to fill this survey out.  Wound care order history:   GREGG's

## 2024-10-08 NOTE — PATIENT INSTRUCTIONS
PHYSICIAN ORDERS AND DISCHARGE INSTRUCTIONS  NOTE: Upon discharge from the Wound Center, you will receive a patient experience survey via E-mail. We would be grateful if you would take the time to fill this survey out.  Wound care order history:   GREGG's   Right       Left    Date    Vascular studies/Intervention: .     Cultures: .               Antibiotics: .Gent and Doxy 7/2/24, 9/17/24              HbA1c:  .               Grafts:  .Epifix 10/1/24   Juxta Lites & Lymph Pumps:  Continuing wound care orders and information:              Residence: .              Continue home health care with:.    Your wound-care supplies will be provided by: .              Pharmacy: [facility]   Wound cleansing:      Do not scrub or use excessive force.    Wash hands with soap and water before and after dressing changes.    Prior to applying a clean dressing, cleanse wound with normal saline,    wound cleanser, or mild soap and water.     Ask your physician or nurse before getting the wound(s) wet in the shower.  Daily Wound management:    Keep weight off wounds and reposition every 2 hours.    Avoid standing for long periods of time.    Evaluate legs to the level of the heart or above for 30 minutes 4-5 times a day and/or when sitting.       When taking antibiotics take entire prescription as ordered by MD do not stop taking until medicine is all gone.     Documentation:  Compression: .   Offloading: .Forefoot offloader ordered        Orders for this week (10/8/2024):  Bilateral foot - Wash with mild soap and water, rinse with saline, pat dry with 4x4  Clobetasol to intact skin on foot   Qwick Aperature to periwound  Epifix applied by Ramirez and secured with versatel, and steri strips  Cover with agile  Wrap with conform and coban up to ankle secure with tape.   Change on Tuesday    Please dispense 30 day quantity when sending supplies     (Next appointment morning of 10/17/24)  Follow up with Ramirez WEAVER  In 1 weeks in the wound

## 2024-10-10 ENCOUNTER — HOSPITAL ENCOUNTER (OUTPATIENT)
Dept: PHYSICAL THERAPY | Age: 54
Discharge: HOME | End: 2024-10-10
Attending: NURSE PRACTITIONER
Payer: COMMERCIAL

## 2024-10-10 PROCEDURE — 97530 THERAPEUTIC ACTIVITIES: CPT

## 2024-10-10 PROCEDURE — 97110 THERAPEUTIC EXERCISES: CPT

## 2024-10-10 NOTE — FLOWSHEET NOTE
Occupational Therapy          [facility] Outpatient Occupational Therapy  [address] [address]  Phone: [phone]  Fax: [phone]     Occupational Therapy: Treatment Note   Patient: Tomas La (53 y.o. male)   Examination Date: 2024  Plan of Care Certification Period: 2024 to     :  1970  MRN: [id number]  CSN: [id number]   Insurance: Payor: BCBS / Plan: BCBS OUT OF STATE / Product Type: *No Product type* /   Insurance ID: [id number] - ([place] BCBS) Secondary Insurance (if applicable):    Insurance Information:     Referring Physician: Charlie Mead APRN - CNP Caputo, Nicholas, APRN - CNP   PCP: Ruma Osman FNP Visits to Date/Visits Approved:   /      No Show/Cancelled Appts:   /       Medical Diagnosis: Diabetic ulcer of right midfoot associated with type 2 diabetes mellitus, with fat layer exposed (HCC) [E11.621, L97.412]  Charcot's joint of foot in type 2 diabetes mellitus (HCC) [E11.610] E11.621, L97.412 (ICD-10-CM) - Diabetic ulcer of right midfoot associated with type 2 diabetes mellitus, with fat layer exposed (HCC)  E11.610 (ICD-10-CM) - Charcot's joint of foot in type 2 diabetes mellitus (HCC)  Treatment Diagnosis: M62.81 - Muscle Weakness, M24. 541 for Contracture, right hand      Restrictions:   Restrictions/Precautions: Fall Risk, MRSA    Pain at beginning of treatment session:0/10  Pain at end of treatment session:0/10    Year visit# / POC visits:  7/10    Progress Note: []  Yes  [x]  No  Next due by: Visit #10      Date of evaluation/re-evaluation: 2024    Time In: 1030  Time Out: 1120    Subjective: Patient rec'd in lobby, wearing resting volar hand splint. He reports metal in hand splint is causing discomfort in finger tips due to digits flexing and pressing consistently through foam. Discussed adaptations to possibly trial with brace to decrease. Increased time to place theraband to exercises and patient provided yellow theraband

## 2024-10-10 NOTE — FLOWSHEET NOTE
Outpatient Physical Therapy  [place]           [] Phone: [phone]   Fax: [phone]  [place]           [x] Phone: [phone]   Fax: [phone]        Physical Therapy Daily Treatment Note  Date:  10/10/2024    Patient Name:  Tomas La    :  1970  MRN: [id number]  Restrictions/Precautions:  fall risk   Diagnosis:   Diabetic ulcer of right midfoot associated with type 2 diabetes mellitus, with fat layer exposed (HCC) [E11.621, L97.412]  Charcot's joint of foot in type 2 diabetes mellitus (HCC) [E11610]    Date of Injury/Surgery:  CVA  and .  Pt reports he started outpt PT . He reports he fell in  and fx his R shoulder. He reports he stopped therapy at that point. He reports he has a wound on the R lateral side of his foot. He has a history of pins and rods and external fixator in  secondary to his charcot foot on R. Pt reports the angel is cracked, but they are not fixing it.   Treatment Diagnosis:   M62.81 muscle weakness, R26.89 abnormality of gait   Insurance/Certification information:  [facility]  Referring Physician:  Charlie Mead APRN *     PCP: Ruma Osman FNP  Plan of care signed (Y/N):  adileneal faxed  Outcome Measure: LEFs: 16 = 20% ability = 80% disability   2MWT: 168.3 feet  2 LOB  Visit# / total visits:          Pain level: 0/10   Goals:     Patient goals:   increase strength in leg to stand, improve walking     Subjective:  Patient states that he is still fatigued, but better than at last visit.     Any changes in Ambulatory Summary Sheet?  None    Objective:  Some compensation noted with standing hip aduction    Exercises: (No more than 4 columns)   Exercise/Equipment Date: 9/23/24 10/7/2024 10/10/2024             WARM UP         NuStep   X10' S12 Lvl 4  Foot strapped to pedal and right knee strapped to machine to help with prevent knee rolling in w/ BTB X10' S12 Lvl 4  Foot strapped to pedal and right knee strapped to machine to help with

## 2024-10-14 ENCOUNTER — HOSPITAL ENCOUNTER (OUTPATIENT)
Dept: PHYSICAL THERAPY | Age: 54
Discharge: HOME | End: 2024-10-14
Attending: NURSE PRACTITIONER
Payer: COMMERCIAL

## 2024-10-14 PROCEDURE — 97140 MANUAL THERAPY 1/> REGIONS: CPT

## 2024-10-14 PROCEDURE — 97530 THERAPEUTIC ACTIVITIES: CPT

## 2024-10-14 PROCEDURE — 97014 ELECTRIC STIMULATION THERAPY: CPT

## 2024-10-14 PROCEDURE — 97035 APP MDLTY 1+ULTRASOUND EA 15: CPT

## 2024-10-14 PROCEDURE — 97110 THERAPEUTIC EXERCISES: CPT

## 2024-10-14 NOTE — FLOWSHEET NOTE
Outpatient Physical Therapy  [place]           [] Phone: [phone]   Fax: [phone]  [place]           [x] Phone: [phone]   Fax: [phone]        Physical Therapy Daily Treatment Note  Date:  10/14/2024    Patient Name:  Tomas La    :  1970  MRN: [id number]  Restrictions/Precautions:  fall risk   Diagnosis:   Diabetic ulcer of right midfoot associated with type 2 diabetes mellitus, with fat layer exposed (HCC) [E11.621, L97.412]  Charcot's joint of foot in type 2 diabetes mellitus (HCC) [E11610]    Date of Injury/Surgery:  CVA  and .  Pt reports he started outpt PT . He reports he fell in  and fx his R shoulder. He reports he stopped therapy at that point. He reports he has a wound on the R lateral side of his foot. He has a history of pins and rods and external fixator in  secondary to his charcot foot on R. Pt reports the angel is cracked, but they are not fixing it.   Treatment Diagnosis:   M62.81 muscle weakness, R26.89 abnormality of gait   Insurance/Certification information:  [facility]  Referring Physician:  Charlie Mead APRN *     PCP: Ruma Osman FNP  Plan of care signed (Y/N):  eval faxed  Outcome Measure: LEFs:  = 20% ability = 80% disability   2MWT: 168.3 feet  2 LOB  Visit# / total visits:          Pain level: 0/10   Goals:     Patient goals:   increase strength in leg to stand, improve walking     Subjective:  Patient denies pain upon arrival and voices no new c/o.    Any changes in Ambulatory Summary Sheet?  None    Objective:  increased fatigue noted this date         Exercises: (No more than 4 columns)   Exercise/Equipment 10/10/2024 10/14/24  (8)          WARM UP       NuStep   X10' S12 Lvl 4  Foot strapped to pedal and right knee strapped to machine to help with prevent knee rolling in w/ BTB S12 Lv5 10'     Foot strapped to pedal and right knee strapped to machine to help with prevent knee rolling in w/ BTB        TABLE     SAQ

## 2024-10-14 NOTE — FLOWSHEET NOTE
motor skill, proprioception. (97079)     Therapeutic Activities/ADL:   [] Provided use of dynamic activities to improve functional performance (47459)  [] Provided self-care/home management training for activities of daily living and compensatory training (77525)     Manual Treatments:   [x] Provided manual therapy to mobilize soft tissue/joints for the purpose of modulating pain, promoting relaxation, increasing ROM, reducing/eliminating soft tissue swelling/inflammation/restriction, improving soft tissue extensibility. (42053)     Orthotic Management:   [] Provided assessment and fitting orthotic device for improved functional performance. (77989)    Service Based Modalities:  25    Timed Code Treatment Minutes: 20      Total Treatment Minutes: 45    Treatment/Activity Tolerance:  [x] Patient tolerated treatment well [x] Patient limited by fatigue  [] Patient limited by pain  [] Patient limited by other medical complications  [] Other:     Prognosis: [x] Good [] Fair  [] Poor    Patient Requires Follow-up: [x] Yes  [] No      Goals:    Patient Goal(s): Patient goals : To get more movement in my arm  Short Term Goals Completed by 4 weeks Goal Status   Patient to be independent in home program New   Patient to demonstrate increased AROM R shoulder flexion > 53° and R shoulder abduction > 50° for improved ease of ADLs New   Patient to demo increased AROM R wrist flexion > 56°; R wrist extension > 5°; and R wrist RD/UD > 5° for improved self care New   Patient will demonstrate increased RUE overall strength > 3+/5 for improved self care New         Long Term Goals Completed by 12 weeks Goal Status   Patient will demo increased R  strength > 25 lbs for improved self care New   Patient will demo improved strength R tip pinch > 10 lbs; R lateral pinch > 5 lbs; R palmar pinch > 5 lbs for improved self care New   Patient will demo ability to complete 9 HPT R hand indep New   Patient to demonstrate increased AROM R

## 2024-10-17 ENCOUNTER — HOSPITAL ENCOUNTER (OUTPATIENT)
Dept: WOUND CARE | Age: 54
Discharge: HOME | End: 2024-10-17
Attending: NURSE PRACTITIONER
Payer: COMMERCIAL

## 2024-10-17 VITALS — HEART RATE: 73 BPM | TEMPERATURE: 97.6 F | DIASTOLIC BLOOD PRESSURE: 79 MMHG | SYSTOLIC BLOOD PRESSURE: 122 MMHG

## 2024-10-17 DIAGNOSIS — L97.422 DIABETIC ULCER OF LEFT MIDFOOT ASSOCIATED WITH TYPE 2 DIABETES MELLITUS, WITH FAT LAYER EXPOSED (HCC): Primary | ICD-10-CM

## 2024-10-17 DIAGNOSIS — L97.412 DIABETIC ULCER OF RIGHT MIDFOOT ASSOCIATED WITH TYPE 2 DIABETES MELLITUS, WITH FAT LAYER EXPOSED (HCC): ICD-10-CM

## 2024-10-17 DIAGNOSIS — E11.621 DIABETIC ULCER OF RIGHT MIDFOOT ASSOCIATED WITH TYPE 2 DIABETES MELLITUS, WITH FAT LAYER EXPOSED (HCC): ICD-10-CM

## 2024-10-17 DIAGNOSIS — E11.621 DIABETIC ULCER OF LEFT MIDFOOT ASSOCIATED WITH TYPE 2 DIABETES MELLITUS, WITH FAT LAYER EXPOSED (HCC): Primary | ICD-10-CM

## 2024-10-17 PROCEDURE — 11042 DBRDMT SUBQ TIS 1ST 20SQCM/<: CPT

## 2024-10-17 PROCEDURE — 15275 SKIN SUB GRAFT FACE/NK/HF/G: CPT

## 2024-10-17 NOTE — PROGRESS NOTES
EpiFix Treatment Note    NAME:  Tomas La  YOB: 1970  MEDICAL RECORD NUMBER:  [id number]  DATE:  10/17/2024    Goal:  Patient will receive safe and proper application of skin substitute.  Patient will comply with caring for dressing, offloading and reporting complications.     Expiration date checked immediately prior to use.  Package intact prior to use and no damage noted.  EpiFix is stored at room temperature.  EpiFix was removed from protective sterile packaging by provider and  applied to prepared ulcer bed.  EpiFix was placed using embossment letting as a guide.  EpiFix was hydrated with sterile normal saline per provider.   EpiFix was applied to right lateral foot and affixed with steri-strips by the provider.   EpiFix was covered with non-adherent ulcer dressing.   Applied agile over non-adherent.  Applied dry gauze and/or roll gauze.   Coban or ace wrap was applied to secure graft and decrease edema.   The patient/caregiver was instructed not to remove dressing and to keep it clean and dry.   The patient/family/caregiver was instructed on the need for offloading and elevation of the affected extremity and on using the prescribed offloading device.  The patient/family/caregiver was instructed on signs and symptoms of complication to report, such as draining through dressing, dressing falling down/slipping, getting wet, or severe pain or tingling in toes.     Guidelines followed  EpiFix may only be used every 12 months per wound.    Date of first application of EpiFix for this current wound is October 1, 2024.     Application # 3 out of 10       Electronically signed by Sia Seymour RN on 10/17/2024 at 11:46 AM

## 2024-10-17 NOTE — PATIENT INSTRUCTIONS
PHYSICIAN ORDERS AND DISCHARGE INSTRUCTIONS  NOTE: Upon discharge from the Wound Center, you will receive a patient experience survey via E-mail. We would be grateful if you would take the time to fill this survey out.  Wound care order history:   GREGG's   Right       Left    Date    Vascular studies/Intervention: .     Cultures: .               Antibiotics: .Gent and Doxy 7/2/24, 9/17/24              HbA1c:  .               Grafts:  .Epifix 10/1/24, #2 Epifix 10/8/24, #3 Epifix 10/17/24/   Juxta Lites & Lymph Pumps:  Continuing wound care orders and information:              Residence: .              Continue home health care with:.    Your wound-care supplies will be provided by: .              Pharmacy: [facility]   Wound cleansing:      Do not scrub or use excessive force.    Wash hands with soap and water before and after dressing changes.    Prior to applying a clean dressing, cleanse wound with normal saline,    wound cleanser, or mild soap and water.     Ask your physician or nurse before getting the wound(s) wet in the shower.  Daily Wound management:    Keep weight off wounds and reposition every 2 hours.    Avoid standing for long periods of time.    Evaluate legs to the level of the heart or above for 30 minutes 4-5 times a day and/or when sitting.       When taking antibiotics take entire prescription as ordered by MD do not stop taking until medicine is all gone.     Documentation:  Compression: .   Offloading: .Forefoot offloader ordered        Orders for this week (10/17/2024):  Bilateral foot - Wash with mild soap and water, rinse with saline, pat dry with 4x4  Clobetasol to intact skin on foot   Qwick Aperature to periwound of left and right feet wounds  Epifix applied by Ramirez and secured with versatel, and steri strips to right later foot  Left plantar apply Puracol Ag,  Cover with agile both left and right feet wounds  Wrap with conform and coban up to ankle secure with tape.   Change on

## 2024-10-17 NOTE — PROGRESS NOTES
Wound Care Center Progress Note and Bioengineered Skin Application      Tomas La  AGE: 53 y.o.   GENDER: male  : 1970  EPISODE DATE:  10/17/2024     Subjective:     Chief Complaint   Patient presents with    Wound Check     Wound check          HISTORY of PRESENT ILLNESS     Tomas La is a 53 y.o. male who presents to the Wound Clinic for a visit for evaluation and treatment of acute diabetic  ulcer(s) of  right . foot medial.  The condition is of moderate severity. The ulcer has been present for several months since march.  The underlying cause is thought to be diabetes.  The patients care to date has included offloading and normal care. The patient has significant underlying medical conditions as below.      **Patient getting epiFix graft #3 today     2024: Patient right wound looks ok. Plantar wound to left has opened again and we will add the same treatment as before  Patient only picked up antibiotics today and encouraged him to start ASAP.  Approved for epifix and will plan to apply next week if delivered     2024: Patient looks ok. Will apply for grafts today. Will send antibiotics as well  Concern for lingering infection.   Patient doing PT and is up on his feet a lot      9-: We were able to to finally debride enough callus and macerated tissue to expose the whole wound today. Should help in finally getting this healed as the extent was not visualied previously.  He did have high WBCs on latest lab work. Had been checked for osteo in the past in this foot but was negative and no interventions were needed.  Will give him a week or 2 to improve and then likely need an XR      2024: Patient looking good. Will finish trimming callus today and A&D has softened skin somewhat. RN visit next week and hope he is about healed in 2 when he sees provider     2024: Patient looking ok but has some depth and undermining because of callus. Will try to get this off this week

## 2024-10-18 NOTE — PATIENT INSTRUCTIONS
PHYSICIAN ORDERS AND DISCHARGE INSTRUCTIONS  NOTE: Upon discharge from the Wound Center, you will receive a patient experience survey via E-mail. We would be grateful if you would take the time to fill this survey out.  Wound care order history:   GREGG's   Right       Left    Date    Vascular studies/Intervention: .     Cultures: .               Antibiotics: .Gent and Doxy 7/2/24, 9/17/24              HbA1c:  .               Grafts:  .Epifix 10/1/24, #2 Epifix 10/8/24, #3 Epifix 10/17/24, #4 Epifix 10/22/24,   Juxta Lites & Lymph Pumps:  Continuing wound care orders and information:              Residence: .              Continue home health care with:.    Your wound-care supplies will be provided by: .              Pharmacy: [facility]   Wound cleansing:      Do not scrub or use excessive force.    Wash hands with soap and water before and after dressing changes.    Prior to applying a clean dressing, cleanse wound with normal saline,    wound cleanser, or mild soap and water.     Ask your physician or nurse before getting the wound(s) wet in the shower.  Daily Wound management:    Keep weight off wounds and reposition every 2 hours.    Avoid standing for long periods of time.    Evaluate legs to the level of the heart or above for 30 minutes 4-5 times a day and/or when sitting.       When taking antibiotics take entire prescription as ordered by MD do not stop taking until medicine is all gone.     Documentation:  Compression: .   Offloading: .Forefoot offloader ordered        Orders for this week (10/18/2024):  Bilateral foot - Wash with mild soap and water, rinse with saline, pat dry with 4x4  Clobetasol to intact skin on foot   Qwick Aperature to periwound of left and right feet wounds  Epifix applied by Ramirez and secured with versatel, and steri strips to right lateral foot  Left plantar apply Puracol Ag,  Cover with agile both left and right feet wounds  Wrap with conform and coban up to ankle secure with

## 2024-10-21 ENCOUNTER — HOSPITAL ENCOUNTER (OUTPATIENT)
Dept: PHYSICAL THERAPY | Age: 54
Discharge: HOME | End: 2024-10-21
Attending: NURSE PRACTITIONER
Payer: COMMERCIAL

## 2024-10-21 PROCEDURE — 97014 ELECTRIC STIMULATION THERAPY: CPT

## 2024-10-21 PROCEDURE — 97110 THERAPEUTIC EXERCISES: CPT

## 2024-10-21 PROCEDURE — 97530 THERAPEUTIC ACTIVITIES: CPT

## 2024-10-21 PROCEDURE — 97035 APP MDLTY 1+ULTRASOUND EA 15: CPT

## 2024-10-21 NOTE — FLOWSHEET NOTE
Occupational Therapy          [facility] Outpatient Occupational Therapy  [address] [address]  Phone: [phone]  Fax: [phone]     Occupational Therapy: Treatment Note   Patient: Tomas La (53 y.o. male)   Examination Date: 2024  Plan of Care Certification Period: 2024 to     :  1970  MRN: [id number]  CSN: [id number]   Insurance: Payor: BCBS / Plan: BCBS OUT OF STATE / Product Type: *No Product type* /   Insurance ID: [id number] - ([place] BCBS) Secondary Insurance (if applicable):    Insurance Information:     Referring Physician: Charlie Mead APRN - CNP Caputo, Nicholas, APRN - CNP   PCP: Ruma Osman FNP Visits to Date/Visits Approved:   /      No Show/Cancelled Appts:   /       Medical Diagnosis: Diabetic ulcer of right midfoot associated with type 2 diabetes mellitus, with fat layer exposed (HCC) [E11.621, L97.412]  Charcot's joint of foot in type 2 diabetes mellitus (HCC) [E11.610] E11.621, L97.412 (ICD-10-CM) - Diabetic ulcer of right midfoot associated with type 2 diabetes mellitus, with fat layer exposed (HCC)  E11.610 (ICD-10-CM) - Charcot's joint of foot in type 2 diabetes mellitus (HCC)  Treatment Diagnosis: M62.81 - Muscle Weakness, M24. 541 for Contracture, right hand      Restrictions:   Restrictions/Precautions: Fall Risk, MRSA    Pain at beginning of treatment session:0/10  Pain at end of treatment session:010    Year visit# / POC visits:  7/10    Progress Note: []  Yes  [x]  No  Next due by: Visit #10      Date of evaluation/re-evaluation: 2024    Time In: 1115 Time Out: 1200    Subjective: Pt states \"I feel like I'm having ministrokes\" Pt described having episodes of increased tone at times and then it will back to normal. Therapist encouraged pt to inform doctor to see if they would like pt to come in ealier than scheduled appointment.      Objective/Assessment:  Decreased movement noted with R shoulder during exercises

## 2024-10-21 NOTE — FLOWSHEET NOTE
knee strapped to machine to help with prevent knee rolling in w/ BTB S12 Lv5 10'     Foot strapped to pedal and right knee strapped to machine to help with prevent knee rolling in w/ BTB S12 x 10 min   Right knee strapped to machine         TABLE      SAQ Not today Not today Medium roll  20x5\"   SLR Not today Not today    Resisted hip add Not today Not today    bridges Not today Not today 15x5\"   Timed hold toes up R LE extended Not today Not today    IR R LE in supine LE ext               STANDING      Forward marching focus on heel toe 3 laps Hurdles 3 laps    Lateral stepping 2 laps hurdles L lead only 3 laps     4 way hip 3 way all together  10x R LE  3 way 15x RLE individually  3 way 15x RLE individually    Heel raises 15x 20x 20x   HS curls R LE  10x R LE 15x3\" RLE 15x3\"   Step taps 6\" 15x B 6\" 15x B 6\" 15x B   RLE march   15x3\" 15x3\"                                PROPRIOCEPTION      NBOS on ground      Foam pad marching                        MODALITIES                    Other Therapeutic Activities/Education:  Pt educated on PT findings, plan, prognosis, and  HEP.     Home Exercise Program:      Manual Treatments:  none    Modalities:  none    Communication with other providers:  eval faxed    Assessment:  (Response towards treatment session) (Pain Rating)   No change in symptoms at the end of treatment.  Noted increased fatigue.      Plan for Next Session: Continue with above exercises providing seated rest breaks.      Time In / Time Out:   1035/1115    Timed Code/Total Treatment Minutes:   40  2te 1ta    Next Progress Note due:  10/23/24    Plan of Care Interventions:  [x] Therapeutic Exercise  [] Modalities:  [x] Therapeutic Activity     [] Ultrasound  [] Estim  [x] Gait Training      [] Cervical Traction [] Lumbar Traction  [x] Neuromuscular Re-education    [] Cold/hotpack [] Iontophoresis   [x] Instruction in HEP      [] Vasopneumatic   [] Dry Needling    [] Manual Therapy               [] Aquatic

## 2024-10-22 ENCOUNTER — HOSPITAL ENCOUNTER (OUTPATIENT)
Dept: WOUND CARE | Age: 54
Discharge: HOME | End: 2024-10-22
Attending: NURSE PRACTITIONER
Payer: COMMERCIAL

## 2024-10-22 VITALS
SYSTOLIC BLOOD PRESSURE: 110 MMHG | TEMPERATURE: 98.5 F | RESPIRATION RATE: 14 BRPM | HEART RATE: 67 BPM | DIASTOLIC BLOOD PRESSURE: 65 MMHG

## 2024-10-22 DIAGNOSIS — E11.621 DIABETIC ULCER OF RIGHT MIDFOOT ASSOCIATED WITH TYPE 2 DIABETES MELLITUS, WITH FAT LAYER EXPOSED (HCC): Primary | ICD-10-CM

## 2024-10-22 DIAGNOSIS — L97.422 DIABETIC ULCER OF LEFT MIDFOOT ASSOCIATED WITH TYPE 2 DIABETES MELLITUS, WITH FAT LAYER EXPOSED (HCC): ICD-10-CM

## 2024-10-22 DIAGNOSIS — E11.621 DIABETIC ULCER OF LEFT MIDFOOT ASSOCIATED WITH TYPE 2 DIABETES MELLITUS, WITH FAT LAYER EXPOSED (HCC): ICD-10-CM

## 2024-10-22 DIAGNOSIS — L97.412 DIABETIC ULCER OF RIGHT MIDFOOT ASSOCIATED WITH TYPE 2 DIABETES MELLITUS, WITH FAT LAYER EXPOSED (HCC): Primary | ICD-10-CM

## 2024-10-22 PROCEDURE — 6370000000 HC RX 637 (ALT 250 FOR IP): Performed by: NURSE PRACTITIONER

## 2024-10-22 PROCEDURE — 11042 DBRDMT SUBQ TIS 1ST 20SQCM/<: CPT | Performed by: NURSE PRACTITIONER

## 2024-10-22 PROCEDURE — 15275 SKIN SUB GRAFT FACE/NK/HF/G: CPT

## 2024-10-22 PROCEDURE — 11042 DBRDMT SUBQ TIS 1ST 20SQCM/<: CPT

## 2024-10-22 PROCEDURE — 15275 SKIN SUB GRAFT FACE/NK/HF/G: CPT | Performed by: NURSE PRACTITIONER

## 2024-10-22 RX ORDER — BETAMETHASONE DIPROPIONATE 0.5 MG/G
CREAM TOPICAL ONCE
OUTPATIENT
Start: 2024-10-22 | End: 2024-10-22

## 2024-10-22 RX ORDER — LIDOCAINE 40 MG/G
CREAM TOPICAL ONCE
OUTPATIENT
Start: 2024-10-22 | End: 2024-10-22

## 2024-10-22 RX ORDER — LIDOCAINE 50 MG/G
OINTMENT TOPICAL ONCE
OUTPATIENT
Start: 2024-10-22 | End: 2024-10-22

## 2024-10-22 RX ORDER — NEOMYCIN/BACITRACIN/POLYMYXINB 3.5-400-5K
OINTMENT (GRAM) TOPICAL ONCE
OUTPATIENT
Start: 2024-10-22 | End: 2024-10-22

## 2024-10-22 RX ORDER — CLOBETASOL PROPIONATE 0.5 MG/G
OINTMENT TOPICAL ONCE
OUTPATIENT
Start: 2024-10-22 | End: 2024-10-22

## 2024-10-22 RX ORDER — LIDOCAINE HYDROCHLORIDE 40 MG/ML
SOLUTION TOPICAL ONCE
OUTPATIENT
Start: 2024-10-22 | End: 2024-10-22

## 2024-10-22 RX ORDER — CLOBETASOL PROPIONATE 0.5 MG/G
OINTMENT TOPICAL ONCE
Status: COMPLETED | OUTPATIENT
Start: 2024-10-22 | End: 2024-10-22

## 2024-10-22 RX ORDER — LIDOCAINE HYDROCHLORIDE 20 MG/ML
JELLY TOPICAL ONCE
OUTPATIENT
Start: 2024-10-22 | End: 2024-10-22

## 2024-10-22 RX ORDER — MUPIROCIN 20 MG/G
OINTMENT TOPICAL ONCE
OUTPATIENT
Start: 2024-10-22 | End: 2024-10-22

## 2024-10-22 RX ORDER — SODIUM CHLOR/HYPOCHLOROUS ACID 0.033 %
SOLUTION, IRRIGATION IRRIGATION ONCE
OUTPATIENT
Start: 2024-10-22 | End: 2024-10-22

## 2024-10-22 RX ORDER — BACITRACIN ZINC AND POLYMYXIN B SULFATE 500; 1000 [USP'U]/G; [USP'U]/G
OINTMENT TOPICAL ONCE
OUTPATIENT
Start: 2024-10-22 | End: 2024-10-22

## 2024-10-22 RX ORDER — GINSENG 100 MG
CAPSULE ORAL ONCE
OUTPATIENT
Start: 2024-10-22 | End: 2024-10-22

## 2024-10-22 RX ORDER — TRIAMCINOLONE ACETONIDE 1 MG/G
OINTMENT TOPICAL ONCE
OUTPATIENT
Start: 2024-10-22 | End: 2024-10-22

## 2024-10-22 RX ORDER — SILVER SULFADIAZINE 10 MG/G
CREAM TOPICAL ONCE
OUTPATIENT
Start: 2024-10-22 | End: 2024-10-22

## 2024-10-22 RX ORDER — GENTAMICIN SULFATE 1 MG/G
OINTMENT TOPICAL ONCE
OUTPATIENT
Start: 2024-10-22 | End: 2024-10-22

## 2024-10-22 RX ADMIN — CLOBETASOL PROPIONATE: 0.5 OINTMENT TOPICAL at 13:49

## 2024-10-22 NOTE — PROGRESS NOTES
EpiFix Treatment Note    NAME:  Tomas La  YOB: 1970  MEDICAL RECORD NUMBER:  [id number]  DATE:  10/22/2024    Goal:  Patient will receive safe and proper application of skin substitute.  Patient will comply with caring for dressing, offloading and reporting complications.     Expiration date checked immediately prior to use.  Package intact prior to use and no damage noted.  EpiFix is stored at room temperature.  EpiFix was removed from protective sterile packaging by provider and  applied to prepared ulcer bed.  EpiFix was placed using embossment letting as a guide.  EpiFix was hydrated with sterile normal saline per provider.   EpiFix was applied to right lateral foot and affixed with steri-strips by the provider.   EpiFix was covered with non-adherent ulcer dressing.   Applied agile over non-adherent.  Applied dry gauze and/or roll gauze.   Coban or ace wrap was applied to secure graft and decrease edema.   The patient/caregiver was instructed not to remove dressing and to keep it clean and dry.   The patient/family/caregiver was instructed on the need for offloading and elevation of the affected extremity and on using the prescribed offloading device.  The patient/family/caregiver was instructed on signs and symptoms of complication to report, such as draining through dressing, dressing falling down/slipping, getting wet, or severe pain or tingling in toes.     Guidelines followed  EpiFix may only be used every 12 months per wound.    Date of first application of EpiFix for this current wound is October 1, 2024.     Application # 4 out of 10       Electronically signed by Sia Seymour RN on 10/22/2024 at 1:55 PM  
0.3 cm 10/22/24 1310   Wound Depth (cm) 0.1 cm 10/22/24 1310   Wound Surface Area (cm^2) 0.12 cm^2 10/22/24 1310   Change in Wound Size % (l*w) -1100 10/22/24 1310   Wound Volume (cm^3) 0.012 cm^3 10/22/24 1310   Wound Healing % -1100 10/22/24 1310   Post-Procedure Length (cm) 0.4 cm 10/22/24 1331   Post-Procedure Width (cm) 0.3 cm 10/22/24 1331   Post-Procedure Depth (cm) 0.1 cm 10/22/24 1331   Post-Procedure Surface Area (cm^2) 0.12 cm^2 10/22/24 1331   Post-Procedure Volume (cm^3) 0.012 cm^3 10/22/24 1331   Distance Tunneling (cm) 0 cm 10/22/24 1310   Tunneling Position ___ O'Clock 0 10/22/24 1310   Undermining Starts ___ O'Clock 0 10/22/24 1310   Undermining Ends___ O'Clock 0 10/22/24 1310   Undermining Maxium Distance (cm) 0 10/22/24 1310   Wound Assessment Granulation tissue 10/22/24 1310   Drainage Amount Moderate (25-50%) 10/22/24 1310   Drainage Description Yellow 10/22/24 1310   Odor None 10/22/24 1310   Violeta-wound Assessment Dry/flaky 10/22/24 1310   Margins Defined edges 10/22/24 1310   Wound Thickness Description not for Pressure Injury Full thickness 10/22/24 1310   Number of days: 28         Total Surface Area Covered 0.54 sq/cm    Was the Product Layered  Yes      Amount Wasted 0 sq/cm    Reason for Waste: material provided was greater than wound size      Secured: Yes    Instruments used to complete placement of graft::scissors and forceps    Secured With: versatel  [x]Steri Strips    []Sutures     []Staples []Other    Procedural Pain: 0/10     Post Procedural Pain: 0 / 10    Response to Treatment:  Well tolerated by patient.    Procedure Note    Indications:  Based on my examination of this patient's wound(s) today, sharp excision into necrotic subcutaneous tissue is required to promote healing and evaluate the extent of previous healing.    Performed by: ADELFO Morton - CNP    Consent obtained: Yes    Time out taken:  Yes    Pain Control: N/A      Debridement:Excisional

## 2024-10-24 ENCOUNTER — HOSPITAL ENCOUNTER (OUTPATIENT)
Dept: PHYSICAL THERAPY | Age: 54
Discharge: HOME | End: 2024-10-24
Attending: NURSE PRACTITIONER
Payer: COMMERCIAL

## 2024-10-24 PROCEDURE — 97110 THERAPEUTIC EXERCISES: CPT

## 2024-10-24 PROCEDURE — 97530 THERAPEUTIC ACTIVITIES: CPT

## 2024-10-24 NOTE — FLOWSHEET NOTE
and handout of home stretches including shoulder shrugs, shoulder rolls forward and reverse, scapular retraction, and neck stretch, 10x each to complete at home. Patient demo'd and verbalized understanding.      Objective/Assessment:      All exercises performed with RUE  Exercises: (no more than 4 columns)  Exercise/Equipment 10/7/24 10/14/24 10/21/24 10/24/24   AROM fingers and wrist       PROM to elbow and shoulder with stretching       Table slide forwards and side ways       Yellow pinch pin    15 poms active assist from opposite hand for stabilization of wrist   Theraputty    [place]  Removed multiple sized beads with active assist from opposite hand for wrist stabilization with mod difficulty manipulating digits to complete   Pt able to don R splint on hand    completed   R wrist WB through shoulder, elbow, wrist and hand.       Estim R extensors x 20 min 10/20 cycle on 42 MA with attempted AROM in wrist ext when estim is activated. R extensors x 20 min 10/20 cycle on 42 MA with attempted AROM in wrist ext when estim is activated. R extensors x 20 min 10/20 cycle on 42 MA with attempted AROM in wrist ext when estim is activated.    PROM   Digit extension       PROM  Wrist extension  Wrist RD  Wrist UD 4x 20 second hold  4x 20 second hold  4x 20 second hold 4x 20 second hold  4x 20 second hold  4x 20 second hold 4x 20 second hold  4x 20 second hold  4x 20 second hold    Shoulder flexion AAROM push pull x 10 reps Shld table slides x 15 reps using towels forward and side to side 4x 20 second hold  4x 20 second hold  4x 20 second holdShld table slides x 15 reps using towels forward and side to side PROM   Shoulder Horiz abd    PROM    Shoulder Abd    PROM   Shoulder shrugs 15x   20x   Scapular Retraction  15x   20x   Shldr rolls forward  Shldr rolls reverse 15x   20x  20x   Shldr posterior capsule stretch       Head tilting stretch    20x   Isometric Elbow Flexion  Isometric Elbow Extension 5x 10x     Wrist flex

## 2024-10-24 NOTE — FLOWSHEET NOTE
Outpatient Physical Therapy  [place]           [] Phone: [phone]   Fax: [phone]  [place]           [x] Phone: [phone]   Fax: [phone]        Physical Therapy Daily Treatment Note  Date:  10/24/2024    Patient Name:  Tomas La    :  1970  MRN: [id number]  Restrictions/Precautions:  fall risk   Diagnosis:   Diabetic ulcer of right midfoot associated with type 2 diabetes mellitus, with fat layer exposed (HCC) [E11.621, L97.412]  Charcot's joint of foot in type 2 diabetes mellitus (HCC) [E11610]    Date of Injury/Surgery:  CVA  and .  Pt reports he started outpt PT . He reports he fell in  and fx his R shoulder. He reports he stopped therapy at that point. He reports he has a wound on the R lateral side of his foot. He has a history of pins and rods and external fixator in  secondary to his charcot foot on R. Pt reports the angel is cracked, but they are not fixing it.   Treatment Diagnosis:   M62.81 muscle weakness, R26.89 abnormality of gait   Insurance/Certification information:  BCBS  Referring Physician:  Charlie Mead APRN *     PCP: Ruma Osman FNP  Plan of care signed (Y/N):  eval faxed  Outcome Measure: LEFs: 16 = 20% ability = 80% disability   2MWT: 168.3 feet  2 LOB  Visit# / total visits:          Pain level: 0/10   Goals:     Patient goals:   increase strength in leg to stand, improve walking     Subjective: Continues to report that locking up in the right LE.      Any changes in Ambulatory Summary Sheet?  None    Objective:  Able to perform some exercises without HHA        Exercises: (No more than 4 columns)   Exercise/Equipment 10/14/24  (8) 10/21/2024 10/24/2024           WARM UP        NuStep   S12 Lv5 10'     Foot strapped to pedal and right knee strapped to machine to help with prevent knee rolling in w/ BTB S12 x 10 min   Right knee strapped to machine S12 x 10 min          TABLE      SAQ Not today Medium roll  20x5\" Medium

## 2024-10-28 ENCOUNTER — HOSPITAL ENCOUNTER (OUTPATIENT)
Dept: PHYSICAL THERAPY | Age: 54
Discharge: HOME | End: 2024-10-28
Attending: NURSE PRACTITIONER
Payer: COMMERCIAL

## 2024-10-28 PROCEDURE — 97112 NEUROMUSCULAR REEDUCATION: CPT

## 2024-10-28 PROCEDURE — 97110 THERAPEUTIC EXERCISES: CPT

## 2024-10-28 PROCEDURE — 97530 THERAPEUTIC ACTIVITIES: CPT

## 2024-10-28 NOTE — FLOWSHEET NOTE
Occupational Therapy          [facility]  [address] [address]  Phone: [phone]  Fax: [phone]     Occupational Therapy: Treatment Note   Patient: Tomas La (53 y.o. male)   Examination Date: 2024  Plan of Care Certification Period: 2024 to     :  1970  MRN: [id number]  CSN: [id number]   Insurance: Payor: BCBS / Plan: BCBS OUT OF STATE / Product Type: *No Product type* /   Insurance ID: [id number] - (Maria Stein BCBS) Secondary Insurance (if applicable):    Insurance Information:     Referring Physician: Charlie Mead APRN - CNP Caputo, Nicholas, APRN - CNP   PCP: Ruma Osman FNP Visits to Date/Visits Approved:   /      No Show/Cancelled Appts:   /       Medical Diagnosis: Diabetic ulcer of right midfoot associated with type 2 diabetes mellitus, with fat layer exposed (HCC) [E11.621, L97.412]  Charcot's joint of foot in type 2 diabetes mellitus (HCC) [E11.610] E11.621, L97.412 (ICD-10-CM) - Diabetic ulcer of right midfoot associated with type 2 diabetes mellitus, with fat layer exposed (HCC)  E11.610 (ICD-10-CM) - Charcot's joint of foot in type 2 diabetes mellitus (HCC)  Treatment Diagnosis: M62.81 - Muscle Weakness, M24. 541 for Contracture, right hand      Restrictions:   Restrictions/Precautions: Fall Risk, MRSA    Pain at beginning of treatment session: 0/10  Pain at end of treatment session:0/10    Year visit# / POC visits:  9/10    Progress Note: []  Yes  [x]  No  Next due by: Visit #10      Date of evaluation/re-evaluation: 2024    Time In: 1115  Time Out: 1200    Subjective: Patient rec'd from PT. Patient reports no changes and reports continued difficulty with manipulating digits on R hand when keeping wrist in neutral position. He reports his L wrist is beginning to hurt possibly from over compensating with it. Patient provided orange theraputty with multiple sized beads to practice FM manipulation at home

## 2024-10-28 NOTE — PROGRESS NOTES
demonstrate the ability to safely stand x15 consecutive minutes while performing exercises in // bars in order to improve endurance and strength. - MET, discharge goal.    Updated Goals to be achieved by :  1) Continue above  2) Continue above  3) Pt will demonstrate the ability to safely complete at least 200 feet on the 2 minute walk test with no LOB in order to improve functional mobility.   4) Pt will demonstrate the ability to safely stand x25 consecutive minutes while performing exercises in // bars in order to improve endurance and strength.   5) Pt will demonstrate R LE strength at least 4+/5 in order to improve strength.     Frequency/Duration:  # Days per week: [] 1 day # Weeks: [] 1 week [] 4 weeks      [x] 2 days   [] 2 weeks [] 5 weeks      [] 3 days   [] 3 weeks [x] 6 weeks     Rehab Potential: [] Excellent [x] Good [] Fair  [] Poor         Patient Status: [x] Continue per initial plan of Care     [] Patient now discharged     [x] Additional visits requested, Please re-certify for additional visits:      Requested frequency/duration:  2x/week for 6 weeks    If we are requesting more visits, we fully anticipate the patient's condition is expected to improve within the treatment timeframe we are requesting.    Electronically signed by:  MYRIAM Tiwari  10/28/2024, 1:13 PM    If you have any questions or concerns, please don't hesitate to call.  Thank you for your referral.    Physician Signature:______________________ Date:______ Time: ________  By signing above, therapist’s plan is approved by physician

## 2024-10-28 NOTE — FLOWSHEET NOTE
Outpatient Physical Therapy  [place]           [] Phone: [phone]   Fax: [phone]  [place]           [x] Phone: [phone]   Fax: [phone]        Physical Therapy Daily Treatment Note  Date:  10/28/2024    Patient Name:  Tomas La    :  1970  MRN: [id number]  Restrictions/Precautions:  fall risk   Diagnosis:   Diabetic ulcer of right midfoot associated with type 2 diabetes mellitus, with fat layer exposed (HCC) [E11.621, L97.412]  Charcot's joint of foot in type 2 diabetes mellitus (HCC) [E11610]    Date of Injury/Surgery:  CVA  and .  Pt reports he started outpt PT . He reports he fell in  and fx his R shoulder. He reports he stopped therapy at that point. He reports he has a wound on the R lateral side of his foot. He has a history of pins and rods and external fixator in  secondary to his charcot foot on R. Pt reports the angel is cracked, but they are not fixing it.   Treatment Diagnosis:   M62.81 muscle weakness, R26.89 abnormality of gait   Insurance/Certification information:  [facility]  Referring Physician:  Charlie Mead APRN *     PCP: Ruma Osman FNP  Plan of care signed (Y/N):  adileneal faxed  Outcome Measure: LEFs: 1680 = 20% ability = 80% disability   2MWT: 168.3 feet  2 LOB  Visit# / total visits:          Pain level: 0/10   Goals:     Patient goals:   increase strength in leg to stand, improve walking     Short term goals to be achieved by 2024:  Short term goal 1: Pt will report compliance with current HEP as prescribed in order to improve strength and functional mobility.  - not met, continue   Short term goal 2: Pt will demonstrate a LEFs score of no more than 74% disability in order to improve quality of life. - not met, continue  Short term goal 3: Pt will demonstrate the ability to safely complete at least 180 feet on the 2 minute walk test with no LOB in order to improve functional mobility. - MET, discharge goal.  Short

## 2024-10-29 ENCOUNTER — HOSPITAL ENCOUNTER (OUTPATIENT)
Dept: WOUND CARE | Age: 54
Discharge: HOME | End: 2024-10-29
Attending: NURSE PRACTITIONER
Payer: COMMERCIAL

## 2024-10-29 VITALS — SYSTOLIC BLOOD PRESSURE: 124 MMHG | DIASTOLIC BLOOD PRESSURE: 78 MMHG | TEMPERATURE: 98.2 F | HEART RATE: 83 BPM

## 2024-10-29 DIAGNOSIS — L97.412 DIABETIC ULCER OF RIGHT MIDFOOT ASSOCIATED WITH TYPE 2 DIABETES MELLITUS, WITH FAT LAYER EXPOSED (HCC): Primary | ICD-10-CM

## 2024-10-29 DIAGNOSIS — E11.621 DIABETIC ULCER OF LEFT MIDFOOT ASSOCIATED WITH TYPE 2 DIABETES MELLITUS, WITH FAT LAYER EXPOSED (HCC): ICD-10-CM

## 2024-10-29 DIAGNOSIS — L97.422 DIABETIC ULCER OF LEFT MIDFOOT ASSOCIATED WITH TYPE 2 DIABETES MELLITUS, WITH FAT LAYER EXPOSED (HCC): ICD-10-CM

## 2024-10-29 DIAGNOSIS — E11.621 DIABETIC ULCER OF RIGHT MIDFOOT ASSOCIATED WITH TYPE 2 DIABETES MELLITUS, WITH FAT LAYER EXPOSED (HCC): Primary | ICD-10-CM

## 2024-10-29 PROCEDURE — 11042 DBRDMT SUBQ TIS 1ST 20SQCM/<: CPT | Performed by: NURSE PRACTITIONER

## 2024-10-29 PROCEDURE — 15275 SKIN SUB GRAFT FACE/NK/HF/G: CPT

## 2024-10-29 PROCEDURE — 11042 DBRDMT SUBQ TIS 1ST 20SQCM/<: CPT

## 2024-10-29 PROCEDURE — 6370000000 HC RX 637 (ALT 250 FOR IP): Performed by: NURSE PRACTITIONER

## 2024-10-29 PROCEDURE — 15275 SKIN SUB GRAFT FACE/NK/HF/G: CPT | Performed by: NURSE PRACTITIONER

## 2024-10-29 RX ORDER — CLOBETASOL PROPIONATE 0.5 MG/G
OINTMENT TOPICAL ONCE
Status: COMPLETED | OUTPATIENT
Start: 2024-10-29 | End: 2024-10-29

## 2024-10-29 RX ORDER — NEOMYCIN/BACITRACIN/POLYMYXINB 3.5-400-5K
OINTMENT (GRAM) TOPICAL ONCE
OUTPATIENT
Start: 2024-10-29 | End: 2024-10-29

## 2024-10-29 RX ORDER — TRIAMCINOLONE ACETONIDE 1 MG/G
OINTMENT TOPICAL ONCE
OUTPATIENT
Start: 2024-10-29 | End: 2024-10-29

## 2024-10-29 RX ORDER — SODIUM CHLOR/HYPOCHLOROUS ACID 0.033 %
SOLUTION, IRRIGATION IRRIGATION ONCE
OUTPATIENT
Start: 2024-10-29 | End: 2024-10-29

## 2024-10-29 RX ORDER — LIDOCAINE HYDROCHLORIDE 40 MG/ML
SOLUTION TOPICAL ONCE
OUTPATIENT
Start: 2024-10-29 | End: 2024-10-29

## 2024-10-29 RX ORDER — LIDOCAINE HYDROCHLORIDE 20 MG/ML
JELLY TOPICAL ONCE
OUTPATIENT
Start: 2024-10-29 | End: 2024-10-29

## 2024-10-29 RX ORDER — BETAMETHASONE DIPROPIONATE 0.5 MG/G
CREAM TOPICAL ONCE
OUTPATIENT
Start: 2024-10-29 | End: 2024-10-29

## 2024-10-29 RX ORDER — LIDOCAINE 40 MG/G
CREAM TOPICAL ONCE
OUTPATIENT
Start: 2024-10-29 | End: 2024-10-29

## 2024-10-29 RX ORDER — BACITRACIN ZINC AND POLYMYXIN B SULFATE 500; 1000 [USP'U]/G; [USP'U]/G
OINTMENT TOPICAL ONCE
OUTPATIENT
Start: 2024-10-29 | End: 2024-10-29

## 2024-10-29 RX ORDER — CLOBETASOL PROPIONATE 0.5 MG/G
OINTMENT TOPICAL ONCE
OUTPATIENT
Start: 2024-10-29 | End: 2024-10-29

## 2024-10-29 RX ORDER — LIDOCAINE 50 MG/G
OINTMENT TOPICAL ONCE
OUTPATIENT
Start: 2024-10-29 | End: 2024-10-29

## 2024-10-29 RX ORDER — SILVER SULFADIAZINE 10 MG/G
CREAM TOPICAL ONCE
OUTPATIENT
Start: 2024-10-29 | End: 2024-10-29

## 2024-10-29 RX ORDER — GENTAMICIN SULFATE 1 MG/G
OINTMENT TOPICAL ONCE
OUTPATIENT
Start: 2024-10-29 | End: 2024-10-29

## 2024-10-29 RX ORDER — MUPIROCIN 20 MG/G
OINTMENT TOPICAL ONCE
OUTPATIENT
Start: 2024-10-29 | End: 2024-10-29

## 2024-10-29 RX ORDER — GINSENG 100 MG
CAPSULE ORAL ONCE
OUTPATIENT
Start: 2024-10-29 | End: 2024-10-29

## 2024-10-29 RX ADMIN — CLOBETASOL PROPIONATE: 0.5 OINTMENT TOPICAL at 14:01

## 2024-10-29 NOTE — PROGRESS NOTES
EpiFix Treatment Note    NAME:  Tomas La  YOB: 1970  MEDICAL RECORD NUMBER:  [id number]  DATE:  10/29/2024    Goal:  Patient will receive safe and proper application of skin substitute.  Patient will comply with caring for dressing, offloading and reporting complications.     Expiration date checked immediately prior to use.  Package intact prior to use and no damage noted.  EpiFix is stored at room temperature.  EpiFix was removed from protective sterile packaging by provider and  applied to prepared ulcer bed.  EpiFix was placed using embossment letting as a guide.  EpiFix was hydrated with sterile normal saline per provider.   EpiFix was applied to right lateral foot and affixed with steri-strips by the provider.   EpiFix was covered with non-adherent ulcer dressing.   Applied agile over non-adherent.  Applied dry gauze and/or roll gauze.   Coban or ace wrap was applied to secure graft and decrease edema.   The patient/caregiver was instructed not to remove dressing and to keep it clean and dry.   The patient/family/caregiver was instructed on the need for offloading and elevation of the affected extremity and on using the prescribed offloading device.  The patient/family/caregiver was instructed on signs and symptoms of complication to report, such as draining through dressing, dressing falling down/slipping, getting wet, or severe pain or tingling in toes.     Guidelines followed  EpiFix may only be used every 12 months per wound.    Date of first application of EpiFix for this current wound is October 1, 2024.     Application # 5 out of 10       Electronically signed by Sia Seymour RN on 10/29/2024 at 1:38 PM  
  Undermining Maxium Distance (cm) 0 10/29/24 1306   Wound Assessment Pink/red;Pale granulation tissue;Slough 10/29/24 1306   Drainage Amount Large (50-75% saturated) 10/29/24 1306   Drainage Description Brown;Yellow 10/29/24 1306   Odor None 10/29/24 1306   Violeta-wound Assessment Maceration;Hyperkeratosis (callous) 10/29/24 1306   Margins Defined edges 10/29/24 1306   Wound Thickness Description not for Pressure Injury Full thickness 10/29/24 1306   Number of days: 90       Wound 09/24/24 #2 Left Plantar (Active)   Wound Image   10/29/24 1306   Dressing Status Clean;Dry;New dressing applied 10/22/24 1344   Wound Cleansed Wound cleanser;Soap and water 10/29/24 1306   Offloading for Diabetic Foot Ulcers Felt or foam 10/29/24 1306   Wound Length (cm) 0.4 cm 10/29/24 1306   Wound Width (cm) 0.4 cm 10/29/24 1306   Wound Depth (cm) 0.2 cm 10/29/24 1306   Wound Surface Area (cm^2) 0.16 cm^2 10/29/24 1306   Change in Wound Size % (l*w) -1500 10/29/24 1306   Wound Volume (cm^3) 0.032 cm^3 10/29/24 1306   Wound Healing % -3100 10/29/24 1306   Post-Procedure Length (cm) 0.4 cm 10/29/24 1329   Post-Procedure Width (cm) 0.4 cm 10/29/24 1329   Post-Procedure Depth (cm) 0.2 cm 10/29/24 1329   Post-Procedure Surface Area (cm^2) 0.16 cm^2 10/29/24 1329   Post-Procedure Volume (cm^3) 0.032 cm^3 10/29/24 1329   Distance Tunneling (cm) 0 cm 10/29/24 1306   Tunneling Position ___ O'Clock 0 10/29/24 1306   Undermining Starts ___ O'Clock 0 10/29/24 1306   Undermining Ends___ O'Clock 0 10/29/24 1306   Undermining Maxium Distance (cm) 0 10/29/24 1306   Wound Assessment Granulation tissue 10/29/24 1306   Drainage Amount Moderate (25-50%) 10/29/24 1306   Drainage Description Yellow 10/29/24 1306   Odor None 10/29/24 1306   Violeta-wound Assessment Hyperkeratosis (callous);Dry/flaky 10/29/24 1306   Margins Defined edges 10/29/24 1306   Wound Thickness Description not for Pressure Injury Full thickness 10/29/24 1306   Number of days: 35

## 2024-10-29 NOTE — PATIENT INSTRUCTIONS
PHYSICIAN ORDERS AND DISCHARGE INSTRUCTIONS  NOTE: Upon discharge from the Wound Center, you will receive a patient experience survey via E-mail. We would be grateful if you would take the time to fill this survey out.  Wound care order history:   GREGG's   Right       Left    Date    Vascular studies/Intervention: .     Cultures: .               Antibiotics: .Gent and Doxy 7/2/24, 9/17/24              HbA1c:  .               Grafts:  .#1 Epifix 10/1/24, #2 Epifix 10/8/24, #3 Epifix 10/17/24, #4 Epifix 10/22/24, #5 Epifix 10/29/24,   Juxta Lites & Lymph Pumps:  Continuing wound care orders and information:              Residence: .              Continue home health care with:.    Your wound-care supplies will be provided by: .              Pharmacy: .[facility]   Wound cleansing:      Do not scrub or use excessive force.    Wash hands with soap and water before and after dressing changes.    Prior to applying a clean dressing, cleanse wound with normal saline,    wound cleanser, or mild soap and water.     Ask your physician or nurse before getting the wound(s) wet in the shower.  Daily Wound management:    Keep weight off wounds and reposition every 2 hours.    Avoid standing for long periods of time.    Evaluate legs to the level of the heart or above for 30 minutes 4-5 times a day and/or when sitting.       When taking antibiotics take entire prescription as ordered by MD do not stop taking until medicine is all gone.     Documentation:  Compression: .   Offloading: .Forefoot offloader ordered        Orders for this week (10/29/2024):  Bilateral foot - Wash with mild soap and water, rinse with saline, pat dry with 4x4  Clobetasol to intact skin on foot   Qwick Aperature to periwound of left and right feet wounds  Epifix applied by Ramirez and secured with versatel, and steri strips to right lateral foot  Left plantar apply Puracol Ag,  Cover with agile both left and right feet wounds  Wrap with conform and coban

## 2024-10-31 ENCOUNTER — HOSPITAL ENCOUNTER (OUTPATIENT)
Dept: PHYSICAL THERAPY | Age: 54
Discharge: HOME | End: 2024-10-31
Attending: NURSE PRACTITIONER

## 2024-10-31 NOTE — PROGRESS NOTES
Occupational Therapy       Occupational Therapy  Cancellation/No-show Note  Patient Name:  Tomas La  :  1970   Date:  10/31/2024  Cancelled visits to date: 1  No-shows to date: 0    For today's appointment patient:  [x]    Cancelled  []    Rescheduled appointment  []    No-show     Reason given by patient:  []    Patient ill  []    Conflicting appointment  []    No transportation    []    Conflict with work  []    No reason given  []    Other:     Comments:      Electronically signed by:  ALEYDA Martinez/L TITA   , 10/31/2024, 12:28 PM

## 2024-11-04 ENCOUNTER — HOSPITAL ENCOUNTER (OUTPATIENT)
Dept: PHYSICAL THERAPY | Age: 54
Discharge: HOME | End: 2024-11-04
Attending: NURSE PRACTITIONER

## 2024-11-04 NOTE — PROGRESS NOTES
Occupational Therapy       Occupational Therapy  Cancellation/No-show Note  Patient Name:  Tomas La  :  1970   Date:  2024  Cancelled visits to date:2  No-shows to date: 0    For today's appointment patient:  [x]    Cancelled  []    Rescheduled appointment  []    No-show     Reason given by patient:  [x]    Patient ill  []    Conflicting appointment  []    No transportation    []    Conflict with work  []    No reason given  []    Other:     Comments:      Electronically signed by:  ALEYDA Martinez/L TITA   , 2024, 3:04 PM      
(3) no apparent problem

## 2024-11-04 NOTE — FLOWSHEET NOTE
Physical Therapy  Cancellation/No-show Note  Patient Name:  Tomas La  :  1970   Date:  2024  Cancelled visits to date: 0  No-shows to date: 0    For today's appointment patient:  [x]  Cancelled  []  Rescheduled appointment  []  No-show     Reason given by patient:  [x]  Patient ill  []  Conflicting appointment  []  No transportation    []  Conflict with work  []  No reason given  []  Other:     Comments:      Electronically signed by:  MYRIAM Tiwari, 2024, 10:00 AM

## 2024-11-05 ENCOUNTER — HOSPITAL ENCOUNTER (OUTPATIENT)
Dept: WOUND CARE | Age: 54
Discharge: HOME | End: 2024-11-05
Attending: NURSE PRACTITIONER
Payer: COMMERCIAL

## 2024-11-05 VITALS
HEART RATE: 74 BPM | DIASTOLIC BLOOD PRESSURE: 82 MMHG | RESPIRATION RATE: 14 BRPM | TEMPERATURE: 97.4 F | SYSTOLIC BLOOD PRESSURE: 131 MMHG

## 2024-11-05 DIAGNOSIS — E11.621 DIABETIC ULCER OF RIGHT MIDFOOT ASSOCIATED WITH TYPE 2 DIABETES MELLITUS, WITH FAT LAYER EXPOSED (HCC): Primary | ICD-10-CM

## 2024-11-05 DIAGNOSIS — L97.412 DIABETIC ULCER OF RIGHT MIDFOOT ASSOCIATED WITH TYPE 2 DIABETES MELLITUS, WITH FAT LAYER EXPOSED (HCC): Primary | ICD-10-CM

## 2024-11-05 PROCEDURE — 11042 DBRDMT SUBQ TIS 1ST 20SQCM/<: CPT

## 2024-11-05 PROCEDURE — 6370000000 HC RX 637 (ALT 250 FOR IP): Performed by: NURSE PRACTITIONER

## 2024-11-05 PROCEDURE — 11042 DBRDMT SUBQ TIS 1ST 20SQCM/<: CPT | Performed by: NURSE PRACTITIONER

## 2024-11-05 RX ORDER — LIDOCAINE HYDROCHLORIDE 20 MG/ML
JELLY TOPICAL ONCE
OUTPATIENT
Start: 2024-11-05 | End: 2024-11-05

## 2024-11-05 RX ORDER — LIDOCAINE HYDROCHLORIDE 40 MG/ML
SOLUTION TOPICAL ONCE
OUTPATIENT
Start: 2024-11-05 | End: 2024-11-05

## 2024-11-05 RX ORDER — BACITRACIN ZINC AND POLYMYXIN B SULFATE 500; 1000 [USP'U]/G; [USP'U]/G
OINTMENT TOPICAL ONCE
OUTPATIENT
Start: 2024-11-05 | End: 2024-11-05

## 2024-11-05 RX ORDER — GENTAMICIN SULFATE 1 MG/G
OINTMENT TOPICAL ONCE
OUTPATIENT
Start: 2024-11-05 | End: 2024-11-05

## 2024-11-05 RX ORDER — GINSENG 100 MG
CAPSULE ORAL ONCE
OUTPATIENT
Start: 2024-11-05 | End: 2024-11-05

## 2024-11-05 RX ORDER — SODIUM CHLOR/HYPOCHLOROUS ACID 0.033 %
SOLUTION, IRRIGATION IRRIGATION ONCE
OUTPATIENT
Start: 2024-11-05 | End: 2024-11-05

## 2024-11-05 RX ORDER — BETAMETHASONE DIPROPIONATE 0.5 MG/G
CREAM TOPICAL ONCE
OUTPATIENT
Start: 2024-11-05 | End: 2024-11-05

## 2024-11-05 RX ORDER — MUPIROCIN 20 MG/G
OINTMENT TOPICAL ONCE
OUTPATIENT
Start: 2024-11-05 | End: 2024-11-05

## 2024-11-05 RX ORDER — SILVER SULFADIAZINE 10 MG/G
CREAM TOPICAL ONCE
OUTPATIENT
Start: 2024-11-05 | End: 2024-11-05

## 2024-11-05 RX ORDER — TRIAMCINOLONE ACETONIDE 1 MG/G
OINTMENT TOPICAL ONCE
OUTPATIENT
Start: 2024-11-05 | End: 2024-11-05

## 2024-11-05 RX ORDER — NEOMYCIN/BACITRACIN/POLYMYXINB 3.5-400-5K
OINTMENT (GRAM) TOPICAL ONCE
OUTPATIENT
Start: 2024-11-05 | End: 2024-11-05

## 2024-11-05 RX ORDER — CLOBETASOL PROPIONATE 0.5 MG/G
OINTMENT TOPICAL ONCE
OUTPATIENT
Start: 2024-11-05 | End: 2024-11-05

## 2024-11-05 RX ORDER — CLOBETASOL PROPIONATE 0.5 MG/G
OINTMENT TOPICAL ONCE
Status: COMPLETED | OUTPATIENT
Start: 2024-11-05 | End: 2024-11-05

## 2024-11-05 RX ORDER — LIDOCAINE 40 MG/G
CREAM TOPICAL ONCE
OUTPATIENT
Start: 2024-11-05 | End: 2024-11-05

## 2024-11-05 RX ORDER — LIDOCAINE 50 MG/G
OINTMENT TOPICAL ONCE
OUTPATIENT
Start: 2024-11-05 | End: 2024-11-05

## 2024-11-05 RX ADMIN — CLOBETASOL PROPIONATE: 0.5 OINTMENT TOPICAL at 13:46

## 2024-11-05 NOTE — PATIENT INSTRUCTIONS
PHYSICIAN ORDERS AND DISCHARGE INSTRUCTIONS  NOTE: Upon discharge from the Wound Center, you will receive a patient experience survey via E-mail. We would be grateful if you would take the time to fill this survey out.  Wound care order history:   GREGG's   Right       Left    Date    Vascular studies/Intervention: .     Cultures: .               Antibiotics: .Gent and Doxy 7/2/24, 9/17/24              HbA1c:  .               Grafts:  .#1 Epifix 10/1/24, #2 Epifix 10/8/24, #3 Epifix 10/17/24, #4 Epifix 10/22/24, #5 Epifix 10/29/24,   Juxta Lites & Lymph Pumps:  Continuing wound care orders and information:              Residence: .              Continue home health care with:.    Your wound-care supplies will be provided by: .              Pharmacy: [facility]   Wound cleansing:      Do not scrub or use excessive force.    Wash hands with soap and water before and after dressing changes.    Prior to applying a clean dressing, cleanse wound with normal saline,    wound cleanser, or mild soap and water.     Ask your physician or nurse before getting the wound(s) wet in the shower.  Daily Wound management:    Keep weight off wounds and reposition every 2 hours.    Avoid standing for long periods of time.    Evaluate legs to the level of the heart or above for 30 minutes 4-5 times a day and/or when sitting.       When taking antibiotics take entire prescription as ordered by MD do not stop taking until medicine is all gone.     Documentation:  Compression: .   Offloading: .Forefoot offloader ordered        Orders for this week (11/5/2024):  Bilateral foot - Wash with mild soap and water, rinse with saline, pat dry with 4x4  Clobetasol to intact skin on foot   Qwick Aperature to periwound of left and right feet wounds  Apply Betdine and stimulen powder to wound bed and cover with ioplex   Cover with agile both left and right feet wounds  Wrap with conform and coban up to ankle secure with tape.   Change on

## 2024-11-05 NOTE — PROGRESS NOTES
Wound Care Center Progress Note With Procedure    Tomas La  AGE: 53 y.o.   GENDER: male  : 1970  EPISODE DATE:  2024     Subjective:     Chief Complaint   Patient presents with    Wound Check     LLE         HISTORY of PRESENT ILLNESS     Tomas La is a 53 y.o. male who presents to the Wound Clinic for a visit for evaluation and treatment of acute diabetic  ulcer(s) of  right . foot medial.  The condition is of moderate severity. The ulcer has been present for several months since march.  The underlying cause is thought to be diabetes.  The patients care to date has included offloading and normal care. The patient has significant underlying medical conditions as below.      **Patient getting epiFix graft #5 today    2024: Patient did not get his graft in time this week. Will add orders for this week and apply at next appointment     2024: Patient right wound looks ok. Plantar wound to left has opened again and we will add the same treatment as before  Patient only picked up antibiotics today and encouraged him to start ASAP.  Approved for epifix and will plan to apply next week if delivered     2024: Patient looks ok. Will apply for grafts today. Will send antibiotics as well  Concern for lingering infection.   Patient doing PT and is up on his feet a lot      9-: We were able to to finally debride enough callus and macerated tissue to expose the whole wound today. Should help in finally getting this healed as the extent was not visualied previously.  He did have high WBCs on latest lab work. Had been checked for osteo in the past in this foot but was negative and no interventions were needed.  Will give him a week or 2 to improve and then likely need an XR      2024: Patient looking good. Will finish trimming callus today and A&D has softened skin somewhat. RN visit next week and hope he is about healed in 2 when he sees provider     2024: Patient

## 2024-11-08 ENCOUNTER — HOSPITAL ENCOUNTER (OUTPATIENT)
Dept: PHYSICAL THERAPY | Age: 54
Discharge: HOME | End: 2024-11-08
Attending: NURSE PRACTITIONER

## 2024-11-08 NOTE — PROGRESS NOTES
Occupational Therapy       Occupational Therapy  Cancellation/No-show Note  Patient Name:  Tomas La  :  1970   Date:  2024  Cancelled visits to date:2  No-shows to date:1    For today's appointment patient:  []    Cancelled  []    Rescheduled appointment  [x]    No-show     Reason given by patient:  []    Patient ill  []    Conflicting appointment  []    No transportation    []    Conflict with work  []    No reason given  []    Other:     Comments:      Electronically signed by:  ALEYDA Martinez/L TITA   , 2024, 12:18 PM

## 2024-11-08 NOTE — FLOWSHEET NOTE
Physical Therapy  Cancellation/No-show Note  Patient Name:  Tomas La  :  1970   Date:  2024  Cancelled visits to date: 0  No-shows to date: 0    For today's appointment patient:  []  Cancelled  [x]  Clinic cancelled   []  No-show     Reason given by patient:  []  Patient ill  []  Conflicting appointment  []  No transportation    []  Conflict with work  []  No reason given  [x]  Other:  Pt insurance reports pt has no more visits   Comments:      Electronically signed by:  Mary Malone PT, DPT [id number] 2024, 4:21 PM

## 2024-11-11 ENCOUNTER — OFFICE VISIT (OUTPATIENT)
Age: 54
End: 2024-11-11
Payer: COMMERCIAL

## 2024-11-11 VITALS
BODY MASS INDEX: 33.35 KG/M2 | HEIGHT: 72 IN | OXYGEN SATURATION: 98 % | RESPIRATION RATE: 18 BRPM | HEART RATE: 64 BPM | WEIGHT: 246.2 LBS | DIASTOLIC BLOOD PRESSURE: 78 MMHG | SYSTOLIC BLOOD PRESSURE: 126 MMHG

## 2024-11-11 DIAGNOSIS — R11.0 NAUSEA: ICD-10-CM

## 2024-11-11 DIAGNOSIS — E11.42 TYPE 2 DIABETES MELLITUS WITH DIABETIC POLYNEUROPATHY, WITHOUT LONG-TERM CURRENT USE OF INSULIN (HCC): Primary | ICD-10-CM

## 2024-11-11 LAB — HBA1C MFR BLD: 8 %

## 2024-11-11 PROCEDURE — 83036 HEMOGLOBIN GLYCOSYLATED A1C: CPT

## 2024-11-11 PROCEDURE — 99214 OFFICE O/P EST MOD 30 MIN: CPT

## 2024-11-11 PROCEDURE — 3078F DIAST BP <80 MM HG: CPT

## 2024-11-11 PROCEDURE — 3074F SYST BP LT 130 MM HG: CPT

## 2024-11-11 PROCEDURE — 3052F HG A1C>EQUAL 8.0%<EQUAL 9.0%: CPT

## 2024-11-11 RX ORDER — ACYCLOVIR 400 MG/1
TABLET ORAL
Qty: 3 EACH | Refills: 2 | Status: SHIPPED | OUTPATIENT
Start: 2024-11-11

## 2024-11-11 RX ORDER — GABAPENTIN 600 MG/1
600 TABLET ORAL 2 TIMES DAILY
Qty: 60 TABLET | Refills: 2 | Status: SHIPPED | OUTPATIENT
Start: 2024-11-11 | End: 2025-02-09

## 2024-11-11 RX ORDER — ACYCLOVIR 400 MG/1
TABLET ORAL
Qty: 1 EACH | Refills: 0 | Status: SHIPPED | OUTPATIENT
Start: 2024-11-11

## 2024-11-11 RX ORDER — FAMOTIDINE 20 MG/1
20 TABLET, FILM COATED ORAL 2 TIMES DAILY
Qty: 180 TABLET | Refills: 3 | Status: SHIPPED | OUTPATIENT
Start: 2024-11-11

## 2024-11-11 ASSESSMENT — PATIENT HEALTH QUESTIONNAIRE - PHQ9
9. THOUGHTS THAT YOU WOULD BE BETTER OFF DEAD, OR OF HURTING YOURSELF: NOT AT ALL
SUM OF ALL RESPONSES TO PHQ9 QUESTIONS 1 & 2: 0
2. FEELING DOWN, DEPRESSED OR HOPELESS: NOT AT ALL
8. MOVING OR SPEAKING SO SLOWLY THAT OTHER PEOPLE COULD HAVE NOTICED. OR THE OPPOSITE, BEING SO FIGETY OR RESTLESS THAT YOU HAVE BEEN MOVING AROUND A LOT MORE THAN USUAL: NOT AT ALL
1. LITTLE INTEREST OR PLEASURE IN DOING THINGS: NOT AT ALL
3. TROUBLE FALLING OR STAYING ASLEEP: NOT AT ALL
SUM OF ALL RESPONSES TO PHQ QUESTIONS 1-9: 0
6. FEELING BAD ABOUT YOURSELF - OR THAT YOU ARE A FAILURE OR HAVE LET YOURSELF OR YOUR FAMILY DOWN: NOT AT ALL
SUM OF ALL RESPONSES TO PHQ QUESTIONS 1-9: 0
4. FEELING TIRED OR HAVING LITTLE ENERGY: NOT AT ALL
7. TROUBLE CONCENTRATING ON THINGS, SUCH AS READING THE NEWSPAPER OR WATCHING TELEVISION: NOT AT ALL
10. IF YOU CHECKED OFF ANY PROBLEMS, HOW DIFFICULT HAVE THESE PROBLEMS MADE IT FOR YOU TO DO YOUR WORK, TAKE CARE OF THINGS AT HOME, OR GET ALONG WITH OTHER PEOPLE: NOT DIFFICULT AT ALL
5. POOR APPETITE OR OVEREATING: NOT AT ALL

## 2024-11-11 ASSESSMENT — ENCOUNTER SYMPTOMS
RESPIRATORY NEGATIVE: 1
GASTROINTESTINAL NEGATIVE: 1

## 2024-11-11 NOTE — PROGRESS NOTES
Tomas La (:  1970) is a 53 y.o. male,Established patient, here for evaluation of the following chief complaint(s):  Follow-up (No questions or concerns /Needs refills )      Subjective   Pt here for routine follow up.     T2DM- Denies polyuria/polydipsia/polyphagia at this time.  Currently taking insulin, lantus, and metformin. Denies SE to medication and is tolerating well.  Patient has continuous glucose monitor and does monitor glucose multiple times a day.  He does report he has been less active lately as he had to stop therapy due to insurance no longer covering it.     Neuropathy- Pt on gabapentin and was increased to 600mg BID during last OV.  He reports this has controlled his pain well and is no longer having breakthrough pain. Denies SE to medication and is tolerating well.      Nausea- Pt was experiencing an increase in nausea during last OV. He was started on a medication by Dr. Deleon but he stopped the medication at that time and the nausea had improved. He endorsed some nausea after eating as well as drinking larger amounts of fluid with meals. He was started on Pepcid Bid as this worked for him in the past and has had significant relief. The nausea has resolved. Denies SE to medication and is tolerating well.               2024     1:41 PM 2024     1:52 PM 2024    11:10 AM 2024    12:21 PM 2023     9:34 AM 2023     4:19 PM 2023     2:10 PM   PHQ Scores   PHQ2 Score 0 0 0 0  0 0   PHQ9 Score 0 4 0 0 0 1 0         I reviewed the medical records and past history for Tomas.    Allergies   Allergen Reactions    Bactrim [Sulfamethoxazole-Trimethoprim] Swelling, Dermatitis and Other (See Comments)     Causes vasculitis    Sulfa Antibiotics Anaphylaxis       Current Outpatient Medications   Medication Sig Dispense Refill    gabapentin (NEURONTIN) 600 MG tablet Take 1 tablet by mouth 2 times daily for 90 days. 60 tablet 2    famotidine (PEPCID) 20 MG tablet

## 2024-11-11 NOTE — PATIENT INSTRUCTIONS
We are committed to providing you the best care possible.    If you receive a survey after visiting one of our offices, please take time to share your experience concerning your physician office visit.  These surveys are confidential and no health information about you is shared.    We are eager to improve for you and continue to give you satisfactory care, we are counting on your feedback to help make that happen.            Welcome to [facility]:    Did you know we now have a faster way for you to move through your appointment? For your convenience, we now have digital registration available. When you schedule your next appointment, you will receive a link via your email as well as a text message that will allow you to complete any paperwork digitally before your appointment.

## 2024-11-12 ENCOUNTER — HOSPITAL ENCOUNTER (OUTPATIENT)
Dept: WOUND CARE | Age: 54
Discharge: HOME | End: 2024-11-12
Attending: NURSE PRACTITIONER
Payer: COMMERCIAL

## 2024-11-12 VITALS
SYSTOLIC BLOOD PRESSURE: 108 MMHG | DIASTOLIC BLOOD PRESSURE: 63 MMHG | RESPIRATION RATE: 18 BRPM | HEART RATE: 66 BPM | TEMPERATURE: 97.1 F

## 2024-11-12 DIAGNOSIS — E11.621 DIABETIC ULCER OF RIGHT MIDFOOT ASSOCIATED WITH TYPE 2 DIABETES MELLITUS, WITH FAT LAYER EXPOSED (HCC): Primary | ICD-10-CM

## 2024-11-12 DIAGNOSIS — L97.412 DIABETIC ULCER OF RIGHT MIDFOOT ASSOCIATED WITH TYPE 2 DIABETES MELLITUS, WITH FAT LAYER EXPOSED (HCC): Primary | ICD-10-CM

## 2024-11-12 DIAGNOSIS — L97.422 DIABETIC ULCER OF LEFT MIDFOOT ASSOCIATED WITH TYPE 2 DIABETES MELLITUS, WITH FAT LAYER EXPOSED (HCC): ICD-10-CM

## 2024-11-12 DIAGNOSIS — E11.621 DIABETIC ULCER OF LEFT MIDFOOT ASSOCIATED WITH TYPE 2 DIABETES MELLITUS, WITH FAT LAYER EXPOSED (HCC): ICD-10-CM

## 2024-11-12 LAB
CHOLEST SERPL-MCNC: 202 MG/DL (ref 0–199)
HDLC SERPL-MCNC: 33 MG/DL (ref 40–60)
LDL CHOLESTEROL: 150 MG/DL
TRIGL SERPL-MCNC: 96 MG/DL (ref 0–150)
VLDLC SERPL CALC-MCNC: 19 MG/DL

## 2024-11-12 PROCEDURE — 15275 SKIN SUB GRAFT FACE/NK/HF/G: CPT

## 2024-11-12 PROCEDURE — 11042 DBRDMT SUBQ TIS 1ST 20SQCM/<: CPT

## 2024-11-12 PROCEDURE — 11042 DBRDMT SUBQ TIS 1ST 20SQCM/<: CPT | Performed by: NURSE PRACTITIONER

## 2024-11-12 PROCEDURE — 15275 SKIN SUB GRAFT FACE/NK/HF/G: CPT | Performed by: NURSE PRACTITIONER

## 2024-11-12 PROCEDURE — 6370000000 HC RX 637 (ALT 250 FOR IP): Performed by: NURSE PRACTITIONER

## 2024-11-12 RX ORDER — LIDOCAINE 50 MG/G
OINTMENT TOPICAL ONCE
OUTPATIENT
Start: 2024-11-12 | End: 2024-11-12

## 2024-11-12 RX ORDER — BETAMETHASONE DIPROPIONATE 0.5 MG/G
CREAM TOPICAL ONCE
OUTPATIENT
Start: 2024-11-12 | End: 2024-11-12

## 2024-11-12 RX ORDER — LIDOCAINE HYDROCHLORIDE 20 MG/ML
JELLY TOPICAL ONCE
OUTPATIENT
Start: 2024-11-12 | End: 2024-11-12

## 2024-11-12 RX ORDER — TRIAMCINOLONE ACETONIDE 1 MG/G
OINTMENT TOPICAL ONCE
OUTPATIENT
Start: 2024-11-12 | End: 2024-11-12

## 2024-11-12 RX ORDER — SILVER SULFADIAZINE 10 MG/G
CREAM TOPICAL ONCE
OUTPATIENT
Start: 2024-11-12 | End: 2024-11-12

## 2024-11-12 RX ORDER — CLOBETASOL PROPIONATE 0.5 MG/G
OINTMENT TOPICAL ONCE
OUTPATIENT
Start: 2024-11-12 | End: 2024-11-12

## 2024-11-12 RX ORDER — LIDOCAINE 40 MG/G
CREAM TOPICAL ONCE
OUTPATIENT
Start: 2024-11-12 | End: 2024-11-12

## 2024-11-12 RX ORDER — SODIUM CHLOR/HYPOCHLOROUS ACID 0.033 %
SOLUTION, IRRIGATION IRRIGATION ONCE
OUTPATIENT
Start: 2024-11-12 | End: 2024-11-12

## 2024-11-12 RX ORDER — CLOBETASOL PROPIONATE 0.5 MG/G
OINTMENT TOPICAL ONCE
Status: COMPLETED | OUTPATIENT
Start: 2024-11-12 | End: 2024-11-12

## 2024-11-12 RX ORDER — MUPIROCIN 20 MG/G
OINTMENT TOPICAL ONCE
OUTPATIENT
Start: 2024-11-12 | End: 2024-11-12

## 2024-11-12 RX ORDER — GENTAMICIN SULFATE 1 MG/G
OINTMENT TOPICAL ONCE
OUTPATIENT
Start: 2024-11-12 | End: 2024-11-12

## 2024-11-12 RX ORDER — LIDOCAINE HYDROCHLORIDE 40 MG/ML
SOLUTION TOPICAL ONCE
OUTPATIENT
Start: 2024-11-12 | End: 2024-11-12

## 2024-11-12 RX ORDER — BACITRACIN ZINC AND POLYMYXIN B SULFATE 500; 1000 [USP'U]/G; [USP'U]/G
OINTMENT TOPICAL ONCE
OUTPATIENT
Start: 2024-11-12 | End: 2024-11-12

## 2024-11-12 RX ORDER — GINSENG 100 MG
CAPSULE ORAL ONCE
OUTPATIENT
Start: 2024-11-12 | End: 2024-11-12

## 2024-11-12 RX ORDER — NEOMYCIN/BACITRACIN/POLYMYXINB 3.5-400-5K
OINTMENT (GRAM) TOPICAL ONCE
OUTPATIENT
Start: 2024-11-12 | End: 2024-11-12

## 2024-11-12 RX ADMIN — CLOBETASOL PROPIONATE: 0.5 OINTMENT TOPICAL at 13:49

## 2024-11-12 NOTE — PROGRESS NOTES
Volume (cm^3) 0.03 cm^3 11/12/24 1310   Wound Healing % 96 11/12/24 1310   Post-Procedure Length (cm) 0.5 cm 11/12/24 1311   Post-Procedure Width (cm) 0.6 cm 11/12/24 1311   Post-Procedure Depth (cm) 0.1 cm 11/12/24 1311   Post-Procedure Surface Area (cm^2) 0.3 cm^2 11/12/24 1311   Post-Procedure Volume (cm^3) 0.03 cm^3 11/12/24 1311   Distance Tunneling (cm) 0 cm 11/12/24 1310   Tunneling Position ___ O'Clock 0 11/12/24 1310   Undermining Starts ___ O'Clock 0 11/12/24 1310   Undermining Ends___ O'Clock 0 11/12/24 1310   Undermining Maxium Distance (cm) 0 11/12/24 1310   Wound Assessment Pink/red;Slough 11/12/24 1310   Drainage Amount Moderate (25-50%) 11/12/24 1310   Drainage Description Brown;Serosanguinous 11/12/24 1310   Odor None 11/12/24 1310   Violeta-wound Assessment Maceration;Hyperkeratosis (callous) 11/12/24 1310   Margins Defined edges 11/12/24 1310   Wound Thickness Description not for Pressure Injury Full thickness 11/12/24 1310   Number of days: 104       Wound 09/24/24 #2 Left Plantar (Active)   Wound Image   11/12/24 1310   Dressing Status Clean;Dry;New dressing applied 11/05/24 1342   Wound Cleansed Soap and water;Wound cleanser;Cleansed with saline 11/12/24 1310   Offloading for Diabetic Foot Ulcers Felt or foam 11/12/24 1310   Wound Length (cm) 0.3 cm 11/12/24 1310   Wound Width (cm) 0.3 cm 11/12/24 1310   Wound Depth (cm) 0.2 cm 11/12/24 1310   Wound Surface Area (cm^2) 0.09 cm^2 11/12/24 1310   Change in Wound Size % (l*w) -800 11/12/24 1310   Wound Volume (cm^3) 0.018 cm^3 11/12/24 1310   Wound Healing % -1700 11/12/24 1310   Post-Procedure Length (cm) 0.3 cm 11/12/24 1311   Post-Procedure Width (cm) 0.3 cm 11/12/24 1311   Post-Procedure Depth (cm) 0.2 cm 11/12/24 1311   Post-Procedure Surface Area (cm^2) 0.09 cm^2 11/12/24 1311   Post-Procedure Volume (cm^3) 0.018 cm^3 11/12/24 1311   Distance Tunneling (cm) 0 cm 11/12/24 1310   Tunneling Position ___ O'Clock 0 11/12/24 1310   Undermining Starts

## 2024-11-12 NOTE — PATIENT INSTRUCTIONS
PHYSICIAN ORDERS AND DISCHARGE INSTRUCTIONS  NOTE: Upon discharge from the Wound Center, you will receive a patient experience survey via E-mail. We would be grateful if you would take the time to fill this survey out.  Wound care order history:   GREGG's   Right       Left    Date    Vascular studies/Intervention: .     Cultures: .               Antibiotics: .Gent and Doxy 7/2/24, 9/17/24              HbA1c:  .               Grafts:  .#1 Epifix 10/1/24, #2 Epifix 10/8/24, #3 Epifix 10/17/24, #4 Epifix 10/22/24, #5 Epifix 10/29/24, #6 Epifix 11/12/24   Juxta Lites & Lymph Pumps:  Continuing wound care orders and information:              Residence: .              Continue home health care with:.    Your wound-care supplies will be provided by: .              Pharmacy: [facility]   Wound cleansing:      Do not scrub or use excessive force.    Wash hands with soap and water before and after dressing changes.    Prior to applying a clean dressing, cleanse wound with normal saline,    wound cleanser, or mild soap and water.     Ask your physician or nurse before getting the wound(s) wet in the shower.  Daily Wound management:    Keep weight off wounds and reposition every 2 hours.    Avoid standing for long periods of time.    Evaluate legs to the level of the heart or above for 30 minutes 4-5 times a day and/or when sitting.       When taking antibiotics take entire prescription as ordered by MD do not stop taking until medicine is all gone.     Documentation:  Compression: .   Offloading: .Forefoot offloader ordered        Orders for this week (11/12/2024):  Bilateral foot - Wash with mild soap and water, rinse with saline, pat dry with 4x4  Left Foot-  Clobetasol to intact skin on foot   Qwick Aperature to periwound of left foot wound  Apply Betdine and stimulen powder to wound bed and cover with ioplex   Cover with agile left foot wound  Wrap with conform and coban up to ankle secure with tape.   Change on

## 2024-11-12 NOTE — WOUND CARE
EpiFix Treatment Note    NAME:  Tomas La  YOB: 1970  MEDICAL RECORD NUMBER:  [id number]  DATE:  11/12/2024    Goal:  Patient will receive safe and proper application of skin substitute.  Patient will comply with caring for dressing, offloading and reporting complications.     Expiration date checked immediately prior to use.  Package intact prior to use and no damage noted.  EpiFix is stored at room temperature.  EpiFix was removed from protective sterile packaging by provider and  applied to prepared ulcer bed.  EpiFix was placed using embossment letting as a guide.  EpiFix was hydrated with sterile normal saline per provider.   EpiFix was applied to Right foot and affixed with steri-strips by the provider.   EpiFix was covered with non-adherent ulcer dressing.   Applied versatel over non-adherent.  Applied dry gauze and/or roll gauze.   Coban or ace wrap was applied to secure graft and decrease edema.   The patient/caregiver was instructed not to remove dressing and to keep it clean and dry.   The patient/family/caregiver was instructed on the need for offloading and elevation of the affected extremity and on using the prescribed offloading device.  The patient/family/caregiver was instructed on signs and symptoms of complication to report, such as draining through dressing, dressing falling down/slipping, getting wet, or severe pain or tingling in toes.     Guidelines followed  EpiFix may only be used every 12 months per wound.    Date of first application of EpiFix for this current wound is October 1, 2024.     Application # 6 out of 10       Electronically signed by Safia Vann RN on 11/12/2024 at 1:26 PM

## 2024-11-19 ENCOUNTER — HOSPITAL ENCOUNTER (OUTPATIENT)
Dept: WOUND CARE | Age: 54
Discharge: HOME | End: 2024-11-19
Attending: NURSE PRACTITIONER
Payer: COMMERCIAL

## 2024-11-19 VITALS — DIASTOLIC BLOOD PRESSURE: 69 MMHG | TEMPERATURE: 97.9 F | SYSTOLIC BLOOD PRESSURE: 112 MMHG | HEART RATE: 76 BPM

## 2024-11-19 DIAGNOSIS — E11.621 DIABETIC ULCER OF LEFT MIDFOOT ASSOCIATED WITH TYPE 2 DIABETES MELLITUS, WITH FAT LAYER EXPOSED (HCC): Primary | ICD-10-CM

## 2024-11-19 DIAGNOSIS — E11.621 DIABETIC ULCER OF RIGHT MIDFOOT ASSOCIATED WITH TYPE 2 DIABETES MELLITUS, WITH FAT LAYER EXPOSED (HCC): ICD-10-CM

## 2024-11-19 DIAGNOSIS — L97.422 DIABETIC ULCER OF LEFT MIDFOOT ASSOCIATED WITH TYPE 2 DIABETES MELLITUS, WITH FAT LAYER EXPOSED (HCC): Primary | ICD-10-CM

## 2024-11-19 DIAGNOSIS — L97.412 DIABETIC ULCER OF RIGHT MIDFOOT ASSOCIATED WITH TYPE 2 DIABETES MELLITUS, WITH FAT LAYER EXPOSED (HCC): ICD-10-CM

## 2024-11-19 PROCEDURE — 11042 DBRDMT SUBQ TIS 1ST 20SQCM/<: CPT | Performed by: NURSE PRACTITIONER

## 2024-11-19 PROCEDURE — 15275 SKIN SUB GRAFT FACE/NK/HF/G: CPT

## 2024-11-19 PROCEDURE — 15275 SKIN SUB GRAFT FACE/NK/HF/G: CPT | Performed by: NURSE PRACTITIONER

## 2024-11-19 PROCEDURE — 11042 DBRDMT SUBQ TIS 1ST 20SQCM/<: CPT

## 2024-11-19 NOTE — WOUND CARE
EpiFix Treatment Note    NAME:  Tomas La  YOB: 1970  MEDICAL RECORD NUMBER:  [id number]  DATE:  11/19/2024    Goal:  Patient will receive safe and proper application of skin substitute.  Patient will comply with caring for dressing, offloading and reporting complications.     Expiration date checked immediately prior to use.  Package intact prior to use and no damage noted.  EpiFix is stored at room temperature.  EpiFix was removed from protective sterile packaging by provider and  applied to prepared ulcer bed.  EpiFix was placed using embossment letting as a guide.  EpiFix was hydrated with sterile normal saline per provider.   EpiFix was applied to right plantar foot and affixed with steri-strips by the provider.   EpiFix was covered with non-adherent ulcer dressing.   Applied versatel over non-adherent.  Applied dry gauze and/or roll gauze.   Coban or ace wrap was applied to secure graft and decrease edema.   The patient/caregiver was instructed not to remove dressing and to keep it clean and dry.   The patient/family/caregiver was instructed on the need for offloading and elevation of the affected extremity and on using the prescribed offloading device.  The patient/family/caregiver was instructed on signs and symptoms of complication to report, such as draining through dressing, dressing falling down/slipping, getting wet, or severe pain or tingling in toes.     Guidelines followed  EpiFix may only be used every 12 months per wound.    Date of first application of EpiFix for this current wound is October 1, 2024.     Application # 7 out of 10       Electronically signed by Safia Vann RN on 11/19/2024 at 1:25 PM

## 2024-11-19 NOTE — PATIENT INSTRUCTIONS
PHYSICIAN ORDERS AND DISCHARGE INSTRUCTIONS  NOTE: Upon discharge from the Wound Center, you will receive a patient experience survey via E-mail. We would be grateful if you would take the time to fill this survey out.  Wound care order history:   GREGG's   Right       Left    Date    Vascular studies/Intervention: .     Cultures: .               Antibiotics: .Gent and Doxy 7/2/24, 9/17/24              HbA1c:  .               Grafts:  .#1 Epifix 10/1/24, #2 Epifix 10/8/24, #3 Epifix 10/17/24, #4 Epifix 10/22/24, #5 Epifix 10/29/24, #6 Epifix 11/12/24. Epifix #7 11/19/24   Juxta Lites & Lymph Pumps:  Continuing wound care orders and information:              Residence: .              Continue home health care with:.    Your wound-care supplies will be provided by: .              Pharmacy: [facility]   Wound cleansing:      Do not scrub or use excessive force.    Wash hands with soap and water before and after dressing changes.    Prior to applying a clean dressing, cleanse wound with normal saline,    wound cleanser, or mild soap and water.     Ask your physician or nurse before getting the wound(s) wet in the shower.  Daily Wound management:    Keep weight off wounds and reposition every 2 hours.    Avoid standing for long periods of time.    Evaluate legs to the level of the heart or above for 30 minutes 4-5 times a day and/or when sitting.       When taking antibiotics take entire prescription as ordered by MD do not stop taking until medicine is all gone.     Documentation:  Compression: .   Offloading: .Forefoot offloader ordered        Orders for this week (11/19/2024):  Bilateral foot - Wash with mild soap and water, rinse with saline, pat dry with 4x4  Left Foot-  Clobetasol to intact skin on foot   Qwick Aperature to periwound of left foot wound  Apply Betdine and stimulen powder to wound bed and cover with ioplex   Cover with agile left foot wound  Wrap with conform and coban up to ankle secure with

## 2024-11-19 NOTE — PROGRESS NOTES
Wound Care Center Progress Note and Bioengineered Skin Application      Tomas La  AGE: 54 y.o.   GENDER: male  : 1970  EPISODE DATE:  2024     Subjective:     Chief Complaint   Patient presents with    Wound Check     Wound check          HISTORY of PRESENT ILLNESS     Tomas La is a 53 y.o. male who presents to the Wound Clinic for a visit for evaluation and treatment of acute diabetic  ulcer(s) of  right . foot medial.  The condition is of moderate severity. The ulcer has been present for several months since march.  The underlying cause is thought to be diabetes.  The patients care to date has included offloading and normal care. The patient has significant underlying medical conditions as below.      **Patient getting epiFix graft #7 today.    2024: Patient will finish grafts to right foot. Continue plan of care for left foot. Getting small and drying out     2024: Patient left foot not getting graft this week. His wounds look good. Will order graft for next week and make a decision on whether we will use or if conservative dressing worked better      2024: Patient did not get his graft in time this week. Will add orders for this week and apply at next appointment     2024: Patient right wound looks ok. Plantar wound to left has opened again and we will add the same treatment as before  Patient only picked up antibiotics today and encouraged him to start ASAP.  Approved for epifix and will plan to apply next week if delivered     2024: Patient looks ok. Will apply for grafts today. Will send antibiotics as well  Concern for lingering infection.   Patient doing PT and is up on his feet a lot      9-: We were able to to finally debride enough callus and macerated tissue to expose the whole wound today. Should help in finally getting this healed as the extent was not visualied previously.  He did have high WBCs on latest lab work. Had been checked for

## 2024-11-26 ENCOUNTER — HOSPITAL ENCOUNTER (OUTPATIENT)
Dept: WOUND CARE | Age: 54
Discharge: HOME | End: 2024-11-26
Attending: NURSE PRACTITIONER
Payer: COMMERCIAL

## 2024-11-26 VITALS
DIASTOLIC BLOOD PRESSURE: 82 MMHG | RESPIRATION RATE: 18 BRPM | TEMPERATURE: 96.3 F | SYSTOLIC BLOOD PRESSURE: 147 MMHG | HEART RATE: 69 BPM

## 2024-11-26 DIAGNOSIS — E11.621 DIABETIC ULCER OF RIGHT MIDFOOT ASSOCIATED WITH TYPE 2 DIABETES MELLITUS, WITH FAT LAYER EXPOSED (HCC): Primary | ICD-10-CM

## 2024-11-26 DIAGNOSIS — L97.412 DIABETIC ULCER OF RIGHT MIDFOOT ASSOCIATED WITH TYPE 2 DIABETES MELLITUS, WITH FAT LAYER EXPOSED (HCC): Primary | ICD-10-CM

## 2024-11-26 DIAGNOSIS — E11.621 DIABETIC ULCER OF LEFT MIDFOOT ASSOCIATED WITH TYPE 2 DIABETES MELLITUS, WITH FAT LAYER EXPOSED (HCC): ICD-10-CM

## 2024-11-26 DIAGNOSIS — L97.422 DIABETIC ULCER OF LEFT MIDFOOT ASSOCIATED WITH TYPE 2 DIABETES MELLITUS, WITH FAT LAYER EXPOSED (HCC): ICD-10-CM

## 2024-11-26 DIAGNOSIS — L89.892 PRESSURE INJURY OF DORSUM OF RIGHT FOOT, STAGE 2 (HCC): ICD-10-CM

## 2024-11-26 PROCEDURE — 6370000000 HC RX 637 (ALT 250 FOR IP): Performed by: NURSE PRACTITIONER

## 2024-11-26 PROCEDURE — 11042 DBRDMT SUBQ TIS 1ST 20SQCM/<: CPT

## 2024-11-26 PROCEDURE — 11042 DBRDMT SUBQ TIS 1ST 20SQCM/<: CPT | Performed by: NURSE PRACTITIONER

## 2024-11-26 RX ORDER — NEOMYCIN/BACITRACIN/POLYMYXINB 3.5-400-5K
OINTMENT (GRAM) TOPICAL ONCE
OUTPATIENT
Start: 2024-11-26 | End: 2024-11-26

## 2024-11-26 RX ORDER — BETAMETHASONE DIPROPIONATE 0.5 MG/G
CREAM TOPICAL ONCE
OUTPATIENT
Start: 2024-11-26 | End: 2024-11-26

## 2024-11-26 RX ORDER — CLOBETASOL PROPIONATE 0.5 MG/G
OINTMENT TOPICAL ONCE
OUTPATIENT
Start: 2024-11-26 | End: 2024-11-26

## 2024-11-26 RX ORDER — GENTAMICIN SULFATE 1 MG/G
OINTMENT TOPICAL ONCE
OUTPATIENT
Start: 2024-11-26 | End: 2024-11-26

## 2024-11-26 RX ORDER — LIDOCAINE 50 MG/G
OINTMENT TOPICAL ONCE
OUTPATIENT
Start: 2024-11-26 | End: 2024-11-26

## 2024-11-26 RX ORDER — LIDOCAINE HYDROCHLORIDE 20 MG/ML
JELLY TOPICAL ONCE
OUTPATIENT
Start: 2024-11-26 | End: 2024-11-26

## 2024-11-26 RX ORDER — GINSENG 100 MG
CAPSULE ORAL ONCE
OUTPATIENT
Start: 2024-11-26 | End: 2024-11-26

## 2024-11-26 RX ORDER — CLOBETASOL PROPIONATE 0.5 MG/G
OINTMENT TOPICAL ONCE
Status: COMPLETED | OUTPATIENT
Start: 2024-11-26 | End: 2024-11-26

## 2024-11-26 RX ORDER — SILVER SULFADIAZINE 10 MG/G
CREAM TOPICAL ONCE
OUTPATIENT
Start: 2024-11-26 | End: 2024-11-26

## 2024-11-26 RX ORDER — LIDOCAINE HYDROCHLORIDE 40 MG/ML
SOLUTION TOPICAL ONCE
OUTPATIENT
Start: 2024-11-26 | End: 2024-11-26

## 2024-11-26 RX ORDER — SODIUM CHLOR/HYPOCHLOROUS ACID 0.033 %
SOLUTION, IRRIGATION IRRIGATION ONCE
OUTPATIENT
Start: 2024-11-26 | End: 2024-11-26

## 2024-11-26 RX ORDER — MUPIROCIN 20 MG/G
OINTMENT TOPICAL ONCE
OUTPATIENT
Start: 2024-11-26 | End: 2024-11-26

## 2024-11-26 RX ORDER — BACITRACIN ZINC AND POLYMYXIN B SULFATE 500; 1000 [USP'U]/G; [USP'U]/G
OINTMENT TOPICAL ONCE
OUTPATIENT
Start: 2024-11-26 | End: 2024-11-26

## 2024-11-26 RX ORDER — TRIAMCINOLONE ACETONIDE 1 MG/G
OINTMENT TOPICAL ONCE
OUTPATIENT
Start: 2024-11-26 | End: 2024-11-26

## 2024-11-26 RX ORDER — LIDOCAINE 40 MG/G
CREAM TOPICAL ONCE
OUTPATIENT
Start: 2024-11-26 | End: 2024-11-26

## 2024-11-26 RX ADMIN — CLOBETASOL PROPIONATE: 0.5 OINTMENT TOPICAL at 13:30

## 2024-11-26 NOTE — PROGRESS NOTES
0 units, 200-249 4 units, 250-299 8 units, 300-349 12 units, >349 16 units Take insulin according to glucose check before sleep:  0 units, >300 4 units 20 mL 5    insulin glargine (BASAGLAR KWIKPEN) 100 UNIT/ML injection pen Inject 10 Units into the skin nightly Increase by 2 units every 2-3 days to keep fasting glucose under 140 5 Adjustable Dose Pre-filled Pen Syringe 3    Continuous Blood Gluc Sensor (DEXCOM G6 SENSOR) MISC Change q 10 days 3 each 11    Continuous Blood Gluc  (DEXCOM G6 ) PATRICIA Use as directed 1 each 1    aspirin 81 MG chewable tablet Take 1 tablet by mouth daily Hold the dose for 7 days before your brain biopsy. 90 tablet 1    blood glucose monitor strips Test 4 times a day & as needed for symptoms of irregular blood glucose. Dispense sufficient amount for indicated testing frequency plus additional to accommodate PRN testing needs. 120 strip 0     No current facility-administered medications on file prior to encounter.       REVIEW OF SYSTEMS    Pertinent items are noted in HPI.    Constitutional: Negative for systemic symptoms including fever, chills and malaise.      Objective:      BP (!) 147/82   Pulse 69   Temp (!) 96.3 °F (35.7 °C) (Temporal)   Resp 18     PHYSICAL EXAM      General: The patient is in no acute distress.    Mental status:  Patient is appropriate, is  oriented to place and plan of care.  Dermatologic exam: Visual inspection of the periwound reveals the skin to be normal in turgor and texture and dry  Wound exam: see wound description below in procedure note      Assessment:     Problem List Items Addressed This Visit          Endocrine    WD-Diabetic ulcer of left midfoot associated with type 2 diabetes mellitus, with fat layer exposed (HCC)    WD-Diabetic ulcer of right foot associated with type 2 diabetes mellitus, with fat layer exposed (HCC) - Primary       Other    WD-Pressure injury of dorsum of right foot, stage 2 (HCC)     Procedure

## 2024-11-26 NOTE — PATIENT INSTRUCTIONS
PHYSICIAN ORDERS AND DISCHARGE INSTRUCTIONS  NOTE: Upon discharge from the Wound Center, you will receive a patient experience survey via E-mail. We would be grateful if you would take the time to fill this survey out.  Wound care order history:   GREGG's   Right       Left    Date    Vascular studies/Intervention: .     Cultures: .               Antibiotics: .Gent and Doxy 7/2/24, 9/17/24              HbA1c:  .               Grafts:  .#1 Epifix 10/1/24, #2 Epifix 10/8/24, #3 Epifix 10/17/24, #4 Epifix 10/22/24, #5 Epifix 10/29/24,   Juxta Lites & Lymph Pumps:  Continuing wound care orders and information:              Residence: .              Continue home health care with:.    Your wound-care supplies will be provided by: .              Pharmacy: [facility]   Wound cleansing:      Do not scrub or use excessive force.    Wash hands with soap and water before and after dressing changes.    Prior to applying a clean dressing, cleanse wound with normal saline,    wound cleanser, or mild soap and water.     Ask your physician or nurse before getting the wound(s) wet in the shower.  Daily Wound management:    Keep weight off wounds and reposition every 2 hours.    Avoid standing for long periods of time.    Evaluate legs to the level of the heart or above for 30 minutes 4-5 times a day and/or when sitting.       When taking antibiotics take entire prescription as ordered by MD do not stop taking until medicine is all gone.     Documentation:  Compression: .   Offloading: .Forefoot offloader ordered        Orders for this week (11/26/2024):  Bilateral foot - Wash with mild soap and water, rinse with saline, pat dry with 4x4  Clobetasol to intact skin on foot   Qwick Aperature to periwound of left and right feet wounds  Apply Betdine and stimulen powder to wound bed and cover with ioplex , on dorsal foot wound do not use ioplex use optifoam border   Cover with agile both left and right feet wounds  Wrap with conform

## 2024-12-03 ENCOUNTER — OFFICE VISIT (OUTPATIENT)
Age: 54
End: 2024-12-03
Payer: COMMERCIAL

## 2024-12-03 ENCOUNTER — TELEPHONE (OUTPATIENT)
Age: 54
End: 2024-12-03

## 2024-12-03 ENCOUNTER — HOSPITAL ENCOUNTER (OUTPATIENT)
Dept: WOUND CARE | Age: 54
Discharge: HOME | End: 2024-12-03
Attending: NURSE PRACTITIONER
Payer: COMMERCIAL

## 2024-12-03 VITALS
OXYGEN SATURATION: 97 % | TEMPERATURE: 98.7 F | HEART RATE: 98 BPM | DIASTOLIC BLOOD PRESSURE: 82 MMHG | SYSTOLIC BLOOD PRESSURE: 134 MMHG

## 2024-12-03 VITALS
HEART RATE: 98 BPM | DIASTOLIC BLOOD PRESSURE: 94 MMHG | OXYGEN SATURATION: 97 % | SYSTOLIC BLOOD PRESSURE: 134 MMHG | TEMPERATURE: 97.8 F

## 2024-12-03 DIAGNOSIS — R09.82 POSTNASAL DRIP: ICD-10-CM

## 2024-12-03 DIAGNOSIS — M79.672 LEFT FOOT PAIN: ICD-10-CM

## 2024-12-03 DIAGNOSIS — E11.621 DIABETIC ULCER OF RIGHT MIDFOOT ASSOCIATED WITH TYPE 2 DIABETES MELLITUS, WITH FAT LAYER EXPOSED (HCC): Primary | ICD-10-CM

## 2024-12-03 DIAGNOSIS — R05.3 CHRONIC COUGH: ICD-10-CM

## 2024-12-03 DIAGNOSIS — G62.9 NEUROPATHY: Primary | ICD-10-CM

## 2024-12-03 DIAGNOSIS — L97.412 DIABETIC ULCER OF RIGHT MIDFOOT ASSOCIATED WITH TYPE 2 DIABETES MELLITUS, WITH FAT LAYER EXPOSED (HCC): Primary | ICD-10-CM

## 2024-12-03 PROCEDURE — 99214 OFFICE O/P EST MOD 30 MIN: CPT | Performed by: PHYSICIAN ASSISTANT

## 2024-12-03 PROCEDURE — 99213 OFFICE O/P EST LOW 20 MIN: CPT

## 2024-12-03 PROCEDURE — 3079F DIAST BP 80-89 MM HG: CPT | Performed by: PHYSICIAN ASSISTANT

## 2024-12-03 PROCEDURE — 3075F SYST BP GE 130 - 139MM HG: CPT | Performed by: PHYSICIAN ASSISTANT

## 2024-12-03 RX ORDER — ALBUTEROL SULFATE 90 UG/1
2 INHALANT RESPIRATORY (INHALATION) EVERY 4 HOURS PRN
Qty: 18 G | Refills: 0 | Status: SHIPPED | OUTPATIENT
Start: 2024-12-03

## 2024-12-03 RX ORDER — FLUTICASONE PROPIONATE 50 MCG
2 SPRAY, SUSPENSION (ML) NASAL DAILY
Qty: 16 G | Refills: 0 | Status: SHIPPED | OUTPATIENT
Start: 2024-12-03

## 2024-12-03 ASSESSMENT — ENCOUNTER SYMPTOMS
CONSTIPATION: 0
WHEEZING: 0
PHOTOPHOBIA: 0
NAUSEA: 0
VOMITING: 0
COUGH: 1
EYE DISCHARGE: 0
COLOR CHANGE: 0
DIARRHEA: 0
BLOOD IN STOOL: 0
BACK PAIN: 0
EYE REDNESS: 0
ABDOMINAL PAIN: 0
CHEST TIGHTNESS: 0
SHORTNESS OF BREATH: 1
EYE PAIN: 0
SORE THROAT: 1
RHINORRHEA: 0

## 2024-12-03 ASSESSMENT — PAIN DESCRIPTION - FREQUENCY: FREQUENCY: INTERMITTENT

## 2024-12-03 ASSESSMENT — PAIN SCALES - GENERAL: PAINLEVEL_OUTOF10: 10

## 2024-12-03 ASSESSMENT — PAIN DESCRIPTION - ORIENTATION: ORIENTATION: LEFT

## 2024-12-03 ASSESSMENT — PAIN DESCRIPTION - PAIN TYPE: TYPE: CHRONIC PAIN

## 2024-12-03 ASSESSMENT — PAIN DESCRIPTION - LOCATION: LOCATION: FOOT

## 2024-12-03 ASSESSMENT — PAIN DESCRIPTION - DESCRIPTORS: DESCRIPTORS: SHOOTING;STABBING

## 2024-12-03 NOTE — WOUND CARE
Patient arrived for nurse visit. Old dressings were removed and wounds were cleansed as documented in flowsheet. Wound care was performed per provider orders. Patient tolerated well. Patient had no further questions or concerns. Informed of follow up care, and verbalized understanding.     Electronically signed by Safia Vann RN on 12/3/2024 at 1:36 PM

## 2024-12-03 NOTE — PROGRESS NOTES
Tomas La (:  1970) is a 54 y.o. male,Established patient, here for evaluation of the following chief complaint(s):    No chief complaint on file.        ASSESSMENT/PLAN:  Assessment & Plan  1. Neuropathy.  They report worsening neuropathy with stabbing pain localized to the bottom of their left foot, which has been persistent for the past 3-4 days. They have a history of chronic neuropathy and diabetes, with recent poor control of their diabetes as indicated by an elevated A1c. They are currently on gabapentin, which was recently increased to 600 mg twice a day. An EMG will be ordered to further evaluate the neuropathy. They are advised to consult with their primary care physician regarding the adjustment of their gabapentin dosage and to maintain strict control over their diabetes.    2. Shortness of breath.  They report intermittent shortness of breath and wheezing over the past several months, which worsens with activity. Their oxygen levels are satisfactory, and their lungs sound clear on examination. Their symptoms may be due to postnasal drainage. Flonase nasal spray, two sprays in each nostril daily, and an albuterol inhaler, two puffs as needed, will be prescribed. They are advised to perform warm salt water gargles twice daily, nasal saline irrigation, and to clean their nose thoroughly before using Flonase. They should continue taking daily antihistamines. A pulmonary function test will be ordered to evaluate for asthma or COPD. They are also advised to use a mouthguard at night and to consider purchasing a new mask for their CPAP machine.      1. Neuropathy  -     Jani Mota MD, Physical Medicine, [place]  2. Left foot pain  -     Jani Mota MD, Physical Medicine, [place]  3. Chronic cough  -     albuterol sulfate HFA (VENTOLIN HFA) 108 (90 Base) MCG/ACT inhaler; Inhale 2 puffs into the lungs every 4 hours as needed for Wheezing or Shortness of Breath

## 2024-12-10 ENCOUNTER — HOSPITAL ENCOUNTER (OUTPATIENT)
Dept: WOUND CARE | Age: 54
Discharge: HOME | End: 2024-12-10
Attending: NURSE PRACTITIONER
Payer: COMMERCIAL

## 2024-12-10 VITALS
DIASTOLIC BLOOD PRESSURE: 86 MMHG | SYSTOLIC BLOOD PRESSURE: 143 MMHG | TEMPERATURE: 97.5 F | HEART RATE: 73 BPM | RESPIRATION RATE: 18 BRPM

## 2024-12-10 DIAGNOSIS — L97.412 DIABETIC ULCER OF RIGHT MIDFOOT ASSOCIATED WITH TYPE 2 DIABETES MELLITUS, WITH FAT LAYER EXPOSED (HCC): Primary | ICD-10-CM

## 2024-12-10 DIAGNOSIS — E11.621 DIABETIC ULCER OF RIGHT MIDFOOT ASSOCIATED WITH TYPE 2 DIABETES MELLITUS, WITH FAT LAYER EXPOSED (HCC): Primary | ICD-10-CM

## 2024-12-10 PROCEDURE — 15275 SKIN SUB GRAFT FACE/NK/HF/G: CPT

## 2024-12-10 RX ORDER — LIDOCAINE HYDROCHLORIDE 20 MG/ML
JELLY TOPICAL ONCE
OUTPATIENT
Start: 2024-12-10 | End: 2024-12-10

## 2024-12-10 RX ORDER — LIDOCAINE 50 MG/G
OINTMENT TOPICAL ONCE
OUTPATIENT
Start: 2024-12-10 | End: 2024-12-10

## 2024-12-10 RX ORDER — BACITRACIN ZINC AND POLYMYXIN B SULFATE 500; 1000 [USP'U]/G; [USP'U]/G
OINTMENT TOPICAL ONCE
OUTPATIENT
Start: 2024-12-10 | End: 2024-12-10

## 2024-12-10 RX ORDER — SILVER SULFADIAZINE 10 MG/G
CREAM TOPICAL ONCE
OUTPATIENT
Start: 2024-12-10 | End: 2024-12-10

## 2024-12-10 RX ORDER — SODIUM CHLOR/HYPOCHLOROUS ACID 0.033 %
SOLUTION, IRRIGATION IRRIGATION ONCE
OUTPATIENT
Start: 2024-12-10 | End: 2024-12-10

## 2024-12-10 RX ORDER — LIDOCAINE HYDROCHLORIDE 40 MG/ML
SOLUTION TOPICAL ONCE
OUTPATIENT
Start: 2024-12-10 | End: 2024-12-10

## 2024-12-10 RX ORDER — GENTAMICIN SULFATE 1 MG/G
OINTMENT TOPICAL ONCE
OUTPATIENT
Start: 2024-12-10 | End: 2024-12-10

## 2024-12-10 RX ORDER — LIDOCAINE 40 MG/G
CREAM TOPICAL ONCE
OUTPATIENT
Start: 2024-12-10 | End: 2024-12-10

## 2024-12-10 RX ORDER — NEOMYCIN/BACITRACIN/POLYMYXINB 3.5-400-5K
OINTMENT (GRAM) TOPICAL ONCE
OUTPATIENT
Start: 2024-12-10 | End: 2024-12-10

## 2024-12-10 RX ORDER — MUPIROCIN 20 MG/G
OINTMENT TOPICAL ONCE
OUTPATIENT
Start: 2024-12-10 | End: 2024-12-10

## 2024-12-10 RX ORDER — CLOBETASOL PROPIONATE 0.5 MG/G
OINTMENT TOPICAL ONCE
OUTPATIENT
Start: 2024-12-10 | End: 2024-12-10

## 2024-12-10 RX ORDER — TRIAMCINOLONE ACETONIDE 1 MG/G
OINTMENT TOPICAL ONCE
OUTPATIENT
Start: 2024-12-10 | End: 2024-12-10

## 2024-12-10 RX ORDER — GINSENG 100 MG
CAPSULE ORAL ONCE
OUTPATIENT
Start: 2024-12-10 | End: 2024-12-10

## 2024-12-10 RX ORDER — BETAMETHASONE DIPROPIONATE 0.5 MG/G
CREAM TOPICAL ONCE
OUTPATIENT
Start: 2024-12-10 | End: 2024-12-10

## 2024-12-10 NOTE — PATIENT INSTRUCTIONS
Change on Tuesday at [facility]     Right Foot  Apply Clobetasol to intact skin  Apply Epifix graft #8  Cover with versatel and Agile   Cover with Qwick cut in half with aperture   Secure with steri strips   Pad proximal dorsal foot with gentac   Wrap with conform and coban up to ankle and secure with tape   Change on Tuesday at [facility]    Please dispense 30 day quantity when sending supplies     Follow up OWEN Wilkerson in 1 weeks in the wound care center  Call [phone] for any questions or concerns.

## 2024-12-10 NOTE — WOUND CARE
EpiFix Treatment Note    NAME:  Tomas La  YOB: 1970  MEDICAL RECORD NUMBER:  [id number]  DATE:  12/10/2024    Goal:  Patient will receive safe and proper application of skin substitute.  Patient will comply with caring for dressing, offloading and reporting complications.     Expiration date checked immediately prior to use.  Package intact prior to use and no damage noted.  EpiFix is stored at room temperature.  EpiFix was removed from protective sterile packaging by provider and  applied to prepared ulcer bed.  EpiFix was placed using embossment letting as a guide.  EpiFix was hydrated with sterile normal saline per provider.   EpiFix was applied to Right lateral foot and affixed with steri-strips by the provider.   EpiFix was covered with non-adherent ulcer dressing.   Applied versatel over non-adherent.  Applied dry gauze and/or roll gauze.   Coban or ace wrap was applied to secure graft and decrease edema.   The patient/caregiver was instructed not to remove dressing and to keep it clean and dry.   The patient/family/caregiver was instructed on the need for offloading and elevation of the affected extremity and on using the prescribed offloading device.  The patient/family/caregiver was instructed on signs and symptoms of complication to report, such as draining through dressing, dressing falling down/slipping, getting wet, or severe pain or tingling in toes.     Guidelines followed  EpiFix may only be used every 12 months per wound.    Date of first application of EpiFix for this current wound is October 01, 2024.     Application # 8 out of 10       Electronically signed by Safia Vann RN on 12/10/2024 at 1:34 PM

## 2024-12-12 ENCOUNTER — OFFICE VISIT (OUTPATIENT)
Dept: NEUROLOGY | Age: 54
End: 2024-12-12
Payer: COMMERCIAL

## 2024-12-12 ENCOUNTER — HOSPITAL ENCOUNTER (OUTPATIENT)
Age: 54
Discharge: HOME | End: 2024-12-12
Payer: COMMERCIAL

## 2024-12-12 VITALS
SYSTOLIC BLOOD PRESSURE: 132 MMHG | HEIGHT: 72 IN | DIASTOLIC BLOOD PRESSURE: 78 MMHG | OXYGEN SATURATION: 98 % | WEIGHT: 250.6 LBS | BODY MASS INDEX: 33.94 KG/M2 | HEART RATE: 76 BPM

## 2024-12-12 DIAGNOSIS — G62.9 NEUROPATHY: ICD-10-CM

## 2024-12-12 DIAGNOSIS — R25.2 SPASTICITY: ICD-10-CM

## 2024-12-12 DIAGNOSIS — G81.91 RIGHT HEMIPARESIS (HCC): ICD-10-CM

## 2024-12-12 DIAGNOSIS — I77.6 VASCULITIS (HCC): ICD-10-CM

## 2024-12-12 DIAGNOSIS — G62.9 NEUROPATHY: Primary | ICD-10-CM

## 2024-12-12 LAB
VDRL CSF QL: NON REACTIVE
VDRL CSF-TITR: NORMAL {TITER}

## 2024-12-12 PROCEDURE — 3078F DIAST BP <80 MM HG: CPT | Performed by: NURSE PRACTITIONER

## 2024-12-12 PROCEDURE — 82746 ASSAY OF FOLIC ACID SERUM: CPT

## 2024-12-12 PROCEDURE — 36415 COLL VENOUS BLD VENIPUNCTURE: CPT

## 2024-12-12 PROCEDURE — 99215 OFFICE O/P EST HI 40 MIN: CPT | Performed by: NURSE PRACTITIONER

## 2024-12-12 PROCEDURE — 3075F SYST BP GE 130 - 139MM HG: CPT | Performed by: NURSE PRACTITIONER

## 2024-12-12 PROCEDURE — 82607 VITAMIN B-12: CPT

## 2024-12-12 RX ORDER — PREGABALIN 100 MG/1
100 CAPSULE ORAL 3 TIMES DAILY
Qty: 270 CAPSULE | Refills: 1 | Status: SHIPPED | OUTPATIENT
Start: 2024-12-12 | End: 2025-06-12

## 2024-12-12 RX ORDER — PREGABALIN 100 MG/1
100 CAPSULE ORAL 3 TIMES DAILY
Qty: 180 CAPSULE | Refills: 1 | Status: SHIPPED | OUTPATIENT
Start: 2024-12-12 | End: 2024-12-12

## 2024-12-12 NOTE — PROGRESS NOTES
glucose monitor strips Test 4 times a day & as needed for symptoms of irregular blood glucose. Dispense sufficient amount for indicated testing frequency plus additional to accommodate PRN testing needs. 120 strip 0    dicyclomine (BENTYL) 10 MG capsule Take 1 capsule by mouth 2 times daily (before meals) 120 capsule 0     No current facility-administered medications for this visit.     /78 (Site: Left Upper Arm, Position: Sitting, Cuff Size: Medium Adult)   Pulse 76   Ht 1.829 m (6')   Wt 113.7 kg (250 lb 9.6 oz)   SpO2 98%   BMI 33.99 kg/m²   Physical Exam:  Mental Status: A&O to self, location, month and year, NAD, speech clear, language fluent, repetition and naming intact, follows commands appropriately     Cranial Nerve Exam:   CN II-XII: PERRL, VFF, no nystagmus, no gaze paresis, sensation V1-V3 intact b/l, muscles of facial expression symmetric; hearing intact to conversational tone, palate elevates symmetrically, shoulder elevation symmetric and tongue protrudes midline with movement side to side.     Motor Exam:       Strength 5/5 UE/LE left, 4/5UE/LE right  Tone and bulk normal   No pronator drift     Deep Tendon Reflexes: 1/4 biceps, triceps, brachioradialis, patellar, and achilles b/l; flexor plantar responses b/l     Sensation: Intact light touch/pinprick/vibration UE's/LE's b/l, all decreased in stocking distribution      Coordination/Cerebellum:       Tremors--none      Rapidly alternating movements: very mild RUE       Finger-to-Nose: no dysmetria b/l     Gait and stance:      Gait: Hemiplegic  Assessment and Plan     Diagnosis Orders   1. Neuropathy  pregabalin (LYRICA) 100 MG capsule    Vitamin B12 & Folate      2. Right hemiparesis (HCC)        3. Vasculitis (HCC)        4. Spasticity  External Referral To Neurology      Tomas was seen in neurological follow up in regards to neuropathy.   -Will change his gabapentin 600 mg 3 times daily to Lyrica 100 mg 3 times daily for neuropathic

## 2024-12-16 ENCOUNTER — TELEPHONE (OUTPATIENT)
Age: 54
End: 2024-12-16

## 2024-12-16 NOTE — TELEPHONE ENCOUNTER
Received call from patient mother Anusha with patient in background answering questions.      Patient was taken to [facility] walk in last week  for cough / congestion.   Patient was given Flonase and albuterol inhaler.   Patient continues to have wheezing and shortness of breath with exertion / coughing episodes.   Has been bringing up thick clear mucus and has vomiting when coughing hard.   No fever, no vomiting/ no diarrhea / no sore throat .   Please advise

## 2024-12-16 NOTE — TELEPHONE ENCOUNTER
Spoke with pt. Advised of message from PCP. Pt. Verbalized understanding. No further action at this time.    no

## 2024-12-17 ENCOUNTER — HOSPITAL ENCOUNTER (OUTPATIENT)
Dept: GENERAL RADIOLOGY | Age: 54
Discharge: HOME | End: 2024-12-17
Payer: COMMERCIAL

## 2024-12-17 ENCOUNTER — HOSPITAL ENCOUNTER (OUTPATIENT)
Dept: WOUND CARE | Age: 54
Discharge: HOME | End: 2024-12-17
Attending: NURSE PRACTITIONER
Payer: COMMERCIAL

## 2024-12-17 ENCOUNTER — HOSPITAL ENCOUNTER (OUTPATIENT)
Age: 54
Discharge: HOME | End: 2024-12-17
Payer: COMMERCIAL

## 2024-12-17 ENCOUNTER — OFFICE VISIT (OUTPATIENT)
Age: 54
End: 2024-12-17
Payer: COMMERCIAL

## 2024-12-17 VITALS
RESPIRATION RATE: 18 BRPM | SYSTOLIC BLOOD PRESSURE: 131 MMHG | DIASTOLIC BLOOD PRESSURE: 83 MMHG | HEART RATE: 78 BPM | TEMPERATURE: 97.8 F

## 2024-12-17 VITALS
DIASTOLIC BLOOD PRESSURE: 86 MMHG | SYSTOLIC BLOOD PRESSURE: 140 MMHG | TEMPERATURE: 98.8 F | OXYGEN SATURATION: 97 % | HEART RATE: 81 BPM

## 2024-12-17 DIAGNOSIS — E11.42 TYPE 2 DIABETES MELLITUS WITH DIABETIC POLYNEUROPATHY, WITHOUT LONG-TERM CURRENT USE OF INSULIN (HCC): ICD-10-CM

## 2024-12-17 DIAGNOSIS — L97.422 DIABETIC ULCER OF LEFT MIDFOOT ASSOCIATED WITH TYPE 2 DIABETES MELLITUS, WITH FAT LAYER EXPOSED (HCC): ICD-10-CM

## 2024-12-17 DIAGNOSIS — R05.3 CHRONIC COUGH: ICD-10-CM

## 2024-12-17 DIAGNOSIS — R05.3 CHRONIC COUGH: Primary | ICD-10-CM

## 2024-12-17 DIAGNOSIS — E11.621 DIABETIC ULCER OF LEFT MIDFOOT ASSOCIATED WITH TYPE 2 DIABETES MELLITUS, WITH FAT LAYER EXPOSED (HCC): ICD-10-CM

## 2024-12-17 DIAGNOSIS — R05.3 PERSISTENT COUGH: ICD-10-CM

## 2024-12-17 DIAGNOSIS — L97.412 DIABETIC ULCER OF RIGHT MIDFOOT ASSOCIATED WITH TYPE 2 DIABETES MELLITUS, WITH FAT LAYER EXPOSED (HCC): Primary | ICD-10-CM

## 2024-12-17 DIAGNOSIS — E11.621 DIABETIC ULCER OF RIGHT MIDFOOT ASSOCIATED WITH TYPE 2 DIABETES MELLITUS, WITH FAT LAYER EXPOSED (HCC): Primary | ICD-10-CM

## 2024-12-17 LAB
FOLATE SERPL-MCNC: 7.1 NG/ML (ref 4.8–24.2)
VIT B12 SERPL-MCNC: 500 PG/ML (ref 211–911)

## 2024-12-17 PROCEDURE — 99213 OFFICE O/P EST LOW 20 MIN: CPT | Performed by: NURSE PRACTITIONER

## 2024-12-17 PROCEDURE — 99214 OFFICE O/P EST MOD 30 MIN: CPT | Performed by: PHYSICIAN ASSISTANT

## 2024-12-17 PROCEDURE — 97597 DBRDMT OPN WND 1ST 20 CM/<: CPT

## 2024-12-17 PROCEDURE — 97597 DBRDMT OPN WND 1ST 20 CM/<: CPT | Performed by: NURSE PRACTITIONER

## 2024-12-17 PROCEDURE — 94640 AIRWAY INHALATION TREATMENT: CPT | Performed by: PHYSICIAN ASSISTANT

## 2024-12-17 PROCEDURE — 15275 SKIN SUB GRAFT FACE/NK/HF/G: CPT

## 2024-12-17 PROCEDURE — 71046 X-RAY EXAM CHEST 2 VIEWS: CPT

## 2024-12-17 PROCEDURE — 15275 SKIN SUB GRAFT FACE/NK/HF/G: CPT | Performed by: NURSE PRACTITIONER

## 2024-12-17 PROCEDURE — 6370000000 HC RX 637 (ALT 250 FOR IP): Performed by: NURSE PRACTITIONER

## 2024-12-17 PROCEDURE — 3077F SYST BP >= 140 MM HG: CPT | Performed by: PHYSICIAN ASSISTANT

## 2024-12-17 PROCEDURE — 3079F DIAST BP 80-89 MM HG: CPT | Performed by: PHYSICIAN ASSISTANT

## 2024-12-17 PROCEDURE — 11042 DBRDMT SUBQ TIS 1ST 20SQCM/<: CPT

## 2024-12-17 RX ORDER — IPRATROPIUM BROMIDE AND ALBUTEROL SULFATE 2.5; .5 MG/3ML; MG/3ML
1 SOLUTION RESPIRATORY (INHALATION) ONCE
Status: COMPLETED | OUTPATIENT
Start: 2024-12-17 | End: 2024-12-17

## 2024-12-17 RX ORDER — NEOMYCIN/BACITRACIN/POLYMYXINB 3.5-400-5K
OINTMENT (GRAM) TOPICAL ONCE
OUTPATIENT
Start: 2024-12-17 | End: 2024-12-17

## 2024-12-17 RX ORDER — CLOBETASOL PROPIONATE 0.5 MG/G
OINTMENT TOPICAL ONCE
OUTPATIENT
Start: 2024-12-17 | End: 2024-12-17

## 2024-12-17 RX ORDER — LIDOCAINE 40 MG/G
CREAM TOPICAL ONCE
OUTPATIENT
Start: 2024-12-17 | End: 2024-12-17

## 2024-12-17 RX ORDER — GINSENG 100 MG
CAPSULE ORAL ONCE
OUTPATIENT
Start: 2024-12-17 | End: 2024-12-17

## 2024-12-17 RX ORDER — BENZONATATE 200 MG/1
200 CAPSULE ORAL 3 TIMES DAILY PRN
Qty: 30 CAPSULE | Refills: 0 | Status: CANCELLED | OUTPATIENT
Start: 2024-12-17 | End: 2024-12-24

## 2024-12-17 RX ORDER — LIDOCAINE HYDROCHLORIDE 40 MG/ML
SOLUTION TOPICAL ONCE
OUTPATIENT
Start: 2024-12-17 | End: 2024-12-17

## 2024-12-17 RX ORDER — CLOBETASOL PROPIONATE 0.5 MG/G
OINTMENT TOPICAL ONCE
Status: COMPLETED | OUTPATIENT
Start: 2024-12-17 | End: 2024-12-17

## 2024-12-17 RX ORDER — LIDOCAINE HYDROCHLORIDE 20 MG/ML
JELLY TOPICAL ONCE
OUTPATIENT
Start: 2024-12-17 | End: 2024-12-17

## 2024-12-17 RX ORDER — DEXTROMETHORPHAN HYDROBROMIDE AND PROMETHAZINE HYDROCHLORIDE 15; 6.25 MG/5ML; MG/5ML
5 SYRUP ORAL NIGHTLY PRN
Qty: 118 ML | Refills: 0 | Status: SHIPPED | OUTPATIENT
Start: 2024-12-17 | End: 2024-12-24

## 2024-12-17 RX ORDER — BACITRACIN ZINC AND POLYMYXIN B SULFATE 500; 1000 [USP'U]/G; [USP'U]/G
OINTMENT TOPICAL ONCE
OUTPATIENT
Start: 2024-12-17 | End: 2024-12-17

## 2024-12-17 RX ORDER — MUPIROCIN 20 MG/G
OINTMENT TOPICAL ONCE
OUTPATIENT
Start: 2024-12-17 | End: 2024-12-17

## 2024-12-17 RX ORDER — BENZONATATE 100 MG/1
100 CAPSULE ORAL 3 TIMES DAILY PRN
Qty: 42 CAPSULE | Refills: 0 | Status: SHIPPED | OUTPATIENT
Start: 2024-12-17 | End: 2024-12-31

## 2024-12-17 RX ORDER — GENTAMICIN SULFATE 1 MG/G
OINTMENT TOPICAL ONCE
OUTPATIENT
Start: 2024-12-17 | End: 2024-12-17

## 2024-12-17 RX ORDER — SODIUM CHLOR/HYPOCHLOROUS ACID 0.033 %
SOLUTION, IRRIGATION IRRIGATION ONCE
OUTPATIENT
Start: 2024-12-17 | End: 2024-12-17

## 2024-12-17 RX ORDER — BETAMETHASONE DIPROPIONATE 0.5 MG/G
CREAM TOPICAL ONCE
OUTPATIENT
Start: 2024-12-17 | End: 2024-12-17

## 2024-12-17 RX ORDER — TRIAMCINOLONE ACETONIDE 1 MG/G
OINTMENT TOPICAL ONCE
OUTPATIENT
Start: 2024-12-17 | End: 2024-12-17

## 2024-12-17 RX ORDER — LIDOCAINE 50 MG/G
OINTMENT TOPICAL ONCE
OUTPATIENT
Start: 2024-12-17 | End: 2024-12-17

## 2024-12-17 RX ORDER — SILVER SULFADIAZINE 10 MG/G
CREAM TOPICAL ONCE
OUTPATIENT
Start: 2024-12-17 | End: 2024-12-17

## 2024-12-17 RX ORDER — AZITHROMYCIN 250 MG/1
TABLET, FILM COATED ORAL
Qty: 6 TABLET | Refills: 0 | Status: SHIPPED | OUTPATIENT
Start: 2024-12-17 | End: 2024-12-27

## 2024-12-17 RX ADMIN — CLOBETASOL PROPIONATE: 0.5 OINTMENT TOPICAL at 13:44

## 2024-12-17 RX ADMIN — IPRATROPIUM BROMIDE AND ALBUTEROL SULFATE 1 DOSE: 2.5; .5 SOLUTION RESPIRATORY (INHALATION) at 15:01

## 2024-12-17 ASSESSMENT — ENCOUNTER SYMPTOMS
BLOOD IN STOOL: 0
CONSTIPATION: 0
CHEST TIGHTNESS: 1
DIARRHEA: 0
RHINORRHEA: 0
NAUSEA: 0
EYE DISCHARGE: 0
SORE THROAT: 0
ABDOMINAL PAIN: 0
COLOR CHANGE: 0
WHEEZING: 0
BACK PAIN: 0
EYE PAIN: 0
EYE REDNESS: 0
VOMITING: 0
PHOTOPHOBIA: 0
SHORTNESS OF BREATH: 1
COUGH: 1

## 2024-12-17 NOTE — PROGRESS NOTES
Wound Care Center Progress Visit      Tomas La  AGE: 54 y.o.   GENDER: male  : 1970  EPISODE DATE:  2024   Referred by: Ruma Osman FNP     Subjective:     CHIEF COMPLAINT  WOUND   Problem List Items Addressed This Visit          Endocrine    Type 2 diabetes mellitus with diabetic polyneuropathy, without long-term current use of insulin (HCC)    WD-Diabetic ulcer of left midfoot associated with type 2 diabetes mellitus, with fat layer exposed (HCC)    WD-Diabetic ulcer of right foot associated with type 2 diabetes mellitus, with fat layer exposed (HCC) - Primary    Relevant Orders    Initiate Outpatient Wound Care Protocol       Other    Persistent cough     Chief Complaint   Patient presents with    Wound Check        HISTORY of PRESENT ILLNESS      Tomas La is a 54 y.o. male who presents to the Wound Clinic for evaluation and treatment of Chronic diabetic  ulcer(s) of  right foot.  The condition is of moderate severity. The ulcers have been present for several months and has been treated at the wound clinic since 24 which was the initial visit to the wound clinic.  The underlying cause is thought to be diabetes.  Advanced wound care modalities established with initiation of care at the wound clinic.The patient has significant underlying medical conditions as below. Ruma Osman FNP .    Living Situation  [x] Home [] SNF []  Assisted living  [] Other (ie rehab, etc.) [] Home Health  []  Transportation    Wound Pain Timing/Severity: none  Quality of pain: N/A  Severity of pain:  0 / 10   Modifying Factors: diabetes, chronic pressure, shear force, and obesity  Associated Signs/Symptoms: drainage    Wound status:  2024     SIGNIFICANT, SEPARATELY IDENTIFIABLE EVALUATION AND MANAGEMENT SERVICE BY THE SAME PROVIDER ON THE SAME DAY OF THE PROCEDURE OR OTHER SERVICE:    Diagnosis & Medically appropriate history/examination & Medical Decision Making: Persistent chronic cough of

## 2024-12-17 NOTE — PROGRESS NOTES
EpiFix Treatment Note    NAME:  Tomas La  YOB: 1970  MEDICAL RECORD NUMBER:  [id number]  DATE:  12/17/2024    Goal:  Patient will receive safe and proper application of skin substitute.  Patient will comply with caring for dressing, offloading and reporting complications.     Expiration date checked immediately prior to use.  Package intact prior to use and no damage noted.  EpiFix is stored at room temperature.  EpiFix was removed from protective sterile packaging by provider and  applied to prepared ulcer bed.  EpiFix was placed using embossment letting as a guide.  EpiFix was hydrated with sterile normal saline per provider.   EpiFix was applied to right lateral foot and affixed with steri-strips by the provider.   EpiFix was covered with non-adherent ulcer dressing.   Applied agile over non-adherent.  Applied dry gauze and/or roll gauze.   Coban or ace wrap was applied to secure graft and decrease edema.   The patient/caregiver was instructed not to remove dressing and to keep it clean and dry.   The patient/family/caregiver was instructed on the need for offloading and elevation of the affected extremity and on using the prescribed offloading device.  The patient/family/caregiver was instructed on signs and symptoms of complication to report, such as draining through dressing, dressing falling down/slipping, getting wet, or severe pain or tingling in toes.     Guidelines followed  EpiFix may only be used every 12 months per wound.    Date of first application of EpiFix for this current wound is October 1, 2024.     Application # 9 out of 10       Electronically signed by Izzy Salvador RN on 12/17/2024 at 3:46 PM

## 2024-12-17 NOTE — PATIENT INSTRUCTIONS
PHYSICIAN ORDERS AND DISCHARGE INSTRUCTIONS  NOTE: Upon discharge from the Wound Center, you will receive a patient experience survey via E-mail. We would be grateful if you would take the time to fill this survey out.  Wound care order history:   GREGG's   Right       Left    Date    Vascular studies/Intervention: .     Cultures: .               Antibiotics: .Gent and Doxy 7/2/24, 9/17/24              HbA1c:  .               Grafts:  .#1 Epifix 10/1/24, #2 Epifix 10/8/24, #3 Epifix 10/17/24, #4 Epifix 10/22/24, #5 Epifix 10/29/24, #6 Eipifix 12/10/24. #7 Epifix 11/19/24, #8 Epifix 12/10/24, #9 Epifix 12/17/24   Juxta Lites & Lymph Pumps:  Continuing wound care orders and information:              Residence: .              Continue home health care with:.    Your wound-care supplies will be provided by: .              Pharmacy: [facility]   Wound cleansing:      Do not scrub or use excessive force.    Wash hands with soap and water before and after dressing changes.    Prior to applying a clean dressing, cleanse wound with normal saline,    wound cleanser, or mild soap and water.     Ask your physician or nurse before getting the wound(s) wet in the shower.  Daily Wound management:    Keep weight off wounds and reposition every 2 hours.    Avoid standing for long periods of time.    Evaluate legs to the level of the heart or above for 30 minutes 4-5 times a day and/or when sitting.       When taking antibiotics take entire prescription as ordered by MD do not stop taking until medicine is all gone.     Documentation:  Compression: .   Offloading: .Forefoot offloader ordered        Orders for this week (12/17/2024):  Left Foot-- Wash with mild soap and water, rinse with saline, pat dry with 4x4  Clobetasol to intact skin on foot   Cover with Qwick cut in half with aperture   Secure with steri strips   Wrap with conform and coban up to ankle and secure with tape   Change on Tuesday at [facility]     Right Foot  Apply

## 2024-12-17 NOTE — PROGRESS NOTES
Tomas La (:  1970) is a 54 y.o. male,Established patient, here for evaluation of the following chief complaint(s):    Cough (Not better from 12/3 appt. ), Shortness of Breath, and Chest Congestion        ASSESSMENT/PLAN:  Assessment & Plan  1. Persistent cough.  The cough has not improved with the use of an inhaler and is now accompanied by clear-colored sputum, shortness of breath, and sore lungs. There is no fever present. The cough worsens when lying down and is severe enough to disrupt sleep. Given the symptoms and history, a chest x-ray is warranted to rule out atypical pneumonia or other underlying conditions. A chest x-ray will be ordered today. A prescription for a Z-Bar (azithromycin) will be provided, which should be started tonight. Tessalon Perles will be prescribed for use every 8 hours as needed during the day. A nighttime cough syrup containing promethazine and dextromethorphan will be prescribed for use at bedtime. They are advised to continue taking Tylenol as needed. A breathing treatment will be administered in the office today to assess its effectiveness. They are encouraged to schedule their pulmonary function test to rule out COPD or asthma, which may benefit from a maintenance inhaler treatment. If the chest x-ray reveals any abnormalities, additional treatments may be considered.  1. Chronic cough  -     ipratropium 0.5 mg-albuterol 2.5 mg (DUONEB) nebulizer solution 1 Dose; 1 Dose, Inhalation, ONCE, 1 dose, On 24 at [facility] [place] [facility] Bronchodilator Protocol: No  -     XR CHEST STANDARD (2 VW); Future  -     azithromycin (ZITHROMAX) 250 MG tablet; 500mg on day 1 followed by 250mg on days 2 - 5, Disp-6 tablet, R-0Normal  -     promethazine-dextromethorphan (PROMETHAZINE-DM) 6.25-15 MG/5ML syrup; Take 5 mLs by mouth nightly as needed for Cough, Disp-118 mL, R-0Normal      No follow-ups on file.      SUBJECTIVE/OBJECTIVE:  HPI  History of Present Illness  The patient

## 2024-12-18 ENCOUNTER — TELEPHONE (OUTPATIENT)
Age: 54
End: 2024-12-18

## 2024-12-18 NOTE — TELEPHONE ENCOUNTER
Per Curly Rowland, APRN-CNP:  B12 levels were normal.     Left msg informing pt that B12 levels normal per Curly.

## 2024-12-20 ENCOUNTER — TELEPHONE (OUTPATIENT)
Age: 54
End: 2024-12-20

## 2024-12-20 NOTE — TELEPHONE ENCOUNTER
Patient's mom called and left voicemail stating he was seen by provider, Curly and patient was given orders and states Curly wanted him to get them done in office so she was calling to schedule the testing with us. Upon review of chart, I only see lab work order but this was completed. Was patient going to have anything done here?

## 2024-12-23 ENCOUNTER — TELEPHONE (OUTPATIENT)
Age: 54
End: 2024-12-23

## 2024-12-23 NOTE — TELEPHONE ENCOUNTER
Patient mother called and explained to patient mother that unable to discuss any care needs of patient with her.   Patient got on phone and explained unable to discuss his care with his mother cause mother isn't on communication release.   Patient continues to have cough / congestion .  Patient was seen in walk in clinic received antibiotic, Promethazine  DM  and Tessalon capsules .    Patient is out of medication  and unable to sleep cause of coughing .   Patient wondering if refills can be sent in .   Please advise

## 2024-12-27 ENCOUNTER — OFFICE VISIT (OUTPATIENT)
Age: 54
End: 2024-12-27
Payer: COMMERCIAL

## 2024-12-27 ENCOUNTER — HOSPITAL ENCOUNTER (OUTPATIENT)
Dept: WOUND CARE | Age: 54
Discharge: HOME | End: 2024-12-27
Attending: NURSE PRACTITIONER
Payer: COMMERCIAL

## 2024-12-27 VITALS
OXYGEN SATURATION: 97 % | SYSTOLIC BLOOD PRESSURE: 138 MMHG | DIASTOLIC BLOOD PRESSURE: 80 MMHG | TEMPERATURE: 98.7 F | HEART RATE: 76 BPM

## 2024-12-27 VITALS
RESPIRATION RATE: 18 BRPM | HEART RATE: 78 BPM | SYSTOLIC BLOOD PRESSURE: 142 MMHG | TEMPERATURE: 97.1 F | DIASTOLIC BLOOD PRESSURE: 84 MMHG

## 2024-12-27 DIAGNOSIS — L97.412 DIABETIC ULCER OF RIGHT MIDFOOT ASSOCIATED WITH TYPE 2 DIABETES MELLITUS, WITH FAT LAYER EXPOSED (HCC): Primary | ICD-10-CM

## 2024-12-27 DIAGNOSIS — E11.42 TYPE 2 DIABETES MELLITUS WITH DIABETIC POLYNEUROPATHY, WITHOUT LONG-TERM CURRENT USE OF INSULIN (HCC): ICD-10-CM

## 2024-12-27 DIAGNOSIS — E11.621 DIABETIC ULCER OF LEFT MIDFOOT ASSOCIATED WITH TYPE 2 DIABETES MELLITUS, WITH FAT LAYER EXPOSED (HCC): ICD-10-CM

## 2024-12-27 DIAGNOSIS — R05.3 CHRONIC COUGH: Primary | ICD-10-CM

## 2024-12-27 DIAGNOSIS — L84 PRE-ULCERATIVE CALLUSES: ICD-10-CM

## 2024-12-27 DIAGNOSIS — R06.2 WHEEZES: ICD-10-CM

## 2024-12-27 DIAGNOSIS — L60.2 LONG TOENAIL: ICD-10-CM

## 2024-12-27 DIAGNOSIS — L97.422 DIABETIC ULCER OF LEFT MIDFOOT ASSOCIATED WITH TYPE 2 DIABETES MELLITUS, WITH FAT LAYER EXPOSED (HCC): ICD-10-CM

## 2024-12-27 DIAGNOSIS — E11.621 DIABETIC ULCER OF RIGHT MIDFOOT ASSOCIATED WITH TYPE 2 DIABETES MELLITUS, WITH FAT LAYER EXPOSED (HCC): Primary | ICD-10-CM

## 2024-12-27 DIAGNOSIS — R09.82 POSTNASAL DRIP: ICD-10-CM

## 2024-12-27 PROCEDURE — 99213 OFFICE O/P EST LOW 20 MIN: CPT | Performed by: NURSE PRACTITIONER

## 2024-12-27 PROCEDURE — 11719 TRIM NAIL(S) ANY NUMBER: CPT

## 2024-12-27 PROCEDURE — 6370000000 HC RX 637 (ALT 250 FOR IP): Performed by: NURSE PRACTITIONER

## 2024-12-27 PROCEDURE — 11056 PARNG/CUTG B9 HYPRKR LES 2-4: CPT

## 2024-12-27 PROCEDURE — 11056 PARNG/CUTG B9 HYPRKR LES 2-4: CPT | Performed by: NURSE PRACTITIONER

## 2024-12-27 PROCEDURE — 11042 DBRDMT SUBQ TIS 1ST 20SQCM/<: CPT

## 2024-12-27 PROCEDURE — 11042 DBRDMT SUBQ TIS 1ST 20SQCM/<: CPT | Performed by: NURSE PRACTITIONER

## 2024-12-27 PROCEDURE — 3075F SYST BP GE 130 - 139MM HG: CPT | Performed by: NURSE PRACTITIONER

## 2024-12-27 PROCEDURE — 3079F DIAST BP 80-89 MM HG: CPT | Performed by: NURSE PRACTITIONER

## 2024-12-27 PROCEDURE — 11719 TRIM NAIL(S) ANY NUMBER: CPT | Performed by: NURSE PRACTITIONER

## 2024-12-27 RX ORDER — SILVER SULFADIAZINE 10 MG/G
CREAM TOPICAL ONCE
OUTPATIENT
Start: 2024-12-27 | End: 2024-12-27

## 2024-12-27 RX ORDER — LORATADINE 10 MG/1
10 TABLET ORAL DAILY
Qty: 30 TABLET | Refills: 0 | Status: SHIPPED | OUTPATIENT
Start: 2024-12-27 | End: 2025-01-26

## 2024-12-27 RX ORDER — DEXTROMETHORPHAN HYDROBROMIDE AND PROMETHAZINE HYDROCHLORIDE 15; 6.25 MG/5ML; MG/5ML
5 SYRUP ORAL 4 TIMES DAILY PRN
Qty: 140 ML | Refills: 0 | Status: SHIPPED | OUTPATIENT
Start: 2024-12-27 | End: 2025-01-03

## 2024-12-27 RX ORDER — GENTAMICIN SULFATE 1 MG/G
OINTMENT TOPICAL ONCE
OUTPATIENT
Start: 2024-12-27 | End: 2024-12-27

## 2024-12-27 RX ORDER — GINSENG 100 MG
CAPSULE ORAL ONCE
OUTPATIENT
Start: 2024-12-27 | End: 2024-12-27

## 2024-12-27 RX ORDER — BACITRACIN ZINC AND POLYMYXIN B SULFATE 500; 1000 [USP'U]/G; [USP'U]/G
OINTMENT TOPICAL ONCE
OUTPATIENT
Start: 2024-12-27 | End: 2024-12-27

## 2024-12-27 RX ORDER — LIDOCAINE 40 MG/G
CREAM TOPICAL ONCE
OUTPATIENT
Start: 2024-12-27 | End: 2024-12-27

## 2024-12-27 RX ORDER — BETAMETHASONE DIPROPIONATE 0.5 MG/G
CREAM TOPICAL ONCE
OUTPATIENT
Start: 2024-12-27 | End: 2024-12-27

## 2024-12-27 RX ORDER — PREDNISONE 20 MG/1
20 TABLET ORAL DAILY
Qty: 5 TABLET | Refills: 0 | Status: SHIPPED | OUTPATIENT
Start: 2024-12-27 | End: 2025-01-01

## 2024-12-27 RX ORDER — CLOBETASOL PROPIONATE 0.5 MG/G
OINTMENT TOPICAL ONCE
Status: COMPLETED | OUTPATIENT
Start: 2024-12-27 | End: 2024-12-27

## 2024-12-27 RX ORDER — LIDOCAINE HYDROCHLORIDE 40 MG/ML
SOLUTION TOPICAL ONCE
OUTPATIENT
Start: 2024-12-27 | End: 2024-12-27

## 2024-12-27 RX ORDER — LIDOCAINE 50 MG/G
OINTMENT TOPICAL ONCE
OUTPATIENT
Start: 2024-12-27 | End: 2024-12-27

## 2024-12-27 RX ORDER — LIDOCAINE HYDROCHLORIDE 20 MG/ML
JELLY TOPICAL ONCE
OUTPATIENT
Start: 2024-12-27 | End: 2024-12-27

## 2024-12-27 RX ORDER — TRIAMCINOLONE ACETONIDE 1 MG/G
OINTMENT TOPICAL ONCE
OUTPATIENT
Start: 2024-12-27 | End: 2024-12-27

## 2024-12-27 RX ORDER — NEOMYCIN/BACITRACIN/POLYMYXINB 3.5-400-5K
OINTMENT (GRAM) TOPICAL ONCE
OUTPATIENT
Start: 2024-12-27 | End: 2024-12-27

## 2024-12-27 RX ORDER — GENTAMICIN SULFATE 1 MG/G
OINTMENT TOPICAL ONCE
Status: COMPLETED | OUTPATIENT
Start: 2024-12-27 | End: 2024-12-27

## 2024-12-27 RX ORDER — SODIUM CHLOR/HYPOCHLOROUS ACID 0.033 %
SOLUTION, IRRIGATION IRRIGATION ONCE
OUTPATIENT
Start: 2024-12-27 | End: 2024-12-27

## 2024-12-27 RX ORDER — CLOBETASOL PROPIONATE 0.5 MG/G
OINTMENT TOPICAL ONCE
OUTPATIENT
Start: 2024-12-27 | End: 2024-12-27

## 2024-12-27 RX ORDER — MUPIROCIN 20 MG/G
OINTMENT TOPICAL ONCE
OUTPATIENT
Start: 2024-12-27 | End: 2024-12-27

## 2024-12-27 RX ADMIN — GENTAMICIN SULFATE: 1 OINTMENT TOPICAL at 13:54

## 2024-12-27 RX ADMIN — CLOBETASOL PROPIONATE: 0.5 OINTMENT TOPICAL at 13:53

## 2024-12-27 ASSESSMENT — ENCOUNTER SYMPTOMS
COUGH: 1
HEARTBURN: 0
HEMOPTYSIS: 0
SHORTNESS OF BREATH: 1
SINUS PAIN: 0
SINUS PRESSURE: 0
CHEST TIGHTNESS: 0
RHINORRHEA: 1
SORE THROAT: 0
WHEEZING: 1

## 2024-12-27 NOTE — TELEPHONE ENCOUNTER
Pt's mom called to check on this (pt was also present). I informed them that Ruma will not be back in office until next week and we will give them a call as soon as she is able to respond. I recommended having pt seen sooner for any new or worsening symptoms. They voiced understanding, will wait for PCP's response.

## 2024-12-27 NOTE — PROGRESS NOTES
Tomas La (:  1970) is a 54 y.o. male,Established patient, here for evaluation of the following chief complaint(s):    Cough (Nothing helping. Unable to sleep. ) and Wheezing      SUBJECTIVE/OBJECTIVE:  Pt presents with c/o as stated below- was seen in clinic 2024 and given antibiotic, inhaler  and cough suppressant - CXR was also ordered and was negative - continues to have cough and wheezing.     Cough  This is a chronic problem. The current episode started more than 1 month ago. The problem has been unchanged. The problem occurs every few minutes (coughs alot at night time). The cough is Non-productive. Associated symptoms include chest pain (with cough), headaches, rhinorrhea, shortness of breath (with coughing fits) and wheezing (with laying  flat). Pertinent negatives include no chills, ear congestion, ear pain, fever, heartburn, hemoptysis, myalgias, nasal congestion, postnasal drip, rash, sore throat, sweats or weight loss. Nothing aggravates the symptoms. He has tried a beta-agonist inhaler, OTC cough suppressant, prescription cough suppressant, rest and steroid inhaler (antibiotics) for the symptoms. The treatment provided no relief. There is no history of asthma, bronchiectasis, bronchitis, COPD, emphysema, environmental allergies or pneumonia.       Allergies   Allergen Reactions    Bactrim [Sulfamethoxazole-Trimethoprim] Swelling, Dermatitis and Other (See Comments)     Causes vasculitis    Sulfa Antibiotics Anaphylaxis        Current Outpatient Medications   Medication Sig Dispense Refill    amoxicillin-clavulanate (AUGMENTIN) 875-125 MG per tablet Take 1 tablet by mouth 2 times daily for 10 days 20 tablet 0    predniSONE (DELTASONE) 20 MG tablet Take 1 tablet by mouth daily for 5 days 5 tablet 0    promethazine-dextromethorphan (PROMETHAZINE-DM) 6.25-15 MG/5ML syrup Take 5 mLs by mouth 4 times daily as needed for Cough 140 mL 0    loratadine (CLARITIN) 10 MG tablet Take 1 tablet

## 2024-12-27 NOTE — PROGRESS NOTES
to fill this survey out.  Wound care order history:   GREGG's   Right       Left    Date    Vascular studies/Intervention: .     Cultures: .               Antibiotics: .Gent and Doxy 7/2/24, 9/17/24              HbA1c:  .               Grafts:  .#1 Epifix 10/1/24, #2 Epifix 10/8/24, #3 Epifix 10/17/24, #4 Epifix 10/22/24, #5 Epifix 10/29/24, #6 Eipifix 12/10/24. #7 Epifix 11/19/24, #8 Epifix 12/10/24, #9 Epifix 12/17/24   Juxta Lites & Lymph Pumps:  Continuing wound care orders and information:              Residence: .              Continue home health care with:.    Your wound-care supplies will be provided by: .              Pharmacy: [facility]   Wound cleansing:      Do not scrub or use excessive force.    Wash hands with soap and water before and after dressing changes.    Prior to applying a clean dressing, cleanse wound with normal saline,    wound cleanser, or mild soap and water.     Ask your physician or nurse before getting the wound(s) wet in the shower.  Daily Wound management:    Keep weight off wounds and reposition every 2 hours.    Avoid standing for long periods of time.    Evaluate legs to the level of the heart or above for 30 minutes 4-5 times a day and/or when sitting.       When taking antibiotics take entire prescription as ordered by MD do not stop taking until medicine is all gone.     Documentation:  Compression: .   Offloading: .Forefoot offloader ordered   Toe Nail Trim: 12/27/24        Orders for this week (12/27/2024):  Paint toe nails with betadine on 12/27/24  Left Foot-- Wash with mild soap and water, rinse with saline, pat dry with 4x4  Moisturize with A&D  Clobetasol to intact irritated skin on foot   Apply gentamicin on puracol to wound   Cover with Qwick entire piece with aperture   Secure with steri strips   Wrap with conform and coban up to ankle and secure with tape   Change on Tuesday at [facility]     Right Foot  Moisturize with A&D  Apply Clobetasol to irritated intact

## 2024-12-27 NOTE — PATIENT INSTRUCTIONS
PHYSICIAN ORDERS AND DISCHARGE INSTRUCTIONS  NOTE: Upon discharge from the Wound Center, you will receive a patient experience survey via E-mail. We would be grateful if you would take the time to fill this survey out.  Wound care order history:   GREGG's   Right       Left    Date    Vascular studies/Intervention: .     Cultures: .               Antibiotics: .Gent and Doxy 7/2/24, 9/17/24              HbA1c:  .               Grafts:  .#1 Epifix 10/1/24, #2 Epifix 10/8/24, #3 Epifix 10/17/24, #4 Epifix 10/22/24, #5 Epifix 10/29/24, #6 Eipifix 12/10/24. #7 Epifix 11/19/24, #8 Epifix 12/10/24, #9 Epifix 12/17/24   Juxta Lites & Lymph Pumps:  Continuing wound care orders and information:              Residence: .              Continue home health care with:.    Your wound-care supplies will be provided by: .              Pharmacy: [facility]   Wound cleansing:      Do not scrub or use excessive force.    Wash hands with soap and water before and after dressing changes.    Prior to applying a clean dressing, cleanse wound with normal saline,    wound cleanser, or mild soap and water.     Ask your physician or nurse before getting the wound(s) wet in the shower.  Daily Wound management:    Keep weight off wounds and reposition every 2 hours.    Avoid standing for long periods of time.    Evaluate legs to the level of the heart or above for 30 minutes 4-5 times a day and/or when sitting.       When taking antibiotics take entire prescription as ordered by MD do not stop taking until medicine is all gone.     Documentation:  Compression: .   Offloading: .Forefoot offloader ordered   Toe Nail Trim: 12/27/24        Orders for this week (12/27/2024):  Paint toe nails with betadine on 12/27/24  Left Foot-- Wash with mild soap and water, rinse with saline, pat dry with 4x4  Moisturize with A&D  Clobetasol to intact irritated skin on foot   Apply gentamicin on puracol to wound   Cover with Qwick entire piece with aperture

## 2025-01-02 ENCOUNTER — HOSPITAL ENCOUNTER (OUTPATIENT)
Dept: WOUND CARE | Age: 55
Discharge: HOME | End: 2025-01-02
Attending: NURSE PRACTITIONER
Payer: COMMERCIAL

## 2025-01-02 VITALS — DIASTOLIC BLOOD PRESSURE: 98 MMHG | HEART RATE: 76 BPM | SYSTOLIC BLOOD PRESSURE: 165 MMHG | TEMPERATURE: 97.1 F

## 2025-01-02 PROCEDURE — 99213 OFFICE O/P EST LOW 20 MIN: CPT

## 2025-01-02 RX ORDER — INSULIN LISPRO 100 [IU]/ML
INJECTION, SOLUTION INTRAVENOUS; SUBCUTANEOUS
Qty: 15 ML | Refills: 5 | Status: SHIPPED | OUTPATIENT
Start: 2025-01-02

## 2025-01-02 RX ORDER — INSULIN GLARGINE 100 [IU]/ML
10 INJECTION, SOLUTION SUBCUTANEOUS NIGHTLY
Qty: 15 ML | Refills: 3 | Status: SHIPPED | OUTPATIENT
Start: 2025-01-02

## 2025-01-02 NOTE — WOUND CARE
Patient arrived for nurse visit. Old dressings were removed and wounds were cleansed as documented in flowsheet. Wound care was performed per provider orders. Patient tolerated well. Patient had no further questions or concerns. Informed of follow up care, and verbalized understanding.     Electronically signed by Safia Vann RN on 1/2/2025 at 1:49 PM

## 2025-01-07 ENCOUNTER — HOSPITAL ENCOUNTER (OUTPATIENT)
Dept: WOUND CARE | Age: 55
Discharge: HOME | End: 2025-01-07
Attending: NURSE PRACTITIONER
Payer: COMMERCIAL

## 2025-01-07 VITALS
HEART RATE: 65 BPM | RESPIRATION RATE: 18 BRPM | TEMPERATURE: 96.3 F | SYSTOLIC BLOOD PRESSURE: 128 MMHG | DIASTOLIC BLOOD PRESSURE: 75 MMHG

## 2025-01-07 DIAGNOSIS — E11.621 DIABETIC ULCER OF LEFT MIDFOOT ASSOCIATED WITH TYPE 2 DIABETES MELLITUS, WITH FAT LAYER EXPOSED (HCC): ICD-10-CM

## 2025-01-07 DIAGNOSIS — L97.412 DIABETIC ULCER OF RIGHT MIDFOOT ASSOCIATED WITH TYPE 2 DIABETES MELLITUS, WITH FAT LAYER EXPOSED (HCC): Primary | ICD-10-CM

## 2025-01-07 DIAGNOSIS — E11.621 DIABETIC ULCER OF RIGHT MIDFOOT ASSOCIATED WITH TYPE 2 DIABETES MELLITUS, WITH FAT LAYER EXPOSED (HCC): Primary | ICD-10-CM

## 2025-01-07 DIAGNOSIS — L60.2 LONG TOENAIL: ICD-10-CM

## 2025-01-07 DIAGNOSIS — L97.422 DIABETIC ULCER OF LEFT MIDFOOT ASSOCIATED WITH TYPE 2 DIABETES MELLITUS, WITH FAT LAYER EXPOSED (HCC): ICD-10-CM

## 2025-01-07 DIAGNOSIS — L84 PRE-ULCERATIVE CALLUSES: ICD-10-CM

## 2025-01-07 PROCEDURE — 97597 DBRDMT OPN WND 1ST 20 CM/<: CPT

## 2025-01-07 PROCEDURE — 6370000000 HC RX 637 (ALT 250 FOR IP): Performed by: NURSE PRACTITIONER

## 2025-01-07 PROCEDURE — 11055 PARING/CUTG B9 HYPRKER LES 1: CPT

## 2025-01-07 PROCEDURE — 11042 DBRDMT SUBQ TIS 1ST 20SQCM/<: CPT

## 2025-01-07 PROCEDURE — 11042 DBRDMT SUBQ TIS 1ST 20SQCM/<: CPT | Performed by: NURSE PRACTITIONER

## 2025-01-07 PROCEDURE — 97597 DBRDMT OPN WND 1ST 20 CM/<: CPT | Performed by: NURSE PRACTITIONER

## 2025-01-07 RX ORDER — MUPIROCIN 20 MG/G
OINTMENT TOPICAL ONCE
OUTPATIENT
Start: 2025-01-07 | End: 2025-01-07

## 2025-01-07 RX ORDER — BETAMETHASONE DIPROPIONATE 0.5 MG/G
CREAM TOPICAL ONCE
OUTPATIENT
Start: 2025-01-07 | End: 2025-01-07

## 2025-01-07 RX ORDER — LIDOCAINE HYDROCHLORIDE 20 MG/ML
JELLY TOPICAL ONCE
OUTPATIENT
Start: 2025-01-07 | End: 2025-01-07

## 2025-01-07 RX ORDER — GINSENG 100 MG
CAPSULE ORAL ONCE
OUTPATIENT
Start: 2025-01-07 | End: 2025-01-07

## 2025-01-07 RX ORDER — GENTAMICIN SULFATE 1 MG/G
OINTMENT TOPICAL ONCE
OUTPATIENT
Start: 2025-01-07 | End: 2025-01-07

## 2025-01-07 RX ORDER — LIDOCAINE 40 MG/G
CREAM TOPICAL ONCE
OUTPATIENT
Start: 2025-01-07 | End: 2025-01-07

## 2025-01-07 RX ORDER — SODIUM CHLOR/HYPOCHLOROUS ACID 0.033 %
SOLUTION, IRRIGATION IRRIGATION ONCE
OUTPATIENT
Start: 2025-01-07 | End: 2025-01-07

## 2025-01-07 RX ORDER — BACITRACIN ZINC AND POLYMYXIN B SULFATE 500; 1000 [USP'U]/G; [USP'U]/G
OINTMENT TOPICAL ONCE
OUTPATIENT
Start: 2025-01-07 | End: 2025-01-07

## 2025-01-07 RX ORDER — CLOBETASOL PROPIONATE 0.5 MG/G
OINTMENT TOPICAL ONCE
OUTPATIENT
Start: 2025-01-07 | End: 2025-01-07

## 2025-01-07 RX ORDER — LIDOCAINE HYDROCHLORIDE 40 MG/ML
SOLUTION TOPICAL ONCE
OUTPATIENT
Start: 2025-01-07 | End: 2025-01-07

## 2025-01-07 RX ORDER — TRIAMCINOLONE ACETONIDE 1 MG/G
OINTMENT TOPICAL ONCE
OUTPATIENT
Start: 2025-01-07 | End: 2025-01-07

## 2025-01-07 RX ORDER — NEOMYCIN/BACITRACIN/POLYMYXINB 3.5-400-5K
OINTMENT (GRAM) TOPICAL ONCE
OUTPATIENT
Start: 2025-01-07 | End: 2025-01-07

## 2025-01-07 RX ORDER — CLOBETASOL PROPIONATE 0.5 MG/G
OINTMENT TOPICAL ONCE
Status: COMPLETED | OUTPATIENT
Start: 2025-01-07 | End: 2025-01-07

## 2025-01-07 RX ORDER — GENTAMICIN SULFATE 1 MG/G
OINTMENT TOPICAL ONCE
Status: COMPLETED | OUTPATIENT
Start: 2025-01-07 | End: 2025-01-07

## 2025-01-07 RX ORDER — LIDOCAINE 50 MG/G
OINTMENT TOPICAL ONCE
OUTPATIENT
Start: 2025-01-07 | End: 2025-01-07

## 2025-01-07 RX ORDER — SILVER SULFADIAZINE 10 MG/G
CREAM TOPICAL ONCE
OUTPATIENT
Start: 2025-01-07 | End: 2025-01-07

## 2025-01-07 RX ADMIN — GENTAMICIN SULFATE: 1 OINTMENT TOPICAL at 13:33

## 2025-01-07 RX ADMIN — CLOBETASOL PROPIONATE: 0.5 OINTMENT TOPICAL at 13:33

## 2025-01-07 NOTE — PROGRESS NOTES
Wound Care Center Progress Visit      Tomas La  AGE: 54 y.o.   GENDER: male  : 1970  EPISODE DATE:  2025   Referred by: Ruma Osman FNP     Subjective:     CHIEF COMPLAINT  WOUND   Problem List Items Addressed This Visit          Endocrine    WD-Diabetic ulcer of left midfoot associated with type 2 diabetes mellitus, with fat layer exposed (HCC)    WD-Diabetic ulcer of right foot associated with type 2 diabetes mellitus, with fat layer exposed (HCC) - Primary    Relevant Orders    Initiate Outpatient Wound Care Protocol       Other    Pre-ulcerative calluses    Long toenail     Chief Complaint   Patient presents with    Wound Check        HISTORY of PRESENT ILLNESS      Tomas La is a 54 y.o. male who presents to the Wound Clinic for evaluation and treatment of Chronic diabetic  ulcer(s) of  right foot.  The condition is of moderate severity. The ulcers have been present for several months and has been treated at the wound clinic since 24 which was the initial visit to the wound clinic.  The underlying cause is thought to be diabetes.  Advanced wound care modalities established with initiation of care at the wound clinic.The patient has significant underlying medical conditions as below. Ruma Osman FNP .    Living Situation  [x] Home [] SNF []  Assisted living  [] Other (ie rehab, etc.) [] Home Health  []  Transportation    Wound Pain Timing/Severity: none  Quality of pain: N/A  Severity of pain:  0 / 10   Modifying Factors: diabetes, chronic pressure, shear force, and obesity  Associated Signs/Symptoms: drainage    Wound status:  2025       Regimen discussed and established with patient/family as below. The patients records were reviewed and discussed.  Time was given for questions. All questions were answered to the patients satisfaction.      [x] Stable [] Worse [] Reopened [] Improved [] Initial visit []  Revisit []  Healed    [x] Adjustments made to regimen of care  [x]

## 2025-01-07 NOTE — PATIENT INSTRUCTIONS
PHYSICIAN ORDERS AND DISCHARGE INSTRUCTIONS  NOTE: Upon discharge from the Wound Center, you will receive a patient experience survey via E-mail. We would be grateful if you would take the time to fill this survey out.  Wound care order history:   GREGG's   Right       Left    Date    Vascular studies/Intervention: .     Cultures: .               Antibiotics: .Gent and Doxy 7/2/24, 9/17/24              HbA1c:  .               Grafts:  .#1 Epifix 10/1/24, #2 Epifix 10/8/24, #3 Epifix 10/17/24, #4 Epifix 10/22/24, #5 Epifix 10/29/24, #6 Eipifix 12/10/24. #7 Epifix 11/19/24, #8 Epifix 12/10/24, #9 Epifix 12/17/24   Juxta Lites & Lymph Pumps:  Continuing wound care orders and information:              Residence: .              Continue home health care with:.    Your wound-care supplies will be provided by: .              Pharmacy: [facility]   Wound cleansing:      Do not scrub or use excessive force.    Wash hands with soap and water before and after dressing changes.    Prior to applying a clean dressing, cleanse wound with normal saline,    wound cleanser, or mild soap and water.     Ask your physician or nurse before getting the wound(s) wet in the shower.  Daily Wound management:    Keep weight off wounds and reposition every 2 hours.    Avoid standing for long periods of time.    Evaluate legs to the level of the heart or above for 30 minutes 4-5 times a day and/or when sitting.       When taking antibiotics take entire prescription as ordered by MD do not stop taking until medicine is all gone.     Documentation:  Compression: .   Offloading: .Forefoot offloader ordered   Toe Nail Trim: 12/27/24        Orders for this week (1/7/2025):    Left Foot-- Wash with mild soap and water, rinse with saline, pat dry with 4x4  Moisturize with A&D  Clobetasol to intact irritated skin on foot     Right Foot  Moisturize with A&D  Apply Clobetasol to irritated intact skin  Apply Gentamicin on puracol to wound bed (pack to

## 2025-01-13 DIAGNOSIS — R09.82 POSTNASAL DRIP: ICD-10-CM

## 2025-01-13 RX ORDER — LORATADINE 10 MG/1
10 TABLET ORAL DAILY
Qty: 30 TABLET | Refills: 0 | OUTPATIENT
Start: 2025-01-13 | End: 2025-02-12

## 2025-01-14 ENCOUNTER — HOSPITAL ENCOUNTER (OUTPATIENT)
Dept: WOUND CARE | Age: 55
Discharge: HOME | End: 2025-01-14
Attending: NURSE PRACTITIONER
Payer: COMMERCIAL

## 2025-01-14 ENCOUNTER — HOSPITAL ENCOUNTER (OUTPATIENT)
Dept: PULMONOLOGY | Age: 55
Discharge: HOME | End: 2025-01-14
Payer: COMMERCIAL

## 2025-01-14 ENCOUNTER — OFFICE VISIT (OUTPATIENT)
Age: 55
End: 2025-01-14
Payer: COMMERCIAL

## 2025-01-14 VITALS
WEIGHT: 254.6 LBS | DIASTOLIC BLOOD PRESSURE: 74 MMHG | OXYGEN SATURATION: 99 % | HEART RATE: 67 BPM | BODY MASS INDEX: 34.53 KG/M2 | SYSTOLIC BLOOD PRESSURE: 118 MMHG

## 2025-01-14 VITALS
SYSTOLIC BLOOD PRESSURE: 123 MMHG | RESPIRATION RATE: 18 BRPM | DIASTOLIC BLOOD PRESSURE: 78 MMHG | HEART RATE: 65 BPM | TEMPERATURE: 97.2 F

## 2025-01-14 DIAGNOSIS — L97.422 DIABETIC ULCER OF LEFT MIDFOOT ASSOCIATED WITH TYPE 2 DIABETES MELLITUS, WITH FAT LAYER EXPOSED (HCC): ICD-10-CM

## 2025-01-14 DIAGNOSIS — E11.621 DIABETIC ULCER OF LEFT MIDFOOT ASSOCIATED WITH TYPE 2 DIABETES MELLITUS, WITH FAT LAYER EXPOSED (HCC): ICD-10-CM

## 2025-01-14 DIAGNOSIS — L97.412 DIABETIC ULCER OF RIGHT MIDFOOT ASSOCIATED WITH TYPE 2 DIABETES MELLITUS, WITH FAT LAYER EXPOSED (HCC): Primary | ICD-10-CM

## 2025-01-14 DIAGNOSIS — R25.2 SPASTICITY: ICD-10-CM

## 2025-01-14 DIAGNOSIS — E11.621 DIABETIC ULCER OF RIGHT MIDFOOT ASSOCIATED WITH TYPE 2 DIABETES MELLITUS, WITH FAT LAYER EXPOSED (HCC): Primary | ICD-10-CM

## 2025-01-14 DIAGNOSIS — I77.6 VASCULITIS (HCC): ICD-10-CM

## 2025-01-14 DIAGNOSIS — L84 PRE-ULCERATIVE CALLUSES: ICD-10-CM

## 2025-01-14 DIAGNOSIS — E08.29 DIABETES MELLITUS DUE TO UNDERLYING CONDITION WITH OTHER KIDNEY COMPLICATION, UNSPECIFIED WHETHER LONG TERM INSULIN USE (HCC): ICD-10-CM

## 2025-01-14 DIAGNOSIS — R05.3 CHRONIC COUGH: ICD-10-CM

## 2025-01-14 DIAGNOSIS — E11.42 TYPE 2 DIABETES MELLITUS WITH DIABETIC POLYNEUROPATHY, WITHOUT LONG-TERM CURRENT USE OF INSULIN (HCC): ICD-10-CM

## 2025-01-14 DIAGNOSIS — I67.7 CEREBRAL VASCULITIS: ICD-10-CM

## 2025-01-14 DIAGNOSIS — G62.9 NEUROPATHY: Primary | ICD-10-CM

## 2025-01-14 DIAGNOSIS — G81.91 RIGHT HEMIPARESIS (HCC): ICD-10-CM

## 2025-01-14 LAB
DLCO %PRED: 76 %
DLCO PRED: NORMAL
DLCO/VA %PRED: 102 %
DLCO/VA PRED: NORMAL
DLCO/VA: NORMAL
DLCO: NORMAL
EXPIRATORY TIME-POST: NORMAL
EXPIRATORY TIME: NORMAL
FEF 25-75 %CHNG: NORMAL
FEF 25-75 POST %PRED: 102 %
FEF 25-75% %PRED-PRE: 76 L/SEC
FEF 25-75% PRED: NORMAL
FEF 25-75-POST: NORMAL
FEF 25-75-PRE: NORMAL
FEV1 %PRED-POST: 82 %
FEV1 %PRED-PRE: 76 %
FEV1 PRED: NORMAL
FEV1-POST: NORMAL
FEV1-PRE: NORMAL
FEV1/FVC %PRED-POST: 105 %
FEV1/FVC %PRED-PRE: 97 %
FEV1/FVC PRED: NORMAL
FEV1/FVC-POST: NORMAL
FEV1/FVC-PRE: NORMAL
FVC %PRED-POST: 78 L
FVC %PRED-PRE: 78 %
FVC PRED: NORMAL
FVC-POST: NORMAL
FVC-PRE: NORMAL
GAW %PRED: NORMAL
GAW PRED: NORMAL
GAW: NORMAL
IC PRE %PRED: 82 %
IC PRED: NORMAL
IC: NORMAL
MEP: NORMAL
MIP: NORMAL
MVV %PRED-PRE: NORMAL
MVV PRED: NORMAL
MVV-PRE: NORMAL
PEF %PRED-POST: NORMAL
PEF %PRED-PRE: NORMAL
PEF PRED: NORMAL
PEF%CHNG: NORMAL
PEF-POST: NORMAL
PEF-PRE: NORMAL
RAW %PRED: NORMAL
RAW PRED: NORMAL
RAW: NORMAL
RV PRE %PRED: 112 %
RV PRED: NORMAL
RV: NORMAL
SVC %PRED: 79 %
SVC PRED: NORMAL
SVC: NORMAL
TLC PRE %PRED: 85 %
TLC PRED: NORMAL
TLC: NORMAL
VA %PRED: 75 %
VA PRED: NORMAL
VA: NORMAL
VTG %PRED: NORMAL
VTG PRED: NORMAL
VTG: NORMAL

## 2025-01-14 PROCEDURE — 6370000000 HC RX 637 (ALT 250 FOR IP): Performed by: NURSE PRACTITIONER

## 2025-01-14 PROCEDURE — 3078F DIAST BP <80 MM HG: CPT | Performed by: NURSE PRACTITIONER

## 2025-01-14 PROCEDURE — 94727 GAS DIL/WSHOT DETER LNG VOL: CPT

## 2025-01-14 PROCEDURE — 11042 DBRDMT SUBQ TIS 1ST 20SQCM/<: CPT | Performed by: NURSE PRACTITIONER

## 2025-01-14 PROCEDURE — 3074F SYST BP LT 130 MM HG: CPT | Performed by: NURSE PRACTITIONER

## 2025-01-14 PROCEDURE — 94060 EVALUATION OF WHEEZING: CPT

## 2025-01-14 PROCEDURE — 94729 DIFFUSING CAPACITY: CPT

## 2025-01-14 PROCEDURE — 99213 OFFICE O/P EST LOW 20 MIN: CPT | Performed by: NURSE PRACTITIONER

## 2025-01-14 PROCEDURE — 11042 DBRDMT SUBQ TIS 1ST 20SQCM/<: CPT

## 2025-01-14 RX ORDER — BETAMETHASONE DIPROPIONATE 0.5 MG/G
CREAM TOPICAL ONCE
OUTPATIENT
Start: 2025-01-14 | End: 2025-01-14

## 2025-01-14 RX ORDER — GINSENG 100 MG
CAPSULE ORAL ONCE
OUTPATIENT
Start: 2025-01-14 | End: 2025-01-14

## 2025-01-14 RX ORDER — SODIUM CHLOR/HYPOCHLOROUS ACID 0.033 %
SOLUTION, IRRIGATION IRRIGATION ONCE
OUTPATIENT
Start: 2025-01-14 | End: 2025-01-14

## 2025-01-14 RX ORDER — LIDOCAINE 50 MG/G
OINTMENT TOPICAL ONCE
OUTPATIENT
Start: 2025-01-14 | End: 2025-01-14

## 2025-01-14 RX ORDER — TRIAMCINOLONE ACETONIDE 1 MG/G
OINTMENT TOPICAL ONCE
OUTPATIENT
Start: 2025-01-14 | End: 2025-01-14

## 2025-01-14 RX ORDER — BACITRACIN ZINC AND POLYMYXIN B SULFATE 500; 1000 [USP'U]/G; [USP'U]/G
OINTMENT TOPICAL ONCE
OUTPATIENT
Start: 2025-01-14 | End: 2025-01-14

## 2025-01-14 RX ORDER — GENTAMICIN SULFATE 1 MG/G
OINTMENT TOPICAL ONCE
OUTPATIENT
Start: 2025-01-14 | End: 2025-01-14

## 2025-01-14 RX ORDER — MUPIROCIN 20 MG/G
OINTMENT TOPICAL ONCE
OUTPATIENT
Start: 2025-01-14 | End: 2025-01-14

## 2025-01-14 RX ORDER — BACLOFEN 10 MG/1
10 TABLET ORAL 3 TIMES DAILY
Qty: 90 TABLET | Refills: 5 | Status: SHIPPED | OUTPATIENT
Start: 2025-01-14

## 2025-01-14 RX ORDER — CLOBETASOL PROPIONATE 0.5 MG/G
OINTMENT TOPICAL ONCE
Status: COMPLETED | OUTPATIENT
Start: 2025-01-14 | End: 2025-01-14

## 2025-01-14 RX ORDER — NEOMYCIN/BACITRACIN/POLYMYXINB 3.5-400-5K
OINTMENT (GRAM) TOPICAL ONCE
OUTPATIENT
Start: 2025-01-14 | End: 2025-01-14

## 2025-01-14 RX ORDER — LIDOCAINE 40 MG/G
CREAM TOPICAL ONCE
OUTPATIENT
Start: 2025-01-14 | End: 2025-01-14

## 2025-01-14 RX ORDER — LIDOCAINE HYDROCHLORIDE 20 MG/ML
JELLY TOPICAL ONCE
OUTPATIENT
Start: 2025-01-14 | End: 2025-01-14

## 2025-01-14 RX ORDER — SILVER SULFADIAZINE 10 MG/G
CREAM TOPICAL ONCE
OUTPATIENT
Start: 2025-01-14 | End: 2025-01-14

## 2025-01-14 RX ORDER — GENTAMICIN SULFATE 1 MG/G
OINTMENT TOPICAL ONCE
Status: COMPLETED | OUTPATIENT
Start: 2025-01-14 | End: 2025-01-14

## 2025-01-14 RX ORDER — APREMILAST 30 MG/1
30 TABLET, FILM COATED ORAL DAILY
COMMUNITY
Start: 2025-01-08

## 2025-01-14 RX ORDER — LIDOCAINE HYDROCHLORIDE 40 MG/ML
SOLUTION TOPICAL ONCE
OUTPATIENT
Start: 2025-01-14 | End: 2025-01-14

## 2025-01-14 RX ORDER — CLOBETASOL PROPIONATE 0.5 MG/G
OINTMENT TOPICAL ONCE
OUTPATIENT
Start: 2025-01-14 | End: 2025-01-14

## 2025-01-14 RX ADMIN — GENTAMICIN SULFATE: 1 OINTMENT TOPICAL at 15:07

## 2025-01-14 RX ADMIN — CLOBETASOL PROPIONATE: 0.5 OINTMENT TOPICAL at 15:07

## 2025-01-14 ASSESSMENT — PULMONARY FUNCTION TESTS
FEV1/FVC_PERCENT_PREDICTED_PRE: 97
FEV1/FVC_PERCENT_PREDICTED_POST: 105
FVC_PERCENT_PREDICTED_PRE: 78
FEV1_PERCENT_PREDICTED_PRE: 76
FEV1_PERCENT_PREDICTED_POST: 82
FVC_PERCENT_PREDICTED_POST: 78

## 2025-01-14 NOTE — PROGRESS NOTES
with Ca alginate to protect against adhesive   Secure/ bolster with steri strips   Pad proximal dorsal foot with gentac   Wrap with conform and coban up to ankle and secure with tape   Change on Tuesday at [facility]  Left Foot-   Moisturize with A&D     Please dispense 30 day quantity when sending supplies     Follow up OWEN Wilkerson in 1 weeks in the wound care center  Call [phone] for any questions or concerns.     Treatment Note      Written Patient Dismissal Instructions Given            Electronically signed by ADELFO Whittaker CNP on 1/14/2025 at 3:01 PM

## 2025-01-14 NOTE — PATIENT INSTRUCTIONS
PHYSICIAN ORDERS AND DISCHARGE INSTRUCTIONS  NOTE: Upon discharge from the Wound Center, you will receive a patient experience survey via E-mail. We would be grateful if you would take the time to fill this survey out.  Wound care order history:   GREGG's   Right       Left    Date    Vascular studies/Intervention: .     Cultures: .               Antibiotics: .Gent and Doxy 7/2/24, 9/17/24              HbA1c:  .               Grafts:  .#1 Epifix 10/1/24, #2 Epifix 10/8/24, #3 Epifix 10/17/24, #4 Epifix 10/22/24, #5 Epifix 10/29/24, #6 Eipifix 12/10/24. #7 Epifix 11/19/24, #8 Epifix 12/10/24, #9 Epifix 12/17/24   Juxta Lites & Lymph Pumps:  Continuing wound care orders and information:              Residence: .              Continue home health care with:.    Your wound-care supplies will be provided by: .              Pharmacy: [facility]   Wound cleansing:      Do not scrub or use excessive force.    Wash hands with soap and water before and after dressing changes.    Prior to applying a clean dressing, cleanse wound with normal saline,    wound cleanser, or mild soap and water.     Ask your physician or nurse before getting the wound(s) wet in the shower.  Daily Wound management:    Keep weight off wounds and reposition every 2 hours.    Avoid standing for long periods of time.    Evaluate legs to the level of the heart or above for 30 minutes 4-5 times a day and/or when sitting.       When taking antibiotics take entire prescription as ordered by MD do not stop taking until medicine is all gone.     Documentation:  Compression: .   Offloading: .Forefoot offloader ordered   Toe Nail Trim: 12/27/24        Orders for this week (1/14/2025):    Left Foot-- Wash with mild soap and water, rinse with saline, pat dry with 4x4  Moisturize with A&D  Clobetasol to intact irritated skin on foot     Right Foot  Moisturize with A&D  Apply Clobetasol to irritated intact skin  Apply Gentamicin on puracol to wound bed (pack to

## 2025-01-14 NOTE — PROGRESS NOTES
1/14/25    Tomas La  1970    Chief Complaint   Patient presents with    Follow-up     Neuropathy       History of Present Illness  Tomas is a 54 y.o. male presenting today for follow-up of: Stroke, new symptoms of peripheral neuropathy      He has had several strokes despite being compliant with secondary stroke preventative medications-was transferred to [facility], had a brain biopsy consistent with CNS vasculitis.     He follows with [facility] rheumatology for management of his CNS vasculitis. Completed 3 months of cyclophosphamide and currently on Imuran.  A1c was 8 on 11/11/2024. He had EMG of the lower extremities ordered by another provider, not completed yet, referred to us for neuropathy by PCP.    His neuropathy has gotten worse over the past couple weeks, he gets a stabbing pain in his feet.  He was started on gabapentin 300 mg daily which was recently increased to twice daily but he admits that he is taking it 3 times daily without any improvement.  He also tells me his right side of his body tightens up.  Last visit, change his gabapentin to Lyrica 100 mg 3 times daily for neuropathic pain.  He was recommended to take vitamin D 5000 IU daily over-the-counter.  He was referred to Dr. White for evaluation for possible Botox of his right hand spasticity after stroke.  He is following up today to discuss more options for spasticity of his right side.     He has a history of psoriasis which he is being treated for.      He tells me his insurance would not cover more PT/OT until January 1.  He is on aspirin and statin for secondary stroke prevention.  He also follows with the wound center for bilateral diabetic ulcers and both of his feet are wrapped today.     He has limited mobility of his right hand.  He walks with a cane.     vitamin D level on 11/7/24-23.5      Current Outpatient Medications   Medication Sig Dispense Refill    OTEZLA 30 MG TABS Take 30 mg by mouth daily      baclofen (LIORESAL) 10

## 2025-01-16 DIAGNOSIS — R06.02 SHORTNESS OF BREATH: ICD-10-CM

## 2025-01-16 DIAGNOSIS — R91.1 PULMONARY NODULE: Primary | ICD-10-CM

## 2025-01-16 DIAGNOSIS — R05.3 CHRONIC COUGH: ICD-10-CM

## 2025-01-20 ENCOUNTER — OFFICE VISIT (OUTPATIENT)
Dept: FAMILY MEDICINE CLINIC | Age: 55
End: 2025-01-20

## 2025-01-20 VITALS
SYSTOLIC BLOOD PRESSURE: 122 MMHG | HEART RATE: 72 BPM | OXYGEN SATURATION: 97 % | DIASTOLIC BLOOD PRESSURE: 76 MMHG | WEIGHT: 252.8 LBS | BODY MASS INDEX: 34.24 KG/M2 | HEIGHT: 72 IN

## 2025-01-20 DIAGNOSIS — E11.621 DIABETIC ULCER OF BOTH FEET (HCC): ICD-10-CM

## 2025-01-20 DIAGNOSIS — R05.3 CHRONIC COUGH: ICD-10-CM

## 2025-01-20 DIAGNOSIS — Z72.0 SMOKELESS TOBACCO USE: ICD-10-CM

## 2025-01-20 DIAGNOSIS — Z79.4 TYPE 2 DIABETES MELLITUS WITH DIABETIC POLYNEUROPATHY, WITH LONG-TERM CURRENT USE OF INSULIN (HCC): Primary | ICD-10-CM

## 2025-01-20 DIAGNOSIS — Z97.8 USES SELF-APPLIED CONTINUOUS GLUCOSE MONITORING DEVICE: ICD-10-CM

## 2025-01-20 DIAGNOSIS — Z76.89 ENCOUNTER TO ESTABLISH CARE: ICD-10-CM

## 2025-01-20 DIAGNOSIS — R09.82 POSTNASAL DRIP: ICD-10-CM

## 2025-01-20 DIAGNOSIS — E11.42 TYPE 2 DIABETES MELLITUS WITH DIABETIC POLYNEUROPATHY, WITH LONG-TERM CURRENT USE OF INSULIN (HCC): Primary | ICD-10-CM

## 2025-01-20 DIAGNOSIS — L97.519 DIABETIC ULCER OF BOTH FEET (HCC): ICD-10-CM

## 2025-01-20 DIAGNOSIS — I67.7 CEREBRAL VASCULITIS: ICD-10-CM

## 2025-01-20 DIAGNOSIS — R15.2 FECAL URGENCY: ICD-10-CM

## 2025-01-20 DIAGNOSIS — E08.29 DIABETES MELLITUS DUE TO UNDERLYING CONDITION WITH OTHER KIDNEY COMPLICATION, UNSPECIFIED WHETHER LONG TERM INSULIN USE (HCC): ICD-10-CM

## 2025-01-20 DIAGNOSIS — L97.529 DIABETIC ULCER OF BOTH FEET (HCC): ICD-10-CM

## 2025-01-20 RX ORDER — PREDNISONE 5 MG/1
5 TABLET ORAL DAILY
COMMUNITY
Start: 2024-07-11 | End: 2025-01-20

## 2025-01-20 RX ORDER — TAPINAROF 10 MG/1000MG
CREAM TOPICAL
COMMUNITY
Start: 2024-11-22

## 2025-01-20 RX ORDER — INSULIN DEGLUDEC 100 U/ML
20 INJECTION, SOLUTION SUBCUTANEOUS DAILY
Qty: 2 ADJUSTABLE DOSE PRE-FILLED PEN SYRINGE | Refills: 1 | Status: SHIPPED | OUTPATIENT
Start: 2025-01-20

## 2025-01-20 RX ORDER — LORATADINE 10 MG/1
10 TABLET ORAL DAILY
Qty: 30 TABLET | Refills: 0 | Status: SHIPPED | OUTPATIENT
Start: 2025-01-20 | End: 2025-02-19

## 2025-01-20 RX ORDER — INSULIN GLARGINE-AGLR 100 [IU]/ML
INJECTION, SOLUTION SUBCUTANEOUS
COMMUNITY
Start: 2025-01-02 | End: 2025-01-20

## 2025-01-20 RX ORDER — CLOBETASOL PROPIONATE 0.5 MG/G
CREAM TOPICAL
COMMUNITY
Start: 2025-01-13

## 2025-01-20 RX ORDER — FLUTICASONE PROPIONATE 50 MCG
2 SPRAY, SUSPENSION (ML) NASAL DAILY
Qty: 16 G | Refills: 0 | Status: SHIPPED | OUTPATIENT
Start: 2025-01-20

## 2025-01-20 RX ORDER — ALBUTEROL SULFATE 90 UG/1
2 INHALANT RESPIRATORY (INHALATION) EVERY 4 HOURS PRN
Qty: 18 G | Refills: 0 | Status: SHIPPED | OUTPATIENT
Start: 2025-01-20

## 2025-01-20 SDOH — ECONOMIC STABILITY: FOOD INSECURITY: WITHIN THE PAST 12 MONTHS, THE FOOD YOU BOUGHT JUST DIDN'T LAST AND YOU DIDN'T HAVE MONEY TO GET MORE.: NEVER TRUE

## 2025-01-20 SDOH — ECONOMIC STABILITY: FOOD INSECURITY: WITHIN THE PAST 12 MONTHS, YOU WORRIED THAT YOUR FOOD WOULD RUN OUT BEFORE YOU GOT MONEY TO BUY MORE.: NEVER TRUE

## 2025-01-20 ASSESSMENT — PATIENT HEALTH QUESTIONNAIRE - PHQ9
SUM OF ALL RESPONSES TO PHQ QUESTIONS 1-9: 0
SUM OF ALL RESPONSES TO PHQ QUESTIONS 1-9: 0
2. FEELING DOWN, DEPRESSED OR HOPELESS: NOT AT ALL
5. POOR APPETITE OR OVEREATING: NOT AT ALL
7. TROUBLE CONCENTRATING ON THINGS, SUCH AS READING THE NEWSPAPER OR WATCHING TELEVISION: NOT AT ALL
6. FEELING BAD ABOUT YOURSELF - OR THAT YOU ARE A FAILURE OR HAVE LET YOURSELF OR YOUR FAMILY DOWN: NOT AT ALL
SUM OF ALL RESPONSES TO PHQ QUESTIONS 1-9: 0
SUM OF ALL RESPONSES TO PHQ9 QUESTIONS 1 & 2: 0
9. THOUGHTS THAT YOU WOULD BE BETTER OFF DEAD, OR OF HURTING YOURSELF: NOT AT ALL
SUM OF ALL RESPONSES TO PHQ QUESTIONS 1-9: 0
3. TROUBLE FALLING OR STAYING ASLEEP: NOT AT ALL
DEPRESSION UNABLE TO ASSESS: FUNCTIONAL CAPACITY MOTIVATION LIMITS ACCURACY
4. FEELING TIRED OR HAVING LITTLE ENERGY: NOT AT ALL
10. IF YOU CHECKED OFF ANY PROBLEMS, HOW DIFFICULT HAVE THESE PROBLEMS MADE IT FOR YOU TO DO YOUR WORK, TAKE CARE OF THINGS AT HOME, OR GET ALONG WITH OTHER PEOPLE: NOT DIFFICULT AT ALL
1. LITTLE INTEREST OR PLEASURE IN DOING THINGS: NOT AT ALL
8. MOVING OR SPEAKING SO SLOWLY THAT OTHER PEOPLE COULD HAVE NOTICED. OR THE OPPOSITE, BEING SO FIGETY OR RESTLESS THAT YOU HAVE BEEN MOVING AROUND A LOT MORE THAN USUAL: NOT AT ALL

## 2025-01-20 NOTE — PROGRESS NOTES
Tomas La (:  1970) is a 54 y.o. 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  Follow up with GI if symptoms persist.    Diabetic ulcer of both feet (HCC)    Continue to follow with wound clinic.        Assessment & Plan  1. Diabetes Mellitus.  His Dexcom G7 CGM indicates that he is spending only 38% of the time in the target range of  mg/dL, suggesting a need for adjustments to his current regimen. His last A1c in November was 8.0, which is higher than desired. He reports frequent urination, likely due to high blood sugar levels, and occasional diarrhea, possibly from metformin. He is currently on Basaglar 10 units daily, Humalog 20-25 units three times a day before meals, and metformin 500 mg once daily. He also has diabetic ulcers on his feet, with one nearly healed and the other still being treated. He experiences neuropathy with no feeling from his shins down. He had two strokes, one in 2023 and another in 2024, and is not currently working. He was advised

## 2025-01-21 ENCOUNTER — HOSPITAL ENCOUNTER (OUTPATIENT)
Dept: WOUND CARE | Age: 55
Discharge: HOME | End: 2025-01-21
Attending: NURSE PRACTITIONER
Payer: COMMERCIAL

## 2025-01-21 VITALS
HEART RATE: 65 BPM | SYSTOLIC BLOOD PRESSURE: 131 MMHG | TEMPERATURE: 95.6 F | RESPIRATION RATE: 18 BRPM | DIASTOLIC BLOOD PRESSURE: 81 MMHG

## 2025-01-21 DIAGNOSIS — E11.621 DIABETIC ULCER OF RIGHT MIDFOOT ASSOCIATED WITH TYPE 2 DIABETES MELLITUS, WITH FAT LAYER EXPOSED (HCC): Primary | ICD-10-CM

## 2025-01-21 DIAGNOSIS — L97.412 DIABETIC ULCER OF RIGHT MIDFOOT ASSOCIATED WITH TYPE 2 DIABETES MELLITUS, WITH FAT LAYER EXPOSED (HCC): Primary | ICD-10-CM

## 2025-01-21 DIAGNOSIS — R09.82 POSTNASAL DRIP: ICD-10-CM

## 2025-01-21 PROCEDURE — 97597 DBRDMT OPN WND 1ST 20 CM/<: CPT

## 2025-01-21 PROCEDURE — 97597 DBRDMT OPN WND 1ST 20 CM/<: CPT | Performed by: NURSE PRACTITIONER

## 2025-01-21 RX ORDER — LIDOCAINE 40 MG/G
CREAM TOPICAL ONCE
OUTPATIENT
Start: 2025-01-21 | End: 2025-01-21

## 2025-01-21 RX ORDER — MUPIROCIN 20 MG/G
OINTMENT TOPICAL ONCE
OUTPATIENT
Start: 2025-01-21 | End: 2025-01-21

## 2025-01-21 RX ORDER — BETAMETHASONE DIPROPIONATE 0.5 MG/G
CREAM TOPICAL ONCE
OUTPATIENT
Start: 2025-01-21 | End: 2025-01-21

## 2025-01-21 RX ORDER — LIDOCAINE HYDROCHLORIDE 40 MG/ML
SOLUTION TOPICAL ONCE
OUTPATIENT
Start: 2025-01-21 | End: 2025-01-21

## 2025-01-21 RX ORDER — CLOBETASOL PROPIONATE 0.5 MG/G
OINTMENT TOPICAL ONCE
OUTPATIENT
Start: 2025-01-21 | End: 2025-01-21

## 2025-01-21 RX ORDER — LIDOCAINE HYDROCHLORIDE 20 MG/ML
JELLY TOPICAL ONCE
OUTPATIENT
Start: 2025-01-21 | End: 2025-01-21

## 2025-01-21 RX ORDER — BACITRACIN ZINC AND POLYMYXIN B SULFATE 500; 1000 [USP'U]/G; [USP'U]/G
OINTMENT TOPICAL ONCE
OUTPATIENT
Start: 2025-01-21 | End: 2025-01-21

## 2025-01-21 RX ORDER — GINSENG 100 MG
CAPSULE ORAL ONCE
OUTPATIENT
Start: 2025-01-21 | End: 2025-01-21

## 2025-01-21 RX ORDER — GENTAMICIN SULFATE 1 MG/G
OINTMENT TOPICAL ONCE
OUTPATIENT
Start: 2025-01-21 | End: 2025-01-21

## 2025-01-21 RX ORDER — LIDOCAINE 50 MG/G
OINTMENT TOPICAL ONCE
OUTPATIENT
Start: 2025-01-21 | End: 2025-01-21

## 2025-01-21 RX ORDER — SILVER SULFADIAZINE 10 MG/G
CREAM TOPICAL ONCE
OUTPATIENT
Start: 2025-01-21 | End: 2025-01-21

## 2025-01-21 RX ORDER — NEOMYCIN/BACITRACIN/POLYMYXINB 3.5-400-5K
OINTMENT (GRAM) TOPICAL ONCE
OUTPATIENT
Start: 2025-01-21 | End: 2025-01-21

## 2025-01-21 RX ORDER — TRIAMCINOLONE ACETONIDE 1 MG/G
OINTMENT TOPICAL ONCE
OUTPATIENT
Start: 2025-01-21 | End: 2025-01-21

## 2025-01-21 RX ORDER — SODIUM CHLOR/HYPOCHLOROUS ACID 0.033 %
SOLUTION, IRRIGATION IRRIGATION ONCE
OUTPATIENT
Start: 2025-01-21 | End: 2025-01-21

## 2025-01-21 NOTE — TELEPHONE ENCOUNTER
Called patient and updated that provider sent in medications    Patient voices understanding .  No questions or concerns at this time

## 2025-01-21 NOTE — PATIENT INSTRUCTIONS
PHYSICIAN ORDERS AND DISCHARGE INSTRUCTIONS  NOTE: Upon discharge from the Wound Center, you will receive a patient experience survey via E-mail. We would be grateful if you would take the time to fill this survey out.  Wound care order history:   GREGG's   Right       Left    Date    Vascular studies/Intervention: .     Cultures: .               Antibiotics: .Gent and Doxy 7/2/24, 9/17/24              HbA1c:  .               Grafts:  .#1 Epifix 10/1/24, #2 Epifix 10/8/24, #3 Epifix 10/17/24, #4 Epifix 10/22/24, #5 Epifix 10/29/24, #6 Eipifix 12/10/24. #7 Epifix 11/19/24, #8 Epifix 12/10/24, #9 Epifix 12/17/24   Juxta Lites & Lymph Pumps:  Continuing wound care orders and information:              Residence: .              Continue home health care with:.    Your wound-care supplies will be provided by: .              Pharmacy: [facility]   Wound cleansing:      Do not scrub or use excessive force.    Wash hands with soap and water before and after dressing changes.    Prior to applying a clean dressing, cleanse wound with normal saline,    wound cleanser, or mild soap and water.     Ask your physician or nurse before getting the wound(s) wet in the shower.  Daily Wound management:    Keep weight off wounds and reposition every 2 hours.    Avoid standing for long periods of time.    Evaluate legs to the level of the heart or above for 30 minutes 4-5 times a day and/or when sitting.       When taking antibiotics take entire prescription as ordered by MD do not stop taking until medicine is all gone.     Documentation:  Compression: .   Offloading: .Forefoot offloader ordered   Toe Nail Trim: 12/27/24        Orders for this week (1/21/2025)    BLE  Moisturize feet and legs with A&D  Apply compression socks at home- wear while standing, walking, sitting- may remove at bedtime      Please dispense 30 day quantity when sending supplies     Follow up OWEN Wilkerson in 1 weeks in the wound care center  Call [phone]

## 2025-01-22 RX ORDER — LORATADINE 10 MG/1
10 TABLET ORAL DAILY
Qty: 30 TABLET | Refills: 0 | OUTPATIENT
Start: 2025-01-22 | End: 2025-02-21

## 2025-01-22 RX ORDER — DICYCLOMINE HYDROCHLORIDE 10 MG/1
10 CAPSULE ORAL
Qty: 60 CAPSULE | Refills: 0 | Status: SHIPPED | OUTPATIENT
Start: 2025-01-22

## 2025-01-23 ENCOUNTER — TELEPHONE (OUTPATIENT)
Dept: FAMILY MEDICINE CLINIC | Age: 55
End: 2025-01-23

## 2025-01-23 DIAGNOSIS — E08.29 DIABETES MELLITUS DUE TO UNDERLYING CONDITION WITH OTHER KIDNEY COMPLICATION, UNSPECIFIED WHETHER LONG TERM INSULIN USE (HCC): Primary | ICD-10-CM

## 2025-01-23 RX ORDER — DAPAGLIFLOZIN 10 MG/1
10 TABLET, FILM COATED ORAL EVERY MORNING
Qty: 30 TABLET | Refills: 0 | Status: SHIPPED | OUTPATIENT
Start: 2025-01-23

## 2025-01-24 NOTE — TELEPHONE ENCOUNTER
I will send Farxiga, he may encounter that the cost is about the same, if so I would suggest that he contact [facility] to see if there is a patient assistance program that they can help him with.   
Left vm for patient to call the office back  
Patient called in and stated that even with the discount card the cost of Jardiance is 400.00 dollars patient states he can not afford this medication is there something else he can take that is more cost friendly   
Primary Care...

## 2025-01-24 NOTE — ASSESSMENT & PLAN NOTE
Chronic, not at goal (unstable), changes made today: see below    Orders:    Insulin Degludec (TRESIBA FLEXTOUCH) 100 UNIT/ML SOPN; Inject 20 Units into the skin daily

## 2025-01-25 ENCOUNTER — HOSPITAL ENCOUNTER (OUTPATIENT)
Dept: CT IMAGING | Age: 55
Discharge: HOME | End: 2025-01-25
Payer: COMMERCIAL

## 2025-01-25 DIAGNOSIS — R05.3 CHRONIC COUGH: ICD-10-CM

## 2025-01-25 DIAGNOSIS — R06.02 SHORTNESS OF BREATH: ICD-10-CM

## 2025-01-25 DIAGNOSIS — R91.1 PULMONARY NODULE: ICD-10-CM

## 2025-01-25 PROCEDURE — 71250 CT THORAX DX C-: CPT

## 2025-01-28 ENCOUNTER — HOSPITAL ENCOUNTER (OUTPATIENT)
Dept: WOUND CARE | Age: 55
Discharge: HOME | End: 2025-01-28
Attending: NURSE PRACTITIONER
Payer: COMMERCIAL

## 2025-01-28 VITALS
HEART RATE: 69 BPM | DIASTOLIC BLOOD PRESSURE: 74 MMHG | RESPIRATION RATE: 14 BRPM | TEMPERATURE: 98.6 F | SYSTOLIC BLOOD PRESSURE: 119 MMHG

## 2025-01-28 PROCEDURE — 99211 OFF/OP EST MAY X REQ PHY/QHP: CPT

## 2025-01-28 NOTE — PATIENT INSTRUCTIONS
PHYSICIAN ORDERS AND DISCHARGE INSTRUCTIONS  NOTE: Upon discharge from the Wound Center, you will receive a patient experience survey via E-mail. We would be grateful if you would take the time to fill this survey out.  Wound care order history:   GREGG's   Right       Left    Date    Vascular studies/Intervention: .     Cultures: .               Antibiotics: .Gent and Doxy 7/2/24, 9/17/24              HbA1c:  .               Grafts:  .#1 Epifix 10/1/24, #2 Epifix 10/8/24, #3 Epifix 10/17/24, #4 Epifix 10/22/24, #5 Epifix 10/29/24, #6 Eipifix 12/10/24. #7 Epifix 11/19/24, #8 Epifix 12/10/24, #9 Epifix 12/17/24   Juxta Lites & Lymph Pumps:  Continuing wound care orders and information:              Residence: .              Continue home health care with:.    Your wound-care supplies will be provided by: .              Pharmacy: [facility]   Wound cleansing:      Do not scrub or use excessive force.    Wash hands with soap and water before and after dressing changes.    Prior to applying a clean dressing, cleanse wound with normal saline,    wound cleanser, or mild soap and water.     Ask your physician or nurse before getting the wound(s) wet in the shower.  Daily Wound management:    Keep weight off wounds and reposition every 2 hours.    Avoid standing for long periods of time.    Evaluate legs to the level of the heart or above for 30 minutes 4-5 times a day and/or when sitting.       When taking antibiotics take entire prescription as ordered by MD do not stop taking until medicine is all gone.     Documentation:  Compression: .   Offloading: .Forefoot offloader ordered   Toe Nail Trim: 12/27/24        Orders for this week (1/28/2025)    BLE  Moisturize feet and legs with A&D  Apply compression socks at home- wear while standing, walking, sitting- may remove at bedtime      Please dispense 30 day quantity when sending supplies     Follow up OWEN Wilkerson in 1 weeks in the wound care center  Call [phone]

## 2025-01-28 NOTE — PLAN OF CARE
Problem: Wound:  Goal: Will show signs of wound healing; wound closure and no evidence of infection  Description: Will show signs of wound healing; wound closure and no evidence of infection  Outcome: Adequate for Discharge     
Statement Selected

## 2025-01-28 NOTE — PROGRESS NOTES
NURSE TX:    Moisturized feet and legs with A&D per order. Zero open areas noted.  Educated on applying compression socks at home- wear while standing, walking, sitting- may remove at bedtime. Voices understanding.

## 2025-02-04 ENCOUNTER — HOSPITAL ENCOUNTER (OUTPATIENT)
Dept: WOUND CARE | Age: 55
Discharge: HOME | End: 2025-02-04
Attending: NURSE PRACTITIONER
Payer: COMMERCIAL

## 2025-02-04 ENCOUNTER — TELEPHONE (OUTPATIENT)
Age: 55
End: 2025-02-04

## 2025-02-04 VITALS
RESPIRATION RATE: 14 BRPM | TEMPERATURE: 98.2 F | DIASTOLIC BLOOD PRESSURE: 71 MMHG | HEART RATE: 68 BPM | SYSTOLIC BLOOD PRESSURE: 114 MMHG

## 2025-02-04 DIAGNOSIS — E11.42 TYPE 2 DIABETES MELLITUS WITH DIABETIC POLYNEUROPATHY, WITHOUT LONG-TERM CURRENT USE OF INSULIN (HCC): ICD-10-CM

## 2025-02-04 DIAGNOSIS — L84 PRE-ULCERATIVE CALLUSES: ICD-10-CM

## 2025-02-04 DIAGNOSIS — L97.412 DIABETIC ULCER OF RIGHT MIDFOOT ASSOCIATED WITH TYPE 2 DIABETES MELLITUS, WITH FAT LAYER EXPOSED (HCC): Primary | ICD-10-CM

## 2025-02-04 DIAGNOSIS — E11.621 DIABETIC ULCER OF RIGHT MIDFOOT ASSOCIATED WITH TYPE 2 DIABETES MELLITUS, WITH FAT LAYER EXPOSED (HCC): Primary | ICD-10-CM

## 2025-02-04 PROCEDURE — 11719 TRIM NAIL(S) ANY NUMBER: CPT

## 2025-02-04 PROCEDURE — 11056 PARNG/CUTG B9 HYPRKR LES 2-4: CPT | Performed by: NURSE PRACTITIONER

## 2025-02-04 PROCEDURE — 99212 OFFICE O/P EST SF 10 MIN: CPT

## 2025-02-04 RX ORDER — BACITRACIN ZINC AND POLYMYXIN B SULFATE 500; 1000 [USP'U]/G; [USP'U]/G
OINTMENT TOPICAL ONCE
OUTPATIENT
Start: 2025-02-04 | End: 2025-02-04

## 2025-02-04 RX ORDER — TRIAMCINOLONE ACETONIDE 1 MG/G
OINTMENT TOPICAL ONCE
OUTPATIENT
Start: 2025-02-04 | End: 2025-02-04

## 2025-02-04 RX ORDER — CLOBETASOL PROPIONATE 0.5 MG/G
OINTMENT TOPICAL ONCE
OUTPATIENT
Start: 2025-02-04 | End: 2025-02-04

## 2025-02-04 RX ORDER — LIDOCAINE HYDROCHLORIDE 20 MG/ML
JELLY TOPICAL ONCE
OUTPATIENT
Start: 2025-02-04 | End: 2025-02-04

## 2025-02-04 RX ORDER — LIDOCAINE 40 MG/G
CREAM TOPICAL ONCE
OUTPATIENT
Start: 2025-02-04 | End: 2025-02-04

## 2025-02-04 RX ORDER — BETAMETHASONE DIPROPIONATE 0.5 MG/G
CREAM TOPICAL ONCE
OUTPATIENT
Start: 2025-02-04 | End: 2025-02-04

## 2025-02-04 RX ORDER — NEOMYCIN/BACITRACIN/POLYMYXINB 3.5-400-5K
OINTMENT (GRAM) TOPICAL ONCE
OUTPATIENT
Start: 2025-02-04 | End: 2025-02-04

## 2025-02-04 RX ORDER — MUPIROCIN 20 MG/G
OINTMENT TOPICAL ONCE
OUTPATIENT
Start: 2025-02-04 | End: 2025-02-04

## 2025-02-04 RX ORDER — SILVER SULFADIAZINE 10 MG/G
CREAM TOPICAL ONCE
OUTPATIENT
Start: 2025-02-04 | End: 2025-02-04

## 2025-02-04 RX ORDER — SODIUM CHLOR/HYPOCHLOROUS ACID 0.033 %
SOLUTION, IRRIGATION IRRIGATION ONCE
OUTPATIENT
Start: 2025-02-04 | End: 2025-02-04

## 2025-02-04 RX ORDER — GENTAMICIN SULFATE 1 MG/G
OINTMENT TOPICAL ONCE
OUTPATIENT
Start: 2025-02-04 | End: 2025-02-04

## 2025-02-04 RX ORDER — LIDOCAINE HYDROCHLORIDE 40 MG/ML
SOLUTION TOPICAL ONCE
OUTPATIENT
Start: 2025-02-04 | End: 2025-02-04

## 2025-02-04 RX ORDER — GINSENG 100 MG
CAPSULE ORAL ONCE
OUTPATIENT
Start: 2025-02-04 | End: 2025-02-04

## 2025-02-04 RX ORDER — LIDOCAINE 50 MG/G
OINTMENT TOPICAL ONCE
OUTPATIENT
Start: 2025-02-04 | End: 2025-02-04

## 2025-02-04 NOTE — PROGRESS NOTES
NURSE TX:    Paint toe nails with Betadine on 02/04/25  BLE  Moisturize feet and legs with A&D  Apply compression socks at home- wear while standing, walking, sitting- may remove at bedtime    Patient tolerated well and voices understanding.

## 2025-02-04 NOTE — PATIENT INSTRUCTIONS
PHYSICIAN ORDERS AND DISCHARGE INSTRUCTIONS  NOTE: Upon discharge from the Wound Center, you will receive a patient experience survey via E-mail. We would be grateful if you would take the time to fill this survey out.  Wound care order history:   GREGG's   Right       Left    Date    Vascular studies/Intervention: .     Cultures: .               Antibiotics: .Gent and Doxy 7/2/24, 9/17/24              HbA1c:  .               Grafts:  .#1 Epifix 10/1/24, #2 Epifix 10/8/24, #3 Epifix 10/17/24, #4 Epifix 10/22/24, #5 Epifix 10/29/24, #6 Eipifix 12/10/24. #7 Epifix 11/19/24, #8 Epifix 12/10/24, #9 Epifix 12/17/24   Juxta Lites & Lymph Pumps:  Continuing wound care orders and information:              Residence: .              Continue home health care with:.    Your wound-care supplies will be provided by: .              Pharmacy: [facility]   Wound cleansing:      Do not scrub or use excessive force.    Wash hands with soap and water before and after dressing changes.    Prior to applying a clean dressing, cleanse wound with normal saline,    wound cleanser, or mild soap and water.     Ask your physician or nurse before getting the wound(s) wet in the shower.  Daily Wound management:    Keep weight off wounds and reposition every 2 hours.    Avoid standing for long periods of time.    Evaluate legs to the level of the heart or above for 30 minutes 4-5 times a day and/or when sitting.       When taking antibiotics take entire prescription as ordered by MD do not stop taking until medicine is all gone.     Documentation:  Compression: .   Offloading: .Forefoot offloader ordered   Toe Nail Trim: 12/27/24        Orders for this week (2/4/2025)  HEALED on 02/04/25    Paint toe nails with Betadine on 02/04/25  BLE  Moisturize feet and legs with A&D  Apply compression socks at home- wear while standing, walking, sitting- may remove at bedtime      Please dispense 30 day quantity when sending supplies     Follow up

## 2025-02-04 NOTE — PROGRESS NOTES
Wound Care Center Progress Visit      Tomas La  AGE: 54 y.o.   GENDER: male  : 1970  EPISODE DATE:  2025   Referred by: Ruma Osman FNP     Subjective:     CHIEF COMPLAINT  WOUND   Problem List Items Addressed This Visit          Endocrine    Type 2 diabetes mellitus with diabetic polyneuropathy, without long-term current use of insulin (HCC)    WD-Diabetic ulcer of right foot associated with type 2 diabetes mellitus, with fat layer exposed (HCC) - Primary    Relevant Orders    Initiate Outpatient Wound Care Protocol       Other    Pre-ulcerative calluses     Chief Complaint   Patient presents with    Wound Check        HISTORY of PRESENT ILLNESS      Tomas La is a 54 y.o. male who presents to the Wound Clinic for evaluation and treatment of Chronic diabetic  ulcer(s) of  right foot.  The condition is of moderate severity. The ulcers have been present for several months and has been treated at the wound clinic since 24 which was the initial visit to the wound clinic.  The underlying cause is thought to be diabetes.  Advanced wound care modalities established with initiation of care at the wound clinic.The patient has significant underlying medical conditions as below. Ruma Osman FNP .    Living Situation  [x] Home [] SNF []  Assisted living  [] Other (ie rehab, etc.) [] Home Health  []  Transportation    Wound Pain Timing/Severity: none  Quality of pain: N/A  Severity of pain:  0 / 10   Modifying Factors: diabetes, chronic pressure, shear force, and obesity  Associated Signs/Symptoms: drainage    Wound status:  2025       Regimen discussed and established with patient/family as below. The patients records were reviewed and discussed.  Time was given for questions. All questions were answered to the patients satisfaction.      [x] Stable [] Worse [] Reopened [] Improved [] Initial visit []  Revisit []  Healed    [x] Adjustments made to regimen of care  [x] Off loading

## 2025-02-04 NOTE — TELEPHONE ENCOUNTER
Received call from pt mother Enriqueta stating they lost orders for PT/OT and requesting to . Pt mother not listed on HIPAA, given verbal consent from pt to discuss information and  orders. Orders up front for , advised pt mother to bring photo ID.

## 2025-02-05 RX ORDER — LISINOPRIL 20 MG/1
20 TABLET ORAL 2 TIMES DAILY
Qty: 180 TABLET | Refills: 4 | Status: SHIPPED | OUTPATIENT
Start: 2025-02-05

## 2025-02-10 ENCOUNTER — OFFICE VISIT (OUTPATIENT)
Age: 55
End: 2025-02-10
Payer: COMMERCIAL

## 2025-02-10 ENCOUNTER — TELEPHONE (OUTPATIENT)
Dept: FAMILY MEDICINE CLINIC | Age: 55
End: 2025-02-10

## 2025-02-10 VITALS
RESPIRATION RATE: 20 BRPM | HEART RATE: 93 BPM | OXYGEN SATURATION: 97 % | SYSTOLIC BLOOD PRESSURE: 124 MMHG | DIASTOLIC BLOOD PRESSURE: 68 MMHG | BODY MASS INDEX: 34.18 KG/M2 | WEIGHT: 252 LBS

## 2025-02-10 DIAGNOSIS — H69.91 EUSTACHIAN TUBE DYSFUNCTION, RIGHT: Primary | ICD-10-CM

## 2025-02-10 DIAGNOSIS — E11.42 TYPE 2 DIABETES MELLITUS WITH DIABETIC POLYNEUROPATHY, WITHOUT LONG-TERM CURRENT USE OF INSULIN (HCC): ICD-10-CM

## 2025-02-10 PROCEDURE — 3078F DIAST BP <80 MM HG: CPT

## 2025-02-10 PROCEDURE — 3074F SYST BP LT 130 MM HG: CPT

## 2025-02-10 PROCEDURE — 99214 OFFICE O/P EST MOD 30 MIN: CPT

## 2025-02-10 RX ORDER — AZATHIOPRINE 50 MG/1
50 TABLET ORAL 2 TIMES DAILY
COMMUNITY
Start: 2025-02-06

## 2025-02-10 ASSESSMENT — ENCOUNTER SYMPTOMS
SORE THROAT: 0
GASTROINTESTINAL NEGATIVE: 1
COUGH: 1
RHINORRHEA: 1

## 2025-02-10 NOTE — PROGRESS NOTES
on 2/10/2025) 18 g 0    ondansetron (ZOFRAN) 4 MG tablet Take 1 tablet by mouth 3 times daily as needed for Nausea or Vomiting (Patient not taking: Reported on 1/14/2025) 30 tablet 1     No current facility-administered medications for this visit.       /68 (Site: Right Upper Arm, Position: Sitting, Cuff Size: Medium Adult)   Pulse 93   Resp 20   Wt 114.3 kg (252 lb)   SpO2 97%   BMI 34.18 kg/m²     Review of Systems   Constitutional: Negative.  Negative for chills, fatigue and fever.   HENT:  Positive for rhinorrhea. Negative for congestion, ear discharge, postnasal drip and sore throat.    Respiratory:  Positive for cough.    Cardiovascular: Negative.  Negative for chest pain.   Gastrointestinal: Negative.    Endocrine: Negative.    Musculoskeletal:  Negative for myalgias.   Neurological: Negative.  Negative for dizziness and headaches.   Psychiatric/Behavioral: Negative.            Objective   Physical Exam  Vitals and nursing note reviewed.   Constitutional:       General: He is not in acute distress.     Appearance: Normal appearance. He is not ill-appearing or toxic-appearing.   HENT:      Right Ear: Tympanic membrane, ear canal and external ear normal.      Left Ear: Tympanic membrane, ear canal and external ear normal.   Cardiovascular:      Rate and Rhythm: Normal rate and regular rhythm.      Pulses: Normal pulses.      Heart sounds: Normal heart sounds. No murmur heard.  Pulmonary:      Effort: Pulmonary effort is normal. No respiratory distress.      Breath sounds: Normal breath sounds. No wheezing or rhonchi.   Neurological:      General: No focal deficit present.      Mental Status: He is alert and oriented to person, place, and time.   Psychiatric:         Mood and Affect: Mood normal.         Behavior: Behavior normal.            Assessment & Plan   1. Eustachian tube dysfunction, right  - Pt prescribed flonase. Advised to use this when symptoms occur.  - Pt to follow up sooner if symptoms

## 2025-02-10 NOTE — TELEPHONE ENCOUNTER
Patient called about the cost of medication and was instructed to contact [facility] assist

## 2025-02-12 DIAGNOSIS — R09.82 POSTNASAL DRIP: ICD-10-CM

## 2025-02-13 RX ORDER — LORATADINE 10 MG/1
10 TABLET ORAL DAILY
Qty: 30 TABLET | Refills: 1 | Status: SHIPPED | OUTPATIENT
Start: 2025-02-13 | End: 2025-04-14

## 2025-02-17 DIAGNOSIS — E11.42 TYPE 2 DIABETES MELLITUS WITH DIABETIC POLYNEUROPATHY, WITHOUT LONG-TERM CURRENT USE OF INSULIN (HCC): ICD-10-CM

## 2025-02-17 RX ORDER — ACYCLOVIR 400 MG/1
TABLET ORAL
Qty: 3 EACH | Refills: 3 | Status: SHIPPED | OUTPATIENT
Start: 2025-02-17

## 2025-03-03 ENCOUNTER — OFFICE VISIT (OUTPATIENT)
Dept: FAMILY MEDICINE CLINIC | Age: 55
End: 2025-03-03

## 2025-03-03 VITALS
BODY MASS INDEX: 35.21 KG/M2 | HEART RATE: 73 BPM | WEIGHT: 260 LBS | HEIGHT: 72 IN | DIASTOLIC BLOOD PRESSURE: 84 MMHG | SYSTOLIC BLOOD PRESSURE: 122 MMHG | OXYGEN SATURATION: 99 %

## 2025-03-03 DIAGNOSIS — Z97.8 USES SELF-APPLIED CONTINUOUS GLUCOSE MONITORING DEVICE: ICD-10-CM

## 2025-03-03 DIAGNOSIS — E11.42 TYPE 2 DIABETES MELLITUS WITH DIABETIC POLYNEUROPATHY, WITHOUT LONG-TERM CURRENT USE OF INSULIN (HCC): Primary | ICD-10-CM

## 2025-03-03 LAB — HBA1C MFR BLD: 8.1 %

## 2025-03-03 RX ORDER — ACYCLOVIR 400 MG/1
TABLET ORAL
Qty: 9 EACH | Refills: 1 | Status: SHIPPED | OUTPATIENT
Start: 2025-03-03

## 2025-03-03 NOTE — PATIENT INSTRUCTIONS
Switch long-acting insulin to in the morning, using 25 units daily.    Monitor glucose closely for lows, if lows are occurring, reduce meal time insulin by 5 units.

## 2025-03-03 NOTE — ASSESSMENT & PLAN NOTE
Orders:    POCT glycosylated hemoglobin (Hb A1C)    Continuous Glucose Sensor (DEXCOM G7 SENSOR) MISC; USE AS DIRECTED - REPLACE EVERY 10 DAYS    Albumin/Creatinine Ratio, Urine; Future

## 2025-03-03 NOTE — PROGRESS NOTES
Tomas La (:  1970) is a 54 y.o. male,Established patient, here for evaluation of the following chief complaint(s):  Diabetes (Follow up)      ASSESSMENT/PLAN:  Assessment & Plan  Type 2 diabetes mellitus with diabetic polyneuropathy, without long-term current use of insulin ([facility])       Orders:    POCT glycosylated hemoglobin (Hb A1C)    Continuous Glucose Sensor (DEXCOM G7 SENSOR) MISC; USE AS DIRECTED - REPLACE EVERY 10 DAYS    Albumin/Creatinine Ratio, Urine; Future    Uses self-applied continuous glucose monitoring device       Orders:    GLUCOSE MONITOR, 72 HOUR, PHYS INTERP      Assessment & Plan  1. Diabetes Mellitus.  His time in range, as per Dexcom data, is suboptimal at 52% over the past 30 days. His A1c level has remained relatively stable, with a slight increase from 8 to 8.1. He has been experiencing some variability in his blood glucose levels, which could be attributed to inconsistent use of his Dexcom device. He has also been administering his insulin postprandially, which may be contributing to episodes of hypoglycemia. He has not yet initiated Tresiba therapy. He was advised to consider using the Annabel 3 sensor as a more cost-effective alternative to Dexcom. He was also encouraged to explore potential discount programs offered by drug manufacturers for Jardiance and Farxiga. He was counseled to administer his insulin approximately 15 minutes prior to meals and to carry his insulin pen when dining out. He was further advised to switch his long-acting insulin to morning administration, initially at a dose of 25 units daily, and to monitor his glucose levels closely. If his glucose levels begin to decrease, he should reduce his mealtime insulin dose by 5 units. He was also advised to avoid snacking after dinner and to limit his intake of grapes and other high-carbohydrate foods. He was provided with information on insulin pumps and patches for his consideration. A prescription for

## 2025-03-04 DIAGNOSIS — I10 ESSENTIAL HYPERTENSION: ICD-10-CM

## 2025-03-04 DIAGNOSIS — F41.9 ANXIETY AND DEPRESSION: ICD-10-CM

## 2025-03-04 DIAGNOSIS — F32.A ANXIETY AND DEPRESSION: ICD-10-CM

## 2025-03-04 RX ORDER — DICYCLOMINE HYDROCHLORIDE 10 MG/1
10 CAPSULE ORAL
Qty: 60 CAPSULE | Refills: 0 | Status: SHIPPED | OUTPATIENT
Start: 2025-03-04

## 2025-03-04 RX ORDER — ESCITALOPRAM OXALATE 10 MG/1
10 TABLET ORAL DAILY
Qty: 90 TABLET | Refills: 3 | Status: SHIPPED | OUTPATIENT
Start: 2025-03-04

## 2025-03-04 RX ORDER — AMLODIPINE BESYLATE 10 MG/1
10 TABLET ORAL DAILY
Qty: 90 TABLET | Refills: 3 | Status: SHIPPED | OUTPATIENT
Start: 2025-03-04

## 2025-03-11 ENCOUNTER — HOSPITAL ENCOUNTER (OUTPATIENT)
Dept: WOUND CARE | Age: 55
Discharge: HOME | End: 2025-03-11
Attending: NURSE PRACTITIONER
Payer: COMMERCIAL

## 2025-03-11 VITALS
DIASTOLIC BLOOD PRESSURE: 80 MMHG | RESPIRATION RATE: 14 BRPM | SYSTOLIC BLOOD PRESSURE: 125 MMHG | HEART RATE: 68 BPM | TEMPERATURE: 98.1 F

## 2025-03-11 DIAGNOSIS — L84 PRE-ULCERATIVE CALLUSES: Primary | ICD-10-CM

## 2025-03-11 DIAGNOSIS — L60.2 LONG TOENAIL: ICD-10-CM

## 2025-03-11 DIAGNOSIS — Z86.31 PERSONAL HISTORY OF DIABETIC FOOT ULCER: ICD-10-CM

## 2025-03-11 DIAGNOSIS — E11.42 TYPE 2 DIABETES MELLITUS WITH DIABETIC POLYNEUROPATHY, WITHOUT LONG-TERM CURRENT USE OF INSULIN (HCC): ICD-10-CM

## 2025-03-11 PROCEDURE — 11056 PARNG/CUTG B9 HYPRKR LES 2-4: CPT

## 2025-03-11 PROCEDURE — 11719 TRIM NAIL(S) ANY NUMBER: CPT

## 2025-03-11 PROCEDURE — 11719 TRIM NAIL(S) ANY NUMBER: CPT | Performed by: NURSE PRACTITIONER

## 2025-03-11 PROCEDURE — 6370000000 HC RX 637 (ALT 250 FOR IP): Performed by: NURSE PRACTITIONER

## 2025-03-11 PROCEDURE — 11056 PARNG/CUTG B9 HYPRKR LES 2-4: CPT | Performed by: NURSE PRACTITIONER

## 2025-03-11 RX ORDER — NEOMYCIN/BACITRACIN/POLYMYXINB 3.5-400-5K
OINTMENT (GRAM) TOPICAL ONCE
OUTPATIENT
Start: 2025-03-11 | End: 2025-03-11

## 2025-03-11 RX ORDER — GINSENG 100 MG
CAPSULE ORAL ONCE
OUTPATIENT
Start: 2025-03-11 | End: 2025-03-11

## 2025-03-11 RX ORDER — LIDOCAINE HYDROCHLORIDE 20 MG/ML
JELLY TOPICAL ONCE
OUTPATIENT
Start: 2025-03-11 | End: 2025-03-11

## 2025-03-11 RX ORDER — GENTAMICIN SULFATE 1 MG/G
OINTMENT TOPICAL ONCE
OUTPATIENT
Start: 2025-03-11 | End: 2025-03-11

## 2025-03-11 RX ORDER — MUPIROCIN 20 MG/G
OINTMENT TOPICAL ONCE
OUTPATIENT
Start: 2025-03-11 | End: 2025-03-11

## 2025-03-11 RX ORDER — GENTAMICIN SULFATE 1 MG/G
OINTMENT TOPICAL ONCE
Status: COMPLETED | OUTPATIENT
Start: 2025-03-11 | End: 2025-03-11

## 2025-03-11 RX ORDER — BACITRACIN ZINC AND POLYMYXIN B SULFATE 500; 1000 [USP'U]/G; [USP'U]/G
OINTMENT TOPICAL ONCE
OUTPATIENT
Start: 2025-03-11 | End: 2025-03-11

## 2025-03-11 RX ORDER — BETAMETHASONE DIPROPIONATE 0.5 MG/G
CREAM TOPICAL ONCE
OUTPATIENT
Start: 2025-03-11 | End: 2025-03-11

## 2025-03-11 RX ORDER — TRIAMCINOLONE ACETONIDE 1 MG/G
OINTMENT TOPICAL ONCE
OUTPATIENT
Start: 2025-03-11 | End: 2025-03-11

## 2025-03-11 RX ORDER — LIDOCAINE 50 MG/G
OINTMENT TOPICAL ONCE
OUTPATIENT
Start: 2025-03-11 | End: 2025-03-11

## 2025-03-11 RX ORDER — SILVER SULFADIAZINE 10 MG/G
CREAM TOPICAL ONCE
OUTPATIENT
Start: 2025-03-11 | End: 2025-03-11

## 2025-03-11 RX ORDER — LIDOCAINE HYDROCHLORIDE 40 MG/ML
SOLUTION TOPICAL ONCE
OUTPATIENT
Start: 2025-03-11 | End: 2025-03-11

## 2025-03-11 RX ORDER — SODIUM CHLOR/HYPOCHLOROUS ACID 0.033 %
SOLUTION, IRRIGATION IRRIGATION ONCE
OUTPATIENT
Start: 2025-03-11 | End: 2025-03-11

## 2025-03-11 RX ORDER — LIDOCAINE 40 MG/G
CREAM TOPICAL ONCE
OUTPATIENT
Start: 2025-03-11 | End: 2025-03-11

## 2025-03-11 RX ORDER — CLOBETASOL PROPIONATE 0.5 MG/G
OINTMENT TOPICAL ONCE
OUTPATIENT
Start: 2025-03-11 | End: 2025-03-11

## 2025-03-11 RX ADMIN — GENTAMICIN SULFATE: 1 OINTMENT TOPICAL at 13:42

## 2025-03-11 NOTE — PROGRESS NOTES
Wound Care Center Progress Visit      Tomas La  AGE: 54 y.o.   GENDER: male  : 1970  EPISODE DATE:  3/11/2025   Referred by: Ruma Osman FNP     Subjective:     CHIEF COMPLAINT  WOUND   Problem List Items Addressed This Visit          Endocrine    Type 2 diabetes mellitus with diabetic polyneuropathy, without long-term current use of insulin (HCC)       Musculoskeletal and Integument    Pre-ulcerative calluses - Primary       Other    Long toenail    Personal history of diabetic foot ulcer     Chief Complaint   Patient presents with    Wound Check        HISTORY of PRESENT ILLNESS      Tomas La is a 54 y.o. male who presents to the Wound Clinic for evaluation and treatment of routine diabetic foot assessment and care. He has a history on Chronic diabetic  ulcer(s) of the right foot, now healed as of 25. The patient has neuropathy and pre-ulcerative callus. The previous diabetic foot ulcers was treated for several months.  Advanced wound care modalities established with initiation of care at the wound clinic.The patient has significant underlying medical conditions as below. Ruma Osman FNP .    Living Situation  [x] Home [] SNF []  Assisted living  [] Other (ie rehab, etc.) [] Home Health  []  Transportation    Wound Pain Timing/Severity: none  Quality of pain: N/A  Severity of pain:  0 / 10   Modifying Factors: diabetes, chronic pressure, shear force, and obesity  Associated Signs/Symptoms: callus    The patient was examined for peripheral arterial disease, neuropathy, and any structural abnormalities that would place the patient at risk for ulceration. Discussed the importance of tight blood sugar control and the effects on their lower extremities. We discussed neuropathy and foot ulcerations in detail. I did explain the importance of daily monitoring of their feet and what to look for in terms of increased areas of pressure, cuts and abrasions. I did further explain appropriate

## 2025-03-11 NOTE — PATIENT INSTRUCTIONS
PHYSICIAN ORDERS AND DISCHARGE INSTRUCTIONS  NOTE: Upon discharge from the Wound Center, you will receive a patient experience survey via E-mail. We would be grateful if you would take the time to fill this survey out.  Wound care order history:   GREGG's   Right       Left    Date    Vascular studies/Intervention: .     Cultures: .               Antibiotics: .Gent and Doxy 7/2/24, 9/17/24              HbA1c:  .               Grafts:  .#1 Epifix 10/1/24, #2 Epifix 10/8/24, #3 Epifix 10/17/24, #4 Epifix 10/22/24, #5 Epifix 10/29/24, #6 Eipifix 12/10/24. #7 Epifix 11/19/24, #8 Epifix 12/10/24, #9 Epifix 12/17/24   Juxta Lites & Lymph Pumps:  Continuing wound care orders and information:              Residence: .              Continue home health care with:.    Your wound-care supplies will be provided by: .              Pharmacy: [facility]   Wound cleansing:      Do not scrub or use excessive force.    Wash hands with soap and water before and after dressing changes.    Prior to applying a clean dressing, cleanse wound with normal saline,    wound cleanser, or mild soap and water.     Ask your physician or nurse before getting the wound(s) wet in the shower.  Daily Wound management:    Keep weight off wounds and reposition every 2 hours.    Avoid standing for long periods of time.    Evaluate legs to the level of the heart or above for 30 minutes 4-5 times a day and/or when sitting.       When taking antibiotics take entire prescription as ordered by MD do not stop taking until medicine is all gone.     Documentation:  Compression: .   Offloading: .Forefoot offloader ordered   Toe Nail Trim: 03/11/25        Orders for this week (3/11/2025)  HEALED on 02/04/25    Paint toe nails with Betadine on 03/11/25    BLE  Moisturize feet and legs with A&D  Apply compression socks at home- wear while standing, walking, sitting- may remove at bedtime    Right medial ankle-   Apply Gentamicin and puracol ag   Cover with

## 2025-03-11 NOTE — PROGRESS NOTES
NURSE TX:    Applied gentamicin and puracol ag and gentac to right medial ankle per order. Patient tolerated well. F/u appt. Scheduled for one month.

## 2025-03-14 ENCOUNTER — TELEPHONE (OUTPATIENT)
Age: 55
End: 2025-03-14

## 2025-03-14 NOTE — TELEPHONE ENCOUNTER
Pt called requesting medication change pt. Was taking methocarbamol to baclofen. Pt. States baclofen is not working as well and would like to go back to methocarbamol.  Please advise.

## 2025-03-17 NOTE — TELEPHONE ENCOUNTER
Attempted to reach pt. Left HIPAA Compliant VM for pt. To return call to the office.      PAST SURGICAL HISTORY:  FH: Total Abdominal Hysterectomy and Bilateral Salpingo-Oophorectomy     S/P small bowel resection 08/2019

## 2025-03-18 NOTE — TELEPHONE ENCOUNTER
Spoke with pt. Advised of message from PCP. Pt. Verbalized understanding.   No further action at this time

## 2025-03-26 DIAGNOSIS — G62.9 NEUROPATHY: Primary | ICD-10-CM

## 2025-03-26 RX ORDER — GABAPENTIN 600 MG/1
600 TABLET ORAL 3 TIMES DAILY
Qty: 90 TABLET | Refills: 5 | Status: SHIPPED | OUTPATIENT
Start: 2025-03-26 | End: 2025-09-26

## 2025-03-31 ENCOUNTER — OFFICE VISIT (OUTPATIENT)
Dept: FAMILY MEDICINE CLINIC | Age: 55
End: 2025-03-31
Payer: COMMERCIAL

## 2025-03-31 VITALS
HEART RATE: 69 BPM | SYSTOLIC BLOOD PRESSURE: 128 MMHG | DIASTOLIC BLOOD PRESSURE: 72 MMHG | WEIGHT: 258.6 LBS | HEIGHT: 72 IN | OXYGEN SATURATION: 95 % | BODY MASS INDEX: 35.03 KG/M2

## 2025-03-31 DIAGNOSIS — Z97.8 USES SELF-APPLIED CONTINUOUS GLUCOSE MONITORING DEVICE: ICD-10-CM

## 2025-03-31 DIAGNOSIS — R30.0 DYSURIA: Primary | ICD-10-CM

## 2025-03-31 DIAGNOSIS — R09.82 POSTNASAL DRIP: ICD-10-CM

## 2025-03-31 DIAGNOSIS — E11.42 TYPE 2 DIABETES MELLITUS WITH DIABETIC POLYNEUROPATHY, WITHOUT LONG-TERM CURRENT USE OF INSULIN (HCC): ICD-10-CM

## 2025-03-31 PROCEDURE — 95251 CONT GLUC MNTR ANALYSIS I&R: CPT

## 2025-03-31 PROCEDURE — 3078F DIAST BP <80 MM HG: CPT

## 2025-03-31 PROCEDURE — 3052F HG A1C>EQUAL 8.0%<EQUAL 9.0%: CPT

## 2025-03-31 PROCEDURE — 3074F SYST BP LT 130 MM HG: CPT

## 2025-03-31 PROCEDURE — 99214 OFFICE O/P EST MOD 30 MIN: CPT

## 2025-03-31 RX ORDER — LORATADINE 10 MG/1
10 TABLET ORAL DAILY
Qty: 90 TABLET | Refills: 1 | Status: SHIPPED | OUTPATIENT
Start: 2025-03-31 | End: 2025-05-30

## 2025-03-31 RX ORDER — INSULIN ASPART 100 [IU]/ML
INJECTION, SOLUTION INTRAVENOUS; SUBCUTANEOUS
Qty: 35 ADJUSTABLE DOSE PRE-FILLED PEN SYRINGE | Refills: 0 | Status: SHIPPED | OUTPATIENT
Start: 2025-03-31

## 2025-03-31 RX ORDER — INCOBOTULINUMTOXINA 100 [USP'U]/1
INJECTION, POWDER, LYOPHILIZED, FOR SOLUTION INTRAMUSCULAR
COMMUNITY
Start: 2025-03-21

## 2025-03-31 NOTE — ASSESSMENT & PLAN NOTE
Orders:    Albumin/Creatinine Ratio, Urine    insulin aspart (NOVOLOG FLEXPEN) 100 UNIT/ML injection pen; Inject 0-40 units 3 times daily approximately 15 minutes prior to meals.  Max daily dose=120 units.

## 2025-03-31 NOTE — PROGRESS NOTES
Tomas La (:  1970) is a 54 y.o. male,Established patient, here for evaluation of the following chief complaint(s):  Diabetes and Follow-up (4 weeks)      ASSESSMENT/PLAN:  Assessment & Plan  Dysuria   Orders:    Urinalysis with Reflex to Culture    Type 2 diabetes mellitus with diabetic polyneuropathy, without long-term current use of insulin ([facility])   Orders:    Albumin/Creatinine Ratio, Urine    insulin aspart (NOVOLOG FLEXPEN) 100 UNIT/ML injection pen; Inject 0-40 units 3 times daily approximately 15 minutes prior to meals.  Max daily dose=120 units.    Uses self-applied continuous glucose monitoring device   Orders:    GLUCOSE MONITOR, 72 HOUR, PHYS INTERP      Assessment & Plan  1. Health Maintenance.  - No acute shortness of breath, cough, dizziness, lightheadedness, chest pains, or palpitations reported.  - Heart rate and blood pressure remained stable.  - Skin is warm, dry, and intact.  - No edema in the lower extremities observed.    2. Urinary Tract Infection.  - Reports dysuria ongoing for about 1 week, along with difficulty starting and continuing a stream.  - No unusual drainage, blood in the urine, or fever.  - Urinalysis and culture ordered to identify the causative organism and determine antibiotic sensitivity.  - Advised to continue using Pyridium for symptom management and increase fluid intake to help flush out the infection. Cranberry juice is not recommended due to its high sugar content, which can exacerbate UTI symptoms in diabetic patients.    3. Diabetes Mellitus.  - CGM report shows progress, with an increase in time in range to 65% and a decrease in average blood glucose levels from 189 to 164. Variability has also improved but still needs to be reduced further.  - Long-acting insulin taken during the day instead of at night, which seems to help stabilize blood sugar levels.  - Advised to continue this regimen and ensure insulin is taken before meals.  - NovoLog pens

## 2025-04-01 PROBLEM — M21.969 TYPE 2 DIABETES MELLITUS WITH DIABETIC FOOT DEFORMITY (HCC): Status: ACTIVE | Noted: 2025-04-01

## 2025-04-01 PROBLEM — L89.892 PRESSURE INJURY OF DORSUM OF RIGHT FOOT, STAGE 2 (HCC): Status: RESOLVED | Noted: 2024-11-26 | Resolved: 2025-04-01

## 2025-04-01 PROBLEM — E11.69 TYPE 2 DIABETES MELLITUS WITH DIABETIC FOOT DEFORMITY (HCC): Status: ACTIVE | Noted: 2025-04-01

## 2025-04-01 LAB
BACTERIA URNS QL MICRO: ABNORMAL /HPF
BILIRUB UR QL STRIP.AUTO: NEGATIVE
CALCIUM OXALATE CRYSTALS: PRESENT
CLARITY UR: CLEAR
COLOR UR: YELLOW
CREAT UR-MCNC: 148 MG/DL (ref 39–259)
EPI CELLS #/AREA URNS AUTO: 1 /HPF (ref 0–5)
GLUCOSE UR STRIP.AUTO-MCNC: NEGATIVE MG/DL
HGB UR QL STRIP.AUTO: ABNORMAL
HYALINE CASTS #/AREA URNS AUTO: 1 /LPF (ref 0–8)
KETONES UR STRIP.AUTO-MCNC: NEGATIVE MG/DL
LEUKOCYTE ESTERASE UR QL STRIP.AUTO: ABNORMAL
MICROALBUMIN UR DL<=1MG/L-MCNC: 23 MG/DL
MICROALBUMIN/CREAT UR: 155.4 MG/G (ref 0–30)
NITRITE UR QL STRIP.AUTO: NEGATIVE
PH UR STRIP.AUTO: 5.5 [PH] (ref 5–8)
PROT UR STRIP.AUTO-MCNC: 100 MG/DL
RBC CLUMPS #/AREA URNS AUTO: 22 /HPF (ref 0–4)
SP GR UR STRIP.AUTO: 1.02 (ref 1–1.03)
UA COMPLETE W REFLEX CULTURE PNL UR: YES
UA DIPSTICK W REFLEX MICRO PNL UR: YES
URN SPEC COLLECT METH UR: ABNORMAL
UROBILINOGEN UR STRIP-ACNC: 0.2 E.U./DL
WBC #/AREA URNS AUTO: 14 /HPF (ref 0–5)

## 2025-04-03 ENCOUNTER — RESULTS FOLLOW-UP (OUTPATIENT)
Dept: FAMILY MEDICINE CLINIC | Age: 55
End: 2025-04-03

## 2025-04-03 DIAGNOSIS — N39.0 URINARY TRACT INFECTION WITH HEMATURIA, SITE UNSPECIFIED: Primary | ICD-10-CM

## 2025-04-03 DIAGNOSIS — R31.9 URINARY TRACT INFECTION WITH HEMATURIA, SITE UNSPECIFIED: Primary | ICD-10-CM

## 2025-04-03 LAB
BACTERIA UR CULT: ABNORMAL
ORGANISM: ABNORMAL

## 2025-04-03 RX ORDER — NITROFURANTOIN 25; 75 MG/1; MG/1
100 CAPSULE ORAL 2 TIMES DAILY
Qty: 14 CAPSULE | Refills: 0 | Status: SHIPPED | OUTPATIENT
Start: 2025-04-03 | End: 2025-04-10

## 2025-04-08 ENCOUNTER — HOSPITAL ENCOUNTER (OUTPATIENT)
Dept: WOUND CARE | Age: 55
Discharge: HOME | End: 2025-04-08
Attending: NURSE PRACTITIONER
Payer: COMMERCIAL

## 2025-04-08 ENCOUNTER — TELEPHONE (OUTPATIENT)
Age: 55
End: 2025-04-08

## 2025-04-08 VITALS
DIASTOLIC BLOOD PRESSURE: 76 MMHG | TEMPERATURE: 98.4 F | RESPIRATION RATE: 16 BRPM | HEART RATE: 70 BPM | SYSTOLIC BLOOD PRESSURE: 114 MMHG

## 2025-04-08 DIAGNOSIS — Z72.0 SMOKELESS TOBACCO USE: ICD-10-CM

## 2025-04-08 DIAGNOSIS — L60.2 LONG TOENAIL: ICD-10-CM

## 2025-04-08 DIAGNOSIS — Z86.31 PERSONAL HISTORY OF DIABETIC FOOT ULCER: ICD-10-CM

## 2025-04-08 DIAGNOSIS — E11.42 TYPE 2 DIABETES MELLITUS WITH DIABETIC POLYNEUROPATHY, WITHOUT LONG-TERM CURRENT USE OF INSULIN (HCC): Primary | ICD-10-CM

## 2025-04-08 DIAGNOSIS — L84 PRE-ULCERATIVE CALLUSES: ICD-10-CM

## 2025-04-08 PROCEDURE — 11056 PARNG/CUTG B9 HYPRKR LES 2-4: CPT | Performed by: NURSE PRACTITIONER

## 2025-04-08 PROCEDURE — 99212 OFFICE O/P EST SF 10 MIN: CPT

## 2025-04-08 PROCEDURE — 11719 TRIM NAIL(S) ANY NUMBER: CPT | Performed by: NURSE PRACTITIONER

## 2025-04-08 PROCEDURE — 11056 PARNG/CUTG B9 HYPRKR LES 2-4: CPT

## 2025-04-08 RX ORDER — LIDOCAINE HYDROCHLORIDE 40 MG/ML
SOLUTION TOPICAL ONCE
OUTPATIENT
Start: 2025-04-08 | End: 2025-04-08

## 2025-04-08 RX ORDER — CLOBETASOL PROPIONATE 0.5 MG/G
OINTMENT TOPICAL ONCE
OUTPATIENT
Start: 2025-04-08 | End: 2025-04-08

## 2025-04-08 RX ORDER — GENTAMICIN SULFATE 1 MG/G
OINTMENT TOPICAL ONCE
OUTPATIENT
Start: 2025-04-08 | End: 2025-04-08

## 2025-04-08 RX ORDER — SILVER SULFADIAZINE 10 MG/G
CREAM TOPICAL ONCE
OUTPATIENT
Start: 2025-04-08 | End: 2025-04-08

## 2025-04-08 RX ORDER — BACITRACIN ZINC AND POLYMYXIN B SULFATE 500; 1000 [USP'U]/G; [USP'U]/G
OINTMENT TOPICAL ONCE
OUTPATIENT
Start: 2025-04-08 | End: 2025-04-08

## 2025-04-08 RX ORDER — MUPIROCIN 20 MG/G
OINTMENT TOPICAL ONCE
OUTPATIENT
Start: 2025-04-08 | End: 2025-04-08

## 2025-04-08 RX ORDER — TRIAMCINOLONE ACETONIDE 1 MG/G
OINTMENT TOPICAL ONCE
OUTPATIENT
Start: 2025-04-08 | End: 2025-04-08

## 2025-04-08 RX ORDER — DICYCLOMINE HYDROCHLORIDE 10 MG/1
10 CAPSULE ORAL
Qty: 60 CAPSULE | Refills: 0 | Status: SHIPPED | OUTPATIENT
Start: 2025-04-08

## 2025-04-08 RX ORDER — LIDOCAINE 40 MG/G
CREAM TOPICAL ONCE
OUTPATIENT
Start: 2025-04-08 | End: 2025-04-08

## 2025-04-08 RX ORDER — GINSENG 100 MG
CAPSULE ORAL ONCE
OUTPATIENT
Start: 2025-04-08 | End: 2025-04-08

## 2025-04-08 RX ORDER — NEOMYCIN/BACITRACIN/POLYMYXINB 3.5-400-5K
OINTMENT (GRAM) TOPICAL ONCE
OUTPATIENT
Start: 2025-04-08 | End: 2025-04-08

## 2025-04-08 RX ORDER — SODIUM CHLOR/HYPOCHLOROUS ACID 0.033 %
SOLUTION, IRRIGATION IRRIGATION ONCE
OUTPATIENT
Start: 2025-04-08 | End: 2025-04-08

## 2025-04-08 RX ORDER — BETAMETHASONE DIPROPIONATE 0.5 MG/G
CREAM TOPICAL ONCE
OUTPATIENT
Start: 2025-04-08 | End: 2025-04-08

## 2025-04-08 RX ORDER — LIDOCAINE HYDROCHLORIDE 20 MG/ML
JELLY TOPICAL ONCE
OUTPATIENT
Start: 2025-04-08 | End: 2025-04-08

## 2025-04-08 RX ORDER — LIDOCAINE 50 MG/G
OINTMENT TOPICAL ONCE
OUTPATIENT
Start: 2025-04-08 | End: 2025-04-08

## 2025-04-08 NOTE — PLAN OF CARE
Problem: Wound:  Goal: Will show signs of wound healing; wound closure and no evidence of infection  Description: Will show signs of wound healing; wound closure and no evidence of infection  Outcome: Progressing  Note: See note     Problem: Wound:  Intervention: Assess ankle, calf, or foot circumference blilaterally  Note: See note  Intervention: Assess pain status  Note: See note

## 2025-04-08 NOTE — PROGRESS NOTES
Wound Care Center Progress Visit      Tomas La  AGE: 54 y.o.   GENDER: male  : 1970  EPISODE DATE:  2025   Referred by: Ruma Osman FNP     Subjective:     CHIEF COMPLAINT  WOUND   Problem List Items Addressed This Visit          Endocrine    Type 2 diabetes mellitus with diabetic polyneuropathy, without long-term current use of insulin (HCC) - Primary       Musculoskeletal and Integument    Pre-ulcerative calluses       Other    Smokeless tobacco use    Long toenail    Personal history of diabetic foot ulcer     Chief Complaint   Patient presents with    Wound Check        HISTORY of PRESENT ILLNESS      Tomas La is a 54 y.o. male who presents to the Wound Clinic for evaluation and treatment of routine diabetic foot assessment and care. He has a history on Chronic diabetic  ulcer(s) of the right foot, now healed as of 25. The patient has neuropathy and pre-ulcerative callus. The previous diabetic foot ulcers was treated for several months.  Advanced wound care modalities established with initiation of care at the wound clinic.The patient has significant underlying medical conditions as below. Ruma Osman FNP .    Living Situation  [x] Home [] SNF []  Assisted living  [] Other (ie rehab, etc.) [] Home Health  []  Transportation    Wound Pain Timing/Severity: none  Quality of pain: N/A  Severity of pain:  0 / 10   Modifying Factors: diabetes, chronic pressure, shear force, and obesity  Associated Signs/Symptoms: callus    The patient was examined for peripheral arterial disease, neuropathy, and any structural abnormalities that would place the patient at risk for ulceration. Discussed the importance of tight blood sugar control and the effects on their lower extremities. We discussed neuropathy and foot ulcerations in detail. I did explain the importance of daily monitoring of their feet and what to look for in terms of increased areas of pressure, cuts and abrasions. I did further

## 2025-04-08 NOTE — PATIENT INSTRUCTIONS
PHYSICIAN ORDERS AND DISCHARGE INSTRUCTIONS  NOTE: Upon discharge from the Wound Center, you will receive a patient experience survey via E-mail. We would be grateful if you would take the time to fill this survey out.  Wound care order history:   GREGG's   Right       Left    Date    Vascular studies/Intervention: .     Cultures: .               Antibiotics: .Gent and Doxy 7/2/24, 9/17/24              HbA1c:  .               Grafts:  .#1 Epifix 10/1/24, #2 Epifix 10/8/24, #3 Epifix 10/17/24, #4 Epifix 10/22/24, #5 Epifix 10/29/24, #6 Eipifix 12/10/24. #7 Epifix 11/19/24, #8 Epifix 12/10/24, #9 Epifix 12/17/24   Juxta Lites & Lymph Pumps:  Continuing wound care orders and information:              Residence: .              Continue home health care with:.    Your wound-care supplies will be provided by: .              Pharmacy: [facility]   Wound cleansing:      Do not scrub or use excessive force.    Wash hands with soap and water before and after dressing changes.    Prior to applying a clean dressing, cleanse wound with normal saline,    wound cleanser, or mild soap and water.     Ask your physician or nurse before getting the wound(s) wet in the shower.  Daily Wound management:    Keep weight off wounds and reposition every 2 hours.    Avoid standing for long periods of time.    Evaluate legs to the level of the heart or above for 30 minutes 4-5 times a day and/or when sitting.       When taking antibiotics take entire prescription as ordered by MD do not stop taking until medicine is all gone.     Documentation:  Compression: .   Offloading: .Forefoot offloader ordered   Toe Nail Trim: 03/11/25        Orders for this week (4/8/2025)    Paint toe nails with Betadine on 04/08/25    BLE  Moisturize feet and legs with A&D  Apply compression socks at home- wear while standing, walking, sitting- may remove at bedtime    Please dispense 30 day quantity when sending supplies     Follow up OWEN Wilkerson in one month

## 2025-04-08 NOTE — TELEPHONE ENCOUNTER
Attempt to reach patient was unsuccessful.  Left contact information and asked to return call at earliest convenience to discuss paperwork.

## 2025-04-15 ENCOUNTER — TELEPHONE (OUTPATIENT)
Age: 55
End: 2025-04-15

## 2025-05-06 ENCOUNTER — HOSPITAL ENCOUNTER (OUTPATIENT)
Dept: WOUND CARE | Age: 55
Discharge: HOME | End: 2025-05-06
Attending: NURSE PRACTITIONER
Payer: COMMERCIAL

## 2025-05-06 VITALS
HEART RATE: 68 BPM | RESPIRATION RATE: 16 BRPM | TEMPERATURE: 97.3 F | DIASTOLIC BLOOD PRESSURE: 74 MMHG | SYSTOLIC BLOOD PRESSURE: 127 MMHG

## 2025-05-06 DIAGNOSIS — M21.969 TYPE 2 DIABETES MELLITUS WITH DIABETIC FOOT DEFORMITY (HCC): ICD-10-CM

## 2025-05-06 DIAGNOSIS — E11.42 TYPE 2 DIABETES MELLITUS WITH DIABETIC POLYNEUROPATHY, WITHOUT LONG-TERM CURRENT USE OF INSULIN (HCC): Primary | ICD-10-CM

## 2025-05-06 DIAGNOSIS — Z86.31 PERSONAL HISTORY OF DIABETIC FOOT ULCER: ICD-10-CM

## 2025-05-06 DIAGNOSIS — L60.2 LONG TOENAIL: ICD-10-CM

## 2025-05-06 DIAGNOSIS — E11.610 CHARCOT'S JOINT OF FOOT IN TYPE 2 DIABETES MELLITUS (HCC): ICD-10-CM

## 2025-05-06 DIAGNOSIS — L84 PRE-ULCERATIVE CALLUSES: ICD-10-CM

## 2025-05-06 DIAGNOSIS — E11.69 TYPE 2 DIABETES MELLITUS WITH DIABETIC FOOT DEFORMITY (HCC): ICD-10-CM

## 2025-05-06 PROCEDURE — 11056 PARNG/CUTG B9 HYPRKR LES 2-4: CPT | Performed by: NURSE PRACTITIONER

## 2025-05-06 PROCEDURE — 11056 PARNG/CUTG B9 HYPRKR LES 2-4: CPT

## 2025-05-06 PROCEDURE — 11719 TRIM NAIL(S) ANY NUMBER: CPT | Performed by: NURSE PRACTITIONER

## 2025-05-06 NOTE — PROGRESS NOTES
Wound Care Center Progress Visit      Tomas La  AGE: 54 y.o.   GENDER: male  : 1970  EPISODE DATE:  2025   Referred by: Ruma Osman FNP     Subjective:     CHIEF COMPLAINT  WOUND   Problem List Items Addressed This Visit          Endocrine    Charcot's joint of foot in type 2 diabetes mellitus (HCC)    Type 2 diabetes mellitus with diabetic polyneuropathy, without long-term current use of insulin (HCC) - Primary    Type 2 diabetes mellitus with diabetic foot deformity (HCC)       Musculoskeletal and Integument    Pre-ulcerative calluses       Other    Long toenail    Personal history of diabetic foot ulcer     Chief Complaint   Patient presents with    Wound Check        HISTORY of PRESENT ILLNESS      Tomas La is a 54 y.o. male who presents to the Wound Clinic for evaluation and treatment of routine diabetic foot assessment and care. He has a history on Chronic diabetic  ulcer(s) of the right foot, now healed as of 25. The patient has neuropathy and pre-ulcerative callus. The previous diabetic foot ulcers was treated for several months.  Advanced wound care modalities established with initiation of care at the wound clinic.The patient has significant underlying medical conditions as below. Ruma Osman FNP .    Living Situation  [x] Home [] SNF []  Assisted living  [] Other (ie rehab, etc.) [] Home Health  []  Transportation    Wound Pain Timing/Severity: none  Quality of pain: N/A  Severity of pain:  0 / 10   Modifying Factors: diabetes, chronic pressure, shear force, and obesity  Associated Signs/Symptoms: callus    The patient was examined for peripheral arterial disease, neuropathy, and any structural abnormalities that would place the patient at risk for ulceration. Discussed the importance of tight blood sugar control and the effects on their lower extremities. We discussed neuropathy and foot ulcerations in detail. I did explain the importance of daily monitoring of their

## 2025-05-12 RX ORDER — DICYCLOMINE HYDROCHLORIDE 10 MG/1
10 CAPSULE ORAL
Qty: 60 CAPSULE | Refills: 0 | Status: SHIPPED | OUTPATIENT
Start: 2025-05-12

## 2025-06-10 ENCOUNTER — OFFICE VISIT (OUTPATIENT)
Age: 55
End: 2025-06-10
Payer: COMMERCIAL

## 2025-06-10 ENCOUNTER — PATIENT MESSAGE (OUTPATIENT)
Age: 55
End: 2025-06-10

## 2025-06-10 VITALS
HEART RATE: 64 BPM | OXYGEN SATURATION: 97 % | SYSTOLIC BLOOD PRESSURE: 110 MMHG | RESPIRATION RATE: 18 BRPM | WEIGHT: 263.6 LBS | DIASTOLIC BLOOD PRESSURE: 64 MMHG | BODY MASS INDEX: 35.75 KG/M2

## 2025-06-10 DIAGNOSIS — K52.9 CHRONIC DIARRHEA: ICD-10-CM

## 2025-06-10 DIAGNOSIS — J30.2 SEASONAL ALLERGIES: Primary | ICD-10-CM

## 2025-06-10 PROCEDURE — 99214 OFFICE O/P EST MOD 30 MIN: CPT

## 2025-06-10 PROCEDURE — 3074F SYST BP LT 130 MM HG: CPT

## 2025-06-10 PROCEDURE — 3078F DIAST BP <80 MM HG: CPT

## 2025-06-10 RX ORDER — DICYCLOMINE HYDROCHLORIDE 10 MG/1
10 CAPSULE ORAL
Qty: 60 CAPSULE | Refills: 2 | Status: SHIPPED | OUTPATIENT
Start: 2025-06-10 | End: 2025-06-11

## 2025-06-10 RX ORDER — FEXOFENADINE HCL 180 MG/1
180 TABLET ORAL DAILY
Qty: 30 TABLET | Refills: 0 | Status: SHIPPED | OUTPATIENT
Start: 2025-06-10 | End: 2025-07-10

## 2025-06-10 ASSESSMENT — ENCOUNTER SYMPTOMS
DIARRHEA: 1
EYE ITCHING: 1
RESPIRATORY NEGATIVE: 1
RHINORRHEA: 1

## 2025-06-10 NOTE — PROGRESS NOTES
your brain biopsy. 90 tablet 1    blood glucose monitor strips Test 4 times a day & as needed for symptoms of irregular blood glucose. Dispense sufficient amount for indicated testing frequency plus additional to accommodate PRN testing needs. 120 strip 0    fluticasone (FLONASE) 50 MCG/ACT nasal spray 2 sprays by Each Nostril route daily (Patient not taking: Reported on 6/10/2025) 16 g 0     No current facility-administered medications for this visit.       /64 (BP Site: Left Upper Arm, Patient Position: Sitting, BP Cuff Size: Large Adult)   Pulse 64   Resp 18   Wt 119.6 kg (263 lb 9.6 oz)   SpO2 97%   BMI 35.75 kg/m²     Review of Systems   Constitutional: Negative.    HENT:  Positive for congestion and rhinorrhea.    Eyes:  Positive for itching.   Respiratory: Negative.     Cardiovascular: Negative.    Gastrointestinal:  Positive for diarrhea.   Endocrine: Negative.    Neurological: Negative.    Psychiatric/Behavioral: Negative.            Objective   Physical Exam  Vitals and nursing note reviewed.   Constitutional:       General: He is not in acute distress.     Appearance: Normal appearance. He is not ill-appearing or toxic-appearing.   Cardiovascular:      Rate and Rhythm: Normal rate and regular rhythm.      Pulses: Normal pulses.      Heart sounds: Normal heart sounds. No murmur heard.  Pulmonary:      Effort: Pulmonary effort is normal. No respiratory distress.      Breath sounds: Normal breath sounds. No wheezing or rhonchi.   Abdominal:      General: Abdomen is flat. Bowel sounds are normal. There is no distension.      Palpations: Abdomen is soft.      Tenderness: There is no abdominal tenderness.   Neurological:      General: No focal deficit present.      Mental Status: He is alert and oriented to person, place, and time.   Psychiatric:         Mood and Affect: Mood normal.         Behavior: Behavior normal.            Assessment & Plan   1. Seasonal allergies  - fexofenadine (ALLEGRA) 180

## 2025-06-11 ENCOUNTER — OFFICE VISIT (OUTPATIENT)
Dept: GASTROENTEROLOGY | Age: 55
End: 2025-06-11
Payer: COMMERCIAL

## 2025-06-11 ENCOUNTER — PREP FOR PROCEDURE (OUTPATIENT)
Dept: GASTROENTEROLOGY | Age: 55
End: 2025-06-11

## 2025-06-11 VITALS
SYSTOLIC BLOOD PRESSURE: 122 MMHG | OXYGEN SATURATION: 98 % | DIASTOLIC BLOOD PRESSURE: 68 MMHG | BODY MASS INDEX: 35.21 KG/M2 | RESPIRATION RATE: 18 BRPM | WEIGHT: 260 LBS | HEART RATE: 76 BPM | HEIGHT: 72 IN

## 2025-06-11 DIAGNOSIS — R13.10 DYSPHAGIA, UNSPECIFIED TYPE: ICD-10-CM

## 2025-06-11 DIAGNOSIS — K58.0 IRRITABLE BOWEL SYNDROME WITH DIARRHEA: ICD-10-CM

## 2025-06-11 DIAGNOSIS — R15.9 INCONTINENCE OF FECES, UNSPECIFIED FECAL INCONTINENCE TYPE: ICD-10-CM

## 2025-06-11 DIAGNOSIS — K52.9 CHRONIC DIARRHEA: Primary | ICD-10-CM

## 2025-06-11 DIAGNOSIS — K21.9 GASTROESOPHAGEAL REFLUX DISEASE, UNSPECIFIED WHETHER ESOPHAGITIS PRESENT: ICD-10-CM

## 2025-06-11 PROCEDURE — 99214 OFFICE O/P EST MOD 30 MIN: CPT | Performed by: INTERNAL MEDICINE

## 2025-06-11 PROCEDURE — 3074F SYST BP LT 130 MM HG: CPT | Performed by: INTERNAL MEDICINE

## 2025-06-11 PROCEDURE — 3078F DIAST BP <80 MM HG: CPT | Performed by: INTERNAL MEDICINE

## 2025-06-11 RX ORDER — DICYCLOMINE HCL 20 MG
20 TABLET ORAL 2 TIMES DAILY
Qty: 60 TABLET | Refills: 2 | Status: SHIPPED | OUTPATIENT
Start: 2025-06-11

## 2025-06-11 NOTE — H&P (VIEW-ONLY)
[facility] Gastroenterology and Hepatology             MD Vitaliy Funk MD Carol Christensen, APRN-CNP       Delmi Olivas, APRN-CNP             [address] [place] [address]             [phone] fax [phone]        Gastroenterology Clinic Consultation    Vitaliy Gardner MD  Encounter Date: 06/11/25     CC: Follow-up (Pt is here to discuss his change is bowel habit- having issues controlling bowel movements, continuously getting worse )       No referring provider defined for this encounter.     History obtained from: patient, mother, medical records     Subjective:       Tomas La is an 54 y.o. male with past medical history of CVA, psoriasis, type 2 diabetes who presents for Follow-up (Pt is here to discuss his change is bowel habit- having issues controlling bowel movements, continuously getting worse )  6/11/2025  History of Present Illness  The patient presents for evaluation of diarrhea, dysphagia, and heartburn.    He reports persistent diarrhea, with bowel movements occurring at least twice daily, and occasionally up to four times. The stools are predominantly loose and often immediate postprandial, to the extent that he struggles to reach the bathroom in time. He has been managing this with Bentyl 10 mg, but perceives minimal relief. His primary care physician has suggested doubling the Bentyl dosage. He also takes Imodium when planning to leave the house. His last colonoscopy was performed by Dr. Foley in 2022, with a recommendation for a repeat procedure in 5 years. He has also been taking Benefiber and Align, but reports no significant improvement.    He reports satisfactory swallowing function, although he experiences occasional choking episodes, even on saliva. He has not undergone an upper endoscopy previously. He also reports daily hiccups since his stroke. He has had one session of speech therapy, after which it was

## 2025-06-12 RX ORDER — SODIUM CHLORIDE, SODIUM LACTATE, POTASSIUM CHLORIDE, CALCIUM CHLORIDE 600; 310; 30; 20 MG/100ML; MG/100ML; MG/100ML; MG/100ML
INJECTION, SOLUTION INTRAVENOUS CONTINUOUS
Status: CANCELLED | OUTPATIENT
Start: 2025-06-12

## 2025-06-12 RX ORDER — SODIUM CHLORIDE 0.9 % (FLUSH) 0.9 %
5-40 SYRINGE (ML) INJECTION EVERY 12 HOURS SCHEDULED
Status: CANCELLED | OUTPATIENT
Start: 2025-06-12

## 2025-06-12 RX ORDER — SODIUM CHLORIDE 9 MG/ML
INJECTION, SOLUTION INTRAVENOUS PRN
Status: CANCELLED | OUTPATIENT
Start: 2025-06-12

## 2025-06-12 RX ORDER — SODIUM CHLORIDE 0.9 % (FLUSH) 0.9 %
5-40 SYRINGE (ML) INJECTION PRN
Status: CANCELLED | OUTPATIENT
Start: 2025-06-12

## 2025-06-26 NOTE — PROGRESS NOTES
LEFT MESSAGE TO CALL PAT FOR ASSESSMENT/INSTRUCTIONS FOR PROCEDURE AT [facility] ON 7/7/25 AT 1300, ARRIVAL AT 1130.

## 2025-06-27 NOTE — PROGRESS NOTES
6/27/25 - 2nd message:  LAURYN with my call-back # concerning  surgery @ [facility] on  7/7/25.  Please call the PAT Nurse for a phone assessment and surgery instructions.

## 2025-06-30 ENCOUNTER — HOSPITAL ENCOUNTER (OUTPATIENT)
Age: 55
Setting detail: SPECIMEN
Discharge: HOME | End: 2025-06-30
Payer: COMMERCIAL

## 2025-06-30 ENCOUNTER — OFFICE VISIT (OUTPATIENT)
Dept: FAMILY MEDICINE CLINIC | Age: 55
End: 2025-06-30
Payer: COMMERCIAL

## 2025-06-30 VITALS
BODY MASS INDEX: 34.88 KG/M2 | SYSTOLIC BLOOD PRESSURE: 108 MMHG | WEIGHT: 257.2 LBS | HEART RATE: 70 BPM | OXYGEN SATURATION: 98 % | DIASTOLIC BLOOD PRESSURE: 64 MMHG

## 2025-06-30 DIAGNOSIS — Z79.4 TYPE 2 DIABETES MELLITUS WITH DIABETIC POLYNEUROPATHY, WITH LONG-TERM CURRENT USE OF INSULIN (HCC): Primary | ICD-10-CM

## 2025-06-30 DIAGNOSIS — E16.2 HYPOGLYCEMIA: ICD-10-CM

## 2025-06-30 DIAGNOSIS — E11.42 TYPE 2 DIABETES MELLITUS WITH DIABETIC POLYNEUROPATHY, WITH LONG-TERM CURRENT USE OF INSULIN (HCC): Primary | ICD-10-CM

## 2025-06-30 DIAGNOSIS — Z97.8 USES SELF-APPLIED CONTINUOUS GLUCOSE MONITORING DEVICE: ICD-10-CM

## 2025-06-30 LAB — HBA1C MFR BLD: 6.9 %

## 2025-06-30 PROCEDURE — 3078F DIAST BP <80 MM HG: CPT

## 2025-06-30 PROCEDURE — 3044F HG A1C LEVEL LT 7.0%: CPT

## 2025-06-30 PROCEDURE — 3074F SYST BP LT 130 MM HG: CPT

## 2025-06-30 PROCEDURE — 99215 OFFICE O/P EST HI 40 MIN: CPT

## 2025-06-30 PROCEDURE — 95251 CONT GLUC MNTR ANALYSIS I&R: CPT

## 2025-06-30 PROCEDURE — 83036 HEMOGLOBIN GLYCOSYLATED A1C: CPT

## 2025-06-30 PROCEDURE — 83993 ASSAY FOR CALPROTECTIN FECAL: CPT

## 2025-06-30 PROCEDURE — 82653 EL-1 FECAL QUANTITATIVE: CPT

## 2025-06-30 RX ORDER — INSULIN ASPART 100 [IU]/ML
INJECTION, SOLUTION INTRAVENOUS; SUBCUTANEOUS
Qty: 35 ADJUSTABLE DOSE PRE-FILLED PEN SYRINGE | Refills: 1 | Status: SHIPPED | OUTPATIENT
Start: 2025-06-30

## 2025-06-30 RX ORDER — WHEAT DEXTRIN 1 G
4 TABLET ORAL
COMMUNITY

## 2025-06-30 RX ORDER — ACYCLOVIR 400 MG/1
TABLET ORAL
Qty: 9 EACH | Refills: 1 | Status: SHIPPED | OUTPATIENT
Start: 2025-06-30

## 2025-06-30 RX ORDER — INSULIN GLARGINE 100 [IU]/ML
10 INJECTION, SOLUTION SUBCUTANEOUS
COMMUNITY

## 2025-06-30 RX ORDER — GLUCAGON INJECTION, SOLUTION 1 MG/.2ML
1 INJECTION, SOLUTION SUBCUTANEOUS AS NEEDED
Qty: 0.4 ML | Refills: 1 | Status: SHIPPED | OUTPATIENT
Start: 2025-06-30

## 2025-06-30 RX ORDER — INSULIN DEGLUDEC 100 U/ML
30 INJECTION, SOLUTION SUBCUTANEOUS DAILY
Qty: 10 ADJUSTABLE DOSE PRE-FILLED PEN SYRINGE | Refills: 1 | Status: SHIPPED | OUTPATIENT
Start: 2025-06-30

## 2025-06-30 NOTE — PROGRESS NOTES
taking insulin, it evelia to 140 within an hour. He also mentions that he noticed sweating during a recent hypoglycemic episode, which he attributes to the low blood sugar level.     He is currently using NovoLog and has requested a refill of his pen needles. He has a glucagon kit at home for emergency use.    Key Antihyperglycemic Medications              insulin glargine (BASAGLAR KWIKPEN) 100 UNIT/ML injection pen (Taking) Inject 10 Units into the skin Takes in the morning    insulin aspart (NOVOLOG FLEXPEN) 100 UNIT/ML injection pen (Taking) Inject into the skin 3 times daily approximately 15 minutes prior to meals based on the sliding scale: if glucose is less than 130=0 units, 131-150=4 units, 151-200=8 units, 201-250=12 units, 251-300=16 units, 301-350=20 units, 351-400=24 units, 401 & higher=28 units. May add 1 additional unit for every 15 carbs.  Max daily dose=120 units.    Insulin Degludec (TRESIBA FLEXTOUCH) 100 UNIT/ML SOPN (Taking) Inject 30 Units into the skin daily    GVOKE HYPOPEN 2-PACK 1 MG/0.2ML SOAJ (Taking As Needed) Inject 1 mg into the skin as needed (as needed for severe hypoglycemia)            Physical Exam  Cardiovascular: Regular rate and rhythm, no murmurs, rubs, or gallops    Results  Labs   - Average blood sugar: 170   - Estimated A1c: 7.4   - Actual A1c: 6.9    Hemoglobin A1C   Date Value Ref Range Status   06/30/2025 6.9 % Final       Lab Results   Component Value Date     02/28/2024    K 4.1 02/28/2024    CL 98 (L) 02/28/2024    CO2 28 02/28/2024    BUN 11 02/28/2024    CREATININE 0.6 (L) 02/28/2024    GLUCOSE 138 (H) 02/28/2024    CALCIUM 9.0 02/28/2024    BILITOT 0.6 02/21/2024    ALKPHOS 87 02/21/2024    AST 11 (L) 02/21/2024    ALT 24 02/21/2024    LABGLOM >60 02/28/2024    GFRAA >60 09/19/2022    AGRATIO 1.2 09/19/2022    GLOB 3.4 07/16/2020       Lab Results   Component Value Date    ALBUMINUR 23.00 (H) 03/31/2025    LABCREAU 148.0 03/31/2025    ALBCREAT 155.4 (H)

## 2025-06-30 NOTE — PROGRESS NOTES
6/30/25 - Patient LM, I called him back and LM with my call-back # concerning  surgery @ [facility] on  7/7/25.  Please call the PAT Nurse for a phone assessment and surgery instructions.    .Surgery @ [facility] on 7/7/25 you will be called 7/3/25 with times    NOTHING TO EAT OR DRINK AFTER MIDNIGHT DAY OF SURGERY    1. Enter thru the hospital main entrance on day of surgery, check in at the Information Desk. If you arrive prior to 6:00am, enter thru the ER entrance.    2. Follow the directions as prescribed by the doctor for your procedure and medications.         Morning of surgery take:  Amlodipine, Bentyl, Famotidine & Gabapentin with sips of water         Stop vitamins, supplements and NSAIDS:  7/2/25    3. Check with your Doctor regarding stopping blood thinners and follow their instructions.    4. Do not smoke, vape or use chewing tobacco morning of surgery. Do not drink any alcoholic beverages 24 hours prior to surgery.       This includes NA Beer. No street drugs 7 days prior to surgery.    5. If you have dentures, contacts of glasses they will be removed before going to the OR; please bring a case.    6. Please bring picture ID, insurance card, paperwork from the doctor’s office (H & P, Consent, & card for implantable devices).    7. Take a shower with an antibacterial soap the night before surgery and the morning of surgery. Do not put anything on your skin      After your morning shower.    8. You will need a responsible adult to drive you home and check on you after surgery.

## 2025-07-02 ENCOUNTER — ANESTHESIA EVENT (OUTPATIENT)
Dept: ENDOSCOPY | Age: 55
End: 2025-07-02
Payer: COMMERCIAL

## 2025-07-02 NOTE — ANESTHESIA PRE PROCEDURE
Department of Anesthesiology  Preprocedure Note       Name:  Tomas La   Age:  54 y.o.  :  1970                                          MRN:  [id number]         Date:  2025      Surgeon: Surgeon(s):  Vitaliy Gardner MD    Procedure: Procedure(s):  ESOPHAGOGASTRODUODENOSCOPY DILATION BALLOON    Medications prior to admission:   Prior to Admission medications    Medication Sig Start Date End Date Taking? Authorizing Provider   insulin glargine (BASAGLAR KWIKPEN) 100 UNIT/ML injection pen Inject 10 Units into the skin Takes in the morning   Yes ProviderTodd MD   Probiotic Product (ALIGN DUALBIOTIC PO) Take by mouth   Yes ProviderTodd MD   Wheat Dextrin (BENEFIBER) TABS Take 4 g by mouth 3 times daily (with meals)   Yes Todd Mendosa MD   insulin aspart (NOVOLOG FLEXPEN) 100 UNIT/ML injection pen Inject into the skin 3 times daily approximately 15 minutes prior to meals based on the sliding scale: if glucose is less than 130=0 units, 131-150=4 units, 151-200=8 units, 201-250=12 units, 251-300=16 units, 301-350=20 units, 351-400=24 units, 401 & higher=28 units. May add 1 additional unit for every 15 carbs.  Max daily dose=120 units. 25   Mary Echols APRN - CNP   Insulin Degludec (TRESIBA FLEXTOUCH) 100 UNIT/ML SOPN Inject 30 Units into the skin daily 25   Mary Echols APRN - CNP   Insulin Pen Needle 31G X 5 MM MISC 1 each by Does not apply route 4 times daily 25   Mary Echols APRN - CNP   Continuous Glucose Sensor (DEXCOM G7 SENSOR) MISC USE AS DIRECTED - REPLACE EVERY 10 DAYS 25   Mary Echols APRN - CNP   GVOKE HYPOPEN 2-PACK 1 MG/0.2ML SOAJ Inject 1 mg into the skin as needed (as needed for severe hypoglycemia) 25   Mary Echols APRN - CNP   dicyclomine (BENTYL) 20 MG tablet Take 1 tablet by mouth in the morning and at bedtime 25   Vitaliy Gardner MD   fexofenadine (ALLEGRA) 180 MG tablet Take 1 tablet by mouth daily 6/10/25

## 2025-07-03 LAB
CALPROTECTIN, FECAL: 61 UG/G
FECAL PANCREATIC ELASTASE-1: 594 UG/G

## 2025-07-03 NOTE — PROGRESS NOTES
7/3/25 - .LM for patient: surgery @ [facility] on  7/7/25 @ 1100, arrival 0930.

## 2025-07-07 ENCOUNTER — RESULTS FOLLOW-UP (OUTPATIENT)
Dept: GASTROENTEROLOGY | Age: 55
End: 2025-07-07

## 2025-07-07 ENCOUNTER — HOSPITAL ENCOUNTER (OUTPATIENT)
Age: 55
Setting detail: OUTPATIENT SURGERY
Discharge: HOME | End: 2025-07-07
Attending: INTERNAL MEDICINE | Admitting: INTERNAL MEDICINE
Payer: COMMERCIAL

## 2025-07-07 ENCOUNTER — ANESTHESIA (OUTPATIENT)
Dept: ENDOSCOPY | Age: 55
End: 2025-07-07
Payer: COMMERCIAL

## 2025-07-07 VITALS
DIASTOLIC BLOOD PRESSURE: 68 MMHG | SYSTOLIC BLOOD PRESSURE: 118 MMHG | WEIGHT: 260 LBS | HEIGHT: 72 IN | TEMPERATURE: 98.6 F | HEART RATE: 66 BPM | OXYGEN SATURATION: 97 % | RESPIRATION RATE: 16 BRPM | BODY MASS INDEX: 35.21 KG/M2

## 2025-07-07 DIAGNOSIS — R13.10 DYSPHAGIA: ICD-10-CM

## 2025-07-07 LAB — GLUCOSE BLD-MCNC: 122 MG/DL (ref 74–99)

## 2025-07-07 PROCEDURE — 3609017700 HC EGD DILATION GASTRIC/DUODENAL STRICTURE: Performed by: INTERNAL MEDICINE

## 2025-07-07 PROCEDURE — 2709999900 HC NON-CHARGEABLE SUPPLY: Performed by: INTERNAL MEDICINE

## 2025-07-07 PROCEDURE — 88305 TISSUE EXAM BY PATHOLOGIST: CPT | Performed by: PATHOLOGY

## 2025-07-07 PROCEDURE — 43249 ESOPH EGD DILATION <30 MM: CPT | Performed by: INTERNAL MEDICINE

## 2025-07-07 PROCEDURE — 3700000001 HC ADD 15 MINUTES (ANESTHESIA): Performed by: INTERNAL MEDICINE

## 2025-07-07 PROCEDURE — 88342 IMHCHEM/IMCYTCHM 1ST ANTB: CPT | Performed by: PATHOLOGY

## 2025-07-07 PROCEDURE — 3700000000 HC ANESTHESIA ATTENDED CARE: Performed by: INTERNAL MEDICINE

## 2025-07-07 PROCEDURE — C1726 CATH, BAL DIL, NON-VASCULAR: HCPCS | Performed by: INTERNAL MEDICINE

## 2025-07-07 PROCEDURE — 43239 EGD BIOPSY SINGLE/MULTIPLE: CPT | Performed by: INTERNAL MEDICINE

## 2025-07-07 PROCEDURE — 82962 GLUCOSE BLOOD TEST: CPT

## 2025-07-07 PROCEDURE — 6360000002 HC RX W HCPCS: Performed by: NURSE ANESTHETIST, CERTIFIED REGISTERED

## 2025-07-07 PROCEDURE — 7100000011 HC PHASE II RECOVERY - ADDTL 15 MIN: Performed by: INTERNAL MEDICINE

## 2025-07-07 PROCEDURE — 2580000003 HC RX 258: Performed by: INTERNAL MEDICINE

## 2025-07-07 PROCEDURE — 7100000010 HC PHASE II RECOVERY - FIRST 15 MIN: Performed by: INTERNAL MEDICINE

## 2025-07-07 RX ORDER — SODIUM CHLORIDE 0.9 % (FLUSH) 0.9 %
5-40 SYRINGE (ML) INJECTION PRN
Status: DISCONTINUED | OUTPATIENT
Start: 2025-07-07 | End: 2025-07-07 | Stop reason: HOSPADM

## 2025-07-07 RX ORDER — SODIUM CHLORIDE 9 MG/ML
INJECTION, SOLUTION INTRAVENOUS PRN
Status: DISCONTINUED | OUTPATIENT
Start: 2025-07-07 | End: 2025-07-07 | Stop reason: HOSPADM

## 2025-07-07 RX ORDER — SODIUM CHLORIDE 0.9 % (FLUSH) 0.9 %
5-40 SYRINGE (ML) INJECTION EVERY 12 HOURS SCHEDULED
Status: DISCONTINUED | OUTPATIENT
Start: 2025-07-07 | End: 2025-07-07 | Stop reason: HOSPADM

## 2025-07-07 RX ORDER — OMEPRAZOLE 40 MG/1
40 CAPSULE, DELAYED RELEASE ORAL
Qty: 90 CAPSULE | Refills: 0 | Status: SHIPPED | OUTPATIENT
Start: 2025-07-07

## 2025-07-07 RX ORDER — MIDAZOLAM HYDROCHLORIDE 1 MG/ML
INJECTION, SOLUTION INTRAMUSCULAR; INTRAVENOUS
Status: DISCONTINUED | OUTPATIENT
Start: 2025-07-07 | End: 2025-07-07 | Stop reason: SDUPTHER

## 2025-07-07 RX ORDER — LIDOCAINE HYDROCHLORIDE 20 MG/ML
INJECTION, SOLUTION INTRAVENOUS
Status: DISCONTINUED | OUTPATIENT
Start: 2025-07-07 | End: 2025-07-07 | Stop reason: SDUPTHER

## 2025-07-07 RX ORDER — SODIUM CHLORIDE, SODIUM LACTATE, POTASSIUM CHLORIDE, CALCIUM CHLORIDE 600; 310; 30; 20 MG/100ML; MG/100ML; MG/100ML; MG/100ML
INJECTION, SOLUTION INTRAVENOUS CONTINUOUS
Status: DISCONTINUED | OUTPATIENT
Start: 2025-07-07 | End: 2025-07-07 | Stop reason: HOSPADM

## 2025-07-07 RX ORDER — PROPOFOL 10 MG/ML
INJECTION, EMULSION INTRAVENOUS
Status: DISCONTINUED | OUTPATIENT
Start: 2025-07-07 | End: 2025-07-07 | Stop reason: SDUPTHER

## 2025-07-07 RX ADMIN — MIDAZOLAM 1 MG: 1 INJECTION INTRAMUSCULAR; INTRAVENOUS at 12:52

## 2025-07-07 RX ADMIN — MIDAZOLAM 1 MG: 1 INJECTION INTRAMUSCULAR; INTRAVENOUS at 12:46

## 2025-07-07 RX ADMIN — SODIUM CHLORIDE, POTASSIUM CHLORIDE, SODIUM LACTATE AND CALCIUM CHLORIDE: 600; 310; 30; 20 INJECTION, SOLUTION INTRAVENOUS at 12:04

## 2025-07-07 RX ADMIN — PROPOFOL 250 MG: 10 INJECTION, EMULSION INTRAVENOUS at 12:44

## 2025-07-07 RX ADMIN — LIDOCAINE HYDROCHLORIDE 100 MG: 20 INJECTION, SOLUTION INTRAVENOUS at 12:44

## 2025-07-07 ASSESSMENT — PAIN - FUNCTIONAL ASSESSMENT
PAIN_FUNCTIONAL_ASSESSMENT: 0-10

## 2025-07-07 NOTE — DISCHARGE INSTRUCTIONS
[facility] [place], [place]  [phone]    Do not drive, work around machines or use equipment.  Do not drink any alcoholic beverages.  Do not smoke while alone.  Avoid making important decisions.  Plan to spend a quiet, relaxed evening @ home.  Resume normal activities as you begin to feel better.  Eat lightly for your first meal, then gradually increase your diet to what is normal for you.  In case of nausea, avoid food and drink only clear liquids.  Resume food as nausea ceases.  Notify your surgeon if you experience fever, chills, large amount of bleeding, difficulty breathing, persistent nausea and vomiting or any other disturbing problem.  Call for a follow-up appointment with your surgeon.   EGD        FOLLOW UP APPOINTMENT IN 1 WEEKS OR AS NEEDED.    REPEAT PROCEDURE per biopsy results    TEST ORDERED:biopsy    What to Expect at Home  Your Recovery:  The only discomfort after your EGD is generally limited to a mild soreness of the throat, which may last a day or two. Call your physician immediately if you have severe chest pain, shortness of breath or a temperature of 100 degrees or higher if taken orally.    How can you care for yourself at home?  Activity  Rest as much as you need to after you go home.  You should be able to go back to your usual activities the day after the test.  Diet  Follow your doctor's directions for eating after the test.  Drink plenty of fluids (unless your doctor has told you not to).  Medications  If you have a sore throat the day after the test, use an over-the-counter spray to numb your throat.  Your doctor will tell you if and when you can restart your medicines. He or she will also give you instructions about taking any new medicines.  If you take blood thinners, such as warfarin (Coumadin), clopidogrel (Plavix), or aspirin, be sure to talk to your doctor. He or she will tell you if and when to start taking those medicines again. Make sure that you

## 2025-07-07 NOTE — ANESTHESIA POSTPROCEDURE EVALUATION
Department of Anesthesiology  Postprocedure Note    Patient: Tomas La  MRN: [id number]  YOB: 1970  Date of evaluation: 7/7/2025    Procedure Summary       Date: 07/07/25 Room / Location: [place] [address] / [facility]    Anesthesia Start: 1240 Anesthesia Stop: 1304    Procedure: ESOPHAGOGASTRODUODENOSCOPY DILATION BALLOON using 15mm to 18mm balloon, up to 18mm; with biopsies Diagnosis:       Dysphagia      (Dysphagia [R13.10])    Surgeons: Vitaliy Gardner MD Responsible Provider: Tacho De Los Santos MD    Anesthesia Type: MAC ASA Status: 3            Anesthesia Type: No value filed.    Shilo Phase I: Shilo Score: 10    Shilo Phase II:      Anesthesia Post Evaluation    Patient location during evaluation: bedside  Patient participation: complete - patient participated  Level of consciousness: awake and alert  Pain score: 0  Airway patency: patent  Nausea & Vomiting: no nausea and no vomiting  Cardiovascular status: blood pressure returned to baseline and hemodynamically stable  Respiratory status: acceptable, room air and spontaneous ventilation  Hydration status: euvolemic  Pain management: adequate and satisfactory to patient    No notable events documented.

## 2025-07-07 NOTE — PROGRESS NOTES
1309 patient to room from [facility] a/o vss assessment wnl family at bedside,call light in reach,beverage of choice given  1350 vss assessment wnl,discharge instructions reviewed with patient and family voiced understanding,iv removed patient up to the side of the bed to get dressed  1355 family to go get the car  1357 volunteer called for transport  1359 patient to car

## 2025-07-07 NOTE — INTERVAL H&P NOTE
Update History & Physical    The patient's History and Physical of June 11, 2025 was reviewed with the patient and I examined the patient. There was no change. The surgical site was confirmed by the patient and me.     Plan: The risks, benefits, expected outcome, and alternative to the recommended procedure have been discussed with the patient. Patient understands and wants to proceed with the procedure.     Electronically signed by Vitaliy Gardner MD on 7/7/2025 at 10:07 AM

## 2025-07-08 ENCOUNTER — HOSPITAL ENCOUNTER (OUTPATIENT)
Dept: WOUND CARE | Age: 55
Discharge: HOME | End: 2025-07-08
Attending: NURSE PRACTITIONER
Payer: COMMERCIAL

## 2025-07-08 VITALS — TEMPERATURE: 97 F | SYSTOLIC BLOOD PRESSURE: 128 MMHG | DIASTOLIC BLOOD PRESSURE: 78 MMHG | HEART RATE: 71 BPM

## 2025-07-08 DIAGNOSIS — E11.621 DIABETIC ULCER OF RIGHT MIDFOOT ASSOCIATED WITH TYPE 2 DIABETES MELLITUS, WITH FAT LAYER EXPOSED (HCC): Primary | ICD-10-CM

## 2025-07-08 DIAGNOSIS — L97.412 DIABETIC ULCER OF RIGHT MIDFOOT ASSOCIATED WITH TYPE 2 DIABETES MELLITUS, WITH FAT LAYER EXPOSED (HCC): Primary | ICD-10-CM

## 2025-07-08 PROCEDURE — 11056 PARNG/CUTG B9 HYPRKR LES 2-4: CPT

## 2025-07-08 PROCEDURE — 11719 TRIM NAIL(S) ANY NUMBER: CPT

## 2025-07-08 PROCEDURE — 11056 PARNG/CUTG B9 HYPRKR LES 2-4: CPT | Performed by: NURSE PRACTITIONER

## 2025-07-08 PROCEDURE — 99211 OFF/OP EST MAY X REQ PHY/QHP: CPT

## 2025-07-08 PROCEDURE — 11719 TRIM NAIL(S) ANY NUMBER: CPT | Performed by: NURSE PRACTITIONER

## 2025-07-08 PROCEDURE — 99213 OFFICE O/P EST LOW 20 MIN: CPT | Performed by: NURSE PRACTITIONER

## 2025-07-08 RX ORDER — LIDOCAINE 50 MG/G
OINTMENT TOPICAL ONCE
OUTPATIENT
Start: 2025-07-08 | End: 2025-07-08

## 2025-07-08 RX ORDER — CLOBETASOL PROPIONATE 0.5 MG/G
OINTMENT TOPICAL ONCE
OUTPATIENT
Start: 2025-07-08 | End: 2025-07-08

## 2025-07-08 RX ORDER — GENTAMICIN SULFATE 1 MG/G
OINTMENT TOPICAL ONCE
OUTPATIENT
Start: 2025-07-08 | End: 2025-07-08

## 2025-07-08 RX ORDER — BETAMETHASONE DIPROPIONATE 0.5 MG/G
CREAM TOPICAL ONCE
OUTPATIENT
Start: 2025-07-08 | End: 2025-07-08

## 2025-07-08 RX ORDER — LIDOCAINE HYDROCHLORIDE 20 MG/ML
JELLY TOPICAL ONCE
OUTPATIENT
Start: 2025-07-08 | End: 2025-07-08

## 2025-07-08 RX ORDER — SILVER SULFADIAZINE 10 MG/G
CREAM TOPICAL ONCE
OUTPATIENT
Start: 2025-07-08 | End: 2025-07-08

## 2025-07-08 RX ORDER — BACITRACIN ZINC AND POLYMYXIN B SULFATE 500; 1000 [USP'U]/G; [USP'U]/G
OINTMENT TOPICAL ONCE
OUTPATIENT
Start: 2025-07-08 | End: 2025-07-08

## 2025-07-08 RX ORDER — GINSENG 100 MG
CAPSULE ORAL ONCE
OUTPATIENT
Start: 2025-07-08 | End: 2025-07-08

## 2025-07-08 RX ORDER — LIDOCAINE HYDROCHLORIDE 40 MG/ML
SOLUTION TOPICAL ONCE
OUTPATIENT
Start: 2025-07-08 | End: 2025-07-08

## 2025-07-08 RX ORDER — SODIUM CHLOR/HYPOCHLOROUS ACID 0.033 %
SOLUTION, IRRIGATION IRRIGATION ONCE
OUTPATIENT
Start: 2025-07-08 | End: 2025-07-08

## 2025-07-08 RX ORDER — NEOMYCIN/BACITRACIN/POLYMYXINB 3.5-400-5K
OINTMENT (GRAM) TOPICAL ONCE
OUTPATIENT
Start: 2025-07-08 | End: 2025-07-08

## 2025-07-08 RX ORDER — LIDOCAINE 40 MG/G
CREAM TOPICAL ONCE
OUTPATIENT
Start: 2025-07-08 | End: 2025-07-08

## 2025-07-08 RX ORDER — MUPIROCIN 2 %
OINTMENT (GRAM) TOPICAL ONCE
OUTPATIENT
Start: 2025-07-08 | End: 2025-07-08

## 2025-07-08 RX ORDER — TRIAMCINOLONE ACETONIDE 1 MG/G
OINTMENT TOPICAL ONCE
OUTPATIENT
Start: 2025-07-08 | End: 2025-07-08

## 2025-07-08 ASSESSMENT — PAIN SCALES - GENERAL: PAINLEVEL_OUTOF10: 0

## 2025-07-08 NOTE — PATIENT INSTRUCTIONS
PHYSICIAN ORDERS AND DISCHARGE INSTRUCTIONS  NOTE: Upon discharge from the Wound Center, you will receive a patient experience survey via E-mail. We would be grateful if you would take the time to fill this survey out.  Wound care order history:   GREGG's   Right       Left    Date    Vascular studies/Intervention: .     Cultures: .               Antibiotics: .               HbA1c:  .               Grafts:  .   Juxta Lites & Lymph Pumps:  Continuing wound care orders and information:              Residence: .              Continue home health care with:.    Your wound-care supplies will be provided by: .              Pharmacy: .  Wound cleansing:      Do not scrub or use excessive force.    Wash hands with soap and water before and after dressing changes.    Prior to applying a clean dressing, cleanse wound with normal saline,    wound cleanser, or mild soap and water.     Ask your physician or nurse before getting the wound(s) wet in the shower.  Daily Wound management:    Keep weight off wounds and reposition every 2 hours.    Avoid standing for long periods of time.    Evaluate legs to the level of the heart or above for 30 minutes 4-5 times a day and/or when sitting.       When taking antibiotics take entire prescription as ordered by MD do not stop taking until medicine is all gone.     Documentation:  Compression: .   Offloading: .   Toenail Trim: 07/08/25        Orders for this week (7/8/2025):  BLE feet  Paint toe nails with Betadine   Apply A&D to intact skin       Follow up with Rhea Garvey NP  In 3 months in the wound care center  Call [phone] for any questions or concerns.

## 2025-07-08 NOTE — PROGRESS NOTES
Wound Care Center Progress Visit      Tomas La  AGE: 54 y.o.   GENDER: male  : 1970  EPISODE DATE:  2025   Referred by: Ruma Osman FNP     Subjective:     CHIEF COMPLAINT  WOUND   Problem List Items Addressed This Visit          Endocrine    WD-Diabetic ulcer of right foot associated with type 2 diabetes mellitus, with fat layer exposed (HCC) - Primary    Relevant Orders    Initiate Outpatient Wound Care Protocol     Chief Complaint   Patient presents with    Wound Check        HISTORY of PRESENT ILLNESS      Tomas La is a 54 y.o. male who presents to the Wound Clinic for evaluation and treatment of routine diabetic foot assessment and care. He has a history on Chronic diabetic  ulcer(s) of the right foot, now healed as of 25. The patient has neuropathy and pre-ulcerative callus. The previous diabetic foot ulcers was treated for several months.  Advanced wound care modalities established with initiation of care at the wound clinic.The patient has significant underlying medical conditions as below. Ruma Osman FNP .    Living Situation  [x] Home [] SNF []  Assisted living  [] Other (ie rehab, etc.) [] Home Health  []  Transportation    Wound Pain Timing/Severity: none  Quality of pain: N/A  Severity of pain:  0 / 10   Modifying Factors: diabetes, chronic pressure, shear force, and obesity  Associated Signs/Symptoms: callus    25 The patient was examined for peripheral arterial disease, neuropathy, and any structural abnormalities that would place the patient at risk for ulceration. Discussed the importance of tight blood sugar control and the effects on their lower extremities. We discussed neuropathy and foot ulcerations in detail. I did explain the importance of daily monitoring of their feet and what to look for in terms of increased areas of pressure, cuts and abrasions. I did further explain appropriate shoes and wearing shoes regularly to prevent injury. Patient does

## 2025-07-08 NOTE — PLAN OF CARE
Problem: Wound:  Goal: Will show signs of wound healing; wound closure and no evidence of infection  Description: Will show signs of wound healing; wound closure and no evidence of infection  Outcome: Progressing   Patient tolerated treatment well.

## 2025-07-09 LAB — SURGICAL PATHOLOGY REPORT: NORMAL

## 2025-08-11 ENCOUNTER — HOSPITAL ENCOUNTER (OUTPATIENT)
Dept: WOUND CARE | Age: 55
Discharge: HOME | End: 2025-08-11
Attending: NURSE PRACTITIONER
Payer: COMMERCIAL

## 2025-08-11 VITALS — TEMPERATURE: 97.2 F | HEART RATE: 65 BPM | SYSTOLIC BLOOD PRESSURE: 119 MMHG | DIASTOLIC BLOOD PRESSURE: 74 MMHG

## 2025-08-11 DIAGNOSIS — E11.610 CHARCOT'S JOINT OF FOOT IN TYPE 2 DIABETES MELLITUS (HCC): ICD-10-CM

## 2025-08-11 DIAGNOSIS — E11.69 TYPE 2 DIABETES MELLITUS WITH DIABETIC FOOT DEFORMITY (HCC): ICD-10-CM

## 2025-08-11 DIAGNOSIS — B35.1 ONYCHOMYCOSIS OF MULTIPLE TOENAILS WITH TYPE 2 DIABETES MELLITUS (HCC): Primary | ICD-10-CM

## 2025-08-11 DIAGNOSIS — M21.969 TYPE 2 DIABETES MELLITUS WITH DIABETIC FOOT DEFORMITY (HCC): ICD-10-CM

## 2025-08-11 DIAGNOSIS — E11.69 ONYCHOMYCOSIS OF MULTIPLE TOENAILS WITH TYPE 2 DIABETES MELLITUS (HCC): Primary | ICD-10-CM

## 2025-08-11 DIAGNOSIS — L84 PRE-ULCERATIVE CALLUSES: ICD-10-CM

## 2025-08-11 PROCEDURE — 11719 TRIM NAIL(S) ANY NUMBER: CPT | Performed by: NURSE PRACTITIONER

## 2025-08-11 PROCEDURE — 11056 PARNG/CUTG B9 HYPRKR LES 2-4: CPT | Performed by: NURSE PRACTITIONER

## 2025-08-11 PROCEDURE — 11056 PARNG/CUTG B9 HYPRKR LES 2-4: CPT

## 2025-08-11 PROCEDURE — 11719 TRIM NAIL(S) ANY NUMBER: CPT

## 2025-08-11 RX ORDER — CICLOPIROX 80 MG/ML
SOLUTION TOPICAL
Qty: 6 ML | Refills: 1 | Status: SHIPPED | OUTPATIENT
Start: 2025-08-11

## 2025-08-11 ASSESSMENT — PAIN SCALES - GENERAL: PAINLEVEL_OUTOF10: 0

## 2025-08-22 PROBLEM — R47.1 DYSARTHRIA DUE TO ACUTE STROKE (HCC): Status: RESOLVED | Noted: 2023-09-12 | Resolved: 2025-08-22

## 2025-08-22 PROBLEM — Z86.73 HISTORY OF STROKE: Status: ACTIVE | Noted: 2023-06-24

## 2025-08-22 PROBLEM — I63.9 DYSARTHRIA DUE TO ACUTE STROKE (HCC): Status: RESOLVED | Noted: 2023-09-12 | Resolved: 2025-08-22

## 2025-08-25 ENCOUNTER — OFFICE VISIT (OUTPATIENT)
Dept: FAMILY MEDICINE CLINIC | Age: 55
End: 2025-08-25
Payer: COMMERCIAL

## 2025-08-25 VITALS
BODY MASS INDEX: 34.72 KG/M2 | DIASTOLIC BLOOD PRESSURE: 68 MMHG | SYSTOLIC BLOOD PRESSURE: 102 MMHG | OXYGEN SATURATION: 96 % | HEART RATE: 89 BPM | WEIGHT: 256 LBS

## 2025-08-25 DIAGNOSIS — Z86.73 HISTORY OF STROKE: ICD-10-CM

## 2025-08-25 DIAGNOSIS — Z79.4 TYPE 2 DIABETES MELLITUS WITH DIABETIC POLYNEUROPATHY, WITH LONG-TERM CURRENT USE OF INSULIN (HCC): Primary | ICD-10-CM

## 2025-08-25 DIAGNOSIS — E16.2 HYPOGLYCEMIA: ICD-10-CM

## 2025-08-25 DIAGNOSIS — Z97.8 USES SELF-APPLIED CONTINUOUS GLUCOSE MONITORING DEVICE: ICD-10-CM

## 2025-08-25 DIAGNOSIS — E11.42 TYPE 2 DIABETES MELLITUS WITH DIABETIC POLYNEUROPATHY, WITH LONG-TERM CURRENT USE OF INSULIN (HCC): Primary | ICD-10-CM

## 2025-08-25 PROCEDURE — 3074F SYST BP LT 130 MM HG: CPT

## 2025-08-25 PROCEDURE — 95251 CONT GLUC MNTR ANALYSIS I&R: CPT

## 2025-08-25 PROCEDURE — 3078F DIAST BP <80 MM HG: CPT

## 2025-08-25 PROCEDURE — 99214 OFFICE O/P EST MOD 30 MIN: CPT

## 2025-08-25 PROCEDURE — 3044F HG A1C LEVEL LT 7.0%: CPT

## 2025-08-25 RX ORDER — ROFLUMILAST 3 MG/G
AEROSOL, FOAM TOPICAL
COMMUNITY
Start: 2025-07-01

## 2025-08-25 RX ORDER — TAPINAROF 10 MG/1000MG
CREAM TOPICAL
COMMUNITY
Start: 2025-07-03

## 2025-08-25 RX ORDER — INSULIN DEGLUDEC 100 U/ML
20 INJECTION, SOLUTION SUBCUTANEOUS
Qty: 10 ADJUSTABLE DOSE PRE-FILLED PEN SYRINGE | Refills: 1 | Status: SHIPPED
Start: 2025-08-25

## 2025-08-25 RX ORDER — METHOCARBAMOL 500 MG/1
TABLET, FILM COATED ORAL
COMMUNITY
Start: 2025-08-12

## 2025-09-02 DIAGNOSIS — R09.82 POSTNASAL DRIP: ICD-10-CM

## 2025-09-02 RX ORDER — LORATADINE 10 MG/1
10 TABLET ORAL DAILY
Qty: 90 TABLET | Refills: 2 | Status: SHIPPED | OUTPATIENT
Start: 2025-09-02 | End: 2025-11-01

## (undated) DEVICE — ENDOSCOPIC KIT 1.1+ OP4 CA DE 2 GWN AAMI LEVEL 3

## (undated) DEVICE — DEFENDO AIR WATER SUCTION AND BIOPSY VALVE KIT FOR  OLYMPUS: Brand: DEFENDO AIR/WATER/SUCTION AND BIOPSY VALVE

## (undated) DEVICE — JELLY LUBRICATING 3 GM BACTERIOSTATIC

## (undated) DEVICE — FORCEPS BX L240CM JAW DIA2.8MM L CAP W/ NDL MIC MESH TOOTH

## (undated) DEVICE — SYRINGE INFL 60ML DISP ALLIANCE II

## (undated) DEVICE — LINER SUCT CANSTR 1500CC SEMI RIG W/ POR HYDROPHOBIC SHUT

## (undated) DEVICE — ENDOSCOPY KIT: Brand: MEDLINE INDUSTRIES, INC.

## (undated) DEVICE — KENDALL 500 SERIES DIAPHORETIC FOAM MONITORING ELECTRODE - TEAR DROP SHAPE ( 30/PK): Brand: KENDALL

## (undated) DEVICE — ACUSNARE POLYPECTOMY SNARE: Brand: ACUSNARE

## (undated) DEVICE — BW-412T DISP COMBO CLEANING BRUSH: Brand: SINGLE USE COMBINATION CLEANING BRUSH

## (undated) DEVICE — TUBING, SUCTION, 3/16" X 6', STRAIGHT: Brand: MEDLINE

## (undated) DEVICE — ESOPHAGEAL BALLOON DILATATION CATHETER: Brand: CRE FIXED WIRE

## (undated) DEVICE — LINE SAMP O2 6.5FT W/FEMALE CONN F/ADULT CAPNOLINE PLUS